# Patient Record
Sex: MALE | Race: WHITE | NOT HISPANIC OR LATINO | Employment: OTHER | URBAN - METROPOLITAN AREA
[De-identification: names, ages, dates, MRNs, and addresses within clinical notes are randomized per-mention and may not be internally consistent; named-entity substitution may affect disease eponyms.]

---

## 2018-08-15 ENCOUNTER — HOSPITAL ENCOUNTER (EMERGENCY)
Facility: HOSPITAL | Age: 53
Discharge: HOME | End: 2018-08-15
Attending: EMERGENCY MEDICINE
Payer: COMMERCIAL

## 2018-08-15 VITALS
BODY MASS INDEX: 21.86 KG/M2 | RESPIRATION RATE: 16 BRPM | HEART RATE: 68 BPM | DIASTOLIC BLOOD PRESSURE: 85 MMHG | WEIGHT: 139.55 LBS | OXYGEN SATURATION: 99 % | SYSTOLIC BLOOD PRESSURE: 139 MMHG | TEMPERATURE: 97.1 F

## 2018-08-15 DIAGNOSIS — L02.91 ABSCESS: Primary | ICD-10-CM

## 2018-08-15 PROCEDURE — 99283 EMERGENCY DEPT VISIT LOW MDM: CPT

## 2018-08-15 RX ORDER — SULFAMETHOXAZOLE AND TRIMETHOPRIM 800; 160 MG/1; MG/1
1 TABLET ORAL 2 TIMES DAILY
Qty: 14 TABLET | Refills: 0 | Status: SHIPPED | OUTPATIENT
Start: 2018-08-15 | End: 2018-08-22

## 2018-08-15 RX ORDER — IBUPROFEN 400 MG/1
200 TABLET ORAL ONCE
Status: DISCONTINUED | OUTPATIENT
Start: 2018-08-15 | End: 2018-08-15

## 2018-08-15 RX ORDER — CEPHALEXIN 250 MG/1
500 CAPSULE ORAL 4 TIMES DAILY
Qty: 56 CAPSULE | Refills: 0 | Status: SHIPPED | OUTPATIENT
Start: 2018-08-15 | End: 2018-08-22

## 2018-08-15 RX ORDER — IBUPROFEN 400 MG/1
400 TABLET ORAL ONCE
Status: COMPLETED | OUTPATIENT
Start: 2018-08-15 | End: 2018-08-15

## 2018-08-15 RX ORDER — IBUPROFEN 400 MG/1
TABLET ORAL
Status: COMPLETED
Start: 2018-08-15 | End: 2018-08-15

## 2018-08-15 RX ADMIN — IBUPROFEN 400 MG: 400 TABLET ORAL at 09:57

## 2018-08-15 NOTE — DISCHARGE INSTRUCTIONS
Abscess   WHAT YOU NEED TO KNOW:   A warm compress may help your abscess drain  Your healthcare provider may make a cut in the abscess so it can drain  You may need surgery to remove an abscess that is on your hands or buttocks  DISCHARGE INSTRUCTIONS:   Return to the emergency department if:   · The area around your abscess becomes very painful, warm, or has red streaks  · You have a fever and chills  · Your heart is beating faster than usual      · You feel faint or confused  Contact your healthcare provider if:   · Your abscess gets bigger or does not get better  · Your abscess returns  · You have questions or concerns about your condition or care  Medicines: You may  need any of the following:  · Antibiotics  help treat a bacterial infection  · Acetaminophen  decreases pain and fever  It is available without a doctor's order  Ask how much to take and how often to take it  Follow directions  Acetaminophen can cause liver damage if not taken correctly  · NSAIDs , such as ibuprofen, help decrease swelling, pain, and fever  This medicine is available with or without a doctor's order  NSAIDs can cause stomach bleeding or kidney problems in certain people  If you take blood thinner medicine, always ask your healthcare provider if NSAIDs are safe for you  Always read the medicine label and follow directions  · Take your medicine as directed  Contact your healthcare provider if you think your medicine is not helping or if you have side effects  Tell him or her if you are allergic to any medicine  Keep a list of the medicines, vitamins, and herbs you take  Include the amounts, and when and why you take them  Bring the list or the pill bottles to follow-up visits  Carry your medicine list with you in case of an emergency  Self-care:   · Apply a warm compress to your abscess  This will help it open and drain  Wet a washcloth in warm, but not hot, water  Apply the compress for 10 minutes  Repeat this 4 times each day  Do not  press on an abscess or try to open it with a needle  You may push the bacteria deeper or into your blood  · Do not share your clothes, towels, or sheets with anyone  This can spread the infection to others  · Wash your hands often  This can help prevent the spread of germs  Use soap and water or an alcohol-based hand rub  Care for your wound after it is drained:   · Care for your wound as directed  If your healthcare provider says it is okay, carefully remove the bandage and gauze packing  You may need to soak the gauze to get it out of your wound  Clean your wound and the area around it as directed  Dry the area and put on new, clean bandages  Change your bandages when they get wet or dirty  · Ask your healthcare provider how to change the gauze in your wound  Keep track of how many pieces of gauze are placed inside the wound  Do not put too much packing in the wound  Do not pack the gauze too tightly in your wound  Follow up with your healthcare provider in 1 to 3 days: You may need to have your packing removed or your bandage changed  Write down your questions so you remember to ask them during your visits  © 2017 [employer]  Information is for End User's use only and may not be sold, redistributed or otherwise used for commercial purposes  All illustrations and images included in CareNotes® are the copyrighted property of [employer] , [employer]  or [employer]  The above information is an  only  It is not intended as medical advice for individual conditions or treatments  Talk to your doctor, nurse or pharmacist before following any medical regimen to see if it is safe and effective for you

## 2018-08-15 NOTE — ED PROVIDER NOTES
History  Chief Complaint   Patient presents with    Abscess     rt back of neck     49-year-old gentleman presents with complaint of an abscess to the back of his neck  He reports he has an ongoing history of this occurring  He denies any fevers, chills, GI symptoms, or any other constitutional symptoms  Denies any history of diabetes or immunosuppression  Pain is localized to the area of the infection  None       History reviewed  No pertinent past medical history  Past Surgical History:   Procedure Laterality Date    HERNIA REPAIR      SKIN GRAFT         History reviewed  No pertinent family history  I have reviewed and agree with the history as documented  Social History   Substance Use Topics    Smoking status: Current Every Day Smoker     Packs/day: 2 00    Smokeless tobacco: Never Used    Alcohol use Yes      Comment: socially        Review of Systems   Constitutional: Negative for chills, fatigue and fever  HENT: Negative for tinnitus and trouble swallowing  Respiratory: Negative for shortness of breath  Cardiovascular: Negative for chest pain  Gastrointestinal: Negative for abdominal pain, nausea and vomiting  Musculoskeletal: Positive for neck pain  Negative for arthralgias, myalgias and neck stiffness  Hematological: Negative for adenopathy  Does not bruise/bleed easily  Physical Exam  Physical Exam   Constitutional: He is oriented to person, place, and time  He appears well-developed and well-nourished  No distress  HENT:   Head: Normocephalic and atraumatic  Eyes: Pupils are equal, round, and reactive to light  Neck: Neck supple  Cardiovascular: Normal rate, regular rhythm and normal heart sounds  Pulmonary/Chest: No respiratory distress  Neurological: He is alert and oriented to person, place, and time  Skin: Skin is warm and dry  Capillary refill takes less than 2 seconds  He is not diaphoretic          Patient noted to have multiple small areas of erythema, folliculitis, tiny abscesses  The most prominent abscess being the right occipital neck area which is already begun draining  There is mild surrounding erythema and no other signs of infection  Nursing note and vitals reviewed  Vital Signs  ED Triage Vitals [08/15/18 0853]   Temperature Pulse Respirations Blood Pressure SpO2   (!) 97 1 °F (36 2 °C) 68 16 139/85 99 %      Temp Source Heart Rate Source Patient Position - Orthostatic VS BP Location FiO2 (%)   Temporal Monitor Sitting Left arm --      Pain Score       --           Vitals:    08/15/18 0853   BP: 139/85   Pulse: 68   Patient Position - Orthostatic VS: Sitting       Visual Acuity      ED Medications  Medications - No data to display    Diagnostic Studies  Results Reviewed     None                 No orders to display              Procedures  Procedures       Phone Contacts  ED Phone Contact    ED Course                               [facility]  CritCbernice Time    Disposition  Final diagnoses:   None     ED Disposition     None      Follow-up Information    None         Patient's Medications    No medications on file     No discharge procedures on file      ED Provider  Electronically Signed by           Tavo Londono DO  08/15/18 0578

## 2018-08-20 ENCOUNTER — TELEPHONE (OUTPATIENT)
Dept: FAMILY MEDICINE CLINIC | Facility: CLINIC | Age: 53
End: 2018-08-20

## 2018-08-20 DIAGNOSIS — L02.91 ABSCESS: ICD-10-CM

## 2018-08-20 DIAGNOSIS — N40.0 BENIGN PROSTATIC HYPERPLASIA WITHOUT LOWER URINARY TRACT SYMPTOMS: Primary | ICD-10-CM

## 2018-08-20 RX ORDER — PANTOPRAZOLE SODIUM 40 MG/1
40 TABLET, DELAYED RELEASE ORAL DAILY
Qty: 30 TABLET | Refills: 2 | Status: SHIPPED | OUTPATIENT
Start: 2018-08-20 | End: 2018-09-20 | Stop reason: SDUPTHER

## 2018-08-20 RX ORDER — PANTOPRAZOLE SODIUM 40 MG/1
40 TABLET, DELAYED RELEASE ORAL DAILY
Refills: 2 | COMMUNITY
Start: 2018-06-06 | End: 2018-08-20 | Stop reason: SDUPTHER

## 2018-08-20 RX ORDER — FLUTICASONE PROPIONATE 50 MCG
SPRAY, SUSPENSION (ML) NASAL EVERY 24 HOURS
COMMUNITY
Start: 2018-04-19 | End: 2018-10-12 | Stop reason: SDUPTHER

## 2018-08-20 RX ORDER — HYDROXYZINE HYDROCHLORIDE 25 MG/1
TABLET, FILM COATED ORAL
COMMUNITY
Start: 2018-04-19 | End: 2018-10-25 | Stop reason: SDUPTHER

## 2018-08-20 RX ORDER — ALPRAZOLAM 0.5 MG/1
TABLET ORAL
COMMUNITY
Start: 2018-04-19 | End: 2018-09-27

## 2018-08-20 RX ORDER — QUETIAPINE FUMARATE 200 MG/1
TABLET, FILM COATED ORAL
COMMUNITY
Start: 2014-04-01 | End: 2018-09-27

## 2018-08-20 RX ORDER — TAMSULOSIN HYDROCHLORIDE 0.4 MG/1
CAPSULE ORAL
COMMUNITY
Start: 2018-04-19 | End: 2018-08-20 | Stop reason: SDUPTHER

## 2018-08-20 RX ORDER — BUPROPION HYDROCHLORIDE 75 MG/1
75 TABLET ORAL 2 TIMES DAILY
Refills: 1 | COMMUNITY
Start: 2018-05-14 | End: 2018-10-25

## 2018-08-20 RX ORDER — ALBUTEROL SULFATE 90 UG/1
AEROSOL, METERED RESPIRATORY (INHALATION)
COMMUNITY
Start: 2018-04-19 | End: 2018-09-27 | Stop reason: SDUPTHER

## 2018-08-20 RX ORDER — CEPHALEXIN 500 MG/1
500 CAPSULE ORAL 4 TIMES DAILY
Refills: 0 | COMMUNITY
Start: 2018-08-16 | End: 2018-09-27

## 2018-08-20 RX ORDER — LORATADINE 10 MG/1
TABLET ORAL EVERY 24 HOURS
COMMUNITY
Start: 2018-04-19 | End: 2018-09-27

## 2018-08-20 RX ORDER — ATORVASTATIN CALCIUM 10 MG/1
10 TABLET, FILM COATED ORAL DAILY
Refills: 2 | COMMUNITY
Start: 2018-06-11 | End: 2018-08-20 | Stop reason: SDUPTHER

## 2018-08-20 RX ORDER — ATORVASTATIN CALCIUM 10 MG/1
10 TABLET, FILM COATED ORAL DAILY
Qty: 30 TABLET | Refills: 2 | Status: SHIPPED | OUTPATIENT
Start: 2018-08-20 | End: 2018-09-20 | Stop reason: SDUPTHER

## 2018-08-20 RX ORDER — BUPROPION HYDROCHLORIDE 100 MG/1
TABLET ORAL
COMMUNITY
Start: 2014-03-06 | End: 2018-10-25

## 2018-08-20 RX ORDER — TAMSULOSIN HYDROCHLORIDE 0.4 MG/1
0.4 CAPSULE ORAL
Qty: 30 CAPSULE | Refills: 2 | Status: SHIPPED | OUTPATIENT
Start: 2018-08-20 | End: 2018-09-20 | Stop reason: SDUPTHER

## 2018-08-20 RX ORDER — DOCUSATE SODIUM 100 MG/1
1 CAPSULE, LIQUID FILLED ORAL 2 TIMES DAILY PRN
COMMUNITY
End: 2018-11-27 | Stop reason: HOSPADM

## 2018-08-20 RX ORDER — PROPRANOLOL HYDROCHLORIDE 10 MG/1
TABLET ORAL
COMMUNITY
Start: 2018-05-14 | End: 2020-10-16 | Stop reason: HOSPADM

## 2018-08-20 NOTE — TELEPHONE ENCOUNTER
Patient requesting refills on   Flomax  Lipitor  Hydralazine  Prilosec  Pharmacy on file correct pt has an appt on 9/27/18

## 2018-08-27 ENCOUNTER — TELEPHONE (OUTPATIENT)
Dept: FAMILY MEDICINE CLINIC | Facility: CLINIC | Age: 53
End: 2018-08-27

## 2018-08-27 NOTE — TELEPHONE ENCOUNTER
I attempted to call Silvia Landa as he happens to be Abbie Gauze  and my patient  I wanted to deliver my condolences regarding his recent loss and Paul Glass passing away on Friday night  No answer  I left a voice message  I Will give another call later

## 2018-09-20 DIAGNOSIS — N40.0 BENIGN PROSTATIC HYPERPLASIA WITHOUT LOWER URINARY TRACT SYMPTOMS: ICD-10-CM

## 2018-09-20 RX ORDER — TAMSULOSIN HYDROCHLORIDE 0.4 MG/1
0.4 CAPSULE ORAL
Qty: 90 CAPSULE | Refills: 0 | Status: SHIPPED | OUTPATIENT
Start: 2018-09-20 | End: 2018-09-27

## 2018-09-20 RX ORDER — PANTOPRAZOLE SODIUM 40 MG/1
40 TABLET, DELAYED RELEASE ORAL DAILY
Qty: 90 TABLET | Refills: 0 | Status: SHIPPED | OUTPATIENT
Start: 2018-09-20 | End: 2018-10-25

## 2018-09-20 RX ORDER — ATORVASTATIN CALCIUM 10 MG/1
10 TABLET, FILM COATED ORAL DAILY
Qty: 90 TABLET | Refills: 0 | Status: SHIPPED | OUTPATIENT
Start: 2018-09-20 | End: 2018-10-25 | Stop reason: SDUPTHER

## 2018-09-27 ENCOUNTER — OFFICE VISIT (OUTPATIENT)
Dept: FAMILY MEDICINE CLINIC | Facility: CLINIC | Age: 53
End: 2018-09-27
Payer: COMMERCIAL

## 2018-09-27 VITALS
WEIGHT: 146 LBS | BODY MASS INDEX: 23.46 KG/M2 | DIASTOLIC BLOOD PRESSURE: 80 MMHG | SYSTOLIC BLOOD PRESSURE: 120 MMHG | TEMPERATURE: 97.2 F | RESPIRATION RATE: 16 BRPM | OXYGEN SATURATION: 98 % | HEIGHT: 66 IN | HEART RATE: 92 BPM

## 2018-09-27 DIAGNOSIS — E78.5 HYPERLIPIDEMIA, UNSPECIFIED HYPERLIPIDEMIA TYPE: ICD-10-CM

## 2018-09-27 DIAGNOSIS — R06.02 SHORTNESS OF BREATH: ICD-10-CM

## 2018-09-27 DIAGNOSIS — Z09 FOLLOW UP: Primary | ICD-10-CM

## 2018-09-27 PROBLEM — F41.9 ANXIETY: Status: ACTIVE | Noted: 2018-09-27

## 2018-09-27 PROBLEM — J44.9 COPD (CHRONIC OBSTRUCTIVE PULMONARY DISEASE) (HCC): Status: ACTIVE | Noted: 2018-09-27

## 2018-09-27 PROBLEM — N40.0 BPH (BENIGN PROSTATIC HYPERPLASIA): Status: ACTIVE | Noted: 2018-09-27

## 2018-09-27 PROBLEM — F17.200 TOBACCO DEPENDENCE SYNDROME: Status: ACTIVE | Noted: 2018-09-27

## 2018-09-27 PROBLEM — K21.9 GASTROESOPHAGEAL REFLUX DISEASE: Status: ACTIVE | Noted: 2018-09-27

## 2018-09-27 PROCEDURE — 99214 OFFICE O/P EST MOD 30 MIN: CPT | Performed by: FAMILY MEDICINE

## 2018-09-27 RX ORDER — TAMSULOSIN HYDROCHLORIDE 0.4 MG/1
0.4 CAPSULE ORAL
Refills: 0
Start: 2018-09-27 | End: 2018-10-25 | Stop reason: SDUPTHER

## 2018-09-27 RX ORDER — ALBUTEROL SULFATE 90 UG/1
1 AEROSOL, METERED RESPIRATORY (INHALATION) EVERY 6 HOURS PRN
Qty: 1 INHALER | Refills: 2 | Status: SHIPPED | OUTPATIENT
Start: 2018-09-27 | End: 2018-12-24 | Stop reason: SDUPTHER

## 2018-09-27 RX ORDER — LORATADINE 10 MG/1
10 TABLET ORAL DAILY
Qty: 30 TABLET | Refills: 2 | Status: SHIPPED | OUTPATIENT
Start: 2018-09-27 | End: 2018-10-25

## 2018-09-27 RX ORDER — OXYCODONE HYDROCHLORIDE AND ACETAMINOPHEN 5; 325 MG/1; MG/1
TABLET ORAL
Refills: 0 | COMMUNITY
Start: 2018-09-21 | End: 2018-11-26

## 2018-09-27 NOTE — PROGRESS NOTES
Assessment/Plan:    COPD (chronic obstructive pulmonary disease) (HCC)  Symptoms are currently uncontrolled at this time  As he reports increased use of his albuterol  Counseled again about smoking cessation  Monitor albuterol use to report back at follow up  Patient aware that increased albuterol use indicates poor control and may need further medication adjustment  BPH (benign prostatic hyperplasia)  Continue Flomax    Anxiety  He is scheduled for follow up with Psychiatry on October 2nd  No HI/SI  Well aware that he needs to call 911 or go to ER if he has any thoughts to hurt himself or anyone  Gastroesophageal reflux disease  Continue Protonix    Hyperlipemia  Continue statin and recheck lipid panel    Shortness of breath  Apparently he has been using his inhaler more frequently  However he has good breath sounds and he is not wheezing  Last use of albuterol inhaler was few days ago  Due to his history of tobacco abuse and hyperlipidemia, will need to rule out coronary artery disease as source of his shortness of breath  Will send for stress test    Tobacco dependence syndrome  Discussed tobacco cessation however patient is not ready to quit at this time  Discussed the effects of smoking on CVS, skin and lungs  Advised the patient try to cut back prior to follow up visit  Diagnoses and all orders for this visit:    Follow up  -     tamsulosin (FLOMAX) 0 4 mg; Take 1 capsule (0 4 mg total) by mouth daily with dinner  -     albuterol (PROAIR HFA) 90 mcg/act inhaler; Inhale 1 puff every 6 (six) hours as needed for wheezing  -     loratadine (CLARITIN) 10 mg tablet; Take 1 tablet (10 mg total) by mouth daily    Shortness of breath  -     Echo stress test w contrast if indicated; Future    Hyperlipidemia, unspecified hyperlipidemia type  -     CBC and differential; Future  -     Comprehensive metabolic panel; Future  -     Lipid panel;  Future    Other orders  -     oxyCODONE-acetaminophen (PERCOCET) 5-325 mg per tablet; TK 1-2 TS PO Q 4-6 H PRN  NOT MORE THAN 4 PILLS PER DAY          Subjective:      Patient ID: Lelo Jones is a 52 y o  male  ARMAND Zhang is a pleasant but unfortunate 78-year-old male whose girlfriend, Harika Escalante, passed away about a month ago from cardiac arrest   I have already spoken to Cathy Richter in person but I gave him my condolences today again  He does not offer any complaints  The he is seeing a therapist and going to group which he thinks is helping a lot with his grief  Denies anyone side of suicidal ideations  He still lives with Karen's brother  The following portions of the patient's history were reviewed and updated as appropriate: allergies, current medications, past family history, past medical history, past social history, past surgical history and problem list     Review of Systems   Constitutional: Negative for chills, fatigue and fever  HENT: Negative for ear discharge, sneezing and sore throat  Eyes: Negative for pain and visual disturbance  Respiratory: Positive for shortness of breath  Negative for cough and chest tightness  Cardiovascular: Negative for chest pain and palpitations  Gastrointestinal: Negative for abdominal distention, abdominal pain, blood in stool, diarrhea, nausea and vomiting  Genitourinary: Negative for difficulty urinating, dysuria and flank pain  Musculoskeletal: Negative for arthralgias and joint swelling  Skin: Negative for pallor and rash  Neurological: Negative for dizziness, syncope and headaches  Hematological: Negative for adenopathy  Psychiatric/Behavioral: Negative for agitation and confusion           Objective:      /80 (BP Location: Left arm, Patient Position: Sitting, Cuff Size: Adult)   Pulse 92   Temp (!) 97 2 °F (36 2 °C) (Tympanic)   Resp 16   Ht 5' 6" (1 676 m)   Wt 66 2 kg (146 lb)   SpO2 98%   BMI 23 57 kg/m²          Physical Exam   Constitutional: He is oriented to person, place, and time  He appears well-developed and well-nourished  HENT:   Head: Normocephalic and atraumatic  Mouth/Throat: Oropharynx is clear and moist  No oropharyngeal exudate  Eyes: Pupils are equal, round, and reactive to light  Right eye exhibits no discharge  Right pupil is reactive  Left pupil is reactive  Neck: Normal range of motion  Neck supple  No tracheal deviation present  No thyromegaly present  Cardiovascular: Normal rate, regular rhythm and normal heart sounds  Exam reveals no friction rub  No murmur heard  Pulmonary/Chest: Effort normal  No respiratory distress  He has decreased breath sounds  He has no wheezes  He has no rales  He exhibits no tenderness  Abdominal: He exhibits no distension  There is no tenderness  Musculoskeletal: Normal range of motion  He exhibits no deformity  Neurological: He is alert and oriented to person, place, and time  Skin: Skin is warm and dry  No rash noted  No erythema  Vitals reviewed

## 2018-09-27 NOTE — ASSESSMENT & PLAN NOTE
Apparently he has been using his inhaler more frequently  However he has good breath sounds and he is not wheezing  Last use of albuterol inhaler was few days ago  Due to his history of tobacco abuse and hyperlipidemia, will need to rule out coronary artery disease as source of his shortness of breath    Will send for stress test

## 2018-09-27 NOTE — ASSESSMENT & PLAN NOTE
Symptoms are currently uncontrolled at this time  As he reports increased use of his albuterol  Counseled again about smoking cessation  Monitor albuterol use to report back at follow up  Patient aware that increased albuterol use indicates poor control and may need further medication adjustment

## 2018-09-27 NOTE — ASSESSMENT & PLAN NOTE
Discussed tobacco cessation however patient is not ready to quit at this time  Discussed the effects of smoking on CVS, skin and lungs  Advised the patient try to cut back prior to follow up visit

## 2018-09-27 NOTE — ASSESSMENT & PLAN NOTE
He is scheduled for follow up with Psychiatry on October 2nd  No HI/SI  Well aware that he needs to call 911 or go to ER if he has any thoughts to hurt himself or anyone

## 2018-10-12 DIAGNOSIS — J30.2 SEASONAL ALLERGIES: Primary | ICD-10-CM

## 2018-10-12 RX ORDER — FLUTICASONE PROPIONATE 50 MCG
SPRAY, SUSPENSION (ML) NASAL
Qty: 1 BOTTLE | Refills: 0 | Status: SHIPPED | OUTPATIENT
Start: 2018-10-12 | End: 2018-10-25 | Stop reason: SDUPTHER

## 2018-10-25 ENCOUNTER — OFFICE VISIT (OUTPATIENT)
Dept: FAMILY MEDICINE CLINIC | Facility: CLINIC | Age: 53
End: 2018-10-25
Payer: COMMERCIAL

## 2018-10-25 VITALS
WEIGHT: 148 LBS | BODY MASS INDEX: 23.78 KG/M2 | HEIGHT: 66 IN | DIASTOLIC BLOOD PRESSURE: 86 MMHG | RESPIRATION RATE: 20 BRPM | SYSTOLIC BLOOD PRESSURE: 132 MMHG | HEART RATE: 81 BPM | OXYGEN SATURATION: 99 % | TEMPERATURE: 96.9 F

## 2018-10-25 DIAGNOSIS — F41.9 ANXIETY: ICD-10-CM

## 2018-10-25 DIAGNOSIS — R05.9 COUGH: ICD-10-CM

## 2018-10-25 DIAGNOSIS — K21.9 GASTROESOPHAGEAL REFLUX DISEASE WITHOUT ESOPHAGITIS: ICD-10-CM

## 2018-10-25 DIAGNOSIS — J30.2 SEASONAL ALLERGIES: ICD-10-CM

## 2018-10-25 DIAGNOSIS — N40.0 BENIGN PROSTATIC HYPERPLASIA WITHOUT LOWER URINARY TRACT SYMPTOMS: ICD-10-CM

## 2018-10-25 DIAGNOSIS — Z09 FOLLOW UP: ICD-10-CM

## 2018-10-25 DIAGNOSIS — Z23 ENCOUNTER FOR IMMUNIZATION: Primary | ICD-10-CM

## 2018-10-25 PROBLEM — R09.82 POSTNASAL DRIP: Status: ACTIVE | Noted: 2018-10-25

## 2018-10-25 PROBLEM — J06.9 UPPER RESPIRATORY INFECTION: Status: ACTIVE | Noted: 2018-10-25

## 2018-10-25 PROCEDURE — 99213 OFFICE O/P EST LOW 20 MIN: CPT | Performed by: FAMILY MEDICINE

## 2018-10-25 PROCEDURE — 90471 IMMUNIZATION ADMIN: CPT | Performed by: FAMILY MEDICINE

## 2018-10-25 PROCEDURE — 90682 RIV4 VACC RECOMBINANT DNA IM: CPT | Performed by: FAMILY MEDICINE

## 2018-10-25 RX ORDER — TAMSULOSIN HYDROCHLORIDE 0.4 MG/1
0.4 CAPSULE ORAL
Qty: 90 CAPSULE | Refills: 0 | Status: SHIPPED | OUTPATIENT
Start: 2018-10-25 | End: 2019-01-18 | Stop reason: SDUPTHER

## 2018-10-25 RX ORDER — FLUTICASONE PROPIONATE 50 MCG
1 SPRAY, SUSPENSION (ML) NASAL DAILY
Qty: 1 BOTTLE | Refills: 2 | Status: SHIPPED | OUTPATIENT
Start: 2018-10-25 | End: 2019-02-14 | Stop reason: SDUPTHER

## 2018-10-25 RX ORDER — TRAZODONE HYDROCHLORIDE 50 MG/1
25 TABLET ORAL
Refills: 0 | Status: ON HOLD
Start: 2018-10-25 | End: 2020-10-16 | Stop reason: SDUPTHER

## 2018-10-25 RX ORDER — GUAIFENESIN 100 MG/5ML
200 SYRUP ORAL 3 TIMES DAILY PRN
Qty: 120 ML | Refills: 0 | Status: SHIPPED | OUTPATIENT
Start: 2018-10-25 | End: 2018-11-04

## 2018-10-25 RX ORDER — CETIRIZINE HYDROCHLORIDE 10 MG/1
10 TABLET, CHEWABLE ORAL DAILY
Qty: 90 TABLET | Refills: 0 | Status: SHIPPED | OUTPATIENT
Start: 2018-10-25 | End: 2018-11-28 | Stop reason: SDUPTHER

## 2018-10-25 RX ORDER — ALPRAZOLAM 0.5 MG/1
0.5 TABLET ORAL DAILY
Refills: 0
Start: 2018-10-25 | End: 2019-09-24 | Stop reason: DRUGHIGH

## 2018-10-25 RX ORDER — HYDROXYZINE HYDROCHLORIDE 25 MG/1
25 TABLET, FILM COATED ORAL
Qty: 90 TABLET | Refills: 0 | Status: SHIPPED | OUTPATIENT
Start: 2018-10-25 | End: 2019-01-18 | Stop reason: SDUPTHER

## 2018-10-25 RX ORDER — ATORVASTATIN CALCIUM 10 MG/1
10 TABLET, FILM COATED ORAL DAILY
Qty: 90 TABLET | Refills: 0 | Status: SHIPPED | OUTPATIENT
Start: 2018-10-25 | End: 2019-01-18 | Stop reason: SDUPTHER

## 2018-10-25 RX ORDER — RANITIDINE 150 MG/1
150 CAPSULE ORAL 2 TIMES DAILY
Qty: 180 CAPSULE | Refills: 0 | Status: SHIPPED | OUTPATIENT
Start: 2018-10-25 | End: 2019-01-18 | Stop reason: SDUPTHER

## 2018-10-25 NOTE — ASSESSMENT & PLAN NOTE
Symptoms are currently controlled at this time  Avoid exposure to tobacco smoke, polluted air and other known COPD triggers  Monitor albuterol use to report back at follow up  Patient aware that increased albuterol use indicates poor control and may need further medication adjustment

## 2018-10-25 NOTE — PROGRESS NOTES
Assessment/Plan:    Upper respiratory infection  Discussed that symptoms are likely cause by virus  Discussed importance of increasing fluid intake and getting plenty of rest   Discussed supportive care with use of OTC cough drops and OTC cough syrup  Symptoms may improve with home humidifier use or exposure to steam from hot shower  Sinus irrigation and warm saltwater gargles may also provide relief  Educated on hand hygiene to prevent spread  Minimize prolonged close contact others while ill  Continue to monitor symptoms closely and call the office if symptoms worsen or do not improve  Gastroesophageal reflux disease  Reviewed the causes of heartburn  Discussed importance of diet and lifestyle modifications to control GERD symptoms  Avoid things which worsen heartburn (ex:  caffeine, tomato based products, spicy foods, tobacco, alcohol, obesity, tight fitting clothing )  Discussed importance of eating small, frequent meals instead of large meals  Elevate head of the bed and do not lay down 2-3 hours following a meal   He has more than 5 year history of GERD  Still symptomatic despite of optimal treatment  He does qualify for EGD to rule out Naranjo's esophagitis  I will refer him to Gastroenterology  He might as well qualify for colonoscopy as he does not recall exactly when his last one was and I do not see any results in the system      COPD (chronic obstructive pulmonary disease) (HCC)  Symptoms are currently controlled at this time  Avoid exposure to tobacco smoke, polluted air and other known COPD triggers  Monitor albuterol use to report back at follow up  Patient aware that increased albuterol use indicates poor control and may need further medication adjustment  Anxiety  He is following up with Psychiatry     Denies any homicidal or suicidal ideation    Encounter for immunization  Will give flu shot today    Shortness of breath  Awaiting for stress test to be scheduled    Postnasal drip  I will switch his loratadine to Zyrtec as he states is not helping much  Continue Flonase  Unfortunately other nasal inhalers will not be covered by his insurance       Diagnoses and all orders for this visit:    Encounter for immunization  -     influenza vaccine, 8853-8162, quadrivalent, recombinant, PF, 0 5 mL, for patients 18 yr+ (FLUBLOK)    Benign prostatic hyperplasia without lower urinary tract symptoms  -     atorvastatin (LIPITOR) 10 mg tablet; Take 1 tablet (10 mg total) by mouth daily    Follow up  -     tamsulosin (FLOMAX) 0 4 mg; Take 1 capsule (0 4 mg total) by mouth daily with dinner    Seasonal allergies  -     hydrOXYzine HCL (ATARAX) 25 mg tablet; Take 1 tablet (25 mg total) by mouth daily at bedtime  -     cetirizine (ZyrTEC) 10 MG chewable tablet; Chew 1 tablet (10 mg total) daily  -     fluticasone (FLONASE) 50 mcg/act nasal spray; 1 spray into each nostril daily    Gastroesophageal reflux disease without esophagitis  -     ranitidine (ZANTAC) 150 MG capsule; Take 1 capsule (150 mg total) by mouth 2 (two) times a day  -     Ambulatory referral to Gastroenterology; Future    Anxiety  -     ALPRAZolam (XANAX) 0 5 mg tablet; Take 1 tablet (0 5 mg total) by mouth daily  -     traZODone (DESYREL) 50 mg tablet; Take 0 5 tablets (25 mg total) by mouth daily at bedtime    Cough  -     guaiFENesin (ROBITUSSIN) 100 mg/5 mL syrup; Take 10 mL (200 mg total) by mouth 3 (three) times a day as needed for cough for up to 10 days          Subjective:      Patient ID: Rosita Loya is a 52 y o  male  HPI  Chung Millan is a pleasant but unfortunate 78-year-old male whose girlfriend, Gemma Nur, passed away about 2 months  ago from cardiac arrest    Today he is here for the follow-up  He states that for the last 2 weeks he has been sick with cough productive of yellow sputum, congestion, and postnasal drip  However he denies any fever or muscle aches  Denies any sick contacts    He has been using Mucinex  However does not help much  He is requesting medication to help loosen the sputum  He did not require Tylenol any ibuprofen  He asked me to refill his Wellbutrin  However patient has been on for a long time and he is still smoking which means it is not helping him  I explained to him that having Wellbutrin in addition to his other psych medications complete him at risk of having multiple interactions  He agrees to stopping the medication     The following portions of the patient's history were reviewed and updated as appropriate: allergies, current medications, past family history, past medical history, past social history, past surgical history and problem list     Review of Systems   Constitutional: Negative for chills, fatigue and fever  HENT: Positive for congestion, postnasal drip and sore throat  Negative for ear discharge and sneezing  Eyes: Negative for pain and visual disturbance  Respiratory: Positive for cough  Negative for chest tightness and shortness of breath  Cardiovascular: Negative for chest pain and palpitations  Gastrointestinal: Negative for abdominal distention, abdominal pain, blood in stool, diarrhea, nausea and vomiting  Genitourinary: Negative for difficulty urinating, dysuria and flank pain  Musculoskeletal: Negative for arthralgias and joint swelling  Skin: Negative for pallor and rash  Neurological: Negative for dizziness, syncope and headaches  Hematological: Negative for adenopathy  Psychiatric/Behavioral: Negative for agitation and confusion  Objective:      /86 (BP Location: Right arm, Patient Position: Sitting, Cuff Size: Standard)   Pulse 81   Temp (!) 96 9 °F (36 1 °C) (Temporal)   Resp 20   Ht 5' 6" (1 676 m)   Wt 67 1 kg (148 lb)   SpO2 99%   BMI 23 89 kg/m²          Physical Exam   Constitutional: He is oriented to person, place, and time  He appears well-developed and well-nourished     HENT:   Head: Normocephalic and atraumatic  Nose: Rhinorrhea present  Mouth/Throat: Oropharynx is clear and moist  No oropharyngeal exudate  Eyes: Pupils are equal, round, and reactive to light  Right eye exhibits no discharge  Right pupil is reactive  Left pupil is reactive  Neck: Normal range of motion  Neck supple  No tracheal deviation present  No thyromegaly present  Cardiovascular: Normal rate, regular rhythm and normal heart sounds  Exam reveals no friction rub  No murmur heard  Pulmonary/Chest: Effort normal  No respiratory distress  He has decreased breath sounds  He has no wheezes  He has no rales  He exhibits no tenderness  Abdominal: He exhibits no distension  There is no tenderness  Musculoskeletal: Normal range of motion  He exhibits no deformity  Neurological: He is alert and oriented to person, place, and time  Skin: Skin is warm and dry  No rash noted  No erythema  seborrhoic  dermatitis on the face   Vitals reviewed

## 2018-10-25 NOTE — ASSESSMENT & PLAN NOTE
Discussed that symptoms are likely cause by virus  Discussed importance of increasing fluid intake and getting plenty of rest   Discussed supportive care with use of OTC cough drops and OTC cough syrup  Symptoms may improve with home humidifier use or exposure to steam from hot shower  Sinus irrigation and warm saltwater gargles may also provide relief  Educated on hand hygiene to prevent spread  Minimize prolonged close contact others while ill  Continue to monitor symptoms closely and call the office if symptoms worsen or do not improve

## 2018-10-25 NOTE — ASSESSMENT & PLAN NOTE
I will switch his loratadine to Zyrtec as he states is not helping much  Continue Flonase    Unfortunately other nasal inhalers will not be covered by his insurance

## 2018-11-26 ENCOUNTER — APPOINTMENT (EMERGENCY)
Dept: RADIOLOGY | Facility: HOSPITAL | Age: 53
End: 2018-11-26
Payer: COMMERCIAL

## 2018-11-26 ENCOUNTER — HOSPITAL ENCOUNTER (OUTPATIENT)
Facility: HOSPITAL | Age: 53
Setting detail: OBSERVATION
Discharge: HOME | End: 2018-11-27
Attending: EMERGENCY MEDICINE | Admitting: FAMILY MEDICINE
Payer: COMMERCIAL

## 2018-11-26 DIAGNOSIS — R11.10 VOMITING: Primary | ICD-10-CM

## 2018-11-26 DIAGNOSIS — R07.9 CHEST PAIN: ICD-10-CM

## 2018-11-26 DIAGNOSIS — K29.00 ACUTE GASTRITIS WITHOUT HEMORRHAGE, UNSPECIFIED GASTRITIS TYPE: ICD-10-CM

## 2018-11-26 DIAGNOSIS — K21.9 GASTROESOPHAGEAL REFLUX DISEASE, ESOPHAGITIS PRESENCE NOT SPECIFIED: ICD-10-CM

## 2018-11-26 PROBLEM — K29.70 GASTRITIS WITHOUT BLEEDING: Status: ACTIVE | Noted: 2018-11-26

## 2018-11-26 LAB
ANION GAP SERPL CALCULATED.3IONS-SCNC: 10 MMOL/L (ref 5–14)
APTT PPP: 28 SECONDS (ref 23–34)
ATRIAL RATE: 57 BPM
ATRIAL RATE: 92 BPM
BUN SERPL-MCNC: 13 MG/DL (ref 5–25)
CALCIUM SERPL-MCNC: 9.2 MG/DL (ref 8.4–10.2)
CHLORIDE SERPL-SCNC: 103 MMOL/L (ref 97–108)
CO2 SERPL-SCNC: 25 MMOL/L (ref 22–30)
CREAT SERPL-MCNC: 0.74 MG/DL (ref 0.7–1.5)
EOSINOPHIL # BLD AUTO: 0.08 THOUSAND/UL (ref 0–0.4)
EOSINOPHIL NFR BLD MANUAL: 1 % (ref 0–6)
ERYTHROCYTE [DISTWIDTH] IN BLOOD BY AUTOMATED COUNT: 12.9 %
GFR SERPL CREATININE-BSD FRML MDRD: 104 ML/MIN/1.73SQ M
GLUCOSE SERPL-MCNC: 101 MG/DL (ref 70–99)
HCT VFR BLD AUTO: 44.4 % (ref 41–53)
HGB BLD-MCNC: 14.6 G/DL (ref 13.5–17.5)
INR PPP: 0.93 (ref 0.89–1.1)
LIPASE SERPL-CCNC: 359 U/L (ref 23–300)
LYMPHOCYTES # BLD AUTO: 1.6 THOUSAND/UL (ref 0.5–4)
LYMPHOCYTES # BLD AUTO: 20 % (ref 20–50)
MCH RBC QN AUTO: 30.2 PG (ref 26–34)
MCHC RBC AUTO-ENTMCNC: 32.9 G/DL (ref 31–36)
MCV RBC AUTO: 92 FL (ref 80–100)
MONOCYTES # BLD AUTO: 0.56 THOUSAND/UL (ref 0.2–0.9)
MONOCYTES NFR BLD AUTO: 7 % (ref 1–10)
NEUTS BAND NFR BLD MANUAL: 2 % (ref 0–8)
NEUTS SEG # BLD: 5.76 THOUSAND/UL (ref 1.8–7.8)
NEUTS SEG NFR BLD AUTO: 70 %
NT-PROBNP SERPL-MCNC: 74.1 PG/ML (ref 0–299)
P AXIS: 62 DEGREES
P AXIS: 69 DEGREES
PLATELET # BLD AUTO: 263 THOUSANDS/UL (ref 150–450)
PLATELET BLD QL SMEAR: ADEQUATE
PMV BLD AUTO: 7.9 FL (ref 8.9–12.7)
POTASSIUM SERPL-SCNC: 4 MMOL/L (ref 3.6–5)
PR INTERVAL: 174 MS
PR INTERVAL: 190 MS
PROTHROMBIN TIME: 9.9 SECONDS (ref 9.5–11.6)
QRS AXIS: 10 DEGREES
QRS AXIS: 4 DEGREES
QRSD INTERVAL: 82 MS
QRSD INTERVAL: 84 MS
QT INTERVAL: 338 MS
QT INTERVAL: 388 MS
QTC INTERVAL: 377 MS
QTC INTERVAL: 417 MS
RBC # BLD AUTO: 4.84 MILLION/UL (ref 4.5–5.9)
RBC MORPH BLD: NORMAL
SODIUM SERPL-SCNC: 138 MMOL/L (ref 137–147)
T WAVE AXIS: 52 DEGREES
T WAVE AXIS: 67 DEGREES
TOTAL CELLS COUNTED SPEC: 100
TROPONIN I SERPL-MCNC: <0.01 NG/ML (ref 0–0.03)
VENTRICULAR RATE: 57 BPM
VENTRICULAR RATE: 92 BPM
WBC # BLD AUTO: 8 THOUSAND/UL (ref 4.5–11)

## 2018-11-26 PROCEDURE — 84484 ASSAY OF TROPONIN QUANT: CPT | Performed by: FAMILY MEDICINE

## 2018-11-26 PROCEDURE — 80048 BASIC METABOLIC PNL TOTAL CA: CPT | Performed by: EMERGENCY MEDICINE

## 2018-11-26 PROCEDURE — 85007 BL SMEAR W/DIFF WBC COUNT: CPT | Performed by: EMERGENCY MEDICINE

## 2018-11-26 PROCEDURE — 85610 PROTHROMBIN TIME: CPT | Performed by: EMERGENCY MEDICINE

## 2018-11-26 PROCEDURE — 83880 ASSAY OF NATRIURETIC PEPTIDE: CPT | Performed by: EMERGENCY MEDICINE

## 2018-11-26 PROCEDURE — 96374 THER/PROPH/DIAG INJ IV PUSH: CPT

## 2018-11-26 PROCEDURE — 85730 THROMBOPLASTIN TIME PARTIAL: CPT | Performed by: EMERGENCY MEDICINE

## 2018-11-26 PROCEDURE — 71045 X-RAY EXAM CHEST 1 VIEW: CPT

## 2018-11-26 PROCEDURE — 84484 ASSAY OF TROPONIN QUANT: CPT | Performed by: EMERGENCY MEDICINE

## 2018-11-26 PROCEDURE — 83690 ASSAY OF LIPASE: CPT | Performed by: EMERGENCY MEDICINE

## 2018-11-26 PROCEDURE — 85027 COMPLETE CBC AUTOMATED: CPT | Performed by: EMERGENCY MEDICINE

## 2018-11-26 PROCEDURE — 93010 ELECTROCARDIOGRAM REPORT: CPT | Performed by: INTERNAL MEDICINE

## 2018-11-26 PROCEDURE — 93005 ELECTROCARDIOGRAM TRACING: CPT

## 2018-11-26 PROCEDURE — 99285 EMERGENCY DEPT VISIT HI MDM: CPT

## 2018-11-26 PROCEDURE — 36415 COLL VENOUS BLD VENIPUNCTURE: CPT | Performed by: EMERGENCY MEDICINE

## 2018-11-26 RX ORDER — LORATADINE 10 MG/1
10 TABLET ORAL DAILY
Status: DISCONTINUED | OUTPATIENT
Start: 2018-11-26 | End: 2018-11-27 | Stop reason: HOSPADM

## 2018-11-26 RX ORDER — HYDROXYZINE HYDROCHLORIDE 25 MG/1
25 TABLET, FILM COATED ORAL
Status: DISCONTINUED | OUTPATIENT
Start: 2018-11-26 | End: 2018-11-27 | Stop reason: HOSPADM

## 2018-11-26 RX ORDER — FAMOTIDINE 20 MG/1
20 TABLET, FILM COATED ORAL 2 TIMES DAILY
Status: DISCONTINUED | OUTPATIENT
Start: 2018-11-26 | End: 2018-11-27 | Stop reason: HOSPADM

## 2018-11-26 RX ORDER — FLUTICASONE PROPIONATE 50 MCG
1 SPRAY, SUSPENSION (ML) NASAL DAILY
Status: DISCONTINUED | OUTPATIENT
Start: 2018-11-26 | End: 2018-11-27 | Stop reason: HOSPADM

## 2018-11-26 RX ORDER — SUCRALFATE ORAL 1 G/10ML
1000 SUSPENSION ORAL ONCE
Status: COMPLETED | OUTPATIENT
Start: 2018-11-26 | End: 2018-11-26

## 2018-11-26 RX ORDER — ONDANSETRON 2 MG/ML
INJECTION INTRAMUSCULAR; INTRAVENOUS
Status: COMPLETED
Start: 2018-11-26 | End: 2018-11-26

## 2018-11-26 RX ORDER — ALPRAZOLAM 0.5 MG/1
0.5 TABLET ORAL DAILY
Status: DISCONTINUED | OUTPATIENT
Start: 2018-11-26 | End: 2018-11-27 | Stop reason: HOSPADM

## 2018-11-26 RX ORDER — SODIUM CHLORIDE AND POTASSIUM CHLORIDE .9; .15 G/100ML; G/100ML
100 SOLUTION INTRAVENOUS CONTINUOUS
Status: DISCONTINUED | OUTPATIENT
Start: 2018-11-26 | End: 2018-11-27 | Stop reason: HOSPADM

## 2018-11-26 RX ORDER — ALBUTEROL SULFATE 90 UG/1
1 AEROSOL, METERED RESPIRATORY (INHALATION) EVERY 6 HOURS PRN
Status: DISCONTINUED | OUTPATIENT
Start: 2018-11-26 | End: 2018-11-27 | Stop reason: HOSPADM

## 2018-11-26 RX ORDER — METOCLOPRAMIDE HYDROCHLORIDE 5 MG/ML
10 INJECTION INTRAMUSCULAR; INTRAVENOUS ONCE
Status: COMPLETED | OUTPATIENT
Start: 2018-11-26 | End: 2018-11-26

## 2018-11-26 RX ORDER — NICOTINE 21 MG/24HR
1 PATCH, TRANSDERMAL 24 HOURS TRANSDERMAL DAILY
Status: DISCONTINUED | OUTPATIENT
Start: 2018-11-26 | End: 2018-11-27 | Stop reason: HOSPADM

## 2018-11-26 RX ORDER — PROPRANOLOL HYDROCHLORIDE 20 MG/1
10 TABLET ORAL DAILY
Status: DISCONTINUED | OUTPATIENT
Start: 2018-11-26 | End: 2018-11-27 | Stop reason: HOSPADM

## 2018-11-26 RX ORDER — DIPHENHYDRAMINE HYDROCHLORIDE 50 MG/ML
50 INJECTION INTRAMUSCULAR; INTRAVENOUS ONCE
Status: COMPLETED | OUTPATIENT
Start: 2018-11-26 | End: 2018-11-26

## 2018-11-26 RX ORDER — ONDANSETRON 2 MG/ML
4 INJECTION INTRAMUSCULAR; INTRAVENOUS ONCE
Status: COMPLETED | OUTPATIENT
Start: 2018-11-26 | End: 2018-11-26

## 2018-11-26 RX ORDER — ATORVASTATIN CALCIUM 10 MG/1
10 TABLET, FILM COATED ORAL DAILY
Status: DISCONTINUED | OUTPATIENT
Start: 2018-11-26 | End: 2018-11-27 | Stop reason: HOSPADM

## 2018-11-26 RX ORDER — TAMSULOSIN HYDROCHLORIDE 0.4 MG/1
0.4 CAPSULE ORAL
Status: DISCONTINUED | OUTPATIENT
Start: 2018-11-26 | End: 2018-11-27 | Stop reason: HOSPADM

## 2018-11-26 RX ORDER — MAGNESIUM HYDROXIDE/ALUMINUM HYDROXICE/SIMETHICONE 120; 1200; 1200 MG/30ML; MG/30ML; MG/30ML
30 SUSPENSION ORAL EVERY 4 HOURS PRN
Status: DISCONTINUED | OUTPATIENT
Start: 2018-11-26 | End: 2018-11-27 | Stop reason: HOSPADM

## 2018-11-26 RX ORDER — TRAZODONE HYDROCHLORIDE 50 MG/1
25 TABLET ORAL
Status: DISCONTINUED | OUTPATIENT
Start: 2018-11-26 | End: 2018-11-27 | Stop reason: HOSPADM

## 2018-11-26 RX ADMIN — SUCRALFATE 1000 MG: 1 SUSPENSION ORAL at 13:18

## 2018-11-26 RX ADMIN — ALUMINUM HYDROXIDE, MAGNESIUM HYDROXIDE, AND SIMETHICONE 30 ML: 200; 200; 20 SUSPENSION ORAL at 16:29

## 2018-11-26 RX ADMIN — LORATADINE 10 MG: 10 TABLET ORAL at 16:35

## 2018-11-26 RX ADMIN — TRAZODONE HYDROCHLORIDE 25 MG: 50 TABLET ORAL at 21:30

## 2018-11-26 RX ADMIN — PROPRANOLOL HYDROCHLORIDE 10 MG: 20 TABLET ORAL at 16:35

## 2018-11-26 RX ADMIN — DIPHENHYDRAMINE HYDROCHLORIDE 50 MG: 50 INJECTION, SOLUTION INTRAMUSCULAR; INTRAVENOUS at 14:04

## 2018-11-26 RX ADMIN — METOCLOPRAMIDE 10 MG: 5 INJECTION, SOLUTION INTRAMUSCULAR; INTRAVENOUS at 14:06

## 2018-11-26 RX ADMIN — ALPRAZOLAM 0.5 MG: 0.5 TABLET ORAL at 16:35

## 2018-11-26 RX ADMIN — POTASSIUM CHLORIDE AND SODIUM CHLORIDE 100 ML/HR: 900; 150 INJECTION, SOLUTION INTRAVENOUS at 16:30

## 2018-11-26 RX ADMIN — ATORVASTATIN CALCIUM 10 MG: 10 TABLET, FILM COATED ORAL at 16:35

## 2018-11-26 RX ADMIN — ONDANSETRON 4 MG: 2 INJECTION INTRAMUSCULAR; INTRAVENOUS at 12:45

## 2018-11-26 RX ADMIN — FAMOTIDINE 20 MG: 20 TABLET ORAL at 13:18

## 2018-11-26 RX ADMIN — ONDANSETRON HYDROCHLORIDE 4 MG: 2 INJECTION, SOLUTION INTRAMUSCULAR; INTRAVENOUS at 12:45

## 2018-11-26 RX ADMIN — TAMSULOSIN HYDROCHLORIDE 0.4 MG: 0.4 CAPSULE ORAL at 16:35

## 2018-11-26 RX ADMIN — NICOTINE 1 PATCH: 21 PATCH, EXTENDED RELEASE TRANSDERMAL at 16:34

## 2018-11-26 RX ADMIN — TIOTROPIUM BROMIDE 18 MCG: 18 CAPSULE ORAL; RESPIRATORY (INHALATION) at 16:53

## 2018-11-26 RX ADMIN — HYDROXYZINE HYDROCHLORIDE 25 MG: 25 TABLET, FILM COATED ORAL at 21:30

## 2018-11-26 NOTE — ASSESSMENT & PLAN NOTE
Currently managed with ventolin PRN, cetirizine 10 mg daily, flonase, and spiriva  Does not appear to be in exacerbation  Lungs CTAB/L    - continue home meds

## 2018-11-26 NOTE — ASSESSMENT & PLAN NOTE
Etiology: gastritis vs panic attack vs cardiac  One day history of burning chest pain  Associated with nausea and non-bilious, non-bloody vomiting  Unable to tolerate p o  intake  Denies diarrhea, fever  History of GERD  Smokes 1 5-2 packs of cigarettes per day  EKG: NSR  Troponin (-)ve x3  Lipase mildly elevated at 359  No leukocytosis   CMP wnl       - IVF at 100 cc/hr   - reglan prn for nausea  - cardiac diet

## 2018-11-26 NOTE — PROGRESS NOTES
History and Physical - Koko Bhatti    Patient Information: Deepti Wilson 52 y o  male MRN: [id number]  Unit/Bed#: 5T -01 Encounter: [id number]  Admitting Physician: Oleg Manning MD  PCP: Radha Berger MD  Date of Admission:  11/26/18    Assessment and Plan    Chest pain   Assessment & Plan    Etiology: gastritis vs panic attack vs cardiac  One day history of burning chest pain  Associated with nausea and non-bilious, non-bloody vomiting  Unable to tolerate po intake  Denies diarrhea, fever  History of GERD  Smokes 1 5-2 packs of cigarettes per day  EKG: NSR  Troponin (-)ve x1  Lipase mildly elevated at 359  No leukocytosis  CMP wnl       - admit for observation  - IVF at 100 cc/hr  - trend troponin  - EKG am   - reglan prn for nausea  - cardiac diet     Gastritis without bleeding   Assessment & Plan    PMH includes GERD, tobacco dependence  One day history of nausea and non-bilious, non-bloody vomiting, associated with burning chest pain  Unable to tolerate po intake since yesterday  Given reglan and zofran in the ED  Continues to complain of nausea  Appears slightly dehydrated on exam  Lipase mildly elevated at 359     - management as above  BPH (benign prostatic hyperplasia)   Assessment & Plan    Managed with flomax  - continue     COPD (chronic obstructive pulmonary disease) ([address] )   Assessment & Plan    Currently managed with ventolin PRN, cetirizine 10 mg daily, flonase, and spiriva  Does not appear to be in exacerbation  Lungs CTAB/L    - continue home meds  Tobacco dependence syndrome   Assessment & Plan    Currently smokes 1 5-2 packs of cigarettes per day  -  cessation  - nicotine replacement therapy  Hyperlipemia   Assessment & Plan    Currently managed with lipitor 10 mg daily  - continue     Gastroesophageal reflux disease   Assessment & Plan    Currently managed with zantac 150 mg BID       - continue     Anxiety   Assessment & Plan Currently managed with xanax 0 5 mg daily, trazodone 50 mg qhs      - continue         VTE Prophylaxis: Enoxaparin (Lovenox)  Code Status: No Order  Anticipated Length of Stay:  Patient will be admitted on an Observation basis with an anticipated length of stay of  < than 2 midnights  Justification for Hospital Stay: intractable vomiting  Total Time for Visit, including Counseling / Coordination of Care: 45 mins  Greater than 50% of this total time spent on direct patient counseling and coordination of care  Chief Complaint:     Chief Complaint   Patient presents with    Vomiting     pt arrives via EMS with vomiting and chest pressure (when vomiting)  pt states it started this moring aournd 1030  pt denies diarrhea  pt denies CP at this time  History of Present Illness:    Lisandro Roth is a 52 y o  male history of GERD, hyperlipidemia, COPD, BPH, anxiety, tobacco dependence who presents with burning chest pain  The pain began this morning  He was on a bus on his way home from a trip out of town when he began to throw up  He also noticed tightness in his chest, and continued to have nausea and a burning sensation in his chest and throat  He continued to throw up in the ambulance  Emesis was clear/yellow liquid with mucus  No blood or bile  He is not aware of any sick contacts  Denies fever, abdominal pain, diarrhea or blood in his stool  Review of Systems:  Review of Systems   Constitutional: Negative for fatigue and fever  HENT: Positive for sore throat  Respiratory: Positive for chest tightness  Negative for cough and shortness of breath  Cardiovascular: Positive for chest pain (burning)  Negative for palpitations  Gastrointestinal: Positive for nausea and vomiting  Negative for abdominal pain, blood in stool and diarrhea  Musculoskeletal: Negative for arthralgias, myalgias and neck stiffness  Skin: Negative for rash  Neurological: Negative for dizziness and weakness  Psychiatric/Behavioral: Negative for suicidal ideas  Past Medical and Surgical History:   Past Medical History:   Diagnosis Date    Anxiety     COPD (chronic obstructive pulmonary disease) ([address] )     Depression     GERD (gastroesophageal reflux disease)     Hyperlipidemia      Past Surgical History:   Procedure Laterality Date    FRACTURE SURGERY      ORIF    HERNIA REPAIR      SKIN GRAFT       Meds/Allergies: Allergies: No Known Allergies  Prior to Admission Medications   Prescriptions Last Dose Informant Patient Reported? Taking?    ALPRAZolam (XANAX) 0 5 mg tablet Unknown at Unknown time  No No   Sig: Take 1 tablet (0 5 mg total) by mouth daily   albuterol (PROAIR HFA) 90 mcg/act inhaler Unknown at Unknown time  No No   Sig: Inhale 1 puff every 6 (six) hours as needed for wheezing   atorvastatin (LIPITOR) 10 mg tablet 2018 at Unknown time  No Yes   Sig: Take 1 tablet (10 mg total) by mouth daily   cetirizine (ZyrTEC) 10 MG chewable tablet Unknown at Unknown time  No No   Sig: Chew 1 tablet (10 mg total) daily   docusate sodium (COLACE) 100 mg capsule Unknown at Unknown time  Yes No   Sig: Take 1 capsule by mouth 2 (two) times a day as needed   fluticasone (FLONASE) 50 mcg/act nasal spray Unknown at Unknown time  No No   Si spray into each nostril daily   hydrOXYzine HCL (ATARAX) 25 mg tablet 2018 at Unknown time  No Yes   Sig: Take 1 tablet (25 mg total) by mouth daily at bedtime   mupirocin (BACTROBAN) 2 % ointment Unknown at Unknown time  Yes No   Sig: APPLY TWICE DAILY FOR 5 DAYS US   propranolol (INDERAL) 10 mg tablet Unknown at Unknown time  Yes No   Sig: Take 1 tab BID   ranitidine (ZANTAC) 150 MG capsule 2018 at Unknown time  No Yes   Sig: Take 1 capsule (150 mg total) by mouth 2 (two) times a day   tamsulosin (FLOMAX) 0 4 mg 2018 at Unknown time  No Yes   Sig: Take 1 capsule (0 4 mg total) by mouth daily with dinner   tiotropium (SPIRIVA HANDIHALER) 18 mcg inhalation capsule Unknown at Unknown time  Yes No   Sig: Place into inhaler and inhale   traZODone (DESYREL) 50 mg tablet Unknown at Unknown time  No No   Sig: Take 0 5 tablets (25 mg total) by mouth daily at bedtime      Facility-Administered Medications: None     Social History:     Social History     Social History    Marital status: Single     Spouse name: N/A    Number of children: N/A    Years of education: N/A     Occupational History    Not on file  Social History Main Topics    Smoking status: Heavy Tobacco Smoker     Packs/day: 2 00     Types: Cigarettes    Smokeless tobacco: Never Used    Alcohol use Yes      Comment: socially    Drug use: No    Sexual activity: Not on file     Other Topics Concern    Not on file     Social History Narrative    No narrative on file     Patient Pre-hospital Living Situation: home  Patient Pre-hospital Level of Mobility: ambulates without assistance  Patient Pre-hospital Diet Restrictions: none    Family History:  History reviewed  No pertinent family history  Physical Exam:   Vitals:   Blood Pressure: 125/73 (11/26/18 1501)  Pulse: 60 (11/26/18 1501)  Temperature: (!) 96 7 °F (35 9 °C) (11/26/18 1236)  Temp Source: Tympanic (11/26/18 1236)  Respirations: 16 (11/26/18 1501)  Height: 5' 6" (167 6 cm) (11/26/18 1236)  Weight - Scale: 70 8 kg (156 lb) (11/26/18 1236)  SpO2: 99 % (11/26/18 1501)    Physical Exam   Constitutional: He is oriented to person, place, and time  He appears well-developed and well-nourished  No distress  HENT:   Head: Normocephalic and atraumatic  Mouth/Throat: Mucous membranes are dry  Abnormal dentition  Eyes: Pupils are equal, round, and reactive to light  Conjunctivae and EOM are normal  No scleral icterus  Neck: Normal range of motion  Neck supple  Cardiovascular: Normal rate, regular rhythm and normal heart sounds  Exam reveals no friction rub  No murmur heard    Pulmonary/Chest: Effort normal and breath sounds normal  No respiratory distress  He has no wheezes  Abdominal: Soft  Bowel sounds are normal  There is no tenderness  Musculoskeletal: He exhibits no deformity  Lymphadenopathy:     He has no cervical adenopathy  Neurological: He is alert and oriented to person, place, and time  No cranial nerve deficit  Skin: Skin is warm and dry  No rash noted  Psychiatric: He has a normal mood and affect  Lab Results: I have personally reviewed pertinent reports  Results from last 7 days  Lab Units 11/26/18  1244   WBC Thousand/uL 8 00   HEMOGLOBIN g/dL 14 6   HEMATOCRIT % 44 4   PLATELETS Thousands/uL 263   LYMPHO PCT % 20   MONO PCT % 7   EOS PCT % 1   BANDS PCT % 2       Results from last 7 days  Lab Units 11/26/18  1244   POTASSIUM mmol/L 4 0   CHLORIDE mmol/L 103   CO2 mmol/L 25   BUN mg/dL 13   CREATININE mg/dL 0 74   CALCIUM mg/dL 9 2   EGFR ml/min/1 73sq m 104       Results from last 7 days  Lab Units 11/26/18  1244   INR  0 93       Results from last 7 days  Lab Units 11/26/18  1244   TROPONIN I ng/mL <0 01               Results from last 7 days  Lab Units 11/26/18  1244   NT-PRO BNP pg/mL 74 1            Invalid input(s): URIBILINOGEN          Imaging: I have personally reviewed pertinent reports  Xr Chest Portable    Result Date: 11/26/2018  Narrative: CHEST INDICATION:   Chest pain  COMPARISON:  8/16/2014 EXAM PERFORMED/VIEWS:  XR CHEST PORTABLE FINDINGS: Cardiomediastinal silhouette demonstrates normal heart size  Medial left lower lobe density likely a small hiatal hernia    The lungs are clear  No pneumothorax or pleural effusion  Osseous structures appear within normal limits for patient age  Impression: No acute cardiopulmonary disease  Medial left lower lobe density likely a small hiatal hernia  This was not definitively present previously and could be confirmed with CT scan if deemed clinically indicated   Workstation performed: [id number]       EKG, Pathology, and Other Studies Reviewed on Admission:   EKG  Result Date: 11/26/18  Impression:  NSR    Allscripts Records Reviewed: Yes    Entire H&P was discussed with Dr Deirdre Rodriguez who agreed to what is noted above    Faustino Toussaint MD  11/26/18  3:29 PM

## 2018-11-26 NOTE — ASSESSMENT & PLAN NOTE
PMH includes GERD, tobacco dependence  One day history of nausea and non-bilious, non-bloody vomiting, associated with burning chest pain  Unable to tolerate po intake since yesterday  Given reglan and zofran in the ED  Continues to complain of nausea  Appears slightly dehydrated on exam  Lipase mildly elevated at 359     - management as above

## 2018-11-26 NOTE — NURSING NOTE
Pt received this pm at 15 30 from the ED into 511, pt a+o, denies nausea/vomitting, tolerating diet, taking meds with no difficulties  C/o mid sternum burning which subsided after mylanta, but still c/o tightness  Lungs clear, no wheezing noted  And soft with bs   Pt now sleeping, no distress, will monitor

## 2018-11-26 NOTE — H&P
History and Physical - Koko Bhatti    Patient Information: Erlinda Cuello 52 y o  male MRN: [id number]  Unit/Bed#: 5T -01 Encounter: [id number]  Admitting Physician: Stephany Lui  PCP: Gudelia Crawford MD  Date of Admission:  11/26/18    Assessment and Plan    Chest pain   Assessment & Plan    Etiology: gastritis vs panic attack vs cardiac  One day history of burning chest pain  Associated with nausea and non-bilious, non-bloody vomiting  Unable to tolerate po intake  Denies diarrhea, fever  History of GERD  Smokes 1 5-2 packs of cigarettes per day  EKG: NSR  Troponin (-)ve x1  Lipase mildly elevated at 359  No leukocytosis  CMP wnl       - admit for observation  - IVF at 100 cc/hr  - trend troponin  - EKG am   - reglan prn for nausea  - cardiac diet     Gastritis without bleeding   Assessment & Plan    PMH includes GERD, tobacco dependence  One day history of nausea and non-bilious, non-bloody vomiting, associated with burning chest pain  Unable to tolerate po intake since yesterday  Given reglan and zofran in the ED  Continues to complain of nausea  Appears slightly dehydrated on exam  Lipase mildly elevated at 359     - management as above  BPH (benign prostatic hyperplasia)   Assessment & Plan    Managed with flomax  - continue     COPD (chronic obstructive pulmonary disease) ([facility] [address] )   Assessment & Plan    Currently managed with ventolin PRN, cetirizine 10 mg daily, flonase, and spiriva  Does not appear to be in exacerbation  Lungs CTAB/L    - continue home meds  Tobacco dependence syndrome   Assessment & Plan    Currently smokes 1 5-2 packs of cigarettes per day  -  cessation  - nicotine replacement therapy  Hyperlipemia   Assessment & Plan    Currently managed with lipitor 10 mg daily  - continue     Gastroesophageal reflux disease   Assessment & Plan    Currently managed with zantac 150 mg BID       - continue     Anxiety   Assessment & Plan Currently managed with xanax 0 5 mg daily, trazodone 50 mg qhs      - continue         VTE Prophylaxis: Enoxaparin (Lovenox)  Code Status: No Order  Anticipated Length of Stay:  Patient will be admitted on an Observation basis with an anticipated length of stay of  < than 2 midnights  Justification for Hospital Stay: intractable vomiting  Total Time for Visit, including Counseling / Coordination of Care: 45 mins  Greater than 50% of this total time spent on direct patient counseling and coordination of care  Chief Complaint:     Chief Complaint   Patient presents with    Vomiting     pt arrives via EMS with vomiting and chest pressure (when vomiting)  pt states it started this moring aournd 1030  pt denies diarrhea  pt denies CP at this time  History of Present Illness:    Michelle De La Cruz is a 52 y o  male history of GERD, hyperlipidemia, COPD, BPH, anxiety, tobacco dependence who presents with burning chest pain  The pain began this morning  He was on a bus on his way home from a trip out of town when he began to throw up  He also noticed tightness in his chest, and continued to have nausea and a burning sensation in his chest and throat  He continued to throw up in the ambulance  Emesis was clear/yellow liquid with mucus  No blood or bile  He is not aware of any sick contacts  Denies fever, abdominal pain, diarrhea or blood in his stool      Review of Systems:  Review of Systems   Constitutional: Negative for fatigue and fever  HENT: Positive for sore throat  Respiratory: Positive for chest tightness  Negative for cough and shortness of breath  Cardiovascular: Positive for chest pain  Negative for palpitations  Gastrointestinal: Positive for nausea and vomiting  Negative for abdominal pain, blood in stool and diarrhea  Musculoskeletal: Negative for arthralgias and myalgias  Skin: Negative for rash  Neurological: Negative for dizziness and weakness     Psychiatric/Behavioral: Negative for suicidal ideas  Past Medical and Surgical History:   Past Medical History:   Diagnosis Date    Anxiety     COPD (chronic obstructive pulmonary disease) ([address] )     Depression     GERD (gastroesophageal reflux disease)     Hyperlipidemia      Past Surgical History:   Procedure Laterality Date    FRACTURE SURGERY      ORIF    HERNIA REPAIR      SKIN GRAFT       Meds/Allergies: Allergies: No Known Allergies  Prior to Admission Medications   Prescriptions Last Dose Informant Patient Reported? Taking?    ALPRAZolam (XANAX) 0 5 mg tablet Unknown at Unknown time  No No   Sig: Take 1 tablet (0 5 mg total) by mouth daily   albuterol (PROAIR HFA) 90 mcg/act inhaler Unknown at Unknown time  No No   Sig: Inhale 1 puff every 6 (six) hours as needed for wheezing   atorvastatin (LIPITOR) 10 mg tablet 2018 at Unknown time  No Yes   Sig: Take 1 tablet (10 mg total) by mouth daily   cetirizine (ZyrTEC) 10 MG chewable tablet Unknown at Unknown time  No No   Sig: Chew 1 tablet (10 mg total) daily   docusate sodium (COLACE) 100 mg capsule Unknown at Unknown time  Yes No   Sig: Take 1 capsule by mouth 2 (two) times a day as needed   fluticasone (FLONASE) 50 mcg/act nasal spray Unknown at Unknown time  No No   Si spray into each nostril daily   hydrOXYzine HCL (ATARAX) 25 mg tablet 2018 at Unknown time  No Yes   Sig: Take 1 tablet (25 mg total) by mouth daily at bedtime   mupirocin (BACTROBAN) 2 % ointment Unknown at Unknown time  Yes No   Sig: APPLY TWICE DAILY FOR 5 DAYS US   propranolol (INDERAL) 10 mg tablet Unknown at Unknown time  Yes No   Sig: Take 1 tab BID   ranitidine (ZANTAC) 150 MG capsule 2018 at Unknown time  No Yes   Sig: Take 1 capsule (150 mg total) by mouth 2 (two) times a day   tamsulosin (FLOMAX) 0 4 mg 2018 at Unknown time  No Yes   Sig: Take 1 capsule (0 4 mg total) by mouth daily with dinner   tiotropium (SPIRIVA HANDIHALER) 18 mcg inhalation capsule Unknown at Unknown time  Yes No   Sig: Place into inhaler and inhale   traZODone (DESYREL) 50 mg tablet Unknown at Unknown time  No No   Sig: Take 0 5 tablets (25 mg total) by mouth daily at bedtime      Facility-Administered Medications: None     Social History:     Social History     Social History    Marital status: Single     Spouse name: N/A    Number of children: N/A    Years of education: N/A     Occupational History    Not on file  Social History Main Topics    Smoking status: Heavy Tobacco Smoker     Packs/day: 2 00     Types: Cigarettes    Smokeless tobacco: Never Used    Alcohol use Yes      Comment: socially    Drug use: No    Sexual activity: Not on file     Other Topics Concern    Not on file     Social History Narrative    No narrative on file     Patient Pre-hospital Living Situation: home  Patient Pre-hospital Level of Mobility: ambulates without assistance  Patient Pre-hospital Diet Restrictions: none    Family History:  History reviewed  No pertinent family history  Physical Exam:   Vitals:   Blood Pressure: 125/73 (11/26/18 1501)  Pulse: 60 (11/26/18 1501)  Temperature: (!) 96 7 °F (35 9 °C) (11/26/18 1236)  Temp Source: Tympanic (11/26/18 1236)  Respirations: 16 (11/26/18 1501)  Height: 5' 6" (167 6 cm) (11/26/18 1236)  Weight - Scale: 70 8 kg (156 lb) (11/26/18 1236)  SpO2: 99 % (11/26/18 1501)    Physical Exam   Constitutional: He is oriented to person, place, and time  He appears well-developed and well-nourished  No distress  HENT:   Head: Normocephalic and atraumatic  Mouth/Throat: Mucous membranes are dry  Abnormal dentition  Eyes: Pupils are equal, round, and reactive to light  Conjunctivae and EOM are normal  No scleral icterus  Neck: Normal range of motion  Neck supple  Cardiovascular: Normal rate, regular rhythm and normal heart sounds  Exam reveals no friction rub  No murmur heard  Pulmonary/Chest: Effort normal and breath sounds normal  No respiratory distress   He has no wheezes  Abdominal: Soft  Bowel sounds are normal  There is no tenderness  Musculoskeletal: He exhibits no deformity  Lymphadenopathy:     He has no cervical adenopathy  Neurological: He is alert and oriented to person, place, and time  No cranial nerve deficit  Skin: Skin is warm and dry  No rash noted  Psychiatric: He has a normal mood and affect  Lab Results: I have personally reviewed pertinent reports  Results from last 7 days  Lab Units 11/26/18  1244   WBC Thousand/uL 8 00   HEMOGLOBIN g/dL 14 6   HEMATOCRIT % 44 4   PLATELETS Thousands/uL 263   LYMPHO PCT % 20   MONO PCT % 7   EOS PCT % 1   BANDS PCT % 2       Results from last 7 days  Lab Units 11/26/18  1244   POTASSIUM mmol/L 4 0   CHLORIDE mmol/L 103   CO2 mmol/L 25   BUN mg/dL 13   CREATININE mg/dL 0 74   CALCIUM mg/dL 9 2   EGFR ml/min/1 73sq m 104       Results from last 7 days  Lab Units 11/26/18  1244   INR  0 93       Results from last 7 days  Lab Units 11/26/18  1244   TROPONIN I ng/mL <0 01               Results from last 7 days  Lab Units 11/26/18  1244   NT-PRO BNP pg/mL 74 1            Invalid input(s): URIBILINOGEN          Imaging: I have personally reviewed pertinent reports  Xr Chest Portable    Result Date: 11/26/2018  Narrative: CHEST INDICATION:   Chest pain  COMPARISON:  8/16/2014 EXAM PERFORMED/VIEWS:  XR CHEST PORTABLE FINDINGS: Cardiomediastinal silhouette demonstrates normal heart size  Medial left lower lobe density likely a small hiatal hernia    The lungs are clear  No pneumothorax or pleural effusion  Osseous structures appear within normal limits for patient age  Impression: No acute cardiopulmonary disease  Medial left lower lobe density likely a small hiatal hernia  This was not definitively present previously and could be confirmed with CT scan if deemed clinically indicated   Workstation performed: [id number]       EKG, Pathology, and Other Studies Reviewed on Admission:   EKG  Result Date: 11/26/18  Impression:  NSR    Allscripts Records Reviewed: Yes    Entire H&P was discussed with Dr Deirdre Rodriguez who agreed to what is noted above    Faustino Toussaint MD  11/26/18  3:35 PM

## 2018-11-26 NOTE — ED PROCEDURE NOTE
PROCEDURE  ECG 12 Lead Documentation  Date/Time: 11/26/2018 12:34 PM  Performed by: Miroslava Malloy by: Narciso Yates     ECG reviewed by me, the ED Provider: yes    Patient location:  ED  Previous ECG:     Previous ECG:  Compared to current    Similarity:  No change  Interpretation:     Interpretation: normal    Rate:     ECG rate assessment: normal    Rhythm:     Rhythm: sinus rhythm    Ectopy:     Ectopy: none    QRS:     QRS axis:  Normal    QRS intervals:  Normal  Conduction:     Conduction: normal    ST segments:     ST segments:  Normal  T waves:     T waves: normal           Perico Marsh DO  11/26/18 1234

## 2018-11-26 NOTE — SEPSIS NOTE
Sepsis Note   Joe Braxton 52 y o  male MRN: [id number]  Unit/Bed#: ED 09 Encounter: [id number]            Initial Sepsis Screening     [address] Name 11/26/18 1354                Is the patient's history suggestive of a new or worsening infection? (!)  Yes (Proceed)  -EE        Suspected source of infection acute abdominal infection  -EE        Are two or more of the following signs & symptoms of infection both present and new to the patient? (!)  Yes (Proceed)  -EE        Indicate SIRS criteria Hypothermia < 36C (96 8F); Tachypnea > 20 resp per min  -EE        If the answer is yes to both questions, suspicion of sepsis is present          If severe sepsis is present AND tissue hypoperfusion perists in the hour after fluid resuscitation or lactate > 4, the patient meets criteria for SEPTIC SHOCK          Are any of the following organ dysfunction criteria present within 6 hours of suspected infection and SIRS criteria that are NOT considered to be chronic conditions?         Organ dysfunction          Date of presentation of severe sepsis          Time of presentation of severe sepsis          Tissue hypoperfusion persists in the hour after crystalloid fluid administration, evidenced, by either:          Was hypotension present within one hour of the conclusion of crystalloid fluid administration?           Date of presentation of septic shock          Time of presentation of septic shock            User Key  (r) = Recorded By, (t) = Taken By, (c) = Cosigned By    [address] Name Provider Type    EE Dayna Sosa DO Physician

## 2018-11-26 NOTE — DISCHARGE SUMMARY
Discharge Summary - Koko Bhatti    Patient Information: Nico Albarado 52 y o  male MRN: [id number]  Unit/Bed#: 5T -01 Encounter: [id number]    Discharging Physician / Practitioner: Suzi Stockton MD  PCP: Duke Bautista MD  Admission Date:   Admission Orders     Ordered        11/26/18 1402  Place in Observation (expected length of stay for this patient is less than two midnights)  Once             Discharge Date: 11/27/18    Reason for Admission: intractable vomiting    Discharge Diagnoses:     Principal Problem:    Chest pain  Active Problems:    Gastritis without bleeding    Anxiety    Gastroesophageal reflux disease    Hyperlipemia    Tobacco dependence syndrome    COPD (chronic obstructive pulmonary disease) (HCC)    BPH (benign prostatic hyperplasia)  Resolved Problems:    * No resolved hospital problems  *      Consultations During Hospital Stay:  · none    Procedures Performed:   · none    Significant Findings / Test Results:   · CXR: no acute cardiopulmonary disease  Incidental Findings:   · none     Test Results Pending at Discharge (will require follow up):   · none     Outpatient Tests Requested:  · none    Complications:  none    Hospital Course:     Nico Albarado is a 52 y o  male patient history of GERD, hyperlipidemia, BPH, COPD, anxiety, tobacco dependence who originally presented to the hospital on 11/26/2018 due to chest pain, intractable non-bilious, non-bloody vomiting  Despite being given reglan and zofran in the ED, he continued to be nauseous  He appeared dehydrated on exam  CXR showed no acute cardiopulmonary disease  EKG showed NSR  He was afebrile, no leukocytosis  His lipase was elevated at 359  He was admitted for observation  Troponin was negative x3  Repeat EKG in the morning was unchanged  When the patient was reassessed in the morning, his nausea had resolved  He was able to tolerate p o intake  He remained afebrile throughout his observation  He was discharged with instructions to continue his zantac and protonix, as well as his other maintenance medications  He was instructed on methods of reducing GERD symptoms, foods to avoid  He was encouraged to keep his follow up appointment with his PCP  Condition at Discharge: good     Discharge Day Visit / Exam:     Vitals: Blood Pressure: 120/57 (11/27/18 0735)  Pulse: 64 (11/27/18 0735)  Temperature: 97 8 °F (36 6 °C) (11/27/18 0735)  Temp Source: Temporal (11/27/18 0735)  Respirations: 18 (11/27/18 0735)  Height: 5' 6" (167 6 cm) (11/26/18 1535)  Weight - Scale: 68 kg (149 lb 14 6 oz) (11/27/18 0500)  SpO2: 97 % (11/27/18 0735)  Exam:   Physical Exam   Constitutional: He is oriented to person, place, and time  He appears well-developed and well-nourished  No distress  HENT:   Head: Normocephalic and atraumatic  Eyes: Conjunctivae and EOM are normal  No scleral icterus  Neck: Normal range of motion  Neck supple  Cardiovascular: Normal rate, regular rhythm and normal heart sounds  Exam reveals no friction rub  No murmur heard  Pulmonary/Chest: Effort normal and breath sounds normal  No respiratory distress  He has no wheezes  Abdominal: Soft  Bowel sounds are normal  There is no tenderness  Musculoskeletal: He exhibits no deformity  Neurological: He is alert and oriented to person, place, and time  No cranial nerve deficit  Skin: Skin is warm and dry  No rash noted  Psychiatric: He has a normal mood and affect  Discussion with Family: none    Discharge instructions/Information to patient and family:   See after visit summary for information provided to patient and family        Discharge Medications:    Morning Afternoon Evening Bedtime As Needed    albuterol 90 mcg/act inhaler   Commonly known as: PROAIR HFA   Inhale 1 puff every 6 (six) hours as needed for wheezing   Refills: 2                    ALPRAZolam 0 5 mg tablet   Commonly known as: XANAX   Take 1 tablet (0 5 mg total) by mouth daily   Refills: 0                    atorvastatin 10 mg tablet   Commonly known as: LIPITOR   Take 1 tablet (10 mg total) by mouth daily   Refills: 0                    cetirizine 10 MG chewable tablet   Commonly known as: ZyrTEC   Chew 1 tablet (10 mg total) daily   Refills: 0                    COLACE 100 mg capsule   Generic drug: docusate sodium   Take 1 capsule by mouth 2 (two) times a day as needed   Refills: 0                    fluticasone 50 mcg/act nasal spray   Commonly known as: FLONASE   1 spray into each nostril daily   Refills: 2                    hydrOXYzine HCL 25 mg tablet   Commonly known as: ATARAX   Take 1 tablet (25 mg total) by mouth daily at bedtime   Refills: 0                    propranolol 10 mg tablet   Commonly known as: INDERAL   Take 1 tab BID   Refills: 0                    ranitidine 150 MG capsule   Commonly known as: ZANTAC   Take 1 capsule (150 mg total) by mouth 2 (two) times a day   Refills: 0                    SPIRIVA HANDIHALER 18 mcg inhalation capsule   Generic drug: tiotropium   Place 18 mcg into inhaler and inhale daily   Refills: 0                    tamsulosin 0 4 mg   Commonly known as: FLOMAX   Take 1 capsule (0 4 mg total) by mouth daily with dinner   Refills: 0                    traZODone 50 mg tablet   Commonly known as: DESYREL   Take 0 5 tablets (25 mg total) by mouth daily at bedtime   Refills: 0                 Provisions for Follow-Up Care:  See after visit summary for information related to follow-up care and any pertinent home health orders  Disposition:     Home    For Discharges to [facility]:   · Not Applicable to this Patient - Not Applicable to this Patient    Planned Readmission: not expected     Discharge Statement:  I spent 30 minutes discharging the patient  This time was spent on the day of discharge  I had direct contact with the patient on the day of discharge   Greater than 50% of the total time was spent examining patient, answering all patient questions, arranging and discussing plan of care with patient as well as directly providing post-discharge instructions  Additional time then spent on discharge activities  Discharge Medications:  See after visit summary for reconciled discharge medications provided to patient and family        ** Please Note: This note has been constructed using a voice recognition system **    Becki Garcia MD  11/27/18  12:16 PM

## 2018-11-26 NOTE — ASSESSMENT & PLAN NOTE
Etiology: gastritis vs panic attack vs cardiac  One day history of burning chest pain  Associated with nausea and non-bilious, non-bloody vomiting  Unable to tolerate po intake  Denies diarrhea, fever  History of GERD  Smokes 1 5-2 packs of cigarettes per day  EKG: NSR  Troponin (-)ve x1  Lipase mildly elevated at 359  No leukocytosis   CMP wnl       - admit for observation  - IVF at 100 cc/hr  - trend troponin  - EKG am   - reglan prn for nausea  - cardiac diet

## 2018-11-26 NOTE — ED PROVIDER NOTES
History  Chief Complaint   Patient presents with    Vomiting     pt arrives via EMS with vomiting and chest pressure (when vomiting)  pt states it started this moring aournd 1030  pt denies diarrhea  pt denies CP at this time  History provided by:  Patient  Vomiting   Severity:  Moderate  Timing:  Constant  Quality:  Stomach contents and undigested food  Progression:  Worsening  Chronicity:  New  Recent urination:  Normal  Relieved by:  Nothing  Worsened by:  Nothing  Ineffective treatments:  None tried  Associated symptoms: chills and cough    Associated symptoms: no abdominal pain, no arthralgias, no diarrhea, no fever, no headaches, no myalgias and no sore throat    Associated symptoms comment:  Cp noted as well hx of gerd         Prior to Admission Medications   Prescriptions Last Dose Informant Patient Reported? Taking?    ALPRAZolam (XANAX) 0 5 mg tablet Unknown at Unknown time  No No   Sig: Take 1 tablet (0 5 mg total) by mouth daily   albuterol (PROAIR HFA) 90 mcg/act inhaler Unknown at Unknown time  No No   Sig: Inhale 1 puff every 6 (six) hours as needed for wheezing   atorvastatin (LIPITOR) 10 mg tablet 2018 at Unknown time  No Yes   Sig: Take 1 tablet (10 mg total) by mouth daily   cetirizine (ZyrTEC) 10 MG chewable tablet Unknown at Unknown time  No No   Sig: Chew 1 tablet (10 mg total) daily   docusate sodium (COLACE) 100 mg capsule Unknown at Unknown time  Yes No   Sig: Take 1 capsule by mouth 2 (two) times a day as needed   fluticasone (FLONASE) 50 mcg/act nasal spray Unknown at Unknown time  No No   Si spray into each nostril daily   hydrOXYzine HCL (ATARAX) 25 mg tablet 2018 at Unknown time  No Yes   Sig: Take 1 tablet (25 mg total) by mouth daily at bedtime   mupirocin (BACTROBAN) 2 % ointment Unknown at Unknown time  Yes No   Sig: APPLY TWICE DAILY FOR 5 DAYS US   oxyCODONE-acetaminophen (PERCOCET) 5-325 mg per tablet Unknown at Unknown time  Yes No   Sig: TK 1-2 TS PO Q 4-6 H PRN  NOT MORE THAN 4 PILLS PER DAY   propranolol (INDERAL) 10 mg tablet Unknown at Unknown time  Yes No   Sig: Take 1 tab BID   ranitidine (ZANTAC) 150 MG capsule 11/25/2018 at Unknown time  No Yes   Sig: Take 1 capsule (150 mg total) by mouth 2 (two) times a day   tamsulosin (FLOMAX) 0 4 mg 11/25/2018 at Unknown time  No Yes   Sig: Take 1 capsule (0 4 mg total) by mouth daily with dinner   tiotropium (SPIRIVA HANDIHALER) 18 mcg inhalation capsule Unknown at Unknown time  Yes No   Sig: Place into inhaler and inhale   traZODone (DESYREL) 50 mg tablet Unknown at Unknown time  No No   Sig: Take 0 5 tablets (25 mg total) by mouth daily at bedtime      Facility-Administered Medications: None       Past Medical History:   Diagnosis Date    Anxiety     COPD [facility]chronic obstructive pulmonary disease) ([facility] [address] )     Depression     GERD (gastroesophageal reflux disease)     Hyperlipidemia        Past Surgical History:   Procedure Laterality Date    FRACTURE SURGERY      ORIF    HERNIA REPAIR      SKIN GRAFT         History reviewed  No pertinent family history  I have reviewed and agree with the history as documented  Social History   Substance Use Topics    Smoking status: Heavy Tobacco Smoker     Packs/day: 2 00     Types: Cigarettes    Smokeless tobacco: Never Used    Alcohol use Yes      Comment: socially        Review of Systems   Constitutional: Positive for chills  Negative for fever  HENT: Negative for rhinorrhea, sore throat and trouble swallowing  Eyes: Negative for pain  Respiratory: Positive for cough  Negative for shortness of breath, wheezing and stridor  Cardiovascular: Positive for chest pain  Negative for leg swelling  Gastrointestinal: Positive for nausea and vomiting  Negative for abdominal pain and diarrhea  Endocrine: Negative for polyuria  Genitourinary: Negative for dysuria, flank pain and urgency     Musculoskeletal: Negative for arthralgias, joint swelling, myalgias and neck stiffness  Skin: Negative for rash  Allergic/Immunologic: Negative for immunocompromised state  Neurological: Negative for dizziness, syncope, weakness, numbness and headaches  Psychiatric/Behavioral: Negative for confusion and suicidal ideas  All other systems reviewed and are negative  Physical Exam  Physical Exam   Constitutional: He is oriented to person, place, and time  He appears distressed  Actively retching and vomiting in the department  HENT:   Head: Normocephalic and atraumatic  Eyes: Pupils are equal, round, and reactive to light  EOM are normal    Neck: Normal range of motion  Neck supple  Cardiovascular: Normal rate and regular rhythm  Exam reveals no friction rub  No murmur heard  Pulmonary/Chest: Breath sounds normal  No respiratory distress  He has no wheezes  He has no rales  Abdominal: Soft  Bowel sounds are normal  He exhibits no distension  There is no tenderness  Musculoskeletal: Normal range of motion  He exhibits no edema or tenderness  Neurological: He is alert and oriented to person, place, and time  Skin: Skin is warm  No rash noted  Psychiatric: He has a normal mood and affect  Nursing note and vitals reviewed        Vital Signs  ED Triage Vitals [11/26/18 1236]   Temperature Pulse Respirations Blood Pressure SpO2   (!) 96 7 °F (35 9 °C) 92 (!) 32 (!) 186/95 100 %      Temp Source Heart Rate Source Patient Position - Orthostatic VS BP Location FiO2 (%)   Tympanic Monitor Sitting Left arm --      Pain Score       --           Vitals:    11/26/18 1236 11/26/18 1312   BP: (!) 186/95 141/85   Pulse: 92 70   Patient Position - Orthostatic VS: Sitting Lying       Visual Acuity      ED Medications  Medications   famotidine (PEPCID) tablet 20 mg (20 mg Oral Given 11/26/18 1318)   ondansetron (ZOFRAN) injection 4 mg (4 mg Intravenous Given 11/26/18 1245)   sucralfate (CARAFATE) oral suspension 1,000 mg (1,000 mg Oral Given 11/26/18 1318) metoclopramide (REGLAN) injection 10 mg (10 mg Intravenous Given 11/26/18 1406)   diphenhydrAMINE (BENADRYL) injection 50 mg (50 mg Intravenous Given 11/26/18 1404)       Diagnostic Studies  Results Reviewed     Procedure Component Value Units Date/Time    Troponin I [[id number]]  (Normal) Collected:  11/26/18 1244    Lab Status:  Final result Specimen:  Blood from Arm, Left Updated:  11/26/18 1313     Troponin I <0 01 ng/mL     NT-BNP PRO [[id number]]  (Normal) Collected:  11/26/18 1244    Lab Status:  Final result Specimen:  Blood from Arm, Left Updated:  11/26/18 1310     NT-proBNP 74 1 pg/mL     Protime-INR [[id number]]  (Normal) Collected:  11/26/18 1244    Lab Status:  Final result Specimen:  Blood from Arm, Left Updated:  11/26/18 1303     Protime 9 9 seconds      INR 0 93    Narrative:       INR:  ,PROTIME:      APTT [[id number]]  (Normal) Collected:  11/26/18 1244    Lab Status:  Final result Specimen:  Blood from Arm, Left Updated:  11/26/18 1303     PTT 28 seconds     Narrative:       PTT:      Basic metabolic panel [[id number]]  (Abnormal) Collected:  11/26/18 1244    Lab Status:  Final result Specimen:  Blood from Arm, Left Updated:  11/26/18 1302     Sodium 138 mmol/L      Potassium 4 0 mmol/L      Chloride 103 mmol/L      CO2 25 mmol/L      ANION GAP 10 mmol/L      BUN 13 mg/dL      Creatinine 0 74 mg/dL      Glucose 101 (H) mg/dL      Calcium 9 2 mg/dL      eGFR 104 ml/min/1 73sq m     Narrative:         National Kidney Disease Education Program recommendations are as follows:  GFR calculation is accurate only with a steady state creatinine  Chronic Kidney disease less than 60 ml/min/1 73 sq  meters  Kidney failure less than 15 ml/min/1 73 sq  meters      Lipase [[id number]]  (Abnormal) Collected:  11/26/18 1244    Lab Status:  Final result Specimen:  Blood from Arm, Left Updated:  11/26/18 1302     Lipase 359 (H) u/L     CBC and differential [[id number]]  (Abnormal) Collected:  11/26/18 1244    Lab Status:  Final result Specimen:  Blood from Arm, Left Updated:  11/26/18 1255     WBC 8 00 Thousand/uL      RBC 4 84 Million/uL      Hemoglobin 14 6 g/dL      Hematocrit 44 4 %      MCV 92 fL      MCH 30 2 pg      MCHC 32 9 g/dL      RDW 12 9 %      MPV 7 9 (L) fL      Platelets 001 Thousands/uL                  XR chest portable   Final Result by Iris Smith MD (11/26 1354)      No acute cardiopulmonary disease  Medial left lower lobe density likely a small hiatal hernia  This was not definitively present previously and could be confirmed with CT scan if deemed clinically indicated  Workstation performed: [id number]                    Procedures  Procedures       Phone Contacts  ED Phone Contact    ED Course         HEART Risk Score      Most Recent Value   History  0 Filed at: 11/26/2018 1356   ECG  0 Filed at: 11/26/2018 1356   Age  1 Filed at: 11/26/2018 1356   Risk Factors  1 Filed at: 11/26/2018 1356   Troponin  0 Filed at: 11/26/2018 1356   Heart Score Risk Calculator   History  0 Filed at: 11/26/2018 1356   ECG  0 Filed at: 11/26/2018 1356   Age  1 Filed at: 11/26/2018 1356   Risk Factors  1 Filed at: 11/26/2018 1356   Troponin  0 Filed at: 11/26/2018 1356   HEART Score  2 Filed at: 11/26/2018 1356   HEART Score  2 Filed at: 11/26/2018 1356                      Initial Sepsis Screening     Row Name 11/26/18 1354                Is the patient's history suggestive of a new or worsening infection? (!)  Yes (Proceed)  -EE        Suspected source of infection acute abdominal infection  -EE        Are two or more of the following signs & symptoms of infection both present and new to the patient? (!)  Yes (Proceed)  -EE        Indicate SIRS criteria Hypothermia < 36C (96 8F); Tachypnea > 20 resp per min  -EE        If the answer is yes to both questions, suspicion of sepsis is present          If severe sepsis is present AND tissue hypoperfusion perists in the hour after fluid resuscitation or lactate > 4, the patient meets criteria for SEPTIC SHOCK          Are any of the following organ dysfunction criteria present within 6 hours of suspected infection and SIRS criteria that are NOT considered to be chronic conditions?         Organ dysfunction          Date of presentation of severe sepsis          Time of presentation of severe sepsis          Tissue hypoperfusion persists in the hour after crystalloid fluid administration, evidenced, by either:          Was hypotension present within one hour of the conclusion of crystalloid fluid administration?         Date of presentation of septic shock          Time of presentation of septic shock            User Key  (r) = Recorded By, (t) = Taken By, (c) = Cosigned By    Initials Name Provider Type    CORY Medellin DO Physician                  [facility]  Number of Diagnoses or Management Options  Acute gastritis without hemorrhage, unspecified gastritis type: new and requires workup  Chest pain: new and requires workup  Vomiting: new and requires workup  Diagnosis management comments: 2:15 PM   Spoke with the admitting team  Accepted the pt for admission  Spoke with the pt and they are in agreement to stay for further eval and admission            Amount and/or Complexity of Data Reviewed  Clinical lab tests: ordered and reviewed  Tests in the radiology section of CPT®: ordered and reviewed  Review and summarize past medical records: yes  Independent visualization of images, tracings, or specimens: yes (All labs reviewed and utilized in the medical decision making process    All radiology studies independently viewed by me and interpreted by the radiologist )      CritCare Time    Disposition  Final diagnoses:   Vomiting   Chest pain   Acute gastritis without hemorrhage, unspecified gastritis type     Time reflects when diagnosis was documented in both MDM as applicable and the Disposition within this note     Time User Action Codes Description Comment    11/26/2018  2:01 PM Emani TOLBERT Add [R11 10] Vomiting     11/26/2018  2:01 PM Emani TOLBERT Add [R07 9] Chest pain     11/26/2018  2:01 PM Emani TOLBERT Add [K29 00] Acute gastritis without hemorrhage, unspecified gastritis type       ED Disposition     ED Disposition Condition Comment    Admit        Follow-up Information    None         Patient's Medications   Discharge Prescriptions    No medications on file     No discharge procedures on file      ED Provider  Electronically Signed by           Sagrario Ruiz DO  11/26/18 5507

## 2018-11-27 ENCOUNTER — TRANSITIONAL CARE MANAGEMENT (OUTPATIENT)
Dept: FAMILY MEDICINE CLINIC | Facility: CLINIC | Age: 53
End: 2018-11-27

## 2018-11-27 VITALS
OXYGEN SATURATION: 97 % | HEIGHT: 66 IN | WEIGHT: 149.91 LBS | HEART RATE: 64 BPM | TEMPERATURE: 97.8 F | BODY MASS INDEX: 24.09 KG/M2 | RESPIRATION RATE: 18 BRPM | SYSTOLIC BLOOD PRESSURE: 120 MMHG | DIASTOLIC BLOOD PRESSURE: 57 MMHG

## 2018-11-27 LAB
ANION GAP SERPL CALCULATED.3IONS-SCNC: 5 MMOL/L (ref 5–14)
BASOPHILS # BLD AUTO: 0 THOUSANDS/ΜL (ref 0–0.1)
BASOPHILS NFR BLD AUTO: 1 % (ref 0–1)
BUN SERPL-MCNC: 16 MG/DL (ref 5–25)
CALCIUM SERPL-MCNC: 8.3 MG/DL (ref 8.4–10.2)
CHLORIDE SERPL-SCNC: 106 MMOL/L (ref 97–108)
CHOLEST SERPL-MCNC: 124 MG/DL
CO2 SERPL-SCNC: 25 MMOL/L (ref 22–30)
CREAT SERPL-MCNC: 0.89 MG/DL (ref 0.7–1.5)
EOSINOPHIL # BLD AUTO: 0.3 THOUSAND/ΜL (ref 0–0.4)
EOSINOPHIL NFR BLD AUTO: 5 % (ref 0–6)
ERYTHROCYTE [DISTWIDTH] IN BLOOD BY AUTOMATED COUNT: 12.6 %
GFR SERPL CREATININE-BSD FRML MDRD: 96 ML/MIN/1.73SQ M
GLUCOSE SERPL-MCNC: 85 MG/DL (ref 70–99)
HCT VFR BLD AUTO: 36.9 % (ref 41–53)
HDLC SERPL-MCNC: 35 MG/DL (ref 40–59)
HGB BLD-MCNC: 12.1 G/DL (ref 13.5–17.5)
LDLC SERPL CALC-MCNC: 62 MG/DL
LYMPHOCYTES # BLD AUTO: 2.2 THOUSANDS/ΜL (ref 0.5–4)
LYMPHOCYTES NFR BLD AUTO: 37 % (ref 20–50)
MAGNESIUM SERPL-MCNC: 1.9 MG/DL (ref 1.6–2.3)
MCH RBC QN AUTO: 30.3 PG (ref 26–34)
MCHC RBC AUTO-ENTMCNC: 32.9 G/DL (ref 31–36)
MCV RBC AUTO: 92 FL (ref 80–100)
MONOCYTES # BLD AUTO: 0.6 THOUSAND/ΜL (ref 0.2–0.9)
MONOCYTES NFR BLD AUTO: 11 % (ref 1–10)
NEUTROPHILS # BLD AUTO: 2.9 THOUSANDS/ΜL (ref 1.8–7.8)
NEUTS SEG NFR BLD AUTO: 47 % (ref 45–65)
NONHDLC SERPL-MCNC: 89 MG/DL
PLATELET # BLD AUTO: 214 THOUSANDS/UL (ref 150–450)
PMV BLD AUTO: 8 FL (ref 8.9–12.7)
POTASSIUM SERPL-SCNC: 4 MMOL/L (ref 3.6–5)
RBC # BLD AUTO: 4.01 MILLION/UL (ref 4.5–5.9)
SODIUM SERPL-SCNC: 136 MMOL/L (ref 137–147)
TRIGL SERPL-MCNC: 136 MG/DL
WBC # BLD AUTO: 6.1 THOUSAND/UL (ref 4.5–11)

## 2018-11-27 PROCEDURE — 99217 PR OBSERVATION CARE DISCHARGE MANAGEMENT: CPT | Performed by: FAMILY MEDICINE

## 2018-11-27 PROCEDURE — 85025 COMPLETE CBC W/AUTO DIFF WBC: CPT | Performed by: FAMILY MEDICINE

## 2018-11-27 PROCEDURE — 80048 BASIC METABOLIC PNL TOTAL CA: CPT | Performed by: FAMILY MEDICINE

## 2018-11-27 PROCEDURE — 99220 PR INITIAL OBSERVATION CARE/DAY 70 MINUTES: CPT | Performed by: FAMILY MEDICINE

## 2018-11-27 PROCEDURE — 94760 N-INVAS EAR/PLS OXIMETRY 1: CPT

## 2018-11-27 PROCEDURE — 80061 LIPID PANEL: CPT | Performed by: FAMILY MEDICINE

## 2018-11-27 PROCEDURE — 94640 AIRWAY INHALATION TREATMENT: CPT

## 2018-11-27 PROCEDURE — 83735 ASSAY OF MAGNESIUM: CPT | Performed by: FAMILY MEDICINE

## 2018-11-27 RX ORDER — PANTOPRAZOLE SODIUM 40 MG/1
40 TABLET, DELAYED RELEASE ORAL DAILY
Qty: 30 TABLET | Refills: 0 | Status: SHIPPED | OUTPATIENT
Start: 2018-11-27 | End: 2019-02-14 | Stop reason: SDUPTHER

## 2018-11-27 RX ADMIN — FLUTICASONE PROPIONATE 1 SPRAY: 50 SPRAY, METERED NASAL at 08:28

## 2018-11-27 RX ADMIN — ALPRAZOLAM 0.5 MG: 0.5 TABLET ORAL at 08:28

## 2018-11-27 RX ADMIN — ALBUTEROL SULFATE 1 PUFF: 90 AEROSOL, METERED RESPIRATORY (INHALATION) at 08:04

## 2018-11-27 RX ADMIN — POTASSIUM CHLORIDE AND SODIUM CHLORIDE 100 ML/HR: 900; 150 INJECTION, SOLUTION INTRAVENOUS at 00:04

## 2018-11-27 RX ADMIN — TIOTROPIUM BROMIDE 18 MCG: 18 CAPSULE ORAL; RESPIRATORY (INHALATION) at 08:01

## 2018-11-27 RX ADMIN — NICOTINE 1 PATCH: 21 PATCH, EXTENDED RELEASE TRANSDERMAL at 08:29

## 2018-11-27 RX ADMIN — FAMOTIDINE 20 MG: 20 TABLET ORAL at 08:28

## 2018-11-27 RX ADMIN — ATORVASTATIN CALCIUM 10 MG: 10 TABLET, FILM COATED ORAL at 08:28

## 2018-11-27 RX ADMIN — PROPRANOLOL HYDROCHLORIDE 10 MG: 20 TABLET ORAL at 08:27

## 2018-11-27 RX ADMIN — LORATADINE 10 MG: 10 TABLET ORAL at 08:28

## 2018-11-27 NOTE — NURSING NOTE
AOX3 HR regular Lungs decreased clear + Bowel Sounds x4 + PP ABD Soft  Denies any pain  Will continue to monitor call bell within reach

## 2018-11-27 NOTE — SOCIAL WORK
Pt under observation status for chest pain and gastritis  Pt reports that he was riding the bus when he had tightness and burning in his chest and nausea/vomiting  SW met with pt to complete observation notification and assessment  Notice signed with original placed in scan bin  Pt reports that he lives with his friend Kulwinder Jimenez and Anand's wife in a single story home  Pt sleeps in a basement apartment  Pt notes that he is independent with ambulation w/o DME and self-care tasks  He relies on public bus for transportation  Pt reports to smoking 1 5-2 ppd of cigarettes and having a MH hx of anxiety and depression managed by his out-patient providers at [facility]  PCP is [employer] and preferred pharmacy is [facility]  When medically cleared for discharge pt plans on taking the bus home  No questions or concerns from pt at this time regarding discharge plan  SW will continue to follow

## 2018-11-27 NOTE — DISCHARGE INSTRUCTIONS
Gastroesophageal Reflux Disease   WHAT YOU NEED TO KNOW:   Gastroesophageal reflux occurs when acid and food in the stomach back up into the esophagus  Gastroesophageal reflux disease (GERD) is reflux that occurs more than twice a week for a few weeks  It usually causes heartburn and other symptoms  GERD can cause other health problems over time if it is not treated  DISCHARGE INSTRUCTIONS:   Return to the emergency department if:   · You feel full and cannot burp or vomit  · You have severe chest pain and sudden trouble breathing  · Your bowel movements are black, bloody, or tarry-looking  · Your vomit looks like coffee grounds or has blood in it  Contact your healthcare provider if:   · You vomit large amounts, or you vomit often  · You have trouble breathing after you vomit  · You have trouble swallowing, or pain with swallowing  · You are losing weight without trying  · Your symptoms get worse or do not improve with treatment  · You have questions or concerns about your condition or care  Medicines:   · Medicines  are used to decrease stomach acid  Medicine may also be used to help your lower esophageal sphincter and stomach contract (tighten) more  · Take your medicine as directed  Contact your healthcare provider if you think your medicine is not helping or if you have side effects  Tell him of her if you are allergic to any medicine  Keep a list of the medicines, vitamins, and herbs you take  Include the amounts, and when and why you take them  Bring the list or the pill bottles to follow-up visits  Carry your medicine list with you in case of an emergency  Manage GERD:   · Do not have foods or drinks that may increase heartburn  These include chocolate, peppermint, fried or fatty foods, drinks that contain caffeine, or carbonated drinks (soda)  Other foods include spicy foods, onions, tomatoes, and tomato-based foods   Do not have foods or drinks that can irritate your esophagus, such as citrus fruits, juices, and alcohol  · Do not eat large meals  When you eat a lot of food at one time, your stomach needs more acid to digest it  Eat 6 small meals each day instead of 3 large ones, and eat slowly  Do not eat meals 2 to 3 hours before bedtime  · Elevate the head of your bed  Place 6-inch blocks under the head of your bed frame  You may also use more than one pillow under your head and shoulders while you sleep  · Maintain a healthy weight  If you are overweight, weight loss may help relieve symptoms of GERD  · Do not smoke  Smoking weakens the lower esophageal sphincter and increases the risk of GERD  Ask your healthcare provider for information if you currently smoke and need help to quit  E-cigarettes or smokeless tobacco still contain nicotine  Talk to your healthcare provider before you use these products  · Do not wear clothing that is tight around your waist   Tight clothing can put pressure on your stomach and cause or worsen GERD symptoms  Follow up with your healthcare provider as directed:  Write down your questions so you remember to ask them during your visits  © 2017 [address] [facility] Information is for End User's use only and may not be sold, redistributed or otherwise used for commercial purposes  All illustrations and images included in CareNotes® are the copyrighted property of [employer]  or [employer]  The above information is an  only  It is not intended as medical advice for individual conditions or treatments  Talk to your doctor, nurse or pharmacist before following any medical regimen to see if it is safe and effective for you  How to Stop Smoking   WHAT YOU NEED TO KNOW:   You will improve your health and the health of others around you if you stop smoking  Your risk for heart and lung disease, cancer, stroke, heart attack, and vision problems will also decrease   You can benefit from quitting no matter how long you have smoked  DISCHARGE INSTRUCTIONS:   Prepare to stop smoking:  Nicotine is a highly addictive drug found in cigarettes  Withdrawal symptoms can happen when you stop smoking and make it hard to quit  These include anxiety, depression, irritability, trouble sleeping, and increased appetite  You increase your chances of success if you prepare to quit  · Set a quit date  Sandeep Berger a date that is within the next 2 weeks  Do not pick a day that you think may be stressful or busy  Write down the day or [place] it on your calender  · Tell friends and family that you plan to quit  Explain that you may have withdrawal symptoms when you try to quit  Ask them to support you  They may be able to encourage you and help reduce your stress to make it easier for you to quit  · Make a list of your reasons for quitting  Put the list somewhere you will see it every day, such as your refrigerator  You can look at the list when you have a craving  · Remove all tobacco and nicotine products from your home, car, and workplace  Also, remove anything else that will tempt you to smoke, such as lighters, matches, or ashtrays  Clean your car, home, and places at work that smell like smoke  The smell of smoke can trigger a craving  · Identify triggers that make you want to smoke  This may include activities, feelings, or people  Also write down 1 way you can deal with each of your triggers  For example, if you want to smoke as soon as you wake up, plan another activity during this time, such as exercise  · Make a plan for how you will quit  Learn about the tools that can help you quit, such as medicine, counseling, or nicotine replacement therapy  Choose at least 2 options to help you quit  Tools to help you stop smoking:   · Counseling  from a trained healthcare provider can provide you with support and skills to quit smoking   The provider will also teach you to manage your withdrawal symptoms and cravings  You may receive counseling from one counselor, in group therapy, or through phone therapy called a quit line  · Nicotine replacement therapy (NRT)  such as nicotine patches, gum, or lozenges may help reduce your nicotine cravings  You may get these without a doctor's order  Do not use e-cigarettes or smokeless tobacco in place of cigarettes or to help you quit  They still contain nicotine  · Prescription medicines  such as nasal sprays or nicotine inhalers may help reduce your withdrawal symptoms  Other medicines may also be used to reduce your urge to smoke  Ask your healthcare provider about these medicines  You may need to start certain medicines 2 weeks before your quit date for them to work well  · Hypnosis  is a practice that helps guide you through thoughts and feelings  Hypnosis may help decrease your cravings and make you more willing to quit  · Acupuncture therapy  uses very thin needles to balance energy channels in the body  This is thought to help decrease cravings and symptoms of nicotine withdrawal      · Support groups  let you talk to others who are trying to quit or have already quit  It may be helpful to speak with others about how they quit  Manage your cravings:   · Avoid situations, people, and places that tempt you to smoke  Go to nonsmoking places, such as libraries or restaurants  Understand what tempts you and try to avoid these things  · Keep your hands busy  Hold things such as a stress ball or pen  · Put candy or toothpicks in your mouth  Keep lollipops, sugarless gum, or toothpicks with you at all times  · Do not have alcohol or caffeine  These drinks may tempt you to smoke  Drink healthy liquids such as water or juice instead  · Reward yourself when you resist your cravings  Rewards will motivate you and help you stay positive  · Do an activity that distracts you from your craving    Examples include going for a walk, exercising, or cleaning  Prevent weight gain after you quit:  You may gain a few pounds after you quit smoking  It is healthier for you to gain a few pounds than to continue to smoke  The following can help you prevent weight gain:  · Eat healthy foods  These include fruits, vegetables, whole-grain breads, low-fat dairy products, beans, lean meats, and fish  Eat healthy snacks, such as low-fat yogurt, if you get hungry between meals  · Drink water before, during, and between meals  This will make your stomach feel full and help prevent you from overeating  Ask your healthcare provider how much liquid to drink each day and which liquids are best for you  · Exercise  Take a walk or do some kind of exercise every day  Ask your healthcare provider what exercise is right for you  This may help reduce your cravings and reduce stress  For support and more information:   · Smokefree  gov  Phone: 7- 306 - 074-8492  Web Address: www [employer]  © 2017 [address] [employer] Information is for End User's use only and may not be sold, redistributed or otherwise used for commercial purposes  All illustrations and images included in CareNotes® are the copyrighted property of [employer]  or [employer]  The above information is an  only  It is not intended as medical advice for individual conditions or treatments  Talk to your doctor, nurse or pharmacist before following any medical regimen to see if it is safe and effective for you

## 2018-11-27 NOTE — UTILIZATION REVIEW
Initial Clinical Review    Admission: Date/Time/Statement: OBS  11/26  1402    Orders Placed This Encounter   Procedures    Place in Observation (expected length of stay for this patient is less than two midnights)     Standing Status:   Standing     Number of Occurrences:   1     Order Specific Question:   Admitting Physician     Answer:   Yvette Sharma     Order Specific Question:   Level of Care     Answer:   Med Surg [16]         ED: Date/Time/Mode of Arrival:   ED Arrival Information     Expected Arrival [address] of Arrival Escorted By Service Admission Type    - [facility] 12[facility]    Arrival Complaint    chest pain          Chief Complaint:   Chief Complaint   Patient presents with    Vomiting     pt arrives via EMS with vomiting and chest pressure (when vomiting)  pt states it started this moring aournd 1030  pt denies diarrhea  pt denies CP at this time  History of Illness:    Tiffany Arias is a 52 y o  male who presents with burning chest pain  The pain began this morning  He was on a bus on his way home from a trip out of town when he began to throw up  He also noticed tightness in his chest, and continued to have nausea and a burning sensation in his chest and throat  He continued to throw up in the ambulance  Emesis was clear/yellow liquid with mucus      ED Vital Signs:   ED Triage Vitals   Temperature Pulse Respirations Blood Pressure SpO2   11/26/18 1236 11/26/18 1236 11/26/18 1236 11/26/18 1236 11/26/18 1236   (!) 96 7 °F (35 9 °C) 92 (!) 32 (!) 186/95 100 %      Temp Source Heart Rate Source Patient Position - Orthostatic VS BP Location FiO2 (%)   11/26/18 1236 11/26/18 1236 11/26/18 1236 11/26/18 1236 --   Tympanic Monitor Sitting Left arm       Pain Score       11/26/18 1501       5        Wt Readings from Last 1 Encounters:   11/27/18 68 kg (149 lb 14 6 oz)       Vital Signs (abnormal): wnl     Abnormal Labs/Diagnostic Test Results:  Gluc  101, lipase  359  PCXR - No acute cardiopulmonary disease  Medial left lower lobe density likely a small hiatal hernia   This was not definitively present previously and could be confirmed with CT scan if deemed clinically indicated  EKG- NSR    ED Treatment:   Medication Administration from 11/26/2018 1229 to 11/26/2018 1529       Date/Time Order Dose Route Action Action by Comments     11/26/2018 1245 ondansetron (ZOFRAN) injection 4 mg 4 mg Intravenous Given Erich Rinaldi RN      11/26/2018 1318 sucralfate (CARAFATE) oral suspension 1,000 mg 1,000 mg Oral Given Misha Tavera RN      11/26/2018 1318 famotidine (PEPCID) tablet 20 mg 20 mg Oral Given Misha Tavera RN      11/26/2018 1406 metoclopramide (REGLAN) injection 10 mg 10 mg Intravenous Given Erich Rinaldi RN      11/26/2018 1404 diphenhydrAMINE (BENADRYL) injection 50 mg 50 mg Intravenous Given Erich Rinaldi RN           Past Medical/Surgical History: Active Ambulatory Problems     Diagnosis Date Noted    Anxiety 09/27/2018    Depression 05/21/2012    Gastroesophageal reflux disease 09/27/2018    Hyperlipemia 09/27/2018    Tobacco dependence syndrome 09/27/2018    COPD [facility]chronic obstructive pulmonary disease) ([facility] [address] ) 09/27/2018    BPH (benign prostatic hyperplasia) 09/27/2018    Shortness of breath 09/27/2018    Upper respiratory infection 10/25/2018    Encounter for immunization 10/25/2018    Postnasal drip 10/25/2018     Past Medical History:   Diagnosis Date    Anxiety     [facility]     Depression     GERD (gastroesophageal reflux disease)     Hyperlipidemia        Admitting Diagnosis: Vomiting [R11 10]  Chest pain [R07 9]  Acute gastritis without hemorrhage, unspecified gastritis type [K29 00]    Age/Sex: 52 y o  male    Assessment/Plan:   Chest pain   Assessment & Plan     Etiology: gastritis vs panic attack vs cardiac  One day history of burning chest pain  Associated with nausea and non-bilious, non-bloody vomiting  Unable to tolerate po intake  Denies diarrhea, fever  History of GERD  Smokes 1 5-2 packs of cigarettes per day  EKG: NSR  Troponin (-)ve x1  Lipase mildly elevated at 359  No leukocytosis  CMP wnl      - admit for observation  - IVF at 100 cc/hr  - trend troponin  - EKG am   - reglan prn for nausea  - cardiac diet    Gastritis without bleeding   Assessment & Plan     PMH includes GERD, tobacco dependence  One day history of nausea and non-bilious, non-bloody vomiting, associated with burning chest pain  Unable to tolerate po intake since yesterday  Given reglan and zofran in the ED  Continues to complain of nausea  Appears slightly dehydrated on exam  Lipase mildly elevated at 359      - management as above      BPH (benign prostatic hyperplasia)   Assessment & Plan     Managed with flomax      - continue    COPD (chronic obstructive pulmonary disease) (HCC)   Assessment & Plan     Currently managed with ventolin PRN, cetirizine 10 mg daily, flonase, and spiriva  Does not appear to be in exacerbation  Lungs CTAB/L     - continue home meds     Tobacco dependence syndrome   Assessment & Plan     Currently smokes 1 5-2 packs of cigarettes per day       -  cessation  - nicotine replacement therapy       Hyperlipemia   Assessment & Plan     Currently managed with lipitor 10 mg daily       - continue    Gastroesophageal reflux disease   Assessment & Plan     Currently managed with zantac 150 mg BID     - continue    Anxiety   Assessment & Plan     Currently managed with xanax 0 5 mg daily, trazodone 50 mg qhs       - continue    VTE Prophylaxis: Enoxaparin (Lovenox)  Code Status: No Order  Anticipated Length of Stay:  Patient will be admitted on an Observation basis with an anticipated length of stay of  < than 2 midnights       Justification for Hospital Stay: intractable vomiting  Total Time for Visit, including Counseling / Coordination of Care: 45 mins  Greater than 50% of this total time spent on direct patient counseling and coordination of care        Admission Orders:  Scheduled Meds:   Current Facility-Administered Medications:  albuterol 1 puff Inhalation Q6H PRN     ALPRAZolam 0 5 mg Oral Daily     aluminum-magnesium hydroxide-simethicone 30 mL Oral Q4H PRN     atorvastatin 10 mg Oral Daily     enoxaparin 40 mg Subcutaneous Daily     famotidine 20 mg Oral BID     fluticasone 1 spray Nasal Daily     hydrOXYzine HCL 25 mg Oral HS     loratadine 10 mg Oral Daily     nicotine 1 patch Transdermal Daily     propranolol 10 mg Oral Daily     sodium chloride 0 9 % with KCl 20 mEq/L 100 mL/hr Intravenous Continuous  Last Rate: 100 mL/hr (11/27/18 0101)   tamsulosin 0 4 mg Oral Daily With Dinner     tiotropium 18 mcg Inhalation Daily     traZODone 25 mg Oral HS       Tele   NPO   EKG prn cp   EKG 2nd and 3rd trop   Up w/ assist   PT eval   Serial trop

## 2018-11-27 NOTE — ASSESSMENT & PLAN NOTE
Managed with ventolin PRN, cetirizine 10 mg daily, flonase, and spiriva  Does not appear to be in exacerbation  Lungs CTAB/L    - continue home meds

## 2018-11-27 NOTE — PLAN OF CARE
DISCHARGE PLANNING     Discharge to home or other facility with appropriate resources Completed        INFECTION - ADULT     Absence or prevention of progression during hospitalization Completed        Knowledge Deficit     Patient/family/caregiver demonstrates understanding of disease process, treatment plan, medications, and discharge instructions Completed        PAIN - ADULT     Verbalizes/displays adequate comfort level or baseline comfort level Completed        Potential for Falls     Patient will remain free of falls Completed        Prexisting or High Potential for Compromised Skin Integrity     Skin integrity is maintained or improved Completed        SAFETY ADULT     Maintain or return to baseline ADL function Completed     Maintain or return mobility status to optimal level Completed     Patient will remain free of falls Completed        Patient ordered for discharge

## 2018-11-27 NOTE — PLAN OF CARE
INFECTION - ADULT     Absence of fever/infection during neutropenic period Completed          DISCHARGE PLANNING     Discharge to home or other facility with appropriate resources Progressing        INFECTION - ADULT     Absence or prevention of progression during hospitalization Progressing        Knowledge Deficit     Patient/family/caregiver demonstrates understanding of disease process, treatment plan, medications, and discharge instructions Progressing        PAIN - ADULT     Verbalizes/displays adequate comfort level or baseline comfort level Progressing        Potential for Falls     Patient will remain free of falls Progressing        Prexisting or High Potential for Compromised Skin Integrity     Skin integrity is maintained or improved Progressing        SAFETY ADULT     Maintain or return to baseline ADL function Progressing     Maintain or return mobility status to optimal level Progressing     Patient will remain free of falls Progressing        Continue current plan of care

## 2018-11-28 ENCOUNTER — TRANSITIONAL CARE MANAGEMENT (OUTPATIENT)
Dept: FAMILY MEDICINE CLINIC | Facility: CLINIC | Age: 53
End: 2018-11-28

## 2018-11-28 DIAGNOSIS — J30.2 SEASONAL ALLERGIES: ICD-10-CM

## 2018-11-28 DIAGNOSIS — Z09 FOLLOW UP: ICD-10-CM

## 2018-11-28 LAB
ATRIAL RATE: 59 BPM
P AXIS: 67 DEGREES
PR INTERVAL: 190 MS
QRS AXIS: 12 DEGREES
QRSD INTERVAL: 84 MS
QT INTERVAL: 408 MS
QTC INTERVAL: 403 MS
T WAVE AXIS: 55 DEGREES
VENTRICULAR RATE: 59 BPM

## 2018-11-28 PROCEDURE — 93010 ELECTROCARDIOGRAM REPORT: CPT | Performed by: INTERNAL MEDICINE

## 2018-11-28 RX ORDER — ALPRAZOLAM 0.25 MG/1
0.25 TABLET ORAL 2 TIMES DAILY
Refills: 0 | COMMUNITY
Start: 2018-11-03 | End: 2018-11-28

## 2018-11-28 RX ORDER — ATORVASTATIN CALCIUM 10 MG/1
10 TABLET, FILM COATED ORAL EVERY 24 HOURS
COMMUNITY
Start: 2015-01-13 | End: 2018-11-28

## 2018-11-29 ENCOUNTER — OFFICE VISIT (OUTPATIENT)
Dept: FAMILY MEDICINE CLINIC | Facility: CLINIC | Age: 53
End: 2018-11-29
Payer: COMMERCIAL

## 2018-11-29 VITALS
BODY MASS INDEX: 24.11 KG/M2 | OXYGEN SATURATION: 98 % | RESPIRATION RATE: 16 BRPM | WEIGHT: 150 LBS | TEMPERATURE: 96.5 F | DIASTOLIC BLOOD PRESSURE: 80 MMHG | HEIGHT: 66 IN | SYSTOLIC BLOOD PRESSURE: 120 MMHG | HEART RATE: 80 BPM

## 2018-11-29 DIAGNOSIS — Z09 HOSPITAL DISCHARGE FOLLOW-UP: Primary | ICD-10-CM

## 2018-11-29 PROBLEM — N53.19 DISORDER OF EJACULATION: Status: ACTIVE | Noted: 2018-11-29

## 2018-11-29 PROCEDURE — 99213 OFFICE O/P EST LOW 20 MIN: CPT | Performed by: FAMILY MEDICINE

## 2018-11-29 NOTE — ASSESSMENT & PLAN NOTE
For which he was hospitalized over night for observation  He has a follow-up appointment with Gastroenterology on January 30, 2019  Requested an earlier appointment  I will check with GI for availability    I will also check with [place] to help with transportation    He should continue his Protonix and Zantac

## 2018-11-29 NOTE — ASSESSMENT & PLAN NOTE
Patient was counseled extensively about cessation with Dr Craven  He had multiple attempts to quit in the past but didn't succeed  He tried medications as well but didn't help   Dr Craven suggested behavorial therapy and encouraged the patient to speak with his therapist

## 2018-11-29 NOTE — PROGRESS NOTES
Assessment/Plan:    Gastritis without bleeding  For which he was hospitalized over night for observation  He has a follow-up appointment with Gastroenterology on January 30, 2019  Requested an earlier appointment  I will check with GI for availability  I will also check with [place] to help with transportation    He should continue his Protonix and Zantac    Tobacco dependence syndrome  Patient was counseled extensively about cessation with Dr Craven  He had multiple attempts to quit in the past but didn't succeed  He tried medications as well but didn't help  Dr Craven suggested behavorial therapy and encouraged the patient to speak with his therapist      Disorder of ejaculation  Requesting medications to help  I explained to him that ejaculation dysfucntion could be 2/2 to his psych medications  I will refer him to urology for further evaluation tho       Diagnoses and all orders for this visit:    Hospital discharge follow-up          Subjective:      Patient ID: Joe Braxton is a 52 y o  male  HPI  Tim Crews is here for the follow-up after his hospitalization  He still reports mild chest/epigastric pain but no nausea or vomiting  He is still smoking 2 packs a day  Denies any headache or shortness of breath  Dr Abhijeet Magallanes was present throughout the encounter  Tim Crews was concerned as he has a new sexual partner and is having problems with ejaculation  He was requesting medications to help with his sexual difficulty  The following portions of the patient's history were reviewed and updated as appropriate: allergies, current medications, past family history, past medical history, past social history, past surgical history and problem list     Review of Systems   Constitutional: Negative for chills, fatigue and fever  HENT: Negative for congestion, ear discharge, postnasal drip, sneezing and sore throat  Eyes: Negative for pain and visual disturbance     Respiratory: Negative for cough, chest tightness and shortness of breath  Cardiovascular: Negative for chest pain and palpitations  Gastrointestinal: Positive for abdominal pain  Negative for abdominal distention, blood in stool, diarrhea, nausea and vomiting  Genitourinary: Negative for difficulty urinating, dysuria and flank pain  Musculoskeletal: Negative for arthralgias and joint swelling  Skin: Negative for pallor and rash  Neurological: Negative for dizziness, syncope and headaches  Hematological: Negative for adenopathy  Psychiatric/Behavioral: Negative for agitation and confusion  Objective:      /80 (BP Location: Left arm, Patient Position: Sitting, Cuff Size: Adult)   Pulse 80   Temp (!) 96 5 °F (35 8 °C) (Tympanic)   Resp 16   Ht 5' 6" (1 676 m)   Wt 68 kg (150 lb)   SpO2 98%   BMI 24 21 kg/m²          Physical Exam   Constitutional: He is oriented to person, place, and time  He appears well-developed and well-nourished  No distress  HENT:   Head: Normocephalic and atraumatic  Nose: No rhinorrhea  Eyes: Pupils are equal, round, and reactive to light  Right eye exhibits no discharge  Left eye exhibits no discharge  Right pupil is reactive  Left pupil is reactive  Neck: Normal range of motion  No tracheal deviation present  No thyromegaly present  Cardiovascular: Normal rate, regular rhythm and normal heart sounds  Exam reveals no friction rub  No murmur heard  Pulmonary/Chest: Effort normal  No respiratory distress  He has decreased breath sounds  He has no wheezes  He has no rales  He exhibits no tenderness  Abdominal: He exhibits no distension  There is no tenderness  Musculoskeletal: Normal range of motion  He exhibits no deformity  Neurological: He is alert and oriented to person, place, and time  Skin: Skin is warm  seborrhoic  dermatitis on the face   Vitals reviewed

## 2018-11-29 NOTE — ASSESSMENT & PLAN NOTE
Requesting medications to help  I explained to him that ejaculation dysfucntion could be 2/2 to his psych medications   I will refer him to urology for further evaluation chay

## 2018-12-14 ENCOUNTER — OFFICE VISIT (OUTPATIENT)
Dept: FAMILY MEDICINE CLINIC | Facility: CLINIC | Age: 53
End: 2018-12-14
Payer: COMMERCIAL

## 2018-12-14 VITALS
SYSTOLIC BLOOD PRESSURE: 122 MMHG | DIASTOLIC BLOOD PRESSURE: 78 MMHG | HEART RATE: 88 BPM | RESPIRATION RATE: 16 BRPM | WEIGHT: 151 LBS | HEIGHT: 66 IN | TEMPERATURE: 98.1 F | BODY MASS INDEX: 24.27 KG/M2 | OXYGEN SATURATION: 97 %

## 2018-12-14 DIAGNOSIS — N53.19 ABNORMAL EJACULATION: Primary | ICD-10-CM

## 2018-12-14 DIAGNOSIS — R06.02 SOB (SHORTNESS OF BREATH): ICD-10-CM

## 2018-12-14 DIAGNOSIS — L30.9 DERMATITIS: ICD-10-CM

## 2018-12-14 DIAGNOSIS — F43.29 GRIEF REACTION WITH PROLONGED BEREAVEMENT: ICD-10-CM

## 2018-12-14 PROBLEM — L21.9 SEBORRHEIC DERMATITIS: Status: ACTIVE | Noted: 2018-12-14

## 2018-12-14 PROCEDURE — 99213 OFFICE O/P EST LOW 20 MIN: CPT | Performed by: FAMILY MEDICINE

## 2018-12-14 RX ORDER — KETOCONAZOLE 20 MG/ML
1 SHAMPOO TOPICAL DAILY
Qty: 120 ML | Refills: 0 | Status: SHIPPED | OUTPATIENT
Start: 2018-12-14 | End: 2019-01-08 | Stop reason: SDUPTHER

## 2018-12-14 NOTE — ASSESSMENT & PLAN NOTE
Early ejaculation  This problem is stressing him out and affecting his new relationship    His requesting a urologist referral

## 2018-12-14 NOTE — ASSESSMENT & PLAN NOTE
Stress test was never scheduled    I ordered it again and instructed the patient to call [facility] to have the test done

## 2018-12-14 NOTE — PROGRESS NOTES
Assessment/Plan:    Disorder of ejaculation  Early ejaculation  This problem is stressing him out and affecting his new relationship  His requesting a urologist referral    Shortness of breath  Stress test was never scheduled  I ordered it again and instructed the patient to call [facility] scheduling to have the test done    Seborrheic dermatitis  Will give ketoconazole shampoo       Diagnoses and all orders for this visit:    Abnormal ejaculation  -     Ambulatory referral to Urology; Future    Dermatitis  -     ketoconazole (NIZORAL) 2 % shampoo; Apply 1 application topically daily    SOB (shortness of breath)  -     Echo stress test w contrast if indicated; Future          Subjective:      Patient ID: Billy Andrew is a 52 y o  male  HPI  Paddy Leonardo is here for the follow-up   Aurea is hard for him this year as his partner passed away recently  However he is talking to friends and to me about it and he feels that this is helping him  I also encouraged him to talk to His psychiatrist   He is still concerned about early ejaculation  Also shortness of breath which seems to progress  Sometimes even when he is tying  his shoes      The following portions of the patient's history were reviewed and updated as appropriate: allergies, current medications, past family history, past medical history, past social history, past surgical history and problem list     Review of Systems   Constitutional: Negative for chills, fatigue and fever  HENT: Negative for congestion, ear discharge, postnasal drip, sneezing and sore throat  Eyes: Negative for pain and visual disturbance  Respiratory: Positive for shortness of breath  Negative for cough and chest tightness  Cardiovascular: Negative for chest pain and palpitations  Gastrointestinal: Negative for abdominal distention, abdominal pain, blood in stool, diarrhea, nausea and vomiting     Genitourinary: Negative for difficulty urinating, dysuria and flank pain    Musculoskeletal: Negative for arthralgias and joint swelling  Skin: Positive for rash  Negative for pallor  Neurological: Negative for dizziness, syncope and headaches  Hematological: Negative for adenopathy  Psychiatric/Behavioral: Negative for agitation and confusion  Objective:      /78 (BP Location: Right arm, Patient Position: Sitting, Cuff Size: Standard)   Pulse 88   Temp 98 1 °F (36 7 °C) (Temporal)   Resp 16   Ht 5' 6" (1 676 m)   Wt 68 5 kg (151 lb)   SpO2 97%   BMI 24 37 kg/m²          Physical Exam   Constitutional: He is oriented to person, place, and time  He appears well-developed and well-nourished  No distress  HENT:   Head: Normocephalic and atraumatic  Nose: No rhinorrhea  Mouth/Throat: Oropharynx is clear and moist    Eyes: Pupils are equal, round, and reactive to light  Right eye exhibits no discharge  Left eye exhibits no discharge  Right pupil is reactive  Left pupil is reactive  Neck: Normal range of motion  Neck supple  No tracheal deviation present  No thyromegaly present  Cardiovascular: Normal rate, regular rhythm and normal heart sounds  Exam reveals no friction rub  No murmur heard  Pulmonary/Chest: Effort normal  No respiratory distress  He has decreased breath sounds  He has no wheezes  He has no rales  He exhibits no tenderness  Abdominal: Soft  He exhibits no distension  There is no tenderness  Musculoskeletal: Normal range of motion  He exhibits no deformity  Neurological: He is alert and oriented to person, place, and time  Skin: Skin is warm  seborrhoic  dermatitis on the face   Vitals reviewed

## 2018-12-18 ENCOUNTER — PATIENT OUTREACH (OUTPATIENT)
Dept: FAMILY MEDICINE CLINIC | Facility: CLINIC | Age: 53
End: 2018-12-18

## 2018-12-18 NOTE — PROGRESS NOTES
Pt referred to SW by RN  AZALIA has met with pt previously  Pt's partner passed a few months ago, and pt is having difficulty coming to terms with this  AZALIA had previously arranged a [address] appt with pt  AZALIA called pt and he states that he is not interested in psychosocial support from SW at this time  Pt was provided with SW's contact information, and encouraged to call SW if he changes his mind  SW is available for additional support as needed via order for ambulatory social work care management order

## 2018-12-24 DIAGNOSIS — Z09 FOLLOW UP: ICD-10-CM

## 2018-12-28 DIAGNOSIS — R05.9 COUGH: ICD-10-CM

## 2018-12-30 RX ORDER — GUAIFENESIN 100 MG/5ML
200 SYRUP ORAL 3 TIMES DAILY PRN
Qty: 120 ML | OUTPATIENT
Start: 2018-12-30 | End: 2019-01-09

## 2018-12-30 RX ORDER — LORATADINE 10 MG/1
10 TABLET ORAL DAILY
Qty: 30 TABLET | Refills: 2 | Status: SHIPPED | OUTPATIENT
Start: 2018-12-30 | End: 2019-02-14 | Stop reason: SDUPTHER

## 2018-12-30 RX ORDER — CETIRIZINE HYDROCHLORIDE 10 MG/1
10 TABLET, CHEWABLE ORAL DAILY
Qty: 90 TABLET | Refills: 0 | Status: SHIPPED | OUTPATIENT
Start: 2018-12-30 | End: 2019-02-14 | Stop reason: SDUPTHER

## 2019-01-08 DIAGNOSIS — L30.9 DERMATITIS: ICD-10-CM

## 2019-01-08 RX ORDER — KETOCONAZOLE 20 MG/ML
1 SHAMPOO TOPICAL DAILY
Qty: 120 ML | Refills: 0 | Status: SHIPPED | OUTPATIENT
Start: 2019-01-08 | End: 2019-02-04 | Stop reason: SDUPTHER

## 2019-01-09 ENCOUNTER — HOSPITAL ENCOUNTER (OUTPATIENT)
Dept: NON INVASIVE DIAGNOSTICS | Facility: HOSPITAL | Age: 54
Discharge: HOME | End: 2019-01-09
Payer: COMMERCIAL

## 2019-01-09 DIAGNOSIS — R06.02 SOB (SHORTNESS OF BREATH): ICD-10-CM

## 2019-01-09 PROCEDURE — 93350 STRESS TTE ONLY: CPT

## 2019-01-10 PROCEDURE — 93351 STRESS TTE COMPLETE: CPT | Performed by: INTERNAL MEDICINE

## 2019-01-11 ENCOUNTER — OFFICE VISIT (OUTPATIENT)
Dept: UROLOGY | Facility: CLINIC | Age: 54
End: 2019-01-11
Payer: COMMERCIAL

## 2019-01-11 VITALS
WEIGHT: 152.6 LBS | HEART RATE: 82 BPM | BODY MASS INDEX: 24.53 KG/M2 | DIASTOLIC BLOOD PRESSURE: 78 MMHG | SYSTOLIC BLOOD PRESSURE: 124 MMHG | HEIGHT: 66 IN

## 2019-01-11 DIAGNOSIS — N53.19 ABNORMAL EJACULATION: ICD-10-CM

## 2019-01-11 DIAGNOSIS — N53.19 DISORDER OF EJACULATION: ICD-10-CM

## 2019-01-11 DIAGNOSIS — Z12.5 PROSTATE CANCER SCREENING: ICD-10-CM

## 2019-01-11 DIAGNOSIS — N40.1 BENIGN PROSTATIC HYPERPLASIA WITH LOWER URINARY TRACT SYMPTOMS, SYMPTOM DETAILS UNSPECIFIED: Primary | ICD-10-CM

## 2019-01-11 LAB
CHEST PAIN STATEMENT: NORMAL
MAX DIASTOLIC BP: 60 MMHG
MAX HEART RATE: 137 BPM
MAX PREDICTED HEART RATE: 165 BPM
MAX. SYSTOLIC BP: 140 MMHG
PROTOCOL NAME: NORMAL
REASON FOR TERMINATION: NORMAL
TARGET HR FORMULA: NORMAL
TEST INDICATION: NORMAL
TIME IN EXERCISE PHASE: NORMAL

## 2019-01-11 PROCEDURE — 99204 OFFICE O/P NEW MOD 45 MIN: CPT | Performed by: UROLOGY

## 2019-01-11 RX ORDER — PAROXETINE HYDROCHLORIDE 20 MG/1
20 TABLET, FILM COATED ORAL AS NEEDED
Qty: 30 TABLET | Refills: 1 | Status: SHIPPED | OUTPATIENT
Start: 2019-01-11 | End: 2019-02-04 | Stop reason: SDUPTHER

## 2019-01-11 NOTE — PATIENT INSTRUCTIONS
Benign Prostatic Hypertrophy   WHAT YOU NEED TO KNOW:   Benign prostatic hypertrophy (BPH) is a condition that causes your prostate gland to grow larger than normal  The prostate gland is the male sex gland that produces a fluid that is part of semen  It is about the size of a walnut and it is located under the bladder  As the prostate grows, it can squeeze the urethra  This can block urine flow and cause urinary problems  DISCHARGE INSTRUCTIONS:   Medicines:   · Alpha blockers: This medicine relaxes the muscles in your prostate and bladder  It may help you urinate more easily  · 5 alpha reductase inhibitors: These medicines block the production of a hormone that causes the prostate to get larger  It may help slow the growth of the prostate or shrink the prostate  · Take your medicine as directed  Contact your healthcare provider if you think your medicine is not helping or if you have side effects  Tell him or her if you are allergic to any medicine  Keep a list of the medicines, vitamins, and herbs you take  Include the amounts, and when and why you take them  Bring the list or the pill bottles to follow-up visits  Carry your medicine list with you in case of an emergency  Follow up with your healthcare provider as directed:  Write down your questions so you remember to ask them during your visits  Manage BPH:   · Do not let your bladder get too full before you empty it  Urinate when you feel the urge  · Limit alcohol  Do not drink large amounts of any liquid at one time  · Decrease the amount of salt you eat  Examples of salty foods are chips, cured meats, and canned soups  Do not use table salt  · Healthcare providers may tell you not to eat spicy foods such as chilli peppers  This may help you find out if spicy food makes your BPH symptoms worse  · You may have sex if you feel well  Contact your healthcare provider if:   · There is a large amount of blood in your urine  · Your signs and symptoms get worse  · You have a fever  · You have questions or concerns about your condition or care  Seek care immediately if:   · You are unable to urinate  · Your bladder feels very full and painful  © 2017 [employer] Information is for End User's use only and may not be sold, redistributed or otherwise used for commercial purposes  All illustrations and images included in CareNotes® are the copyrighted property of [employer]  or [employer]  The above information is an  only  It is not intended as medical advice for individual conditions or treatments  Talk to your doctor, nurse or pharmacist before following any medical regimen to see if it is safe and effective for you

## 2019-01-11 NOTE — PROGRESS NOTES
UROLOGY NEW CONSULT NOTE     CHIEF COMPLAINT   Bhanu Waite is a 52 y o  male with a complaint of   Chief Complaint   Patient presents with    abnormal ejaculation       History of Present Illness:     52 y o  mal e with urinary frequency and urgency  Patient was started on Flomax by his primary care team   This has slightly improved things  Patient denies constipation  He also has an issue with premature ejaculation which is very bothersome  He reports being able to obtain normal erections but typically ejaculating prior to penetration  The patient has a family history of prostate cancer in his uncle    No recent PSA or digital rectal test    Past Medical History:     Past Medical History:   Diagnosis Date    Anxiety     COPD (chronic obstructive pulmonary disease) ([facility] [address] )     Depression     GERD (gastroesophageal reflux disease)     Hyperlipidemia        PAST SURGICAL HISTORY:     Past Surgical History:   Procedure Laterality Date    FRACTURE SURGERY      ORIF    HERNIA REPAIR      SKIN GRAFT         CURRENT MEDICATIONS:     Current Outpatient Prescriptions   Medication Sig Dispense Refill    ALPRAZolam (XANAX) 0 5 mg tablet Take 1 tablet (0 5 mg total) by mouth daily  0    atorvastatin (LIPITOR) 10 mg tablet Take 1 tablet (10 mg total) by mouth daily 90 tablet 0    cetirizine (ZyrTEC) 10 MG chewable tablet Chew 1 tablet (10 mg total) daily 90 tablet 0    fluticasone (FLONASE) 50 mcg/act nasal spray 1 spray into each nostril daily 1 Bottle 2    hydrOXYzine HCL (ATARAX) 25 mg tablet Take 1 tablet (25 mg total) by mouth daily at bedtime 90 tablet 0    ketoconazole (NIZORAL) 2 % shampoo Apply 1 application topically daily 120 mL 0    loratadine (CLARITIN) 10 mg tablet Take 1 tablet (10 mg total) by mouth daily 30 tablet 2    pantoprazole (PROTONIX) 40 mg tablet Take 1 tablet (40 mg total) by mouth daily 30 tablet 0    PROAIR  (90 Base) MCG/ACT inhaler Inhale 1 puff every 6 (six) hours as needed for wheezing 1 Inhaler 1    propranolol (INDERAL) 10 mg tablet Take 1 tab BID      ranitidine (ZANTAC) 150 MG capsule Take 1 capsule (150 mg total) by mouth 2 (two) times a day 180 capsule 0    tamsulosin (FLOMAX) 0 4 mg Take 1 capsule (0 4 mg total) by mouth daily with dinner 90 capsule 0    tiotropium (SPIRIVA HANDIHALER) 18 mcg inhalation capsule Place 18 mcg into inhaler and inhale daily        tiotropium (SPIRIVA HANDIHALER) 18 mcg inhalation capsule Place 18 mcg into inhaler and inhale as needed      traZODone (DESYREL) 50 mg tablet Take 0 5 tablets (25 mg total) by mouth daily at bedtime  0    PARoxetine (PAXIL) 20 mg tablet Take 1 tablet (20 mg total) by mouth as needed (sexual dysfunction) 30 tablet 1     No current facility-administered medications for this visit  ALLERGIES:   No Known Allergies    SOCIAL HISTORY:     Social History     Social History    Marital status: Single     Spouse name: N/A    Number of children: N/A    Years of education: N/A     Social History Main Topics    Smoking status: Heavy Tobacco Smoker     Packs/day: 2 00     Types: Cigarettes    Smokeless tobacco: Never Used    Alcohol use Yes      Comment: socialy    Drug use: No    Sexual activity: Not Asked     Other Topics Concern    None     Social History Narrative    None       SOCIAL HISTORY:   History reviewed  No pertinent family history  REVIEW OF SYSTEMS:     Review of Systems   Constitutional: Negative  Respiratory: Negative  Cardiovascular: Negative  Gastrointestinal: Negative  Negative for constipation  Genitourinary: Positive for difficulty urinating, frequency and urgency  Musculoskeletal: Negative  Skin: Negative  Psychiatric/Behavioral: Negative  PHYSICAL EXAM:     There were no vitals taken for this visit  General:  Healthy appearing male in no acute distress  They have a normal affect    There is not appear to be any gross neurologic defects or abnormalities  HEENT:  Normocephalic, atraumatic  Neck is supple without any palpable lymphadenopathy  Cardiovascular:  Patient has normal palpable distal radial pulses  There is no significant peripheral edema  No JVD is noted  Respiratory:  Patient has unlabored respirations  There is no audible wheeze or rhonchi  Abdomen:  Abdomen with healed surgical scars  Abdomen is soft and nontender  There is no tympany  Inguinal and umbilical hernia are not appreciated, prior inguinal hernia scars    Genitourinary:  Circumcised phallus, orthotopic meatus, testicles descended and smooth without nodules  Digital rectal exam is small and smooth with no induration or abnormality    Musculoskeletal:  Patient does not have significant CVA tenderness in the  flank with palpation or percussion  They full range of motion in all 4 extremities  Strength in all 4 extremities appears congruent  Patient is able to ambulate without assistance or difficulty  Dermatologic:  Patient has no skin abnormalities or rashes  LABS:     CBC:   Lab Results   Component Value Date    WBC 6 10 11/27/2018    HGB 12 1 (L) 11/27/2018    HCT 36 9 (L) 11/27/2018    MCV 92 11/27/2018     11/27/2018       BMP:   Lab Results   Component Value Date    GLUCOSE 93 08/17/2014    CALCIUM 8 3 (L) 11/27/2018     (L) 08/17/2014    K 4 0 11/27/2018    CO2 25 11/27/2018     11/27/2018    BUN 16 11/27/2018    CREATININE 0 89 11/27/2018       ASSESSMENT:     52 y o  male with urinary urgency and premature ejaculation with need for updated prostate cancer screening    PLAN:     Digital rectal exam is normal   Patient will obtain PSA in 2 weeks  Patient has urinary symptoms that are not well enough controlled on Flomax  I may consider adding an anticholinergic or Myrbetriq but I would 1st like to make sure the patient empties his bladder    He will go for a set of urine studies and a renal bladder ultrasound with postvoid residual     For his premature ejaculation, I recommended Paxil 20 mg 1 hour prior to intercourse     Return to clinic in 3 months to review

## 2019-01-18 DIAGNOSIS — K21.9 GASTROESOPHAGEAL REFLUX DISEASE WITHOUT ESOPHAGITIS: ICD-10-CM

## 2019-01-18 DIAGNOSIS — Z09 FOLLOW UP: ICD-10-CM

## 2019-01-18 DIAGNOSIS — N40.0 BENIGN PROSTATIC HYPERPLASIA WITHOUT LOWER URINARY TRACT SYMPTOMS: ICD-10-CM

## 2019-01-18 DIAGNOSIS — J30.2 SEASONAL ALLERGIES: ICD-10-CM

## 2019-01-24 RX ORDER — RANITIDINE 150 MG/1
150 CAPSULE ORAL 2 TIMES DAILY
Qty: 180 CAPSULE | Refills: 0 | Status: SHIPPED | OUTPATIENT
Start: 2019-01-24 | End: 2019-02-14 | Stop reason: SDUPTHER

## 2019-01-24 RX ORDER — ATORVASTATIN CALCIUM 10 MG/1
10 TABLET, FILM COATED ORAL DAILY
Qty: 90 TABLET | Refills: 0 | Status: SHIPPED | OUTPATIENT
Start: 2019-01-24 | End: 2019-02-14 | Stop reason: SDUPTHER

## 2019-01-24 RX ORDER — HYDROXYZINE HYDROCHLORIDE 25 MG/1
25 TABLET, FILM COATED ORAL
Qty: 90 TABLET | Refills: 0 | Status: SHIPPED | OUTPATIENT
Start: 2019-01-24 | End: 2019-04-19 | Stop reason: SDUPTHER

## 2019-01-24 RX ORDER — TAMSULOSIN HYDROCHLORIDE 0.4 MG/1
0.4 CAPSULE ORAL
Qty: 90 CAPSULE | Refills: 0 | Status: SHIPPED | OUTPATIENT
Start: 2019-01-24 | End: 2019-02-14 | Stop reason: SDUPTHER

## 2019-01-30 ENCOUNTER — OFFICE VISIT (OUTPATIENT)
Dept: GASTROENTEROLOGY | Facility: MEDICAL CENTER | Age: 54
End: 2019-01-30
Payer: COMMERCIAL

## 2019-01-30 VITALS
BODY MASS INDEX: 24.91 KG/M2 | HEIGHT: 66 IN | SYSTOLIC BLOOD PRESSURE: 132 MMHG | DIASTOLIC BLOOD PRESSURE: 78 MMHG | TEMPERATURE: 97.3 F | WEIGHT: 155 LBS | HEART RATE: 87 BPM

## 2019-01-30 DIAGNOSIS — K21.9 GASTROESOPHAGEAL REFLUX DISEASE WITHOUT ESOPHAGITIS: ICD-10-CM

## 2019-01-30 PROCEDURE — 99204 OFFICE O/P NEW MOD 45 MIN: CPT | Performed by: INTERNAL MEDICINE

## 2019-01-30 NOTE — LETTER
January 30, 2019     Ronaldo Melo MD  [address] [place], [place] [address]    Patient: Arnoldo Cerna   YOB: 1965   Date of Visit: 1/30/2019       Dear Dr Jared Viera: Thank you for referring Jazmyn Omalley to me for evaluation  Below are my notes for this consultation  If you have questions, please do not hesitate to call me  I look forward to following your patient along with you  Sincerely,        Lakesha Muhammad MD        CC: No Recipients  Lakesha Muhammad MD  1/30/2019 11:00 PM  Sign at close encounter  [facility] - Outpatient Consultation  Arnoldo Cerna 52 y o  male MRN: [id number]  Encounter: [id number]          ASSESSMENT AND PLAN:      1  Gastroesophageal reflux disease without esophagitis  Patient has long-time history of acid reflux  Patient has been compliant with PPI but still with worsening of symptoms  Plan for upper endoscopy to rule out esophagitis, Naranjo's esophagus  2  Colon cancer screening  I discussed with the patient regarding colon cancer screening as he is due for his repeat colonoscopy  He prefers to focus on acid reflux for now and doing a colonoscopy after he feels improvement for his acid reflux   ______________________________________________________________________    HPI:      49-year-old man referred for evaluation of acid reflux  Patient reported he has been having acid reflux for many years and he underwent an upper endoscopy 10 years ago with negative findings  He reported worsening of his symptoms in the past 2 months  He reported intermittent nausea vomiting after he feels heartburn after eating pizza sauce  He reported regular appetite  Denies overt weight loss  He actually gained some weight in the past few years  He denies family history of esophageal cancer     Patient had recent cardiac workup was negative findings for CAD on stress test     He underwent an colonoscopy 10 years ago with findings of polyp  He had another colonoscopy done 1 year after with negative findings according to him  REVIEW OF SYSTEMS:    CONSTITUTIONAL: Denies any fever, chills, rigors, and weight loss  HEENT: No earache or tinnitus  Denies hearing loss or visual disturbances  CARDIOVASCULAR: No chest pain or palpitations  RESPIRATORY: Denies any cough, hemoptysis, shortness of breath or dyspnea on exertion  GASTROINTESTINAL: As noted in the History of Present Illness  GENITOURINARY: No problems with urination  Denies any hematuria or dysuria  NEUROLOGIC: No dizziness or vertigo, denies headaches  MUSCULOSKELETAL: Denies any muscle or joint pain  SKIN: Denies skin rashes or itching  ENDOCRINE: Denies excessive thirst  Denies intolerance to heat or cold  PSYCHOSOCIAL: Denies depression or anxiety  Denies any recent memory loss  Historical Information   Past Medical History:   Diagnosis Date    Anxiety     COPD (chronic obstructive pulmonary disease) ([facility] [address] )     Depression     GERD (gastroesophageal reflux disease)     Hyperlipidemia      Past Surgical History:   Procedure Laterality Date    FRACTURE SURGERY      ORIF    HERNIA REPAIR      SKIN GRAFT       Social History   History   Alcohol Use    Yes     Comment: socialy     History   Drug Use No     History   Smoking Status    Heavy Tobacco Smoker    Packs/day: 2 00    Types: Cigarettes   Smokeless Tobacco    Never Used     No family history on file      Meds/Allergies       Current Outpatient Prescriptions:     ALPRAZolam (XANAX) 0 5 mg tablet    atorvastatin (LIPITOR) 10 mg tablet    cetirizine (ZyrTEC) 10 MG chewable tablet    fluticasone (FLONASE) 50 mcg/act nasal spray    hydrOXYzine HCL (ATARAX) 25 mg tablet    ketoconazole (NIZORAL) 2 % shampoo    loratadine (CLARITIN) 10 mg tablet    pantoprazole (PROTONIX) 40 mg tablet    PARoxetine (PAXIL) 20 mg tablet    PROAIR  (90 Base) MCG/ACT inhaler    propranolol (INDERAL) 10 mg tablet    ranitidine (ZANTAC) 150 MG capsule    tamsulosin (FLOMAX) 0 4 mg    tiotropium (SPIRIVA HANDIHALER) 18 mcg inhalation capsule    tiotropium (SPIRIVA HANDIHALER) 18 mcg inhalation capsule    traZODone (DESYREL) 50 mg tablet    No Known Allergies        Objective     Blood pressure 132/78, pulse 87, temperature (!) 97 3 °F (36 3 °C), temperature source Tympanic, height 5' 6" (1 676 m), weight 70 3 kg (155 lb)  Body mass index is 25 02 kg/m²  PHYSICAL EXAM:      General Appearance:   Alert, cooperative, no distress   HEENT:   Normocephalic, atraumatic, anicteric      Neck:  Supple, symmetrical, trachea midline   Lungs:   Clear to auscultation bilaterally; no rales, rhonchi or wheezing; respirations unlabored    Heart[de-identified]   Regular rate and rhythm; no murmur, rub, or gallop  Abdomen:   Soft, non-tender, non-distended; normal bowel sounds; no masses, no organomegaly    Genitalia:   Deferred    Rectal:   Deferred    Extremities:  No cyanosis, clubbing or edema    Pulses:  2+ and symmetric    Skin:  No jaundice, rashes, or lesions    Lymph nodes:  No palpable cervical lymphadenopathy        Lab Results:   No visits with results within 1 Day(s) from this visit  Latest known visit with results is:   Hospital Outpatient Visit on 01/09/2019   Component Date Value    Protocol Name 01/09/2019 ECHO MELISA     Time In Exercise Phase 01/09/2019 00:10:10     MAX  SYSTOLIC BP 63/99/2500 746     Max Diastolic Bp 30/44/8749 60     Max Heart Rate 01/09/2019 137     Max Predicted Heart Rate 01/09/2019 165     Reason for Termination 01/09/2019 Fatigue     Test Indication 01/09/2019 Dyspnea     Target Hr Formular 01/09/2019 (220 - Age)*85%     Chest Pain Statement 01/09/2019 none          Radiology Results:   No results found

## 2019-01-30 NOTE — PROGRESS NOTES
[facility] - Outpatient Consultation  Jed Rocha 52 y o  male MRN: [id number]  Encounter: [id number]          ASSESSMENT AND PLAN:      1  Gastroesophageal reflux disease without esophagitis  Patient has long-time history of acid reflux  Patient has been compliant with PPI but still with worsening of symptoms  Plan for upper endoscopy to rule out esophagitis, Naranjo's esophagus  2  Colon cancer screening  I discussed with the patient regarding colon cancer screening as he is due for his repeat colonoscopy  He prefers to focus on acid reflux for now and doing a colonoscopy after he feels improvement for his acid reflux   ______________________________________________________________________    HPI:      56-year-old man referred for evaluation of acid reflux  Patient reported he has been having acid reflux for many years and he underwent an upper endoscopy 10 years ago with negative findings  He reported worsening of his symptoms in the past 2 months  He reported intermittent nausea vomiting after he feels heartburn after eating pizza sauce  He reported regular appetite  Denies overt weight loss  He actually gained some weight in the past few years  He denies family history of esophageal cancer  Patient had recent cardiac workup was negative findings for CAD on stress test     He underwent an colonoscopy 10 years ago with findings of polyp  He had another colonoscopy done 1 year after with negative findings according to him  REVIEW OF SYSTEMS:    CONSTITUTIONAL: Denies any fever, chills, rigors, and weight loss  HEENT: No earache or tinnitus  Denies hearing loss or visual disturbances  CARDIOVASCULAR: No chest pain or palpitations  RESPIRATORY: Denies any cough, hemoptysis, shortness of breath or dyspnea on exertion  GASTROINTESTINAL: As noted in the History of Present Illness  GENITOURINARY: No problems with urination   Denies any hematuria or dysuria  NEUROLOGIC: No dizziness or vertigo, denies headaches  MUSCULOSKELETAL: Denies any muscle or joint pain  SKIN: Denies skin rashes or itching  ENDOCRINE: Denies excessive thirst  Denies intolerance to heat or cold  PSYCHOSOCIAL: Denies depression or anxiety  Denies any recent memory loss  Historical Information   Past Medical History:   Diagnosis Date    Anxiety     COPD [facility]ch[facility] disease) ([facility] [address] )     Depression     GERD (gastroesophageal reflux disease)     Hyperlipidemia      Past Surgical History:   Procedure Laterality Date    FRACTURE SURGERY      ORIF    HERNIA REPAIR      SKIN GRAFT       Social History   History   Alcohol Use    Yes     Comment: socialy     History   Drug Use No     History   Smoking Status    Heavy Tobacco Smoker    Packs/day: 2 00    Types: Cigarettes   Smokeless Tobacco    Never Used     No family history on file  Meds/Allergies       Current Outpatient Prescriptions:     ALPRAZolam (XANAX) 0 5 mg tablet    atorvastatin (LIPITOR) 10 mg tablet    cetirizine (ZyrTEC) 10 MG chewable tablet    fluticasone (FLONASE) 50 mcg/act nasal spray    hydrOXYzine HCL (ATARAX) 25 mg tablet    ketoconazole (NIZORAL) 2 % shampoo    loratadine (CLARITIN) 10 mg tablet    pantoprazole (PROTONIX) 40 mg tablet    PARoxetine (PAXIL) 20 mg tablet    PROAIR  (90 Base) MCG/ACT inhaler    propranolol (INDERAL) 10 mg tablet    ranitidine (ZANTAC) 150 MG capsule    tamsulosin (FLOMAX) 0 4 mg    tiotropium (SPIRIVA HANDIHALER) 18 mcg inhalation capsule    tiotropium (SPIRIVA HANDIHALER) 18 mcg inhalation capsule    traZODone (DESYREL) 50 mg tablet    No Known Allergies        Objective     Blood pressure 132/78, pulse 87, temperature (!) 97 3 °F (36 3 °C), temperature source Tympanic, height 5' 6" (1 676 m), weight 70 3 kg (155 lb)  Body mass index is 25 02 kg/m²          PHYSICAL EXAM:      General Appearance:   Alert, cooperative, no distress   HEENT:   Normocephalic, atraumatic, anicteric      Neck:  Supple, symmetrical, trachea midline   Lungs:   Clear to auscultation bilaterally; no rales, rhonchi or wheezing; respirations unlabored    Heart[de-identified]   Regular rate and rhythm; no murmur, rub, or gallop  Abdomen:   Soft, non-tender, non-distended; normal bowel sounds; no masses, no organomegaly    Genitalia:   Deferred    Rectal:   Deferred    Extremities:  No cyanosis, clubbing or edema    Pulses:  2+ and symmetric    Skin:  No jaundice, rashes, or lesions    Lymph nodes:  No palpable cervical lymphadenopathy        Lab Results:   No visits with results within 1 Day(s) from this visit  Latest known visit with results is:   Hospital Outpatient Visit on 01/09/2019   Component Date Value    Protocol Name 01/09/2019 ECHO MELISA     Time In Exercise Phase 01/09/2019 00:10:10     MAX  SYSTOLIC BP 88/37/7955 621     Max Diastolic Bp 21/36/8106 60     Max Heart Rate 01/09/2019 137     Max Predicted Heart Rate 01/09/2019 165     Reason for Termination 01/09/2019 Fatigue     Test Indication 01/09/2019 Dyspnea     Target Hr Formular 01/09/2019 (220 - Age)*85%     Chest Pain Statement 01/09/2019 none          Radiology Results:   No results found

## 2019-02-04 ENCOUNTER — HOSPITAL ENCOUNTER (OUTPATIENT)
Dept: ULTRASOUND IMAGING | Facility: HOSPITAL | Age: 54
Discharge: HOME | End: 2019-02-04
Attending: INTERNAL MEDICINE
Payer: COMMERCIAL

## 2019-02-04 DIAGNOSIS — N53.19 DISORDER OF EJACULATION: ICD-10-CM

## 2019-02-04 DIAGNOSIS — K21.9 GASTROESOPHAGEAL REFLUX DISEASE WITHOUT ESOPHAGITIS: ICD-10-CM

## 2019-02-04 DIAGNOSIS — L30.9 DERMATITIS: ICD-10-CM

## 2019-02-04 PROCEDURE — 76705 ECHO EXAM OF ABDOMEN: CPT

## 2019-02-04 RX ORDER — PAROXETINE HYDROCHLORIDE 20 MG/1
20 TABLET, FILM COATED ORAL AS NEEDED
Qty: 30 TABLET | Refills: 6 | Status: SHIPPED | OUTPATIENT
Start: 2019-02-04 | End: 2019-07-15 | Stop reason: SDUPTHER

## 2019-02-04 RX ORDER — KETOCONAZOLE 20 MG/ML
1 SHAMPOO TOPICAL DAILY
Qty: 120 ML | Refills: 0 | Status: SHIPPED | OUTPATIENT
Start: 2019-02-04 | End: 2019-03-01 | Stop reason: SDUPTHER

## 2019-02-07 ENCOUNTER — HOSPITAL ENCOUNTER (OUTPATIENT)
Dept: ULTRASOUND IMAGING | Facility: HOSPITAL | Age: 54
Discharge: HOME | End: 2019-02-07
Payer: COMMERCIAL

## 2019-02-07 ENCOUNTER — APPOINTMENT (OUTPATIENT)
Dept: LAB | Facility: HOSPITAL | Age: 54
End: 2019-02-07
Payer: COMMERCIAL

## 2019-02-07 ENCOUNTER — TRANSCRIBE ORDERS (OUTPATIENT)
Dept: ADMINISTRATIVE | Facility: HOSPITAL | Age: 54
End: 2019-02-07

## 2019-02-07 DIAGNOSIS — E78.5 HYPERLIPIDEMIA, UNSPECIFIED HYPERLIPIDEMIA TYPE: ICD-10-CM

## 2019-02-07 DIAGNOSIS — N40.1 BENIGN PROSTATIC HYPERPLASIA WITH LOWER URINARY TRACT SYMPTOMS, SYMPTOM DETAILS UNSPECIFIED: ICD-10-CM

## 2019-02-07 LAB
ALBUMIN SERPL BCP-MCNC: 4.5 G/DL (ref 3–5.2)
ALP SERPL-CCNC: 71 U/L (ref 43–122)
ALT SERPL W P-5'-P-CCNC: 24 U/L (ref 9–52)
ANION GAP SERPL CALCULATED.3IONS-SCNC: 8 MMOL/L (ref 5–14)
AST SERPL W P-5'-P-CCNC: 17 U/L (ref 17–59)
BACTERIA UR QL AUTO: ABNORMAL /HPF
BASOPHILS # BLD AUTO: 0 THOUSANDS/ΜL (ref 0–0.1)
BASOPHILS NFR BLD AUTO: 0 % (ref 0–1)
BILIRUB SERPL-MCNC: 0.6 MG/DL
BILIRUB UR QL STRIP: NEGATIVE
BUN SERPL-MCNC: 17 MG/DL (ref 5–25)
CALCIUM SERPL-MCNC: 9.3 MG/DL (ref 8.4–10.2)
CHLORIDE SERPL-SCNC: 102 MMOL/L (ref 97–108)
CHOLEST SERPL-MCNC: 156 MG/DL
CLARITY UR: CLEAR
CO2 SERPL-SCNC: 27 MMOL/L (ref 22–30)
COLOR UR: ABNORMAL
CREAT SERPL-MCNC: 0.9 MG/DL (ref 0.7–1.5)
EOSINOPHIL # BLD AUTO: 0.2 THOUSAND/ΜL (ref 0–0.4)
EOSINOPHIL NFR BLD AUTO: 3 % (ref 0–6)
ERYTHROCYTE [DISTWIDTH] IN BLOOD BY AUTOMATED COUNT: 13.3 %
GFR SERPL CREATININE-BSD FRML MDRD: 96 ML/MIN/1.73SQ M
GLUCOSE P FAST SERPL-MCNC: 97 MG/DL (ref 70–99)
GLUCOSE UR STRIP-MCNC: NEGATIVE MG/DL
HCT VFR BLD AUTO: 43.3 % (ref 41–53)
HDLC SERPL-MCNC: 50 MG/DL (ref 40–59)
HGB BLD-MCNC: 14.4 G/DL (ref 13.5–17.5)
HGB UR QL STRIP.AUTO: NEGATIVE
KETONES UR STRIP-MCNC: NEGATIVE MG/DL
LDLC SERPL CALC-MCNC: 89 MG/DL
LEUKOCYTE ESTERASE UR QL STRIP: NEGATIVE
LYMPHOCYTES # BLD AUTO: 1.4 THOUSANDS/ΜL (ref 0.5–4)
LYMPHOCYTES NFR BLD AUTO: 23 % (ref 25–45)
MCH RBC QN AUTO: 30.2 PG (ref 26–34)
MCHC RBC AUTO-ENTMCNC: 33.3 G/DL (ref 31–36)
MCV RBC AUTO: 91 FL (ref 80–100)
MONOCYTES # BLD AUTO: 0.5 THOUSAND/ΜL (ref 0.2–0.9)
MONOCYTES NFR BLD AUTO: 9 % (ref 1–10)
NEUTROPHILS # BLD AUTO: 3.9 THOUSANDS/ΜL (ref 1.8–7.8)
NEUTS SEG NFR BLD AUTO: 64 % (ref 45–65)
NITRITE UR QL STRIP: NEGATIVE
NON-SQ EPI CELLS URNS QL MICRO: ABNORMAL /HPF
NONHDLC SERPL-MCNC: 106 MG/DL
PH UR STRIP.AUTO: 7 [PH] (ref 4.5–8)
PLATELET # BLD AUTO: 242 THOUSANDS/UL (ref 150–450)
PMV BLD AUTO: 8.4 FL (ref 8.9–12.7)
POTASSIUM SERPL-SCNC: 4.6 MMOL/L (ref 3.6–5)
PROT SERPL-MCNC: 7.5 G/DL (ref 5.9–8.4)
PROT UR STRIP-MCNC: NEGATIVE MG/DL
RBC # BLD AUTO: 4.78 MILLION/UL (ref 4.5–5.9)
RBC #/AREA URNS AUTO: ABNORMAL /HPF
SODIUM SERPL-SCNC: 137 MMOL/L (ref 137–147)
SP GR UR STRIP.AUTO: 1.01 (ref 1–1.04)
TRIGL SERPL-MCNC: 86 MG/DL
UROBILINOGEN UA: NEGATIVE MG/DL
WBC # BLD AUTO: 6 THOUSAND/UL (ref 4.5–11)
WBC #/AREA URNS AUTO: ABNORMAL /HPF

## 2019-02-07 PROCEDURE — 81001 URINALYSIS AUTO W/SCOPE: CPT | Performed by: UROLOGY

## 2019-02-07 PROCEDURE — 80061 LIPID PANEL: CPT

## 2019-02-07 PROCEDURE — 51798 US URINE CAPACITY MEASURE: CPT

## 2019-02-07 PROCEDURE — 85025 COMPLETE CBC W/AUTO DIFF WBC: CPT

## 2019-02-07 PROCEDURE — 36415 COLL VENOUS BLD VENIPUNCTURE: CPT

## 2019-02-07 PROCEDURE — 80053 COMPREHEN METABOLIC PANEL: CPT

## 2019-02-14 ENCOUNTER — HOSPITAL ENCOUNTER (OUTPATIENT)
Dept: RADIOLOGY | Facility: HOSPITAL | Age: 54
Discharge: HOME | End: 2019-02-14
Payer: COMMERCIAL

## 2019-02-14 ENCOUNTER — OFFICE VISIT (OUTPATIENT)
Dept: FAMILY MEDICINE CLINIC | Facility: CLINIC | Age: 54
End: 2019-02-14

## 2019-02-14 VITALS
BODY MASS INDEX: 25.02 KG/M2 | TEMPERATURE: 97.9 F | WEIGHT: 155 LBS | DIASTOLIC BLOOD PRESSURE: 76 MMHG | SYSTOLIC BLOOD PRESSURE: 132 MMHG | RESPIRATION RATE: 16 BRPM | HEART RATE: 97 BPM | OXYGEN SATURATION: 97 %

## 2019-02-14 DIAGNOSIS — Z09 FOLLOW UP: ICD-10-CM

## 2019-02-14 DIAGNOSIS — J42 CHRONIC BRONCHITIS, UNSPECIFIED CHRONIC BRONCHITIS TYPE (HCC): ICD-10-CM

## 2019-02-14 DIAGNOSIS — M54.2 NECK PAIN: Primary | ICD-10-CM

## 2019-02-14 DIAGNOSIS — J30.2 SEASONAL ALLERGIES: ICD-10-CM

## 2019-02-14 DIAGNOSIS — N40.0 BENIGN PROSTATIC HYPERPLASIA WITHOUT LOWER URINARY TRACT SYMPTOMS: ICD-10-CM

## 2019-02-14 DIAGNOSIS — M54.2 NECK PAIN: ICD-10-CM

## 2019-02-14 DIAGNOSIS — K21.9 GASTROESOPHAGEAL REFLUX DISEASE WITHOUT ESOPHAGITIS: ICD-10-CM

## 2019-02-14 DIAGNOSIS — K21.9 GASTROESOPHAGEAL REFLUX DISEASE, ESOPHAGITIS PRESENCE NOT SPECIFIED: ICD-10-CM

## 2019-02-14 PROCEDURE — 72050 X-RAY EXAM NECK SPINE 4/5VWS: CPT

## 2019-02-14 PROCEDURE — 99213 OFFICE O/P EST LOW 20 MIN: CPT | Performed by: FAMILY MEDICINE

## 2019-02-14 RX ORDER — LORATADINE 10 MG/1
10 TABLET ORAL DAILY
Qty: 30 TABLET | Refills: 2 | Status: SHIPPED | OUTPATIENT
Start: 2019-02-14 | End: 2019-05-20 | Stop reason: SDUPTHER

## 2019-02-14 RX ORDER — RANITIDINE 150 MG/1
150 CAPSULE ORAL 2 TIMES DAILY
Qty: 180 CAPSULE | Refills: 0 | Status: SHIPPED | OUTPATIENT
Start: 2019-02-14 | End: 2019-04-09 | Stop reason: SDUPTHER

## 2019-02-14 RX ORDER — TIZANIDINE HYDROCHLORIDE 4 MG/1
4 CAPSULE, GELATIN COATED ORAL 3 TIMES DAILY
Qty: 30 CAPSULE | Refills: 0 | Status: ON HOLD | OUTPATIENT
Start: 2019-02-14 | End: 2019-03-01

## 2019-02-14 RX ORDER — TAMSULOSIN HYDROCHLORIDE 0.4 MG/1
0.4 CAPSULE ORAL
Qty: 90 CAPSULE | Refills: 0 | Status: SHIPPED | OUTPATIENT
Start: 2019-02-14 | End: 2019-04-11 | Stop reason: SDUPTHER

## 2019-02-14 RX ORDER — CETIRIZINE HYDROCHLORIDE 10 MG/1
10 TABLET, CHEWABLE ORAL DAILY
Qty: 90 TABLET | Refills: 0 | Status: SHIPPED | OUTPATIENT
Start: 2019-02-14 | End: 2019-05-20

## 2019-02-14 RX ORDER — ATORVASTATIN CALCIUM 10 MG/1
10 TABLET, FILM COATED ORAL DAILY
Qty: 90 TABLET | Refills: 0 | Status: SHIPPED | OUTPATIENT
Start: 2019-02-14 | End: 2019-05-20 | Stop reason: SDUPTHER

## 2019-02-14 RX ORDER — ALBUTEROL SULFATE 90 UG/1
1 AEROSOL, METERED RESPIRATORY (INHALATION) EVERY 6 HOURS PRN
Qty: 1 INHALER | Refills: 1 | Status: SHIPPED | OUTPATIENT
Start: 2019-02-14 | End: 2019-04-19 | Stop reason: SDUPTHER

## 2019-02-14 RX ORDER — FLUTICASONE PROPIONATE 50 MCG
1 SPRAY, SUSPENSION (ML) NASAL DAILY
Qty: 1 BOTTLE | Refills: 2 | Status: SHIPPED | OUTPATIENT
Start: 2019-02-14 | End: 2019-05-20 | Stop reason: SDUPTHER

## 2019-02-14 RX ORDER — PANTOPRAZOLE SODIUM 40 MG/1
40 TABLET, DELAYED RELEASE ORAL DAILY
Qty: 30 TABLET | Refills: 0 | Status: SHIPPED | OUTPATIENT
Start: 2019-02-14 | End: 2019-03-11 | Stop reason: SDUPTHER

## 2019-02-14 RX ORDER — QUETIAPINE FUMARATE 100 MG/1
1 TABLET, FILM COATED ORAL
COMMUNITY
Start: 2015-01-13 | End: 2020-10-16 | Stop reason: HOSPADM

## 2019-02-14 NOTE — ASSESSMENT & PLAN NOTE
Was evaluated by Urology  Was started on 20 mg of Paxil 1 hr before sexual intercourse however he is not taking it as he is not currently sexually active  Ultrasound of the abdomen was ordered as well  Which was not significant

## 2019-02-14 NOTE — PROGRESS NOTES
Assessment/Plan:    Disorder of ejaculation  Was evaluated by Urology  Was started on 20 mg of Paxil 1 hr before sexual intercourse  Ultrasound of the abdomen was ordered as well  Which was not significant    Neck pain  Will obtain x-ray to evaluate for any fractures although unlikely  but just because of her history of trauma  He  does have muscle spasms as well so will give Tizanidine  COPD (chronic obstructive pulmonary disease) (HCC)  Symptoms are currently controlled at this time  Avoid exposure to tobacco smoke, polluted air and other known COPD triggers  Monitor albuterol use to report back at follow up  Patient aware that increased albuterol use indicates poor control and may need further medication adjustment  Diagnoses and all orders for this visit:    Neck pain  -     XR spine cervical 2 or 3 vw injury; Future  -     TiZANidine (ZANAFLEX) 4 MG capsule; Take 1 capsule (4 mg total) by mouth 3 (three) times a day    Benign prostatic hyperplasia without lower urinary tract symptoms  -     atorvastatin (LIPITOR) 10 mg tablet; Take 1 tablet (10 mg total) by mouth daily    Seasonal allergies  -     cetirizine (ZyrTEC) 10 MG chewable tablet; Chew 1 tablet (10 mg total) daily  -     fluticasone (FLONASE) 50 mcg/act nasal spray; 1 spray into each nostril daily    Follow up  -     loratadine (CLARITIN) 10 mg tablet; Take 1 tablet (10 mg total) by mouth daily  -     albuterol (PROAIR HFA) 90 mcg/act inhaler; Inhale 1 puff every 6 (six) hours as needed for wheezing  -     tamsulosin (FLOMAX) 0 4 mg; Take 1 capsule (0 4 mg total) by mouth daily with dinner    Gastroesophageal reflux disease, esophagitis presence not specified  -     pantoprazole (PROTONIX) 40 mg tablet; Take 1 tablet (40 mg total) by mouth daily    Gastroesophageal reflux disease without esophagitis  -     ranitidine (ZANTAC) 150 MG capsule;  Take 1 capsule (150 mg total) by mouth 2 (two) times a day    Chronic bronchitis, unspecified chronic bronchitis type ([facility] [address] )  -     tiotropium (SPIRIVA HANDIHALER) 18 mcg inhalation capsule; Place 1 capsule (18 mcg total) into inhaler and inhale daily    Other orders  -     QUEtiapine (SEROQUEL) 100 mg tablet; 1 tablet          Subjective:      Patient ID: Gevena Rubinstein is a 52 y o  male  Neck Pain    Pertinent negatives include no chest pain, fever or headaches  Roseann Salas is here for the follow-up   Denies any complaints of chest pain or shortness breath he is complaining of neck pain started few months ago after he fells on stairs  No Numbness or tingling  No weakness in his legs  No saddle anesthesia   No urinary or fecal incontinence  He was concerned about side effects of Paxil that he read on the bottle that he decrease libido  I explained to him that that would happen only when he is taking the medication on a regular basis however he would only be taking it as needed before sexual intercourse  The following portions of the patient's history were reviewed and updated as appropriate: allergies, current medications, past family history, past medical history, past social history, past surgical history and problem list     Review of Systems   Constitutional: Negative for chills, fatigue and fever  HENT: Negative for congestion, ear discharge, postnasal drip, sneezing and sore throat  Eyes: Negative for pain and visual disturbance  Respiratory: Positive for shortness of breath  Negative for cough and chest tightness  Cardiovascular: Negative for chest pain and palpitations  Gastrointestinal: Negative for abdominal distention, abdominal pain, blood in stool, diarrhea, nausea and vomiting  Genitourinary: Negative for difficulty urinating, dysuria and flank pain  Musculoskeletal: Positive for neck pain  Negative for arthralgias and joint swelling  Skin: Positive for rash  Negative for pallor  Neurological: Negative for dizziness, syncope and headaches  Hematological: Negative for adenopathy  Psychiatric/Behavioral: Negative for agitation and confusion  Objective:      /76 (BP Location: Left arm, Patient Position: Sitting, Cuff Size: Standard)   Pulse 97   Temp 97 9 °F (36 6 °C)   Resp 16   Wt 70 3 kg (155 lb)   SpO2 97%   BMI 25 02 kg/m²          Physical Exam   Constitutional: He is oriented to person, place, and time  He appears well-developed and well-nourished  No distress  HENT:   Head: Normocephalic and atraumatic  Nose: No rhinorrhea  Mouth/Throat: Oropharynx is clear and moist    Eyes: Pupils are equal, round, and reactive to light  Right eye exhibits no discharge  Left eye exhibits no discharge  Right pupil is reactive  Left pupil is reactive  Neck: Normal range of motion  Neck supple  No tracheal deviation present  No thyromegaly present  Cardiovascular: Normal rate, regular rhythm and normal heart sounds  Exam reveals no friction rub  No murmur heard  Pulmonary/Chest: Effort normal  No respiratory distress  He has decreased breath sounds  He has no wheezes  He has no rales  He exhibits no tenderness  Abdominal: Soft  He exhibits no distension  There is no tenderness  Musculoskeletal: Normal range of motion  He exhibits no deformity  Neurological: He is alert and oriented to person, place, and time  Skin: Skin is warm  seborrhoic  dermatitis on the face   Vitals reviewed          Cervical  ROM: intact  Tenderness: no spinous process tenderness;   Sensation UE Bilateral:  C5: normal  C6: normal  C7: normal  C8: normal  T1: normal  Strength UE: 5/5 elbow, wrist, fingers bilatearl  Reflexes: symmetric bilateral triceps, biceps, corachobrachiais  Spurlings:

## 2019-02-14 NOTE — ASSESSMENT & PLAN NOTE
Will obtain x-ray to evaluate for any fractures although unlikely  but just because of her history of trauma  He  does have muscle spasms as well so will give Tizanidine

## 2019-02-19 DIAGNOSIS — M54.2 NECK PAIN: ICD-10-CM

## 2019-02-23 RX ORDER — TIZANIDINE HYDROCHLORIDE 4 MG/1
4 CAPSULE, GELATIN COATED ORAL 3 TIMES DAILY
Qty: 30 CAPSULE | Refills: 0 | OUTPATIENT
Start: 2019-02-23

## 2019-02-28 ENCOUNTER — ANESTHESIA EVENT (OUTPATIENT)
Dept: GASTROENTEROLOGY | Facility: MEDICAL CENTER | Age: 54
End: 2019-02-28
Payer: COMMERCIAL

## 2019-03-01 ENCOUNTER — HOSPITAL ENCOUNTER (OUTPATIENT)
Facility: MEDICAL CENTER | Age: 54
Setting detail: OUTPATIENT SURGERY
Discharge: HOME | End: 2019-03-01
Attending: INTERNAL MEDICINE | Admitting: INTERNAL MEDICINE
Payer: COMMERCIAL

## 2019-03-01 ENCOUNTER — ANESTHESIA (OUTPATIENT)
Dept: GASTROENTEROLOGY | Facility: MEDICAL CENTER | Age: 54
End: 2019-03-01
Payer: COMMERCIAL

## 2019-03-01 VITALS
TEMPERATURE: 97.6 F | DIASTOLIC BLOOD PRESSURE: 67 MMHG | HEIGHT: 66 IN | RESPIRATION RATE: 20 BRPM | HEART RATE: 56 BPM | BODY MASS INDEX: 24.91 KG/M2 | SYSTOLIC BLOOD PRESSURE: 96 MMHG | WEIGHT: 155 LBS | OXYGEN SATURATION: 98 %

## 2019-03-01 DIAGNOSIS — K21.9 GASTROESOPHAGEAL REFLUX DISEASE WITHOUT ESOPHAGITIS: ICD-10-CM

## 2019-03-01 DIAGNOSIS — M54.2 NECK PAIN: ICD-10-CM

## 2019-03-01 DIAGNOSIS — L30.9 DERMATITIS: ICD-10-CM

## 2019-03-01 PROCEDURE — 88305 TISSUE EXAM BY PATHOLOGIST: CPT | Performed by: PATHOLOGY

## 2019-03-01 PROCEDURE — 88342 IMHCHEM/IMCYTCHM 1ST ANTB: CPT | Performed by: PATHOLOGY

## 2019-03-01 PROCEDURE — 88341 IMHCHEM/IMCYTCHM EA ADD ANTB: CPT | Performed by: PATHOLOGY

## 2019-03-01 PROCEDURE — 43239 EGD BIOPSY SINGLE/MULTIPLE: CPT | Performed by: INTERNAL MEDICINE

## 2019-03-01 PROCEDURE — 88313 SPECIAL STAINS GROUP 2: CPT | Performed by: PATHOLOGY

## 2019-03-01 RX ORDER — LIDOCAINE HYDROCHLORIDE 20 MG/ML
INJECTION, SOLUTION EPIDURAL; INFILTRATION; INTRACAUDAL; PERINEURAL AS NEEDED
Status: DISCONTINUED | OUTPATIENT
Start: 2019-03-01 | End: 2019-03-01 | Stop reason: SURG

## 2019-03-01 RX ORDER — PROPOFOL 10 MG/ML
INJECTION, EMULSION INTRAVENOUS CONTINUOUS PRN
Status: DISCONTINUED | OUTPATIENT
Start: 2019-03-01 | End: 2019-03-01 | Stop reason: SURG

## 2019-03-01 RX ORDER — SODIUM CHLORIDE 9 MG/ML
125 INJECTION, SOLUTION INTRAVENOUS CONTINUOUS
Status: DISCONTINUED | OUTPATIENT
Start: 2019-03-01 | End: 2019-03-01 | Stop reason: HOSPADM

## 2019-03-01 RX ORDER — PROPOFOL 10 MG/ML
INJECTION, EMULSION INTRAVENOUS AS NEEDED
Status: DISCONTINUED | OUTPATIENT
Start: 2019-03-01 | End: 2019-03-01 | Stop reason: SURG

## 2019-03-01 RX ORDER — ALBUTEROL SULFATE 2.5 MG/3ML
2.5 SOLUTION RESPIRATORY (INHALATION) ONCE AS NEEDED
Status: COMPLETED | OUTPATIENT
Start: 2019-03-01 | End: 2019-03-01

## 2019-03-01 RX ADMIN — SODIUM CHLORIDE 125 ML/HR: 0.9 INJECTION, SOLUTION INTRAVENOUS at 08:41

## 2019-03-01 RX ADMIN — PROPOFOL 50 MG: 10 INJECTION, EMULSION INTRAVENOUS at 09:26

## 2019-03-01 RX ADMIN — PROPOFOL 50 MG: 10 INJECTION, EMULSION INTRAVENOUS at 09:28

## 2019-03-01 RX ADMIN — PROPOFOL 80 MCG/KG/MIN: 10 INJECTION, EMULSION INTRAVENOUS at 09:23

## 2019-03-01 RX ADMIN — PROPOFOL 200 MG: 10 INJECTION, EMULSION INTRAVENOUS at 09:23

## 2019-03-01 RX ADMIN — LIDOCAINE HYDROCHLORIDE 5 ML: 20 INJECTION, SOLUTION EPIDURAL; INFILTRATION; INTRACAUDAL; PERINEURAL at 09:23

## 2019-03-01 RX ADMIN — ALBUTEROL SULFATE 2.5 MG: 2.5 SOLUTION RESPIRATORY (INHALATION) at 08:51

## 2019-03-01 NOTE — OP NOTE
ESOPHAGOGASTRODUODENOSCOPY    PROCEDURE: EGD    SEDATION: Monitored anesthesia care, check anesthesia records    ASA Class: 3    INDICATIONS: acid reflux    CONSENT:  Informed consent was obtained for the procedure, including sedation after explaining the risks and benefits of the procedure  Risks including but not limited to bleeding, perforation, infection, and missed lesion  PREPARATION:   Telemetry, pulse oximetry, blood pressure were monitored throughout the procedure  Patient was identified by myself both verbally and by visual inspection of ID band  DESCRIPTION:   Patient was placed in the left lateral decubitus position and was sedated with the above medication  The gastroscope was introduced in to the oropharynx and the esophagus was intubated under direct visualization  Scope was passed down the esophagus up to 2nd part of the duodenum  A careful inspection was made as the gastroscope was withdrawn, including a retroflexed view of the stomach; findings and interventions are described below  FINDINGS:    #1  Esophagus- Z-line at 35 cm  Slightly irregular  No signs of tejada's esophagus  Cold forceps biopsies taken  #2  Stomach- Hiatus hernia measuring 4 cm in length  Mild erosions in the gastric antrum  Cold forceps biopsies taken  #3  Duodenum- normal          IMPRESSIONS:      Median size hiatus hernia likely to be because of persistent acid reflux  RECOMMENDATIONS:     Follow up biopsy results  Continue PPI  Following in office to discuss acid reflux and possible had is hernia repair  COMPLICATIONS:  None; patient tolerated the procedure well      SPECIMENS:    ID Type Source Tests Collected by Time Destination   1 : gastric antum, erosion, r/o h pylori Tissue Stomach TISSUE EXAM Jeffery Alcazar MD 3/1/2019  9:28 AM    2 : r/o barretts Tissue Esophagogastric junction TISSUE EXAM Jeffery Alcazar MD 3/1/2019  9:30 AM        ESTIMATED BLOOD LOSS:  Minimal

## 2019-03-01 NOTE — DISCHARGE INSTRUCTIONS
Upper Endoscopy   WHAT YOU NEED TO KNOW:   What do I need to know about an upper endoscopy? An upper endoscopy is also called an upper gastrointestinal (GI) endoscopy, or an esophagogastroduodenoscopy (EGD)  A scope (thin, flexible tube with a light and camera) is used to examine the walls of your upper intestines  The upper intestines include the esophagus, stomach, and duodenum (first part of the small intestine)  An upper endoscopy is used to look for problems, such as bleeding, polyps, ulcers, or infection  How do I prepare for an upper endoscopy? Your healthcare provider will talk to you about how to prepare for your procedure  You may need to not eat or drink anything except water for 6 to 12 hours before the procedure  He will tell you what medicines to take or not take on the day of your procedure  Arrange to have someone drive you home  What will happen during an upper endoscopy? · You will be given medicine through your IV to help you relax and make you drowsy  You will also be given medicine to numb your throat  You may need to wear a plastic mouthpiece to help hold your mouth open and protect your teeth and tongue  Your healthcare provider will gently insert the endoscope through your mouth and down into your throat  You may be asked to swallow once to help move the scope into your upper intestines  You may feel pressure in your throat but you should not feel pain  The endoscope does not restrict your breathing  · Your healthcare provider will watch the scope on a monitor  He will take pictures with the scope  He may gently inject air so he can see your digestive tract clearly  Your healthcare provider may take tissue samples and send them to the lab for tests  He may remove foreign objects, tumors, or polyps that may be blocking your upper intestines  Your healthcare provider may also insert tools with the scope to treat bleeding or place a stent (tube)   When the procedure is finished, the endoscope will be slowly removed  What will happen after an upper endoscopy? You may feel bloated, gassy, or have some abdominal discomfort  Your throat may be sore for 24 to 36 hours after the procedure  You may burp or pass gas from air that is still inside your body after your procedure  You may need to take short walks to help move the gas out  Eat small meals, if you feel bloated  Do not drive or make important decisions until the day after your procedure  What are the risks of an upper endoscopy? Your esophagus, stomach, or duodenum may be punctured or torn during the procedure  This is because of increased pressure as the scope and air are passing through  You may bleed more than expected or get an infection  You may have a slow or irregular heartbeat, or low blood pressure  This can cause sweating and fainting  Fluid may enter your lungs and you may have trouble breathing  These problems can be life-threatening  CARE AGREEMENT:   You have the right to help plan your care  Learn about your health condition and how it may be treated  Discuss treatment options with your caregivers to decide what care you want to receive  You always have the right to refuse treatment  The above information is an  only  It is not intended as medical advice for individual conditions or treatments  Talk to your doctor, nurse or pharmacist before following any medical regimen to see if it is safe and effective for you  © 2017 [employer] Information is for End User's use only and may not be sold, redistributed or otherwise used for commercial purposes  All illustrations and images included in CareNotes® are the copyrighted property of [employer]  or [employer]  Gastritis   WHAT YOU NEED TO KNOW:   What is gastritis? Gastritis is inflammation or irritation of the lining of your stomach  What increases my risk for gastritis?    · Infection with bacteria, a virus, or a parasite    · NSAIDs, aspirin, or steroid medicine    · Use of tobacco products or alcohol    · Trauma such as an injury to your stomach or intestine    · Autoimmune disorders such as diabetes, thyroid disease, or Crohn disease    · Stress    · Age older than 60 years    · Illegal drugs, such as cocaine  What are the signs and symptoms of gastritis? · Stomach pain, burning, or tenderness when you press on it    · Stomach fullness or tightness    · Nausea or vomiting    · Loss of appetite, or feeling full quickly when you eat    · Bad breath    · Fatigue or feeling more tired than usual    · Heartburn  How is gastritis diagnosed? Your healthcare provider will ask about your signs and symptoms and examine you  You may need any of the following:  · Blood tests  may be used to show an infection, dehydration, or anemia (low red blood cell levels)  · A bowel movement sample  may be tested for blood or the germ that may be causing your gastritis  · A breath test  may show if H pylori is causing your gastritis  You will be given a liquid to drink  Then you will breathe into a bag  Your healthcare provider will measure the amount of carbon dioxide in your breath  Extra amounts of carbon dioxide may mean you have an H pylori infection  · An endoscopy  may be used to look for irritation or bleeding in your stomach  Your healthcare provider will use an endoscope (tube with a light and camera on the end) during the procedure  He or she may take a sample from your stomach to be tested  How is gastritis treated? Your symptoms may go away without treatment  Treatment will depend on what is causing your gastritis  Your healthcare provider may recommend changes to the medicines you take  Medicines may be given to help treat a bacterial infection or decrease stomach acid  How can I manage or prevent gastritis? · Do not smoke    Nicotine and other chemicals in cigarettes and cigars can make your symptoms worse and cause lung damage  Ask your healthcare provider for information if you currently smoke and need help to quit  E-cigarettes or smokeless tobacco still contain nicotine  Talk to your healthcare provider before you use these products  · Do not drink alcohol  Alcohol can prevent healing and make your gastritis worse  Talk to your healthcare provider if you need help to stop drinking  · Do not take NSAIDs or aspirin unless directed  These and similar medicines can cause irritation of your stomach lining  If your healthcare provider says it is okay to take NSAIDs, take them with food  · Do not eat foods that cause irritation  Foods such as oranges and salsa can cause burning or pain  Eat a variety of healthy foods  Examples include fruits (not citrus), vegetables, low-fat dairy products, beans, whole-grain breads, and lean meats and fish  Try to eat small meals, and drink water with your meals  Do not eat for at least 3 hours before you go to bed  · Find ways to relax and decrease stress  Stress can increase stomach acid and make gastritis worse  Activities such as yoga, meditation, or listening to music can help you relax  Spend time with friends, or do things you enjoy  Call 911 for any of the following:   · You develop chest pain or shortness of breath  When should I seek immediate care? · You vomit blood  · You have black or bloody bowel movements  · You have severe stomach or back pain  When should I contact my healthcare provider? · You have a fever  · You have new or worsening symptoms, even after treatment  · You have questions or concerns about your condition or care  CARE AGREEMENT:   You have the right to help plan your care  Learn about your health condition and how it may be treated  Discuss treatment options with your caregivers to decide what care you want to receive  You always have the right to refuse treatment   The above information is an  only  It is not intended as medical advice for individual conditions or treatments  Talk to your doctor, nurse or pharmacist before following any medical regimen to see if it is safe and effective for you  © 2017 [employer] Information is for End User's use only and may not be sold, redistributed or otherwise used for commercial purposes  All illustrations and images included in CareNotes® are the copyrighted property of [employer]  or [employer]  Diet for Stomach Ulcers and Gastritis   WHAT YOU NEED TO KNOW:   What is a diet for stomach ulcers and gastritis? A diet for ulcers and gastritis is a meal plan that limits foods that irritate your stomach  Certain foods may worsen symptoms such as stomach pain, bloating, heartburn, or indigestion  Which foods should I limit or avoid? You may need to avoid acidic, spicy, or high-fat foods  Not all foods affect everyone the same way  You will need to learn which foods worsen your symptoms and limit those foods  The following are some foods that may worsen ulcer or gastritis symptoms:  · Beverages:      ¨ Whole milk and chocolate milk    ¨ Hot cocoa and cola    ¨ Any beverage with caffeine    ¨ Regular and decaffeinated coffee    ¨ Peppermint and spearmint tea    ¨ Green and black tea, with or without caffeine    ¨ Orange and grapefruit juices    ¨ Drinks that contain alcohol    · Spices and seasonings:      ¨ Black and red pepper    ¨ Chili powder    ¨ Mustard seed and nutmeg    · Other foods:      ¨ Dairy foods made from whole milk or cream    ¨ Chocolate    ¨ Spicy or strongly flavored cheeses, such as jalapeno or black pepper    ¨ Highly seasoned, high-fat meats, such as sausage, salami, mcdaniels, ham, and cold cuts    ¨ Hot chiles and peppers    ¨ Tomato products, such as tomato paste, tomato sauce, or tomato juice  Which foods can I eat and drink? Eat a variety of healthy foods from all the food groups   Eat fruits, vegetables, whole grains, and fat-free or low-fat dairy foods  Whole grains include whole-wheat breads, cereals, pasta, and brown rice  Choose lean meats, poultry (chicken and turkey), fish, beans, eggs, and nuts  A healthy meal plan is low in unhealthy fats, salt, and added sugar  Healthy fats include olive oil and canola oil  Ask your dietitian for more information about a healthy meal plan  What other guidelines may be helpful? · Do not eat right before bedtime  Stop eating at least 2 hours before bedtime  · Eat small, frequent meals  Your stomach may tolerate small, frequent meals better than large meals  CARE AGREEMENT:   You have the right to help plan your care  Discuss treatment options with your caregivers to decide what care you want to receive  You always have the right to refuse treatment  The above information is an  only  It is not intended as medical advice for individual conditions or treatments  Talk to your doctor, nurse or pharmacist before following any medical regimen to see if it is safe and effective for you  © 2017 26[address] [facility] Information is for End User's use only and may not be sold, redistributed or otherwise used for commercial purposes  All illustrations and images included in CareNotes® are the copyrighted property of [employer]  or [employer]  Hiatal Hernia   WHAT YOU NEED TO KNOW:   What is a hiatal hernia? A hiatal hernia is a condition that causes part of your stomach to bulge through the hiatus (small opening) in your diaphragm  The part of the stomach may move up and down, or it may get trapped above the diaphragm  What increases my risk for a hiatal hernia? The exact cause of a hiatal hernia is not known  You may have been born with a large hiatus   The following may increase your risk of a hiatal hernia:  · Obesity    · Older age    · Medical conditions such as diverticulosis or esophagitis    · Previous surgery of the esophagus or stomach or trauma such as from a motor vehicle accident  What are the types of hiatal hernia? · Type I (sliding hiatal hernia): A portion of the stomach slides in and out of the hiatus  This type is the most common and usually causes gastroesophageal reflux disease (GERD)  GERD occurs when the esophageal sphincter does not close properly and causes acid reflux  The esophageal sphincter is the lower muscle of the esophagus  · Type II (paraesophageal hiatal hernia):  Type II hiatal hernia forms when a part of the stomach squeezes through the hiatus and lies next to the esophagus  · Type III (combined):  Type III hiatal hernia is a combination of a sliding and a paraesophageal hiatal hernia  · Type IV (complex paraesophageal hiatal hernia): The whole stomach, the small and large bowels, spleen, pancreas, or liver is pushed up into the chest   What are the signs and symptoms of a hiatal hernia? The most common symptom is heartburn  This usually occurs after meals and spreads to your neck, jaw, or shoulder  You may have no signs or symptoms, or you may have any of the following:  · Abdominal pain, especially in the area just above your navel    · Bitter or acid taste in your mouth    · Trouble swallowing    · Coughing or hoarseness    · Chest pain or shortness of breath that occurs after eating    · Frequent burping or hiccups    · Uncomfortable feeling of fullness after eating  How is a hiatal hernia diagnosed? · An upper GI series test  includes x-rays of your esophagus, stomach, and your small intestines  It is also called a barium swallow test  You will be given barium (a chalky liquid) to drink before the pictures are taken  This liquid helps your stomach and intestines show up better on the x-rays  An upper GI series can show if you have an ulcer, a blocked intestine, or other problems  · An endoscopy  uses a scope to see the inside of your digestive tract   A scope is a long, bendable tube with a light on the end of it  A camera may be hooked to the scope to take pictures  How is a hiatal hernia treated? Treatment depends on the type of hiatal hernia you have and on your symptoms  You may not need any treatment  You may need any of the following:  · Medicines  may be given to relieve heartburn symptoms  These medicines help to decrease or block stomach acid  You may also be given medicines that help to tighten the esophageal sphincter  · Surgery  may be done when medicines cannot control your symptoms, or other problems are present  Your healthcare provider may also suggest surgery depending on the type of hernia you have  Your healthcare provider can put your stomach back into its normal location  He may make the hiatus (hole) smaller and anchor your stomach in your abdomen  Fundoplication is a surgery that wraps the upper part of the stomach around the esophageal sphincter to strengthen it  How can I manage symptoms? The following nutrition and lifestyle changes may be recommended to relieve symptoms of heartburn  · Avoid foods that make your symptoms worse  These may include spicy foods, fruit juices, alcohol, caffeine, chocolate, and mint  · Eat several small meals during the day  Small meals give your stomach less food to digest     · Avoid lying down and bending forward after you eat  Do not eat meals 2 to 3 hours before bedtime  This decreases your risk for reflux  · Maintain a healthy weight  If you are overweight, weight loss may help relieve your symptoms  · Sleep with your head elevated  at least 6 inches  · Do not smoke  Smoking can increase your symptoms of heartburn  When should I seek immediate care? · You have severe abdominal pain  · You try to vomit but nothing comes out (retching)  · You have severe chest pain and sudden trouble breathing  · Your bowel movements are black or bloody      · Your vomit looks like coffee grounds or has blood in it  When should I contact my healthcare provider? · Your symptoms are getting worse  · You have nausea, and you are vomiting  · You are losing weight without trying  · You have questions or concerns about your condition or care  CARE AGREEMENT:   You have the right to help plan your care  Learn about your health condition and how it may be treated  Discuss treatment options with your caregivers to decide what care you want to receive  You always have the right to refuse treatment  The above information is an  only  It is not intended as medical advice for individual conditions or treatments  Talk to your doctor, nurse or pharmacist before following any medical regimen to see if it is safe and effective for you  © 2017 26[address] [facility] Information is for End User's use only and may not be sold, redistributed or otherwise used for commercial purposes  All illustrations and images included in CareNotes® are the copyrighted property of [employer]  or [employer]

## 2019-03-01 NOTE — H&P
History and Physical - [facility]  Mickie Kern III 52 y o  male MRN: [id number]                  HPI: Александр Ansari is a 52y o  year old male who presents for acid reflux       REVIEW OF SYSTEMS: Per the HPI, and otherwise unremarkable  Historical Information   Past Medical History:   Diagnosis Date    Anxiety     Asthma     COPD (chronic obstructive pulmonary disease) ([address] )     Depression     GERD (gastroesophageal reflux disease)     Hyperlipidemia     Hypertension      Past Surgical History:   Procedure Laterality Date    FRACTURE SURGERY      ORIF    HERNIA REPAIR      SKIN GRAFT       Social History   Social History     Substance and Sexual Activity   Alcohol Use Yes    Comment: socialy     Social History     Substance and Sexual Activity   Drug Use No     Social History     Tobacco Use   Smoking Status Heavy Tobacco Smoker    Packs/day: 1 50    Types: Cigarettes   Smokeless Tobacco Never Used     History reviewed  No pertinent family history  Meds/Allergies     Medications Prior to Admission   Medication    albuterol (PROAIR HFA) 90 mcg/act inhaler    ALPRAZolam (XANAX) 0 5 mg tablet    atorvastatin (LIPITOR) 10 mg tablet    cetirizine (ZyrTEC) 10 MG chewable tablet    hydrOXYzine HCL (ATARAX) 25 mg tablet    loratadine (CLARITIN) 10 mg tablet    pantoprazole (PROTONIX) 40 mg tablet    PARoxetine (PAXIL) 20 mg tablet    propranolol (INDERAL) 10 mg tablet    QUEtiapine (SEROQUEL) 100 mg tablet    ranitidine (ZANTAC) 150 MG capsule    tamsulosin (FLOMAX) 0 4 mg    tiotropium (SPIRIVA HANDIHALER) 18 mcg inhalation capsule    traZODone (DESYREL) 50 mg tablet    fluticasone (FLONASE) 50 mcg/act nasal spray    ketoconazole (NIZORAL) 2 % shampoo       No Known Allergies    Objective     Blood pressure 146/87, pulse 63, temperature 97 6 °F (36 4 °C), temperature source Temporal, resp   rate 16, height 5' 6" (1 676 m), weight 70 3 kg (155 lb), SpO2 98 %  PHYSICAL EXAM    Gen: NAD  CV: RRR  CHEST: Clear  ABD: soft, NT/ND  EXT: no edema      ASSESSMENT/PLAN:  This is a 52y o  year old male here for acid reflux, and he is stable and optimized for his procedure

## 2019-03-01 NOTE — DISCHARGE INSTR - AVS FIRST PAGE
ESOPHAGOGASTRODUODENOSCOPY    PROCEDURE: EGD    SEDATION: Monitored anesthesia care, check anesthesia records    ASA Class: 3    INDICATIONS: acid reflux    CONSENT:  Informed consent was obtained for the procedure, including sedation after explaining the risks and benefits of the procedure  Risks including but not limited to bleeding, perforation, infection, and missed lesion  PREPARATION:   Telemetry, pulse oximetry, blood pressure were monitored throughout the procedure  Patient was identified by myself both verbally and by visual inspection of ID band  DESCRIPTION:   Patient was placed in the left lateral decubitus position and was sedated with the above medication  The gastroscope was introduced in to the oropharynx and the esophagus was intubated under direct visualization  Scope was passed down the esophagus up to 2nd part of the duodenum  A careful inspection was made as the gastroscope was withdrawn, including a retroflexed view of the stomach; findings and interventions are described below  FINDINGS:    #1  Esophagus- Z-line at 35 cm  Slightly irregular  No signs of tejada's esophagus  Cold forceps biopsies taken  #2  Stomach- Hiatus hernia measuring 4 cm in length  Mild erosions in the gastric antrum  Cold forceps biopsies taken  #3  Duodenum- normal          IMPRESSIONS:      Median size hiatus hernia likely to be because of persistent acid reflux  RECOMMENDATIONS:     Follow up biopsy results  Continue PPI  Following in office to discuss acid reflux and possible had is hernia repair  COMPLICATIONS:  None; patient tolerated the procedure well      SPECIMENS:    ID Type Source Tests Collected by Time Destination   1 : gastric antum, erosion, r/o h pylori Tissue Stomach TISSUE EXAM Quinn Horne MD 3/1/2019  9:28 AM    2 : r/o barretts Tissue Esophagogastric junction TISSUE EXAM Quinn Horne MD 3/1/2019  9:30 AM        ESTIMATED BLOOD LOSS:  Minimal

## 2019-03-01 NOTE — ANESTHESIA POSTPROCEDURE EVALUATION
Post-Op Assessment Note    CV Status:  Stable  Pain Score: 1    Pain management: adequate     Mental Status:  Alert and awake   Hydration Status:  Euvolemic   PONV Controlled:  Controlled   Airway Patency:  Patent   Post Op Vitals Reviewed: Yes      Staff: Anesthesiologist           BP 96/67 (03/01/19 0957)    Temp      Pulse 56 (03/01/19 0957)   Resp 20 (03/01/19 0957)    SpO2 98 % (03/01/19 0957)

## 2019-03-01 NOTE — ANESTHESIA PREPROCEDURE EVALUATION
Review of Systems/Medical History  Patient summary reviewed    No history of anesthetic complications     Cardiovascular  EKG reviewed, Exercise tolerance (METS): >4,  Hyperlipidemia,   Comment: 1/2019 STRESS RESULTS:  Duration of exercise was 10 min and 10 sec  Maximal work rate was 12 METs  Maximal heart rate during stress was 137 bpm ( 83 % of maximal predicted heart rate)  Target heart rate was not achieved  There was no chest pain during stress  11/2018 SB @ 58bpm ,  Pulmonary  Smoker (2pk/day) cigarette smoker  , COPD moderate- medication dependent , Shortness of breath,        GI/Hepatic    GERD well controlled,        Negative  ROS        Endo/Other  Negative endo/other ROS      GYN       Hematology  Negative hematology ROS      Musculoskeletal  Negative musculoskeletal ROS        Neurology  Negative neurology ROS      Psychology   Anxiety, Depression , being treated for depression,              Physical Exam    Airway    Mallampati score: II  TM Distance: >3 FB  Neck ROM: full     Dental       Cardiovascular  Rhythm: regular, Rate: normal,     Pulmonary  Wheezes,     Other Findings  Very poor dentition, multiple missing teeth  Anesthesia Plan  ASA Score- 2     Anesthesia Type- IV sedation with anesthesia with ASA Monitors  Additional Monitors:   Airway Plan:     Comment: Nebulized albuterol was given in preop-wheezing improved        Plan Factors- Patient instructed to abstain from smoking on day of procedure  Patient smoked on day of surgery  Induction- intravenous  Postoperative Plan-     Informed Consent- Anesthetic plan and risks discussed with patient

## 2019-03-03 RX ORDER — KETOCONAZOLE 20 MG/ML
1 SHAMPOO TOPICAL DAILY
Qty: 120 ML | Refills: 0 | Status: SHIPPED | OUTPATIENT
Start: 2019-03-03 | End: 2019-03-29 | Stop reason: SDUPTHER

## 2019-03-06 RX ORDER — TIZANIDINE HYDROCHLORIDE 4 MG/1
4 CAPSULE, GELATIN COATED ORAL 3 TIMES DAILY
Qty: 30 CAPSULE | Refills: 0 | Status: SHIPPED | OUTPATIENT
Start: 2019-03-06 | End: 2019-03-11 | Stop reason: SDUPTHER

## 2019-03-11 DIAGNOSIS — M54.2 NECK PAIN: ICD-10-CM

## 2019-03-11 DIAGNOSIS — K21.9 GASTROESOPHAGEAL REFLUX DISEASE, ESOPHAGITIS PRESENCE NOT SPECIFIED: ICD-10-CM

## 2019-03-11 RX ORDER — PANTOPRAZOLE SODIUM 40 MG/1
40 TABLET, DELAYED RELEASE ORAL DAILY
Qty: 30 TABLET | Refills: 0 | Status: SHIPPED | OUTPATIENT
Start: 2019-03-11 | End: 2019-04-05 | Stop reason: SDUPTHER

## 2019-03-11 RX ORDER — TIZANIDINE HYDROCHLORIDE 4 MG/1
4 CAPSULE, GELATIN COATED ORAL 3 TIMES DAILY
Qty: 30 CAPSULE | Refills: 0 | Status: SHIPPED | OUTPATIENT
Start: 2019-03-11 | End: 2019-03-19 | Stop reason: SDUPTHER

## 2019-03-14 ENCOUNTER — APPOINTMENT (OUTPATIENT)
Dept: LAB | Facility: HOSPITAL | Age: 54
End: 2019-03-14
Payer: COMMERCIAL

## 2019-03-14 DIAGNOSIS — Z12.5 PROSTATE CANCER SCREENING: ICD-10-CM

## 2019-03-14 LAB — PSA SERPL-MCNC: 0.9 NG/ML (ref 0–4)

## 2019-03-14 PROCEDURE — G0103 PSA SCREENING: HCPCS

## 2019-03-14 PROCEDURE — 36415 COLL VENOUS BLD VENIPUNCTURE: CPT

## 2019-03-19 DIAGNOSIS — M54.2 NECK PAIN: ICD-10-CM

## 2019-03-21 RX ORDER — TIZANIDINE HYDROCHLORIDE 4 MG/1
4 CAPSULE, GELATIN COATED ORAL 3 TIMES DAILY
Qty: 30 CAPSULE | Refills: 0 | Status: SHIPPED | OUTPATIENT
Start: 2019-03-21 | End: 2019-05-20 | Stop reason: SDUPTHER

## 2019-03-26 DIAGNOSIS — J30.2 SEASONAL ALLERGIES: ICD-10-CM

## 2019-03-26 RX ORDER — CETIRIZINE HYDROCHLORIDE 10 MG/1
10 TABLET, CHEWABLE ORAL DAILY
Qty: 90 TABLET | Refills: 0 | Status: SHIPPED | OUTPATIENT
Start: 2019-03-26 | End: 2019-05-20 | Stop reason: SDUPTHER

## 2019-03-29 DIAGNOSIS — L30.9 DERMATITIS: ICD-10-CM

## 2019-03-29 RX ORDER — KETOCONAZOLE 20 MG/ML
1 SHAMPOO TOPICAL DAILY
Qty: 120 ML | Refills: 0 | Status: SHIPPED | OUTPATIENT
Start: 2019-03-29 | End: 2019-04-23 | Stop reason: SDUPTHER

## 2019-04-01 DIAGNOSIS — J42 CHRONIC BRONCHITIS, UNSPECIFIED CHRONIC BRONCHITIS TYPE (HCC): ICD-10-CM

## 2019-04-01 RX ORDER — TIOTROPIUM BROMIDE 18 UG/1
CAPSULE ORAL; RESPIRATORY (INHALATION)
Qty: 30 EACH | Refills: 2 | Status: SHIPPED | OUTPATIENT
Start: 2019-04-01 | End: 2019-05-20 | Stop reason: SDUPTHER

## 2019-04-05 DIAGNOSIS — K21.9 GASTROESOPHAGEAL REFLUX DISEASE, ESOPHAGITIS PRESENCE NOT SPECIFIED: ICD-10-CM

## 2019-04-05 RX ORDER — PANTOPRAZOLE SODIUM 40 MG/1
40 TABLET, DELAYED RELEASE ORAL DAILY
Qty: 30 TABLET | Refills: 0 | Status: SHIPPED | OUTPATIENT
Start: 2019-04-05 | End: 2019-04-09 | Stop reason: SDUPTHER

## 2019-04-09 ENCOUNTER — OFFICE VISIT (OUTPATIENT)
Dept: GASTROENTEROLOGY | Facility: MEDICAL CENTER | Age: 54
End: 2019-04-09
Payer: COMMERCIAL

## 2019-04-09 VITALS
WEIGHT: 150 LBS | TEMPERATURE: 97.4 F | HEART RATE: 79 BPM | HEIGHT: 66 IN | SYSTOLIC BLOOD PRESSURE: 124 MMHG | DIASTOLIC BLOOD PRESSURE: 78 MMHG | BODY MASS INDEX: 24.11 KG/M2

## 2019-04-09 DIAGNOSIS — R19.4 CHANGE IN BOWEL HABITS: ICD-10-CM

## 2019-04-09 DIAGNOSIS — Z12.11 SCREENING FOR COLON CANCER: ICD-10-CM

## 2019-04-09 DIAGNOSIS — K44.9 HIATAL HERNIA: ICD-10-CM

## 2019-04-09 DIAGNOSIS — K21.9 GASTROESOPHAGEAL REFLUX DISEASE, ESOPHAGITIS PRESENCE NOT SPECIFIED: Primary | ICD-10-CM

## 2019-04-09 DIAGNOSIS — K21.9 GASTROESOPHAGEAL REFLUX DISEASE WITHOUT ESOPHAGITIS: ICD-10-CM

## 2019-04-09 DIAGNOSIS — K59.09 OTHER CONSTIPATION: ICD-10-CM

## 2019-04-09 DIAGNOSIS — K22.89 MUCOSAL ABNORMALITY OF ESOPHAGUS: ICD-10-CM

## 2019-04-09 DIAGNOSIS — K22.9 IRREGULAR Z LINE OF ESOPHAGUS: ICD-10-CM

## 2019-04-09 PROCEDURE — 99214 OFFICE O/P EST MOD 30 MIN: CPT | Performed by: PHYSICIAN ASSISTANT

## 2019-04-09 RX ORDER — POLYETHYLENE GLYCOL 3350 17 G/17G
17 POWDER, FOR SOLUTION ORAL DAILY
Qty: 289 G | Refills: 1 | Status: SHIPPED | OUTPATIENT
Start: 2019-04-09 | End: 2019-04-30 | Stop reason: SDUPTHER

## 2019-04-09 RX ORDER — RANITIDINE 150 MG/1
150 CAPSULE ORAL 2 TIMES DAILY
Qty: 180 CAPSULE | Refills: 0 | Status: SHIPPED | OUTPATIENT
Start: 2019-04-09 | End: 2019-05-20 | Stop reason: SDUPTHER

## 2019-04-09 RX ORDER — PANTOPRAZOLE SODIUM 40 MG/1
40 TABLET, DELAYED RELEASE ORAL 2 TIMES DAILY
Qty: 60 TABLET | Refills: 3 | Status: SHIPPED | OUTPATIENT
Start: 2019-04-09 | End: 2019-05-20 | Stop reason: SDUPTHER

## 2019-04-10 PROBLEM — F52.4 PREMATURE EJACULATION: Status: ACTIVE | Noted: 2019-04-10

## 2019-04-10 PROBLEM — R39.15 URINARY URGENCY: Status: ACTIVE | Noted: 2019-04-10

## 2019-04-10 PROBLEM — Z12.5 PROSTATE CANCER SCREENING: Status: ACTIVE | Noted: 2019-04-10

## 2019-04-11 ENCOUNTER — OFFICE VISIT (OUTPATIENT)
Dept: UROLOGY | Facility: CLINIC | Age: 54
End: 2019-04-11
Payer: COMMERCIAL

## 2019-04-11 VITALS
HEART RATE: 58 BPM | HEIGHT: 66 IN | SYSTOLIC BLOOD PRESSURE: 162 MMHG | DIASTOLIC BLOOD PRESSURE: 90 MMHG | WEIGHT: 152 LBS | BODY MASS INDEX: 24.43 KG/M2

## 2019-04-11 DIAGNOSIS — R39.15 URINARY URGENCY: Primary | ICD-10-CM

## 2019-04-11 DIAGNOSIS — Z12.5 PROSTATE CANCER SCREENING: ICD-10-CM

## 2019-04-11 DIAGNOSIS — F52.4 PREMATURE EJACULATION: ICD-10-CM

## 2019-04-11 DIAGNOSIS — Z09 FOLLOW UP: ICD-10-CM

## 2019-04-11 PROCEDURE — 51798 US URINE CAPACITY MEASURE: CPT | Performed by: PHYSICIAN ASSISTANT

## 2019-04-11 PROCEDURE — 99213 OFFICE O/P EST LOW 20 MIN: CPT | Performed by: PHYSICIAN ASSISTANT

## 2019-04-11 RX ORDER — OXYBUTYNIN CHLORIDE 10 MG/1
10 TABLET, EXTENDED RELEASE ORAL
Qty: 30 TABLET | Refills: 11 | Status: SHIPPED | OUTPATIENT
Start: 2019-04-11 | End: 2019-12-27 | Stop reason: SDUPTHER

## 2019-04-11 RX ORDER — TAMSULOSIN HYDROCHLORIDE 0.4 MG/1
0.4 CAPSULE ORAL
Qty: 90 CAPSULE | Refills: 3 | Status: SHIPPED | OUTPATIENT
Start: 2019-04-11 | End: 2020-02-13

## 2019-04-19 DIAGNOSIS — N40.0 BENIGN PROSTATIC HYPERPLASIA WITHOUT LOWER URINARY TRACT SYMPTOMS: ICD-10-CM

## 2019-04-19 DIAGNOSIS — Z09 FOLLOW UP: ICD-10-CM

## 2019-04-19 DIAGNOSIS — J30.2 SEASONAL ALLERGIES: ICD-10-CM

## 2019-04-21 RX ORDER — ATORVASTATIN CALCIUM 10 MG/1
10 TABLET, FILM COATED ORAL DAILY
Qty: 90 TABLET | Refills: 0 | Status: SHIPPED | OUTPATIENT
Start: 2019-04-21 | End: 2019-05-20

## 2019-04-21 RX ORDER — HYDROXYZINE HYDROCHLORIDE 25 MG/1
25 TABLET, FILM COATED ORAL
Qty: 90 TABLET | Refills: 0 | Status: SHIPPED | OUTPATIENT
Start: 2019-04-21 | End: 2019-10-11 | Stop reason: SDUPTHER

## 2019-04-23 DIAGNOSIS — L30.9 DERMATITIS: ICD-10-CM

## 2019-04-23 RX ORDER — KETOCONAZOLE 20 MG/ML
1 SHAMPOO TOPICAL DAILY
Qty: 120 ML | Refills: 0 | Status: SHIPPED | OUTPATIENT
Start: 2019-04-23 | End: 2019-07-20 | Stop reason: SDUPTHER

## 2019-04-30 DIAGNOSIS — K59.09 OTHER CONSTIPATION: ICD-10-CM

## 2019-04-30 RX ORDER — POLYETHYLENE GLYCOL 3350 17 G/17G
17 POWDER, FOR SOLUTION ORAL DAILY
Qty: 289 G | Refills: 1 | Status: SHIPPED | OUTPATIENT
Start: 2019-04-30 | End: 2019-05-09 | Stop reason: SDUPTHER

## 2019-05-07 ENCOUNTER — OFFICE VISIT (OUTPATIENT)
Dept: GASTROENTEROLOGY | Facility: MEDICAL CENTER | Age: 54
End: 2019-05-07
Payer: COMMERCIAL

## 2019-05-07 VITALS
SYSTOLIC BLOOD PRESSURE: 122 MMHG | WEIGHT: 147 LBS | HEIGHT: 66 IN | BODY MASS INDEX: 23.63 KG/M2 | HEART RATE: 74 BPM | TEMPERATURE: 96.5 F | DIASTOLIC BLOOD PRESSURE: 78 MMHG

## 2019-05-07 DIAGNOSIS — K21.9 GASTROESOPHAGEAL REFLUX DISEASE WITHOUT ESOPHAGITIS: Primary | ICD-10-CM

## 2019-05-07 DIAGNOSIS — K44.9 HH (HIATUS HERNIA): ICD-10-CM

## 2019-05-07 PROCEDURE — 99214 OFFICE O/P EST MOD 30 MIN: CPT | Performed by: INTERNAL MEDICINE

## 2019-05-09 DIAGNOSIS — K59.09 OTHER CONSTIPATION: ICD-10-CM

## 2019-05-09 RX ORDER — POLYETHYLENE GLYCOL 3350 17 G/17G
17 POWDER, FOR SOLUTION ORAL DAILY
Qty: 289 G | Refills: 1 | Status: SHIPPED | OUTPATIENT
Start: 2019-05-09 | End: 2019-05-22 | Stop reason: SDUPTHER

## 2019-05-17 DIAGNOSIS — M54.2 NECK PAIN: ICD-10-CM

## 2019-05-17 RX ORDER — TIZANIDINE HYDROCHLORIDE 4 MG/1
4 CAPSULE, GELATIN COATED ORAL 3 TIMES DAILY
Qty: 30 CAPSULE | Refills: 0 | OUTPATIENT
Start: 2019-05-17

## 2019-05-17 RX ORDER — TIZANIDINE HYDROCHLORIDE 4 MG/1
4 CAPSULE, GELATIN COATED ORAL 3 TIMES DAILY
Qty: 30 CAPSULE | Refills: 0 | Status: SHIPPED | OUTPATIENT
Start: 2019-05-17 | End: 2019-05-20

## 2019-05-20 ENCOUNTER — OFFICE VISIT (OUTPATIENT)
Dept: FAMILY MEDICINE CLINIC | Facility: CLINIC | Age: 54
End: 2019-05-20

## 2019-05-20 VITALS
DIASTOLIC BLOOD PRESSURE: 62 MMHG | BODY MASS INDEX: 22.66 KG/M2 | SYSTOLIC BLOOD PRESSURE: 110 MMHG | RESPIRATION RATE: 18 BRPM | WEIGHT: 141 LBS | OXYGEN SATURATION: 98 % | TEMPERATURE: 97.5 F | HEART RATE: 69 BPM | HEIGHT: 66 IN

## 2019-05-20 DIAGNOSIS — K21.9 GASTROESOPHAGEAL REFLUX DISEASE, ESOPHAGITIS PRESENCE NOT SPECIFIED: ICD-10-CM

## 2019-05-20 DIAGNOSIS — K21.9 GASTROESOPHAGEAL REFLUX DISEASE WITHOUT ESOPHAGITIS: ICD-10-CM

## 2019-05-20 DIAGNOSIS — M54.2 NECK PAIN: ICD-10-CM

## 2019-05-20 DIAGNOSIS — J42 CHRONIC BRONCHITIS, UNSPECIFIED CHRONIC BRONCHITIS TYPE (HCC): ICD-10-CM

## 2019-05-20 DIAGNOSIS — Z09 FOLLOW UP: ICD-10-CM

## 2019-05-20 DIAGNOSIS — J30.2 SEASONAL ALLERGIES: ICD-10-CM

## 2019-05-20 DIAGNOSIS — N40.0 BENIGN PROSTATIC HYPERPLASIA WITHOUT LOWER URINARY TRACT SYMPTOMS: ICD-10-CM

## 2019-05-20 PROBLEM — M47.812 OA (OSTEOARTHRITIS) OF NECK: Status: ACTIVE | Noted: 2019-05-20

## 2019-05-20 PROCEDURE — 99213 OFFICE O/P EST LOW 20 MIN: CPT | Performed by: FAMILY MEDICINE

## 2019-05-20 RX ORDER — CETIRIZINE HYDROCHLORIDE 10 MG/1
10 TABLET, CHEWABLE ORAL DAILY
Qty: 90 TABLET | Refills: 0 | Status: SHIPPED | OUTPATIENT
Start: 2019-05-20 | End: 2020-09-24 | Stop reason: SDUPTHER

## 2019-05-20 RX ORDER — HYDROXYZINE HYDROCHLORIDE 25 MG/1
25 TABLET, FILM COATED ORAL
Qty: 90 TABLET | Refills: 0 | Status: SHIPPED | OUTPATIENT
Start: 2019-05-20 | End: 2020-10-16 | Stop reason: HOSPADM

## 2019-05-20 RX ORDER — PANTOPRAZOLE SODIUM 40 MG/1
40 TABLET, DELAYED RELEASE ORAL 2 TIMES DAILY
Qty: 60 TABLET | Refills: 3 | Status: SHIPPED | OUTPATIENT
Start: 2019-05-20 | End: 2020-06-02

## 2019-05-20 RX ORDER — FLUTICASONE PROPIONATE 50 MCG
1 SPRAY, SUSPENSION (ML) NASAL DAILY
Qty: 1 BOTTLE | Refills: 2 | Status: SHIPPED | OUTPATIENT
Start: 2019-05-20 | End: 2020-10-16 | Stop reason: HOSPADM

## 2019-05-20 RX ORDER — LORATADINE 10 MG/1
10 TABLET ORAL DAILY
Qty: 30 TABLET | Refills: 2 | Status: SHIPPED | OUTPATIENT
Start: 2019-05-20 | End: 2019-07-08 | Stop reason: SDUPTHER

## 2019-05-20 RX ORDER — ALBUTEROL SULFATE 90 UG/1
1 AEROSOL, METERED RESPIRATORY (INHALATION) EVERY 6 HOURS PRN
Qty: 1 INHALER | Refills: 1 | Status: SHIPPED | OUTPATIENT
Start: 2019-05-20 | End: 2019-06-03 | Stop reason: SDUPTHER

## 2019-05-20 RX ORDER — RANITIDINE 150 MG/1
150 CAPSULE ORAL 2 TIMES DAILY
Qty: 180 CAPSULE | Refills: 0 | Status: SHIPPED | OUTPATIENT
Start: 2019-05-20 | End: 2020-10-16 | Stop reason: HOSPADM

## 2019-05-20 RX ORDER — TIZANIDINE HYDROCHLORIDE 4 MG/1
4 CAPSULE, GELATIN COATED ORAL 3 TIMES DAILY
Qty: 30 CAPSULE | Refills: 0 | Status: SHIPPED | OUTPATIENT
Start: 2019-05-20 | End: 2020-10-16 | Stop reason: HOSPADM

## 2019-05-20 RX ORDER — ATORVASTATIN CALCIUM 10 MG/1
10 TABLET, FILM COATED ORAL DAILY
Qty: 90 TABLET | Refills: 0 | Status: ON HOLD | OUTPATIENT
Start: 2019-05-20 | End: 2020-10-16 | Stop reason: SDUPTHER

## 2019-05-22 DIAGNOSIS — K59.09 OTHER CONSTIPATION: ICD-10-CM

## 2019-05-22 PROBLEM — R39.15 URINARY URGENCY: Status: RESOLVED | Noted: 2019-04-10 | Resolved: 2019-05-22

## 2019-05-22 PROBLEM — N53.19 DISORDER OF EJACULATION: Status: RESOLVED | Noted: 2018-11-29 | Resolved: 2019-05-22

## 2019-05-22 RX ORDER — POLYETHYLENE GLYCOL 3350 17 G/17G
17 POWDER, FOR SOLUTION ORAL DAILY
Qty: 289 G | Refills: 6 | Status: SHIPPED | OUTPATIENT
Start: 2019-05-22 | End: 2019-10-29 | Stop reason: SDUPTHER

## 2019-05-23 ENCOUNTER — OFFICE VISIT (OUTPATIENT)
Dept: UROLOGY | Facility: CLINIC | Age: 54
End: 2019-05-23
Payer: COMMERCIAL

## 2019-05-23 VITALS
HEART RATE: 64 BPM | SYSTOLIC BLOOD PRESSURE: 126 MMHG | HEIGHT: 66 IN | BODY MASS INDEX: 23.53 KG/M2 | DIASTOLIC BLOOD PRESSURE: 72 MMHG | WEIGHT: 146.4 LBS

## 2019-05-23 DIAGNOSIS — N53.19 DISORDER OF EJACULATION: ICD-10-CM

## 2019-05-23 DIAGNOSIS — Z12.5 PROSTATE CANCER SCREENING: ICD-10-CM

## 2019-05-23 DIAGNOSIS — F52.4 PREMATURE EJACULATION: ICD-10-CM

## 2019-05-23 DIAGNOSIS — N40.1 BENIGN PROSTATIC HYPERPLASIA WITH LOWER URINARY TRACT SYMPTOMS, SYMPTOM DETAILS UNSPECIFIED: Primary | ICD-10-CM

## 2019-05-23 LAB — POST-VOID RESIDUAL VOLUME, ML POC: 0 ML

## 2019-05-23 PROCEDURE — 51798 US URINE CAPACITY MEASURE: CPT | Performed by: PHYSICIAN ASSISTANT

## 2019-05-23 PROCEDURE — 99213 OFFICE O/P EST LOW 20 MIN: CPT | Performed by: PHYSICIAN ASSISTANT

## 2019-05-23 RX ORDER — ESCITALOPRAM OXALATE 10 MG/1
10 TABLET ORAL DAILY
Refills: 0 | COMMUNITY
Start: 2019-05-16 | End: 2020-10-16 | Stop reason: HOSPADM

## 2019-05-23 RX ORDER — ALPRAZOLAM 0.25 MG/1
0.25 TABLET ORAL 2 TIMES DAILY
Refills: 0 | COMMUNITY
Start: 2019-05-21 | End: 2020-10-16 | Stop reason: HOSPADM

## 2019-05-23 RX ORDER — OXYCODONE HYDROCHLORIDE AND ACETAMINOPHEN 5; 325 MG/1; MG/1
1 TABLET ORAL 2 TIMES DAILY PRN
Refills: 0 | COMMUNITY
Start: 2019-05-16 | End: 2020-10-05

## 2019-05-29 ENCOUNTER — HOSPITAL ENCOUNTER (OUTPATIENT)
Dept: GASTROENTEROLOGY | Facility: HOSPITAL | Age: 54
Discharge: HOME | End: 2019-05-29
Attending: INTERNAL MEDICINE
Payer: COMMERCIAL

## 2019-05-29 VITALS
TEMPERATURE: 98.7 F | OXYGEN SATURATION: 97 % | HEART RATE: 55 BPM | DIASTOLIC BLOOD PRESSURE: 92 MMHG | SYSTOLIC BLOOD PRESSURE: 157 MMHG | RESPIRATION RATE: 18 BRPM

## 2019-05-29 DIAGNOSIS — K21.9 GASTROESOPHAGEAL REFLUX DISEASE WITHOUT ESOPHAGITIS: ICD-10-CM

## 2019-05-29 PROCEDURE — 91038 ESOPH IMPED FUNCT TEST > 1HR: CPT

## 2019-05-29 PROCEDURE — 91020 GASTRIC MOTILITY STUDIES: CPT

## 2019-06-03 DIAGNOSIS — Z09 FOLLOW UP: ICD-10-CM

## 2019-06-03 RX ORDER — ALBUTEROL SULFATE 90 UG/1
1 AEROSOL, METERED RESPIRATORY (INHALATION) EVERY 6 HOURS PRN
Qty: 1 INHALER | Refills: 1 | Status: ON HOLD | OUTPATIENT
Start: 2019-06-03 | End: 2020-10-16 | Stop reason: SDUPTHER

## 2019-07-08 DIAGNOSIS — Z09 FOLLOW UP: ICD-10-CM

## 2019-07-10 RX ORDER — LORATADINE 10 MG/1
10 TABLET ORAL DAILY
Qty: 30 TABLET | Refills: 2 | Status: SHIPPED | OUTPATIENT
Start: 2019-07-10 | End: 2019-09-07 | Stop reason: SDUPTHER

## 2019-07-15 DIAGNOSIS — N53.19 DISORDER OF EJACULATION: ICD-10-CM

## 2019-07-16 RX ORDER — PAROXETINE HYDROCHLORIDE 20 MG/1
20 TABLET, FILM COATED ORAL AS NEEDED
Qty: 30 TABLET | Refills: 6 | Status: SHIPPED | OUTPATIENT
Start: 2019-07-16 | End: 2019-12-26 | Stop reason: SDUPTHER

## 2019-07-17 ENCOUNTER — OFFICE VISIT (OUTPATIENT)
Dept: GASTROENTEROLOGY | Facility: MEDICAL CENTER | Age: 54
End: 2019-07-17
Payer: COMMERCIAL

## 2019-07-17 VITALS
TEMPERATURE: 96.9 F | SYSTOLIC BLOOD PRESSURE: 124 MMHG | DIASTOLIC BLOOD PRESSURE: 76 MMHG | HEART RATE: 68 BPM | BODY MASS INDEX: 22.98 KG/M2 | WEIGHT: 143 LBS | HEIGHT: 66 IN

## 2019-07-17 DIAGNOSIS — K59.09 OTHER CONSTIPATION: ICD-10-CM

## 2019-07-17 DIAGNOSIS — K44.9 HIATAL HERNIA: ICD-10-CM

## 2019-07-17 DIAGNOSIS — K21.9 GASTROESOPHAGEAL REFLUX DISEASE WITHOUT ESOPHAGITIS: Primary | ICD-10-CM

## 2019-07-17 PROCEDURE — 99214 OFFICE O/P EST MOD 30 MIN: CPT | Performed by: PHYSICIAN ASSISTANT

## 2019-07-17 RX ORDER — RANITIDINE 150 MG/1
150 TABLET ORAL 2 TIMES DAILY
Refills: 0 | COMMUNITY
Start: 2019-06-09 | End: 2019-09-24 | Stop reason: SDUPTHER

## 2019-07-17 NOTE — PROGRESS NOTES
Assessment/Plan:     Diagnoses and all orders for this visit:    Gastroesophageal reflux disease without esophagitis  He has a history of GERD likely secondary to his hiatal hernia  He underwent manometry testing which was within normal limits  A pH study did not show significant exposure/clinical significance  It was previously recommended that he have an evaluation for hiatal hernia repair however it is possible that he may not get significant improvement in his symptoms  Would recommend following up with bariatrics to discuss this further   -     Ambulatory referral to Bariatric Surgery; Future    Other constipation  He also has a history of constipation for which she takes MiraLax  Would recommend continuing as well as increasing fiber in water in his diet  Will see him back in the next 2-4 months or sooner if necessary  Subjective:      Patient ID: Geoffrey Stephens is a 62 y o  male  HPI     Patient is here for follow-up of GERD, constipation and to discuss his manometry and pH studies  He states his reflux symptoms are fairly well controlled with pantoprazole 40 mg daily and Zantac 150 mg in the evening  He states he does still get occasional breakthrough symptoms especially if he eats spaghetti sauce  He was previously found to have a 4 cm hiatal hernia  He underwent manometry testing that showed normal motility  His pH study showed acid reflux episodes and acid exposure time within normal limits, cough not associated with acid reflux with statistical significance  He also has constipation for which he takes MiraLax  His last endoscopy was in March 2019 with a hiatal hernia and mild erosions in the gastric antrum  Last colonoscopy was reportedly 9 years ago in within normal limits      Patient Active Problem List   Diagnosis    Anxiety    Depression    Gastroesophageal reflux disease    Hyperlipemia    Tobacco dependence syndrome    [facility]    BPH (benign prostatic hyperplasia)    Shortness of breath    Upper respiratory infection    Encounter for immunization    Postnasal drip    Chest pain    Gastritis without bleeding    Seborrheic dermatitis    Gastroesophageal reflux disease without esophagitis    Neck pain    Premature ejaculation    Prostate cancer screening    OA (osteoarthritis) of neck    Other constipation     No Known Allergies  Current Outpatient Medications on File Prior to Visit   Medication Sig    albuterol (PROVENTIL HFA,VENTOLIN HFA) 90 mcg/act inhaler Inhale 1 puff every 6 (six) hours as needed for wheezing    ALPRAZolam (XANAX) 0 25 mg tablet Take 0 25 mg by mouth 2 (two) times a day    atorvastatin (LIPITOR) 10 mg tablet Take 1 tablet (10 mg total) by mouth daily    cetirizine (ZyrTEC) 10 MG chewable tablet Chew 1 tablet (10 mg total) daily    diclofenac sodium (VOLTAREN) 1 % APPLY 1 INCH TO EACH KNEE TWICE DAILY    escitalopram (LEXAPRO) 10 mg tablet Take 10 mg by mouth daily    fluticasone (FLONASE) 50 mcg/act nasal spray 1 spray into each nostril daily    hydrOXYzine HCL (ATARAX) 25 mg tablet Take 1 tablet (25 mg total) by mouth daily at bedtime    ketoconazole (NIZORAL) 2 % shampoo Apply 1 application topically daily    loratadine (CLARITIN) 10 mg tablet Take 1 tablet (10 mg total) by mouth daily    oxybutynin (DITROPAN-XL) 10 MG 24 hr tablet Take 1 tablet (10 mg total) by mouth daily at bedtime    oxyCODONE-acetaminophen (PERCOCET) 5-325 mg per tablet Take 1 tablet by mouth 2 (two) times a day as needed    pantoprazole (PROTONIX) 40 mg tablet Take 1 tablet (40 mg total) by mouth 2 (two) times a day    PARoxetine (PAXIL) 20 mg tablet Take 1 tablet (20 mg total) by mouth as needed (sexual dysfunction)    polyethylene glycol (GLYCOLAX) powder Take 17 g by mouth daily Start with a half dose and increase as needed    propranolol (INDERAL) 10 mg tablet Take 1 tab BID    QUEtiapine (SEROQUEL) 100 mg tablet 1 tablet    ranitidine (ZANTAC) 150 MG capsule Take 1 capsule (150 mg total) by mouth 2 (two) times a day    ranitidine (ZANTAC) 150 mg tablet Take 150 mg by mouth 2 (two) times a day    tamsulosin (FLOMAX) 0 4 mg Take 1 capsule (0 4 mg total) by mouth daily with dinner    tiotropium (SPIRIVA HANDIHALER) 18 mcg inhalation capsule Place 1 capsule (18 mcg total) into inhaler and inhale daily    TiZANidine (ZANAFLEX) 4 MG capsule Take 1 capsule (4 mg total) by mouth 3 (three) times a day    traZODone (DESYREL) 50 mg tablet Take 0 5 tablets (25 mg total) by mouth daily at bedtime    ALPRAZolam (XANAX) 0 5 mg tablet Take 1 tablet (0 5 mg total) by mouth daily (Patient not taking: Reported on 5/23/2019)     No current facility-administered medications on file prior to visit  No family history on file    Past Medical History:   Diagnosis Date    Anxiety     Asthma     [facility]ch[facility]) [facility]     Depression     GERD (gastroesophageal reflux disease)     Hyperlipidemia     Hypertension      Social History     Socioeconomic History    Marital status: Single     Spouse name: None    Number of children: None    Years of education: None    Highest education level: None   Occupational History    None   Social Needs    Financial resource strain: None    Food insecurity:     Worry: None     Inability: None    Transportation needs:     Medical: None     Non-medical: None   Tobacco Use    Smoking status: Heavy Tobacco Smoker     Packs/day: 1 50     Types: Cigarettes    Smokeless tobacco: Never Used   Substance and Sexual Activity    Alcohol use: Yes     Comment: socialy    Drug use: No    Sexual activity: None   Lifestyle    Physical activity:     Days per week: None     Minutes per session: None    Stress: None   Relationships    Social connections:     Talks on phone: None     Gets together: None     Attends Sikh service: None     Active member of club or organization: None     Attends meetings of clubs or organizations: None     Relationship status: None    Intimate partner violence:     Fear of current or ex partner: None     Emotionally abused: None     Physically abused: None     Forced sexual activity: None   Other Topics Concern    None   Social History Narrative    None     Past Surgical History:   Procedure Laterality Date    ESOPHAGOGASTRODUODENOSCOPY N/A 3/1/2019    Procedure: ESOPHAGOGASTRODUODENOSCOPY (EGD); Surgeon: Jackie Wells MD;  Location: [facility]; Service: Gastroenterology    FRACTURE SURGERY      ORIF    HERNIA REPAIR      SKIN GRAFT           Review of Systems   All other systems reviewed and are negative  Objective:      /76 (BP Location: Left arm, Patient Position: Sitting, Cuff Size: Adult)   Pulse 68   Temp (!) 96 9 °F (36 1 °C) (Tympanic)   Ht 5' 6" (1 676 m)   Wt 64 9 kg (143 lb)   BMI 23 08 kg/m²          Physical Exam   Constitutional: He is oriented to person, place, and time  He appears well-developed and well-nourished  HENT:   Head: Normocephalic and atraumatic  Poor dentition   Eyes: Conjunctivae and EOM are normal    Neck: Normal range of motion  Cardiovascular: Normal rate and regular rhythm  Pulmonary/Chest: Effort normal and breath sounds normal    Abdominal: Soft  Bowel sounds are normal    Musculoskeletal: Normal range of motion  Neurological: He is alert and oriented to person, place, and time  Skin: Skin is warm and dry  Psychiatric: He has a normal mood and affect   His behavior is normal

## 2019-07-18 ENCOUNTER — TELEPHONE (OUTPATIENT)
Dept: FAMILY MEDICINE CLINIC | Facility: CLINIC | Age: 54
End: 2019-07-18

## 2019-07-20 DIAGNOSIS — Z09 FOLLOW UP: ICD-10-CM

## 2019-07-20 DIAGNOSIS — L30.9 DERMATITIS: ICD-10-CM

## 2019-07-21 RX ORDER — KETOCONAZOLE 20 MG/ML
1 SHAMPOO TOPICAL DAILY
Qty: 120 ML | Refills: 0 | Status: SHIPPED | OUTPATIENT
Start: 2019-07-21 | End: 2019-10-11 | Stop reason: SDUPTHER

## 2019-07-25 DIAGNOSIS — J42 CHRONIC BRONCHITIS, UNSPECIFIED CHRONIC BRONCHITIS TYPE (HCC): ICD-10-CM

## 2019-07-25 RX ORDER — TIOTROPIUM BROMIDE 18 UG/1
CAPSULE ORAL; RESPIRATORY (INHALATION)
Qty: 30 CAPSULE | Refills: 1 | Status: SHIPPED | OUTPATIENT
Start: 2019-07-25 | End: 2019-09-09 | Stop reason: SDUPTHER

## 2019-08-05 ENCOUNTER — TELEPHONE (OUTPATIENT)
Dept: OBGYN CLINIC | Facility: MEDICAL CENTER | Age: 54
End: 2019-08-05

## 2019-09-07 DIAGNOSIS — Z09 FOLLOW UP: ICD-10-CM

## 2019-09-09 DIAGNOSIS — J42 CHRONIC BRONCHITIS, UNSPECIFIED CHRONIC BRONCHITIS TYPE (HCC): ICD-10-CM

## 2019-09-09 RX ORDER — TIOTROPIUM BROMIDE 18 UG/1
CAPSULE ORAL; RESPIRATORY (INHALATION)
Qty: 30 CAPSULE | Refills: 1 | Status: SHIPPED | OUTPATIENT
Start: 2019-09-09 | End: 2019-10-04 | Stop reason: SDUPTHER

## 2019-09-24 ENCOUNTER — CONSULT (OUTPATIENT)
Dept: PAIN MEDICINE | Facility: MEDICAL CENTER | Age: 54
End: 2019-09-24
Payer: COMMERCIAL

## 2019-09-24 VITALS
RESPIRATION RATE: 14 BRPM | HEIGHT: 66 IN | SYSTOLIC BLOOD PRESSURE: 161 MMHG | HEART RATE: 53 BPM | BODY MASS INDEX: 22.5 KG/M2 | DIASTOLIC BLOOD PRESSURE: 96 MMHG | WEIGHT: 140 LBS

## 2019-09-24 DIAGNOSIS — M12.569 TRAUMATIC ARTHRITIS OF KNEE, UNSPECIFIED LATERALITY: ICD-10-CM

## 2019-09-24 DIAGNOSIS — M25.562 CHRONIC PAIN OF BOTH KNEES: Primary | ICD-10-CM

## 2019-09-24 DIAGNOSIS — G89.29 CHRONIC PAIN OF BOTH KNEES: Primary | ICD-10-CM

## 2019-09-24 DIAGNOSIS — M25.561 CHRONIC PAIN OF BOTH KNEES: Primary | ICD-10-CM

## 2019-09-24 PROCEDURE — 99204 OFFICE O/P NEW MOD 45 MIN: CPT | Performed by: PHYSICAL MEDICINE & REHABILITATION
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
## 2019-09-25 ENCOUNTER — HOSPITAL ENCOUNTER (OUTPATIENT)
Dept: RADIOLOGY | Facility: HOSPITAL | Age: 54
Discharge: HOME | End: 2019-09-25
Payer: COMMERCIAL

## 2019-09-25 DIAGNOSIS — M25.562 CHRONIC PAIN OF BOTH KNEES: ICD-10-CM

## 2019-09-25 DIAGNOSIS — G89.29 CHRONIC PAIN OF BOTH KNEES: ICD-10-CM

## 2019-09-25 DIAGNOSIS — M12.569 TRAUMATIC ARTHRITIS OF KNEE, UNSPECIFIED LATERALITY: ICD-10-CM

## 2019-09-25 DIAGNOSIS — M25.561 CHRONIC PAIN OF BOTH KNEES: ICD-10-CM

## 2019-09-25 PROCEDURE — 73562 X-RAY EXAM OF KNEE 3: CPT

## 2019-09-30 ENCOUNTER — TELEPHONE (OUTPATIENT)
Dept: PAIN MEDICINE | Facility: MEDICAL CENTER | Age: 54
End: 2019-09-30

## 2019-09-30 NOTE — TELEPHONE ENCOUNTER
----- Message from Slim Stewart DO sent at 9/30/2019  8:54 AM EDT -----  Please review xray impression with pt, plan per my note

## 2019-10-01 RX ORDER — LORATADINE 10 MG/1
10 TABLET ORAL DAILY
Qty: 30 TABLET | Refills: 2 | Status: SHIPPED | OUTPATIENT
Start: 2019-10-01 | End: 2019-11-25 | Stop reason: SDUPTHER

## 2019-10-03 NOTE — TELEPHONE ENCOUNTER
Unable to reach pt and got message stating the person you are calling can not accept calls at this time

## 2019-10-04 DIAGNOSIS — J42 CHRONIC BRONCHITIS, UNSPECIFIED CHRONIC BRONCHITIS TYPE (HCC): ICD-10-CM

## 2019-10-04 RX ORDER — TIOTROPIUM BROMIDE 18 UG/1
CAPSULE ORAL; RESPIRATORY (INHALATION)
Qty: 30 CAPSULE | Refills: 1 | Status: SHIPPED | OUTPATIENT
Start: 2019-10-04 | End: 2019-11-18 | Stop reason: SDUPTHER

## 2019-10-10 NOTE — TELEPHONE ENCOUNTER
SAGRARIO    S/w pt and advised of knee xray results  Pt will call to schedule PT consult  States he was waiting to hear results prior to initiating therapy  Pt asking about oxycodone prescription  Per ov note, Elsa Vega will not take over opioid prescription  Pt asking if [facility] can advise PCP that Clifm Fuse not prescribing  Advised Dr Eliecer Helms can view consult note in Epic and pt can discuss at his ov with PCP later this month  Pt verbalized understanding   Advised pt to continue with voltaren gel and schedule PT

## 2019-10-10 NOTE — TELEPHONE ENCOUNTER
Patient transferred to scheduling line; nursing not able to speak with pt currently to review results    Okay to leave him a message if not available and please leave correct # for him to call back to per his request      [phone]

## 2019-10-11 DIAGNOSIS — L30.9 DERMATITIS: ICD-10-CM

## 2019-10-11 DIAGNOSIS — K59.09 OTHER CONSTIPATION: ICD-10-CM

## 2019-10-11 DIAGNOSIS — J30.2 SEASONAL ALLERGIES: ICD-10-CM

## 2019-10-11 RX ORDER — HYDROXYZINE HYDROCHLORIDE 25 MG/1
25 TABLET, FILM COATED ORAL
Qty: 90 TABLET | Refills: 0 | Status: SHIPPED | OUTPATIENT
Start: 2019-10-11 | End: 2020-10-16 | Stop reason: HOSPADM

## 2019-10-12 RX ORDER — KETOCONAZOLE 20 MG/ML
1 SHAMPOO TOPICAL DAILY
Qty: 120 ML | Refills: 0 | Status: SHIPPED | OUTPATIENT
Start: 2019-10-12 | End: 2019-11-06 | Stop reason: SDUPTHER

## 2019-10-13 RX ORDER — POLYETHYLENE GLYCOL 3350 17 G/17G
POWDER, FOR SOLUTION ORAL
Qty: 578 G | Refills: 6 | Status: SHIPPED | OUTPATIENT
Start: 2019-10-13 | End: 2020-06-09

## 2019-10-23 ENCOUNTER — OFFICE VISIT (OUTPATIENT)
Dept: FAMILY MEDICINE CLINIC | Facility: CLINIC | Age: 54
End: 2019-10-23

## 2019-10-23 VITALS
BODY MASS INDEX: 23.57 KG/M2 | HEART RATE: 78 BPM | DIASTOLIC BLOOD PRESSURE: 82 MMHG | RESPIRATION RATE: 16 BRPM | SYSTOLIC BLOOD PRESSURE: 164 MMHG | OXYGEN SATURATION: 98 % | WEIGHT: 146 LBS | TEMPERATURE: 98 F

## 2019-10-23 DIAGNOSIS — M25.562 CHRONIC PAIN OF BOTH KNEES: Primary | ICD-10-CM

## 2019-10-23 DIAGNOSIS — M25.561 CHRONIC PAIN OF BOTH KNEES: Primary | ICD-10-CM

## 2019-10-23 DIAGNOSIS — G89.29 CHRONIC PAIN OF BOTH KNEES: Primary | ICD-10-CM

## 2019-10-23 PROCEDURE — 4004F PT TOBACCO SCREEN RCVD TLK: CPT | Performed by: FAMILY MEDICINE

## 2019-10-23 PROCEDURE — 96372 THER/PROPH/DIAG INJ SC/IM: CPT | Performed by: FAMILY MEDICINE

## 2019-10-23 PROCEDURE — 99214 OFFICE O/P EST MOD 30 MIN: CPT | Performed by: FAMILY MEDICINE

## 2019-10-23 RX ORDER — NAPROXEN 500 MG/1
500 TABLET ORAL 2 TIMES DAILY WITH MEALS
Qty: 30 TABLET | Refills: 0 | Status: SHIPPED | OUTPATIENT
Start: 2019-10-23 | End: 2020-10-16 | Stop reason: HOSPADM

## 2019-10-23 RX ORDER — KETOROLAC TROMETHAMINE 30 MG/ML
30 INJECTION, SOLUTION INTRAMUSCULAR; INTRAVENOUS ONCE
Status: COMPLETED | OUTPATIENT
Start: 2019-10-23 | End: 2019-10-23

## 2019-10-23 RX ADMIN — KETOROLAC TROMETHAMINE 30 MG: 30 INJECTION, SOLUTION INTRAMUSCULAR; INTRAVENOUS at 16:53

## 2019-10-23 NOTE — ASSESSMENT & PLAN NOTE
Has recently follow-up with Dr Erika Adams, patient is requesting 2nd opinion  Comprehensive spinal program referral given in addition to orthopedic surgery, appreciate their input  30 mg Toradol now  Trial of naproxen given, although I feel this will offer little benefit as he may need surgical intervention  Encouraged to use lidocaine patches which recently prescribed
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
## 2019-10-25 ENCOUNTER — TELEPHONE (OUTPATIENT)
Dept: PHYSICAL THERAPY | Facility: OTHER | Age: 54
End: 2019-10-25

## 2019-10-25 NOTE — TELEPHONE ENCOUNTER
Voice message left for patient to call back  Phone number and hours of business provided     called X1

## 2019-10-28 ENCOUNTER — TELEPHONE (OUTPATIENT)
Dept: PHYSICAL THERAPY | Facility: OTHER | Age: 54
End: 2019-10-28

## 2019-10-28 NOTE — TELEPHONE ENCOUNTER
Voice message left for patient to call back  Phone number and hours of business provided  2nd attempt to reach patient    Referral closed

## 2019-10-29 DIAGNOSIS — K59.09 OTHER CONSTIPATION: ICD-10-CM

## 2019-10-29 RX ORDER — POLYETHYLENE GLYCOL 3350 17 G/17G
POWDER, FOR SOLUTION ORAL
Qty: 510 G | Refills: 1 | Status: SHIPPED | OUTPATIENT
Start: 2019-10-29 | End: 2020-10-16 | Stop reason: HOSPADM

## 2019-10-30 DIAGNOSIS — Z09 FOLLOW UP: ICD-10-CM

## 2019-10-30 RX ORDER — ALBUTEROL SULFATE 90 UG/1
AEROSOL, METERED RESPIRATORY (INHALATION)
Qty: 18 G | Refills: 2 | Status: SHIPPED | OUTPATIENT
Start: 2019-10-30 | End: 2020-02-26

## 2019-11-06 DIAGNOSIS — L30.9 DERMATITIS: ICD-10-CM

## 2019-11-06 RX ORDER — KETOCONAZOLE 20 MG/ML
1 SHAMPOO TOPICAL DAILY
Qty: 120 ML | Refills: 0 | Status: SHIPPED | OUTPATIENT
Start: 2019-11-06 | End: 2019-12-02 | Stop reason: SDUPTHER

## 2019-11-09 DIAGNOSIS — M12.569 TRAUMATIC ARTHRITIS OF KNEE, UNSPECIFIED LATERALITY: ICD-10-CM

## 2019-11-09 DIAGNOSIS — M25.561 CHRONIC PAIN OF BOTH KNEES: ICD-10-CM

## 2019-11-09 DIAGNOSIS — N40.0 BENIGN PROSTATIC HYPERPLASIA WITHOUT LOWER URINARY TRACT SYMPTOMS: ICD-10-CM

## 2019-11-09 DIAGNOSIS — K21.9 GASTROESOPHAGEAL REFLUX DISEASE WITHOUT ESOPHAGITIS: ICD-10-CM

## 2019-11-09 DIAGNOSIS — M25.562 CHRONIC PAIN OF BOTH KNEES: ICD-10-CM

## 2019-11-09 DIAGNOSIS — G89.29 CHRONIC PAIN OF BOTH KNEES: ICD-10-CM

## 2019-11-10 RX ORDER — ATORVASTATIN CALCIUM 10 MG/1
10 TABLET, FILM COATED ORAL DAILY
Qty: 90 TABLET | Refills: 0 | Status: SHIPPED | OUTPATIENT
Start: 2019-11-10 | End: 2020-02-04

## 2019-11-11 RX ORDER — RANITIDINE 150 MG/1
TABLET ORAL
Qty: 180 TABLET | Refills: 0 | Status: SHIPPED | OUTPATIENT
Start: 2019-11-11 | End: 2020-02-04

## 2019-11-18 DIAGNOSIS — J42 CHRONIC BRONCHITIS, UNSPECIFIED CHRONIC BRONCHITIS TYPE (HCC): ICD-10-CM

## 2019-11-18 RX ORDER — TIOTROPIUM BROMIDE 18 UG/1
CAPSULE ORAL; RESPIRATORY (INHALATION)
Qty: 30 CAPSULE | Refills: 0 | Status: SHIPPED | OUTPATIENT
Start: 2019-11-18 | End: 2020-01-06

## 2019-11-25 DIAGNOSIS — Z09 FOLLOW UP: ICD-10-CM

## 2019-11-25 RX ORDER — LORATADINE 10 MG/1
10 TABLET ORAL DAILY
Qty: 30 TABLET | Refills: 0 | Status: SHIPPED | OUTPATIENT
Start: 2019-11-25 | End: 2019-12-20 | Stop reason: SDUPTHER

## 2019-12-02 DIAGNOSIS — L30.9 DERMATITIS: ICD-10-CM

## 2019-12-02 RX ORDER — KETOCONAZOLE 20 MG/ML
1 SHAMPOO TOPICAL DAILY
Qty: 120 ML | Refills: 0 | Status: SHIPPED | OUTPATIENT
Start: 2019-12-02 | End: 2019-12-27 | Stop reason: SDUPTHER

## 2019-12-12 DIAGNOSIS — J42 CHRONIC BRONCHITIS, UNSPECIFIED CHRONIC BRONCHITIS TYPE (HCC): ICD-10-CM

## 2019-12-12 RX ORDER — TIOTROPIUM BROMIDE 18 UG/1
CAPSULE ORAL; RESPIRATORY (INHALATION)
Qty: 30 CAPSULE | Refills: 0 | Status: SHIPPED | OUTPATIENT
Start: 2019-12-12 | End: 2020-01-27

## 2019-12-20 DIAGNOSIS — Z09 FOLLOW UP: ICD-10-CM

## 2019-12-20 RX ORDER — LORATADINE 10 MG/1
10 TABLET ORAL DAILY
Qty: 30 TABLET | Refills: 0 | Status: SHIPPED | OUTPATIENT
Start: 2019-12-20 | End: 2020-02-29

## 2019-12-26 DIAGNOSIS — N53.19 DISORDER OF EJACULATION: ICD-10-CM

## 2019-12-26 RX ORDER — PAROXETINE HYDROCHLORIDE 20 MG/1
20 TABLET, FILM COATED ORAL AS NEEDED
Qty: 30 TABLET | Refills: 0 | Status: SHIPPED | OUTPATIENT
Start: 2019-12-26 | End: 2020-01-20

## 2019-12-27 DIAGNOSIS — L30.9 DERMATITIS: ICD-10-CM

## 2019-12-27 DIAGNOSIS — R39.15 URINARY URGENCY: ICD-10-CM

## 2019-12-27 RX ORDER — KETOCONAZOLE 20 MG/ML
1 SHAMPOO TOPICAL DAILY
Qty: 120 ML | Refills: 0 | Status: SHIPPED | OUTPATIENT
Start: 2019-12-27 | End: 2020-01-21

## 2019-12-27 RX ORDER — OXYBUTYNIN CHLORIDE 10 MG/1
10 TABLET, EXTENDED RELEASE ORAL
Qty: 30 TABLET | Refills: 0 | Status: SHIPPED | OUTPATIENT
Start: 2019-12-27 | End: 2020-02-13

## 2020-01-06 DIAGNOSIS — J42 CHRONIC BRONCHITIS, UNSPECIFIED CHRONIC BRONCHITIS TYPE (HCC): ICD-10-CM

## 2020-01-06 RX ORDER — TIOTROPIUM BROMIDE 18 UG/1
CAPSULE ORAL; RESPIRATORY (INHALATION)
Qty: 30 CAPSULE | Refills: 0 | Status: SHIPPED | OUTPATIENT
Start: 2020-01-06 | End: 2020-02-19

## 2020-01-14 ENCOUNTER — TELEPHONE (OUTPATIENT)
Dept: BARIATRICS | Facility: CLINIC | Age: 55
End: 2020-01-14

## 2020-01-20 DIAGNOSIS — N53.19 DISORDER OF EJACULATION: ICD-10-CM

## 2020-01-20 RX ORDER — PAROXETINE HYDROCHLORIDE 20 MG/1
20 TABLET, FILM COATED ORAL AS NEEDED
Qty: 30 TABLET | Refills: 0 | Status: SHIPPED | OUTPATIENT
Start: 2020-01-20 | End: 2020-03-06

## 2020-01-21 DIAGNOSIS — L30.9 DERMATITIS: ICD-10-CM

## 2020-01-21 RX ORDER — KETOCONAZOLE 20 MG/ML
1 SHAMPOO TOPICAL DAILY
Qty: 120 ML | Refills: 0 | Status: SHIPPED | OUTPATIENT
Start: 2020-01-21 | End: 2020-10-16 | Stop reason: HOSPADM

## 2020-01-27 DIAGNOSIS — J42 CHRONIC BRONCHITIS, UNSPECIFIED CHRONIC BRONCHITIS TYPE (HCC): ICD-10-CM

## 2020-01-27 RX ORDER — TIOTROPIUM BROMIDE 18 UG/1
CAPSULE ORAL; RESPIRATORY (INHALATION)
Qty: 30 CAPSULE | Refills: 0 | Status: SHIPPED | OUTPATIENT
Start: 2020-01-27 | End: 2020-10-16 | Stop reason: HOSPADM

## 2020-02-04 DIAGNOSIS — K21.9 GASTROESOPHAGEAL REFLUX DISEASE WITHOUT ESOPHAGITIS: ICD-10-CM

## 2020-02-04 DIAGNOSIS — N40.0 BENIGN PROSTATIC HYPERPLASIA WITHOUT LOWER URINARY TRACT SYMPTOMS: ICD-10-CM

## 2020-02-04 RX ORDER — ATORVASTATIN CALCIUM 10 MG/1
10 TABLET, FILM COATED ORAL DAILY
Qty: 90 TABLET | Refills: 0 | Status: SHIPPED | OUTPATIENT
Start: 2020-02-04 | End: 2020-09-24 | Stop reason: SDUPTHER

## 2020-02-04 RX ORDER — RANITIDINE 150 MG/1
TABLET ORAL
Qty: 180 TABLET | Refills: 0 | Status: SHIPPED | OUTPATIENT
Start: 2020-02-04 | End: 2020-10-16 | Stop reason: HOSPADM

## 2020-02-11 ENCOUNTER — APPOINTMENT (OUTPATIENT)
Dept: LAB | Facility: HOSPITAL | Age: 55
End: 2020-02-11
Payer: COMMERCIAL

## 2020-02-11 ENCOUNTER — TRANSCRIBE ORDERS (OUTPATIENT)
Dept: ADMINISTRATIVE | Facility: HOSPITAL | Age: 55
End: 2020-02-11

## 2020-02-11 DIAGNOSIS — F31.30 BIPOLAR I DISORDER, MOST RECENT EPISODE DEPRESSED (HCC): Primary | ICD-10-CM

## 2020-02-11 DIAGNOSIS — F31.30 BIPOLAR I DISORDER, MOST RECENT EPISODE DEPRESSED (HCC): ICD-10-CM

## 2020-02-11 DIAGNOSIS — Z12.5 PROSTATE CANCER SCREENING: ICD-10-CM

## 2020-02-11 LAB
ALBUMIN SERPL BCP-MCNC: 3.7 G/DL (ref 3–5.2)
ALP SERPL-CCNC: 59 U/L (ref 43–122)
ALT SERPL W P-5'-P-CCNC: 25 U/L (ref 9–52)
ANION GAP SERPL CALCULATED.3IONS-SCNC: 8 MMOL/L (ref 5–14)
AST SERPL W P-5'-P-CCNC: 20 U/L (ref 17–59)
BILIRUB SERPL-MCNC: 0.3 MG/DL
BUN SERPL-MCNC: 24 MG/DL (ref 5–25)
CALCIUM SERPL-MCNC: 9.1 MG/DL (ref 8.4–10.2)
CHLORIDE SERPL-SCNC: 100 MMOL/L (ref 97–108)
CHOLEST SERPL-MCNC: 145 MG/DL
CO2 SERPL-SCNC: 29 MMOL/L (ref 22–30)
CREAT SERPL-MCNC: 0.96 MG/DL (ref 0.7–1.5)
ERYTHROCYTE [DISTWIDTH] IN BLOOD BY AUTOMATED COUNT: 13.7 %
EST. AVERAGE GLUCOSE BLD GHB EST-MCNC: 94 MG/DL
FOLATE SERPL-MCNC: 17.1 NG/ML (ref 3.1–17.5)
GFR SERPL CREATININE-BSD FRML MDRD: 88 ML/MIN/1.73SQ M
GLUCOSE SERPL-MCNC: 89 MG/DL (ref 70–99)
HBA1C MFR BLD: 4.9 %
HCT VFR BLD AUTO: 41.3 % (ref 41–53)
HDLC SERPL-MCNC: 62 MG/DL
HGB BLD-MCNC: 14.3 G/DL (ref 13.5–17.5)
LDLC SERPL CALC-MCNC: 60 MG/DL
MCH RBC QN AUTO: 32.5 PG (ref 26–34)
MCHC RBC AUTO-ENTMCNC: 34.6 G/DL (ref 31–36)
MCV RBC AUTO: 94 FL (ref 80–100)
NONHDLC SERPL-MCNC: 83 MG/DL
PLATELET # BLD AUTO: 237 THOUSANDS/UL (ref 150–450)
PMV BLD AUTO: 7.9 FL (ref 8.9–12.7)
POTASSIUM SERPL-SCNC: 4.4 MMOL/L (ref 3.6–5)
PROT SERPL-MCNC: 6.5 G/DL (ref 5.9–8.4)
PSA SERPL-MCNC: 1.2 NG/ML (ref 0–4)
RBC # BLD AUTO: 4.4 MILLION/UL (ref 4.5–5.9)
SODIUM SERPL-SCNC: 137 MMOL/L (ref 137–147)
TRIGL SERPL-MCNC: 114 MG/DL
TSH SERPL DL<=0.05 MIU/L-ACNC: 0.98 UIU/ML (ref 0.47–4.68)
VIT B12 SERPL-MCNC: 179 PG/ML (ref 100–900)
WBC # BLD AUTO: 6.8 THOUSAND/UL (ref 4.5–11)

## 2020-02-11 PROCEDURE — G0103 PSA SCREENING: HCPCS

## 2020-02-11 PROCEDURE — 84443 ASSAY THYROID STIM HORMONE: CPT

## 2020-02-11 PROCEDURE — 82746 ASSAY OF FOLIC ACID SERUM: CPT

## 2020-02-11 PROCEDURE — 83036 HEMOGLOBIN GLYCOSYLATED A1C: CPT

## 2020-02-11 PROCEDURE — 85027 COMPLETE CBC AUTOMATED: CPT

## 2020-02-11 PROCEDURE — 36415 COLL VENOUS BLD VENIPUNCTURE: CPT

## 2020-02-11 PROCEDURE — 82607 VITAMIN B-12: CPT

## 2020-02-11 PROCEDURE — 80053 COMPREHEN METABOLIC PANEL: CPT

## 2020-02-11 PROCEDURE — 80061 LIPID PANEL: CPT

## 2020-02-11 PROCEDURE — 80307 DRUG TEST PRSMV CHEM ANLYZR: CPT | Performed by: PSYCHIATRY & NEUROLOGY

## 2020-02-12 LAB — OXYCODONE+OXYMORPHONE UR QL SCN: NEGATIVE NG/ML

## 2020-02-13 DIAGNOSIS — R39.15 URINARY URGENCY: ICD-10-CM

## 2020-02-13 DIAGNOSIS — Z09 FOLLOW UP: ICD-10-CM

## 2020-02-13 RX ORDER — OXYBUTYNIN CHLORIDE 10 MG/1
10 TABLET, EXTENDED RELEASE ORAL
Qty: 30 TABLET | Refills: 0 | Status: SHIPPED | OUTPATIENT
Start: 2020-02-13 | End: 2020-03-09

## 2020-02-13 RX ORDER — TAMSULOSIN HYDROCHLORIDE 0.4 MG/1
0.4 CAPSULE ORAL
Qty: 90 CAPSULE | Refills: 3 | Status: ON HOLD | OUTPATIENT
Start: 2020-02-13 | End: 2020-10-16 | Stop reason: SDUPTHER

## 2020-02-18 LAB
AMPHETAMINES UR QL SCN: NEGATIVE NG/ML
BARBITURATES UR QL SCN: NEGATIVE NG/ML
BENZODIAZ UR QL: NEGATIVE
BZE UR QL: NEGATIVE NG/ML
CANNABINOIDS UR QL SCN: NEGATIVE NG/ML
METHADONE UR QL SCN: NEGATIVE NG/ML
OPIATES UR QL: NEGATIVE NG/ML
PCP UR QL: NEGATIVE NG/ML
PROPOXYPH UR QL SCN: NEGATIVE NG/ML

## 2020-02-19 ENCOUNTER — OFFICE VISIT (OUTPATIENT)
Dept: FAMILY MEDICINE CLINIC | Facility: CLINIC | Age: 55
End: 2020-02-19

## 2020-02-19 ENCOUNTER — APPOINTMENT (OUTPATIENT)
Dept: LAB | Facility: HOSPITAL | Age: 55
End: 2020-02-19
Payer: COMMERCIAL

## 2020-02-19 VITALS
BODY MASS INDEX: 25.07 KG/M2 | HEART RATE: 95 BPM | SYSTOLIC BLOOD PRESSURE: 160 MMHG | RESPIRATION RATE: 16 BRPM | OXYGEN SATURATION: 97 % | DIASTOLIC BLOOD PRESSURE: 94 MMHG | WEIGHT: 156 LBS | HEIGHT: 66 IN | TEMPERATURE: 98 F

## 2020-02-19 DIAGNOSIS — J42 CHRONIC BRONCHITIS, UNSPECIFIED CHRONIC BRONCHITIS TYPE (HCC): ICD-10-CM

## 2020-02-19 DIAGNOSIS — J44.1 COPD EXACERBATION (HCC): Primary | ICD-10-CM

## 2020-02-19 DIAGNOSIS — Z11.3 SCREEN FOR STD (SEXUALLY TRANSMITTED DISEASE): ICD-10-CM

## 2020-02-19 PROCEDURE — 3008F BODY MASS INDEX DOCD: CPT | Performed by: FAMILY MEDICINE

## 2020-02-19 PROCEDURE — 87389 HIV-1 AG W/HIV-1&-2 AB AG IA: CPT

## 2020-02-19 PROCEDURE — 86592 SYPHILIS TEST NON-TREP QUAL: CPT

## 2020-02-19 PROCEDURE — 87491 CHLMYD TRACH DNA AMP PROBE: CPT

## 2020-02-19 PROCEDURE — 4004F PT TOBACCO SCREEN RCVD TLK: CPT | Performed by: FAMILY MEDICINE

## 2020-02-19 PROCEDURE — 99214 OFFICE O/P EST MOD 30 MIN: CPT | Performed by: FAMILY MEDICINE

## 2020-02-19 PROCEDURE — 87591 N.GONORRHOEAE DNA AMP PROB: CPT

## 2020-02-19 PROCEDURE — 80074 ACUTE HEPATITIS PANEL: CPT

## 2020-02-19 PROCEDURE — 36415 COLL VENOUS BLD VENIPUNCTURE: CPT

## 2020-02-19 RX ORDER — TIOTROPIUM BROMIDE 18 UG/1
CAPSULE ORAL; RESPIRATORY (INHALATION)
Qty: 30 CAPSULE | Refills: 0 | Status: SHIPPED | OUTPATIENT
Start: 2020-02-19 | End: 2020-04-06

## 2020-02-19 RX ORDER — ALPRAZOLAM 0.5 MG/1
0.5 TABLET ORAL 2 TIMES DAILY
Status: ON HOLD | COMMUNITY
Start: 2020-02-04 | End: 2020-10-16 | Stop reason: SDUPTHER

## 2020-02-19 RX ORDER — OXYMETAZOLINE HYDROCHLORIDE 0.05 G/100ML
2 SPRAY NASAL 2 TIMES DAILY
Qty: 30 ML | Refills: 0 | Status: SHIPPED | OUTPATIENT
Start: 2020-02-19 | End: 2020-10-05

## 2020-02-19 RX ORDER — PREDNISONE 20 MG/1
40 TABLET ORAL DAILY
Qty: 10 TABLET | Refills: 0 | Status: SHIPPED | OUTPATIENT
Start: 2020-02-19 | End: 2020-02-24

## 2020-02-20 PROBLEM — Z11.3 SCREEN FOR STD (SEXUALLY TRANSMITTED DISEASE): Status: ACTIVE | Noted: 2020-02-20

## 2020-02-20 PROBLEM — J44.1 COPD EXACERBATION (HCC): Status: ACTIVE | Noted: 2020-02-20

## 2020-02-20 LAB
C TRACH DNA SPEC QL NAA+PROBE: NEGATIVE
HAV IGM SER QL: NORMAL
HBV CORE IGM SER QL: NORMAL
HBV SURFACE AG SER QL: NORMAL
HCV AB SER QL: NORMAL
HIV 1+2 AB+HIV1 P24 AG SERPL QL IA: NORMAL
N GONORRHOEA DNA SPEC QL NAA+PROBE: NEGATIVE
RPR SER QL: NORMAL

## 2020-02-20 NOTE — ASSESSMENT & PLAN NOTE
Responded well with DuoNeb in office  Will treat with short burst steroid, Afrin and Advair  Instructed update office in 2-3 days regarding symptoms if no improvement will likely need hospital admission

## 2020-02-20 NOTE — PROGRESS NOTES
Assessment/Plan:    Screen for STD (sexually transmitted disease)  Counseled on the importance of safe sex practices  --STD panel pending    [facility]  Responded well with DuoNeb in office  Will treat with short burst steroid, Afrin and Advair  Instructed update office in 2-3 days regarding symptoms if no improvement will likely need hospital admission  Diagnoses and all orders for this visit:    COPD exacerbation ([facility] )  -     predniSONE 20 mg tablet; Take 2 tablets (40 mg total) by mouth daily for 5 days  -     fluticasone-salmeterol (ADVAIR, WIXELA) 500-50 mcg/dose inhaler; Inhale 1 puff 2 (two) times a day Rinse mouth after use  -     oxymetazoline (AFRIN) 0 05 % nasal spray; 2 sprays by Each Nare route 2 (two) times a day    Screen for STD (sexually transmitted disease)  -     Chlamydia/GC amplified DNA by PCR; Future  -     HIV 1/2 AG-AB combo; Future  -     RPR; Future  -     Hepatitis panel, acute; Future    Other orders  -     ALPRAZolam (XANAX) 0 5 mg tablet; Take 0 5 mg by mouth 2 (two) times a day          Subjective:      Patient ID: Vera Espitia is a 62 y o  male  HPI    47-year-old male presents to clinic for sick visit currently has 2 complaints  Complaint 1  Increased shortness of breath over the past 2 weeks, of note he is a current smoker no PFTs noted on file, up-to-date with flu shot  States he has noticed increased sputum production, rhinorrhea and productive cough without fevers  Has been using his albuterol inhaler in addition to Spiriva with minimal resolution of symptoms  Complaint 2  Was recently sexually active with new partner was in unprotected event requesting STD screening  Denies any rash dysuria or pain from site       The following portions of the patient's history were reviewed and updated as appropriate: allergies, current medications, past family history, past medical history, past social history, past surgical history and problem list     Review of Systems   Constitutional: Negative for activity change, appetite change, chills, diaphoresis, fatigue and fever  HENT: Positive for postnasal drip and rhinorrhea  Negative for congestion, ear pain, hearing loss, sneezing, sore throat and tinnitus  Eyes: Negative for pain  Respiratory: Positive for cough, shortness of breath and wheezing  Negative for apnea, choking and chest tightness  Cardiovascular: Negative for chest pain, palpitations and leg swelling  Gastrointestinal: Negative for abdominal distention, abdominal pain, constipation, diarrhea, nausea and vomiting  Genitourinary: Negative for decreased urine volume and dysuria  Musculoskeletal: Negative for back pain  Skin: Negative for rash  Neurological: Negative for dizziness, tremors and syncope  Psychiatric/Behavioral: Negative for agitation and suicidal ideas  Objective:      /94 (BP Location: Left arm, Patient Position: Sitting, Cuff Size: Standard)   Pulse 95   Temp 98 °F (36 7 °C) (Temporal)   Resp 16   Ht 5' 6" (1 676 m)   Wt 70 8 kg (156 lb)   SpO2 97%   BMI 25 18 kg/m²          Physical Exam   Constitutional: He is oriented to person, place, and time  No distress  HENT:   Right Ear: External ear normal    Left Ear: External ear normal    Eyes: Conjunctivae are normal  Right eye exhibits no discharge  Left eye exhibits no discharge  Neck: Normal range of motion  No JVD present  No tracheal deviation present  No thyromegaly present  Cardiovascular: Normal rate and regular rhythm  Pulmonary/Chest: He is in respiratory distress  He has wheezes  Decreased breath sounds  Poor air movement   Abdominal: Soft  Musculoskeletal: He exhibits no edema  Neurological: He is alert and oriented to person, place, and time  Skin: Skin is dry  Capillary refill takes less than 2 seconds  He is not diaphoretic

## 2020-02-26 ENCOUNTER — OFFICE VISIT (OUTPATIENT)
Dept: FAMILY MEDICINE CLINIC | Facility: CLINIC | Age: 55
End: 2020-02-26

## 2020-02-26 VITALS
HEIGHT: 66 IN | TEMPERATURE: 98 F | HEART RATE: 60 BPM | DIASTOLIC BLOOD PRESSURE: 82 MMHG | OXYGEN SATURATION: 98 % | BODY MASS INDEX: 24.75 KG/M2 | RESPIRATION RATE: 16 BRPM | SYSTOLIC BLOOD PRESSURE: 124 MMHG | WEIGHT: 154 LBS

## 2020-02-26 DIAGNOSIS — Z09 FOLLOW UP: ICD-10-CM

## 2020-02-26 DIAGNOSIS — Z71.2 ENCOUNTER TO DISCUSS TEST RESULTS: Primary | ICD-10-CM

## 2020-02-26 DIAGNOSIS — J44.9 CHRONIC OBSTRUCTIVE PULMONARY DISEASE, UNSPECIFIED COPD TYPE (HCC): ICD-10-CM

## 2020-02-26 PROCEDURE — 99213 OFFICE O/P EST LOW 20 MIN: CPT | Performed by: FAMILY MEDICINE

## 2020-02-26 PROCEDURE — 3008F BODY MASS INDEX DOCD: CPT | Performed by: FAMILY MEDICINE

## 2020-02-26 PROCEDURE — 4004F PT TOBACCO SCREEN RCVD TLK: CPT | Performed by: FAMILY MEDICINE

## 2020-02-26 RX ORDER — ALBUTEROL SULFATE 90 UG/1
AEROSOL, METERED RESPIRATORY (INHALATION)
Qty: 18 G | Refills: 2 | Status: SHIPPED | OUTPATIENT
Start: 2020-02-26 | End: 2020-05-12

## 2020-02-26 NOTE — PROGRESS NOTES
Assessment/Plan:    Encounter to discuss test results   STD panel reviewed with patient  Counseled on safe sex practices    COPD (chronic obstructive pulmonary disease) ([facility])   PFTs pending   continue with Spiriva, Advair       Diagnoses and all orders for this visit:    Encounter to discuss test results    Chronic obstructive pulmonary disease, unspecified COPD type ([facility] )          Subjective:      Patient ID: Krista Walters is a 62 y o  male  HPI    57-year-old male presents to clinic for follow-up STD panel results  Has no other questions or concerns at this time  States his breathing is improved from prior examination has completed a short burst of steroids and has been taking his Advair 1 puff 2 times per day he is still a current smoker uninterested in cessation at this time  The following portions of the patient's history were reviewed and updated as appropriate: allergies, current medications, past family history, past medical history, past social history, past surgical history and problem list     Review of Systems   Constitutional: Negative for activity change, appetite change, chills, diaphoresis, fatigue and fever  HENT: Negative for congestion, ear pain, hearing loss, postnasal drip, rhinorrhea, sneezing, sore throat and tinnitus  Eyes: Negative for pain  Respiratory: Negative for apnea, cough, choking, chest tightness, shortness of breath and wheezing  Cardiovascular: Negative for chest pain, palpitations and leg swelling  Gastrointestinal: Negative for abdominal distention, abdominal pain, constipation, diarrhea, nausea and vomiting  Genitourinary: Negative for decreased urine volume and dysuria  Musculoskeletal: Negative for back pain  Skin: Negative for rash  Neurological: Negative for dizziness, tremors and syncope  Psychiatric/Behavioral: Negative for agitation and suicidal ideas           Objective:      /82 (BP Location: Left arm, Patient Position: Sitting, Cuff Size: Standard)   Pulse 60   Temp 98 °F (36 7 °C) (Temporal)   Resp 16   Ht 5' 6" (1 676 m)   Wt 69 9 kg (154 lb)   SpO2 98%   BMI 24 86 kg/m²          Physical Exam   Constitutional: He is oriented to person, place, and time  He appears well-developed and well-nourished  No distress  HENT:   Head: Normocephalic and atraumatic  Right Ear: External ear normal    Left Ear: External ear normal    Mouth/Throat: Oropharynx is clear and moist  No oropharyngeal exudate  Eyes: Pupils are equal, round, and reactive to light  Conjunctivae are normal  No scleral icterus  Neck: Normal range of motion  Neck supple  No tracheal deviation present  No thyromegaly present  Cardiovascular: Normal rate, regular rhythm, normal heart sounds and intact distal pulses  No murmur heard  Pulmonary/Chest: Effort normal  No respiratory distress  He has wheezes  Abdominal: Soft  Bowel sounds are normal  He exhibits no distension  There is no rebound  Musculoskeletal: Normal range of motion  He exhibits no edema  Neurological: He is alert and oriented to person, place, and time  Skin: Skin is warm  He is not diaphoretic  Psychiatric: He has a normal mood and affect

## 2020-02-28 DIAGNOSIS — Z09 FOLLOW UP: ICD-10-CM

## 2020-02-29 RX ORDER — LORATADINE 10 MG/1
10 TABLET ORAL DAILY
Qty: 30 TABLET | Refills: 0 | Status: SHIPPED | OUTPATIENT
Start: 2020-02-29 | End: 2020-03-25

## 2020-03-06 DIAGNOSIS — N53.19 DISORDER OF EJACULATION: ICD-10-CM

## 2020-03-06 RX ORDER — PAROXETINE HYDROCHLORIDE 20 MG/1
20 TABLET, FILM COATED ORAL AS NEEDED
Qty: 30 TABLET | Refills: 0 | Status: SHIPPED | OUTPATIENT
Start: 2020-03-06 | End: 2020-04-22

## 2020-03-09 DIAGNOSIS — R39.15 URINARY URGENCY: ICD-10-CM

## 2020-03-09 RX ORDER — OXYBUTYNIN CHLORIDE 10 MG/1
10 TABLET, EXTENDED RELEASE ORAL
Qty: 30 TABLET | Refills: 0 | Status: SHIPPED | OUTPATIENT
Start: 2020-03-09 | End: 2020-04-03

## 2020-03-25 DIAGNOSIS — Z09 FOLLOW UP: ICD-10-CM

## 2020-03-25 RX ORDER — LORATADINE 10 MG/1
10 TABLET ORAL DAILY
Qty: 90 TABLET | Refills: 0 | Status: SHIPPED | OUTPATIENT
Start: 2020-03-25 | End: 2020-04-19 | Stop reason: SDUPTHER

## 2020-04-03 DIAGNOSIS — R39.15 URINARY URGENCY: ICD-10-CM

## 2020-04-03 RX ORDER — OXYBUTYNIN CHLORIDE 10 MG/1
10 TABLET, EXTENDED RELEASE ORAL
Qty: 30 TABLET | Refills: 0 | Status: SHIPPED | OUTPATIENT
Start: 2020-04-03 | End: 2020-10-05

## 2020-04-06 DIAGNOSIS — J42 CHRONIC BRONCHITIS, UNSPECIFIED CHRONIC BRONCHITIS TYPE (HCC): ICD-10-CM

## 2020-04-06 RX ORDER — TIOTROPIUM BROMIDE 18 UG/1
CAPSULE ORAL; RESPIRATORY (INHALATION)
Qty: 90 CAPSULE | Refills: 3 | Status: ON HOLD | OUTPATIENT
Start: 2020-04-06 | End: 2020-10-16 | Stop reason: SDUPTHER

## 2020-04-17 DIAGNOSIS — Z09 FOLLOW UP: ICD-10-CM

## 2020-04-19 RX ORDER — LORATADINE 10 MG/1
10 TABLET ORAL DAILY
Qty: 90 TABLET | Refills: 0 | Status: SHIPPED | OUTPATIENT
Start: 2020-04-19 | End: 2020-09-24 | Stop reason: SDUPTHER

## 2020-04-22 DIAGNOSIS — J44.1 COPD EXACERBATION (HCC): ICD-10-CM

## 2020-04-22 DIAGNOSIS — N53.19 DISORDER OF EJACULATION: ICD-10-CM

## 2020-04-22 RX ORDER — PAROXETINE HYDROCHLORIDE 20 MG/1
20 TABLET, FILM COATED ORAL AS NEEDED
Qty: 30 TABLET | Refills: 0 | Status: SHIPPED | OUTPATIENT
Start: 2020-04-22 | End: 2020-10-16 | Stop reason: HOSPADM

## 2020-05-05 ENCOUNTER — TELEPHONE (OUTPATIENT)
Dept: UROLOGY | Facility: CLINIC | Age: 55
End: 2020-05-05

## 2020-05-12 DIAGNOSIS — Z09 FOLLOW UP: ICD-10-CM

## 2020-05-12 RX ORDER — ALBUTEROL SULFATE 90 UG/1
AEROSOL, METERED RESPIRATORY (INHALATION)
Qty: 18 G | Refills: 2 | Status: SHIPPED | OUTPATIENT
Start: 2020-05-12 | End: 2020-06-09 | Stop reason: SDUPTHER

## 2020-05-28 ENCOUNTER — TELEPHONE (OUTPATIENT)
Dept: UROLOGY | Facility: CLINIC | Age: 55
End: 2020-05-28

## 2020-05-28 PROBLEM — Z12.5 PROSTATE CANCER SCREENING: Status: ACTIVE | Noted: 2020-05-28

## 2020-05-28 PROBLEM — F52.4 PREMATURE EJACULATION: Status: ACTIVE | Noted: 2020-05-28

## 2020-06-01 DIAGNOSIS — K21.9 GASTROESOPHAGEAL REFLUX DISEASE, ESOPHAGITIS PRESENCE NOT SPECIFIED: ICD-10-CM

## 2020-06-02 RX ORDER — PANTOPRAZOLE SODIUM 40 MG/1
40 TABLET, DELAYED RELEASE ORAL 2 TIMES DAILY
Qty: 60 TABLET | Refills: 3 | Status: SHIPPED | OUTPATIENT
Start: 2020-06-02 | End: 2020-09-24 | Stop reason: SDUPTHER

## 2020-06-09 DIAGNOSIS — K59.09 OTHER CONSTIPATION: ICD-10-CM

## 2020-06-09 DIAGNOSIS — Z09 FOLLOW UP: ICD-10-CM

## 2020-06-09 RX ORDER — POLYETHYLENE GLYCOL 3350 17 G/17G
POWDER, FOR SOLUTION ORAL
Qty: 510 G | Refills: 3 | Status: SHIPPED | OUTPATIENT
Start: 2020-06-09 | End: 2020-10-16 | Stop reason: HOSPADM

## 2020-06-09 RX ORDER — ALBUTEROL SULFATE 90 UG/1
AEROSOL, METERED RESPIRATORY (INHALATION)
Qty: 18 G | Refills: 2 | Status: SHIPPED | OUTPATIENT
Start: 2020-06-09 | End: 2020-09-09

## 2020-07-06 DIAGNOSIS — J44.1 COPD EXACERBATION (HCC): ICD-10-CM

## 2020-09-08 DIAGNOSIS — Z09 FOLLOW UP: ICD-10-CM

## 2020-09-09 RX ORDER — ALBUTEROL SULFATE 90 UG/1
AEROSOL, METERED RESPIRATORY (INHALATION)
Qty: 18 G | Refills: 2 | Status: SHIPPED | OUTPATIENT
Start: 2020-09-09 | End: 2020-10-16 | Stop reason: HOSPADM

## 2020-09-24 DIAGNOSIS — K21.9 GASTROESOPHAGEAL REFLUX DISEASE, ESOPHAGITIS PRESENCE NOT SPECIFIED: ICD-10-CM

## 2020-09-24 DIAGNOSIS — Z09 FOLLOW UP: ICD-10-CM

## 2020-09-24 DIAGNOSIS — K59.09 OTHER CONSTIPATION: ICD-10-CM

## 2020-09-24 DIAGNOSIS — N40.0 BENIGN PROSTATIC HYPERPLASIA WITHOUT LOWER URINARY TRACT SYMPTOMS: ICD-10-CM

## 2020-09-24 DIAGNOSIS — J30.2 SEASONAL ALLERGIES: ICD-10-CM

## 2020-09-24 RX ORDER — ATORVASTATIN CALCIUM 10 MG/1
10 TABLET, FILM COATED ORAL DAILY
Qty: 90 TABLET | Refills: 0 | Status: SHIPPED | OUTPATIENT
Start: 2020-09-24 | End: 2020-10-16 | Stop reason: HOSPADM

## 2020-09-24 RX ORDER — CETIRIZINE HYDROCHLORIDE 10 MG/1
10 TABLET, CHEWABLE ORAL DAILY
Qty: 90 TABLET | Refills: 0 | Status: SHIPPED | OUTPATIENT
Start: 2020-09-24 | End: 2020-10-16 | Stop reason: HOSPADM

## 2020-09-24 RX ORDER — PANTOPRAZOLE SODIUM 40 MG/1
40 TABLET, DELAYED RELEASE ORAL DAILY
Qty: 60 TABLET | Refills: 3 | Status: SHIPPED | OUTPATIENT
Start: 2020-09-24 | End: 2020-10-16 | Stop reason: HOSPADM

## 2020-09-24 RX ORDER — LORATADINE 10 MG/1
10 TABLET ORAL DAILY
Qty: 90 TABLET | Refills: 0 | Status: SHIPPED | OUTPATIENT
Start: 2020-09-24 | End: 2020-10-16 | Stop reason: HOSPADM

## 2020-10-05 ENCOUNTER — HOSPITAL ENCOUNTER (EMERGENCY)
Facility: HOSPITAL | Age: 55
Discharge: HOME | End: 2020-10-05
Attending: EMERGENCY MEDICINE | Admitting: EMERGENCY MEDICINE
Payer: COMMERCIAL

## 2020-10-05 VITALS
WEIGHT: 146.2 LBS | SYSTOLIC BLOOD PRESSURE: 151 MMHG | RESPIRATION RATE: 18 BRPM | OXYGEN SATURATION: 98 % | HEART RATE: 77 BPM | BODY MASS INDEX: 23.6 KG/M2 | DIASTOLIC BLOOD PRESSURE: 88 MMHG | TEMPERATURE: 97.2 F

## 2020-10-05 DIAGNOSIS — Z20.822 SUSPECTED COVID-19 VIRUS INFECTION: Primary | ICD-10-CM

## 2020-10-05 LAB
ALBUMIN SERPL BCP-MCNC: 4.3 G/DL (ref 3–5.2)
ALP SERPL-CCNC: 71 U/L (ref 43–122)
ALT SERPL W P-5'-P-CCNC: 12 U/L (ref 9–52)
ANION GAP SERPL CALCULATED.3IONS-SCNC: 5 MMOL/L (ref 5–14)
AST SERPL W P-5'-P-CCNC: 19 U/L (ref 17–59)
BASOPHILS # BLD AUTO: 0.1 THOUSANDS/ΜL (ref 0–0.1)
BASOPHILS NFR BLD AUTO: 1 % (ref 0–1)
BILIRUB SERPL-MCNC: 0.6 MG/DL
BUN SERPL-MCNC: 11 MG/DL (ref 5–25)
CALCIUM SERPL-MCNC: 9.6 MG/DL (ref 8.4–10.2)
CHLORIDE SERPL-SCNC: 103 MMOL/L (ref 97–108)
CO2 SERPL-SCNC: 27 MMOL/L (ref 22–30)
CREAT SERPL-MCNC: 0.75 MG/DL (ref 0.7–1.5)
EOSINOPHIL # BLD AUTO: 0 THOUSAND/ΜL (ref 0–0.4)
EOSINOPHIL NFR BLD AUTO: 1 % (ref 0–6)
ERYTHROCYTE [DISTWIDTH] IN BLOOD BY AUTOMATED COUNT: 12.9 %
GFR SERPL CREATININE-BSD FRML MDRD: 102 ML/MIN/1.73SQ M
GLUCOSE SERPL-MCNC: 97 MG/DL (ref 70–99)
HCT VFR BLD AUTO: 46.2 % (ref 41–53)
HGB BLD-MCNC: 15.7 G/DL (ref 13.5–17.5)
LIPASE SERPL-CCNC: 50 U/L (ref 23–300)
LYMPHOCYTES # BLD AUTO: 1.1 THOUSANDS/ΜL (ref 0.5–4)
LYMPHOCYTES NFR BLD AUTO: 14 % (ref 25–45)
MCH RBC QN AUTO: 30.8 PG (ref 26–34)
MCHC RBC AUTO-ENTMCNC: 34.1 G/DL (ref 31–36)
MCV RBC AUTO: 90 FL (ref 80–100)
MONOCYTES # BLD AUTO: 0.6 THOUSAND/ΜL (ref 0.2–0.9)
MONOCYTES NFR BLD AUTO: 8 % (ref 1–10)
NEUTROPHILS # BLD AUTO: 5.9 THOUSANDS/ΜL (ref 1.8–7.8)
NEUTS SEG NFR BLD AUTO: 77 % (ref 45–65)
PLATELET # BLD AUTO: 295 THOUSANDS/UL (ref 150–450)
PMV BLD AUTO: 7.7 FL (ref 8.9–12.7)
POTASSIUM SERPL-SCNC: 4.3 MMOL/L (ref 3.6–5)
PROT SERPL-MCNC: 7.6 G/DL (ref 5.9–8.4)
RBC # BLD AUTO: 5.11 MILLION/UL (ref 4.5–5.9)
SODIUM SERPL-SCNC: 135 MMOL/L (ref 137–147)
WBC # BLD AUTO: 7.7 THOUSAND/UL (ref 4.5–11)

## 2020-10-05 PROCEDURE — 96374 THER/PROPH/DIAG INJ IV PUSH: CPT

## 2020-10-05 PROCEDURE — 83690 ASSAY OF LIPASE: CPT | Performed by: PHYSICIAN ASSISTANT

## 2020-10-05 PROCEDURE — 96361 HYDRATE IV INFUSION ADD-ON: CPT

## 2020-10-05 PROCEDURE — 85025 COMPLETE CBC W/AUTO DIFF WBC: CPT | Performed by: PHYSICIAN ASSISTANT

## 2020-10-05 PROCEDURE — 99284 EMERGENCY DEPT VISIT MOD MDM: CPT | Performed by: PHYSICIAN ASSISTANT

## 2020-10-05 PROCEDURE — U0003 INFECTIOUS AGENT DETECTION BY NUCLEIC ACID (DNA OR RNA); SEVERE ACUTE RESPIRATORY SYNDROME CORONAVIRUS 2 (SARS-COV-2) (CORONAVIRUS DISEASE [COVID-19]), AMPLIFIED PROBE TECHNIQUE, MAKING USE OF HIGH THROUGHPUT TECHNOLOGIES AS DESCRIBED BY CMS-2020-01-R: HCPCS | Performed by: PHYSICIAN ASSISTANT

## 2020-10-05 PROCEDURE — 99283 EMERGENCY DEPT VISIT LOW MDM: CPT

## 2020-10-05 PROCEDURE — 36415 COLL VENOUS BLD VENIPUNCTURE: CPT | Performed by: PHYSICIAN ASSISTANT

## 2020-10-05 PROCEDURE — 80053 COMPREHEN METABOLIC PANEL: CPT | Performed by: PHYSICIAN ASSISTANT

## 2020-10-05 RX ORDER — ACETAMINOPHEN 500 MG
500 TABLET ORAL EVERY 6 HOURS PRN
Qty: 30 TABLET | Refills: 0 | Status: SHIPPED | OUTPATIENT
Start: 2020-10-05 | End: 2020-10-16 | Stop reason: HOSPADM

## 2020-10-05 RX ORDER — ONDANSETRON 4 MG/1
4 TABLET, FILM COATED ORAL EVERY 8 HOURS PRN
Qty: 10 TABLET | Refills: 0 | Status: SHIPPED | OUTPATIENT
Start: 2020-10-05 | End: 2020-10-16 | Stop reason: HOSPADM

## 2020-10-05 RX ORDER — ACETAMINOPHEN 325 MG/1
650 TABLET ORAL ONCE
Status: COMPLETED | OUTPATIENT
Start: 2020-10-05 | End: 2020-10-05

## 2020-10-05 RX ORDER — ONDANSETRON 2 MG/ML
4 INJECTION INTRAMUSCULAR; INTRAVENOUS ONCE
Status: COMPLETED | OUTPATIENT
Start: 2020-10-05 | End: 2020-10-05

## 2020-10-05 RX ADMIN — SODIUM CHLORIDE 1000 ML: 0.9 INJECTION, SOLUTION INTRAVENOUS at 15:49

## 2020-10-05 RX ADMIN — ACETAMINOPHEN 650 MG: 325 TABLET ORAL at 16:19

## 2020-10-05 RX ADMIN — ONDANSETRON 4 MG: 2 INJECTION INTRAMUSCULAR; INTRAVENOUS at 16:19

## 2020-10-06 LAB — SARS-COV-2 RNA SPEC QL NAA+PROBE: NOT DETECTED

## 2020-10-07 ENCOUNTER — APPOINTMENT (EMERGENCY)
Dept: RADIOLOGY | Facility: HOSPITAL | Age: 55
DRG: 881 | End: 2020-10-07
Payer: COMMERCIAL

## 2020-10-07 ENCOUNTER — HOSPITAL ENCOUNTER (INPATIENT)
Facility: HOSPITAL | Age: 55
LOS: 9 days | Discharge: HOME | DRG: 881 | End: 2020-10-16
Attending: EMERGENCY MEDICINE | Admitting: PSYCHIATRY & NEUROLOGY
Payer: COMMERCIAL

## 2020-10-07 DIAGNOSIS — J44.9 CHRONIC OBSTRUCTIVE PULMONARY DISEASE, UNSPECIFIED COPD TYPE (HCC): ICD-10-CM

## 2020-10-07 DIAGNOSIS — I10 HTN (HYPERTENSION): ICD-10-CM

## 2020-10-07 DIAGNOSIS — M25.561 CHRONIC PAIN OF BOTH KNEES: ICD-10-CM

## 2020-10-07 DIAGNOSIS — F32.A DEPRESSION: ICD-10-CM

## 2020-10-07 DIAGNOSIS — G89.29 CHRONIC PAIN OF BOTH KNEES: ICD-10-CM

## 2020-10-07 DIAGNOSIS — N40.0 BENIGN PROSTATIC HYPERPLASIA WITHOUT LOWER URINARY TRACT SYMPTOMS: ICD-10-CM

## 2020-10-07 DIAGNOSIS — R07.89 ATYPICAL CHEST PAIN: ICD-10-CM

## 2020-10-07 DIAGNOSIS — M25.562 CHRONIC PAIN OF BOTH KNEES: ICD-10-CM

## 2020-10-07 DIAGNOSIS — F32.9 MAJOR DEPRESSION: Primary | ICD-10-CM

## 2020-10-07 DIAGNOSIS — R45.851 SUICIDAL IDEATION: ICD-10-CM

## 2020-10-07 DIAGNOSIS — J42 CHRONIC BRONCHITIS, UNSPECIFIED CHRONIC BRONCHITIS TYPE (HCC): ICD-10-CM

## 2020-10-07 DIAGNOSIS — Z00.8 MEDICAL CLEARANCE FOR PSYCHIATRIC ADMISSION: ICD-10-CM

## 2020-10-07 DIAGNOSIS — K21.9 GASTROESOPHAGEAL REFLUX DISEASE WITHOUT ESOPHAGITIS: ICD-10-CM

## 2020-10-07 DIAGNOSIS — F41.9 ANXIETY: ICD-10-CM

## 2020-10-07 DIAGNOSIS — Z09 FOLLOW UP: ICD-10-CM

## 2020-10-07 LAB
ALBUMIN SERPL BCP-MCNC: 4.1 G/DL (ref 3–5.2)
ALP SERPL-CCNC: 72 U/L (ref 43–122)
ALT SERPL W P-5'-P-CCNC: 12 U/L (ref 9–52)
AMPHETAMINES SERPL QL SCN: NEGATIVE
ANION GAP SERPL CALCULATED.3IONS-SCNC: 9 MMOL/L (ref 5–14)
APAP SERPL-MCNC: <10 UG/ML (ref 10–20)
AST SERPL W P-5'-P-CCNC: 17 U/L (ref 17–59)
ATRIAL RATE: 98 BPM
BACTERIA UR QL AUTO: ABNORMAL /HPF
BARBITURATES UR QL: NEGATIVE
BASOPHILS # BLD AUTO: 0 THOUSANDS/ΜL (ref 0–0.1)
BASOPHILS NFR BLD AUTO: 0 % (ref 0–1)
BENZODIAZ UR QL: NEGATIVE
BILIRUB SERPL-MCNC: 0.7 MG/DL
BILIRUB UR QL STRIP: NEGATIVE
BUN SERPL-MCNC: 13 MG/DL (ref 5–25)
CALCIUM SERPL-MCNC: 9.8 MG/DL (ref 8.4–10.2)
CHLORIDE SERPL-SCNC: 101 MMOL/L (ref 97–108)
CHOLEST SERPL-MCNC: 216 MG/DL
CLARITY UR: CLEAR
CO2 SERPL-SCNC: 24 MMOL/L (ref 22–30)
COCAINE UR QL: NEGATIVE
COLOR UR: ABNORMAL
CREAT SERPL-MCNC: 0.77 MG/DL (ref 0.7–1.5)
EOSINOPHIL # BLD AUTO: 0 THOUSAND/ΜL (ref 0–0.4)
EOSINOPHIL NFR BLD AUTO: 0 % (ref 0–6)
ERYTHROCYTE [DISTWIDTH] IN BLOOD BY AUTOMATED COUNT: 12.8 %
ETHANOL SERPL-MCNC: <10 MG/DL (ref 0–10)
GFR SERPL CREATININE-BSD FRML MDRD: 101 ML/MIN/1.73SQ M
GLUCOSE SERPL-MCNC: 83 MG/DL (ref 70–99)
GLUCOSE UR STRIP-MCNC: NEGATIVE MG/DL
HCT VFR BLD AUTO: 44.4 % (ref 41–53)
HDLC SERPL-MCNC: 43 MG/DL
HGB BLD-MCNC: 15.4 G/DL (ref 13.5–17.5)
HGB UR QL STRIP.AUTO: 150
KETONES UR STRIP-MCNC: ABNORMAL MG/DL
LDLC SERPL CALC-MCNC: 146 MG/DL
LEUKOCYTE ESTERASE UR QL STRIP: NEGATIVE
LIPASE SERPL-CCNC: 61 U/L (ref 23–300)
LYMPHOCYTES # BLD AUTO: 1 THOUSANDS/ΜL (ref 0.5–4)
LYMPHOCYTES NFR BLD AUTO: 11 % (ref 25–45)
MAGNESIUM SERPL-MCNC: 1.7 MG/DL (ref 1.6–2.3)
MCH RBC QN AUTO: 31.7 PG (ref 26–34)
MCHC RBC AUTO-ENTMCNC: 34.6 G/DL (ref 31–36)
MCV RBC AUTO: 91 FL (ref 80–100)
METHADONE UR QL: NEGATIVE
MONOCYTES # BLD AUTO: 0.5 THOUSAND/ΜL (ref 0.2–0.9)
MONOCYTES NFR BLD AUTO: 5 % (ref 1–10)
MUCOUS THREADS UR QL AUTO: ABNORMAL
NEUTROPHILS # BLD AUTO: 7.4 THOUSANDS/ΜL (ref 1.8–7.8)
NEUTS SEG NFR BLD AUTO: 84 % (ref 45–65)
NITRITE UR QL STRIP: NEGATIVE
NON-SQ EPI CELLS URNS QL MICRO: ABNORMAL /HPF
NONHDLC SERPL-MCNC: 173 MG/DL
OPIATES UR QL SCN: NEGATIVE
OXYCODONE+OXYMORPHONE UR QL SCN: NEGATIVE
P AXIS: 60 DEGREES
PCP UR QL: NEGATIVE
PH UR STRIP.AUTO: 5 [PH]
PLATELET # BLD AUTO: 268 THOUSANDS/UL (ref 150–450)
PMV BLD AUTO: 8.2 FL (ref 8.9–12.7)
POTASSIUM SERPL-SCNC: 4.4 MMOL/L (ref 3.6–5)
PR INTERVAL: 174 MS
PROT SERPL-MCNC: 7 G/DL (ref 5.9–8.4)
PROT UR STRIP-MCNC: ABNORMAL MG/DL
QRS AXIS: 10 DEGREES
QRSD INTERVAL: 80 MS
QT INTERVAL: 332 MS
QTC INTERVAL: 423 MS
RBC # BLD AUTO: 4.86 MILLION/UL (ref 4.5–5.9)
RBC #/AREA URNS AUTO: ABNORMAL /HPF
SALICYLATES SERPL-MCNC: <1 MG/DL (ref 10–30)
SARS-COV-2 RNA RESP QL NAA+PROBE: NEGATIVE
SODIUM SERPL-SCNC: 134 MMOL/L (ref 137–147)
SP GR UR STRIP.AUTO: 1.03 (ref 1–1.04)
T WAVE AXIS: 68 DEGREES
THC UR QL: NEGATIVE
TRIGL SERPL-MCNC: 136 MG/DL
TROPONIN I SERPL-MCNC: 0.01 NG/ML (ref 0–0.03)
TROPONIN I SERPL-MCNC: <0.01 NG/ML (ref 0–0.03)
TSH SERPL DL<=0.05 MIU/L-ACNC: 0.6 UIU/ML (ref 0.47–4.68)
UROBILINOGEN UA: 1 MG/DL
VENTRICULAR RATE: 98 BPM
WBC # BLD AUTO: 8.8 THOUSAND/UL (ref 4.5–11)
WBC #/AREA URNS AUTO: ABNORMAL /HPF

## 2020-10-07 PROCEDURE — 93005 ELECTROCARDIOGRAM TRACING: CPT

## 2020-10-07 PROCEDURE — 99285 EMERGENCY DEPT VISIT HI MDM: CPT | Performed by: EMERGENCY MEDICINE

## 2020-10-07 PROCEDURE — 71045 X-RAY EXAM CHEST 1 VIEW: CPT

## 2020-10-07 PROCEDURE — 87635 SARS-COV-2 COVID-19 AMP PRB: CPT | Performed by: EMERGENCY MEDICINE

## 2020-10-07 PROCEDURE — 99285 EMERGENCY DEPT VISIT HI MDM: CPT

## 2020-10-07 PROCEDURE — 84443 ASSAY THYROID STIM HORMONE: CPT | Performed by: EMERGENCY MEDICINE

## 2020-10-07 PROCEDURE — 80053 COMPREHEN METABOLIC PANEL: CPT | Performed by: EMERGENCY MEDICINE

## 2020-10-07 PROCEDURE — 81001 URINALYSIS AUTO W/SCOPE: CPT | Performed by: EMERGENCY MEDICINE

## 2020-10-07 PROCEDURE — 80329 ANALGESICS NON-OPIOID 1 OR 2: CPT | Performed by: EMERGENCY MEDICINE

## 2020-10-07 PROCEDURE — 83735 ASSAY OF MAGNESIUM: CPT | Performed by: EMERGENCY MEDICINE

## 2020-10-07 PROCEDURE — 93010 ELECTROCARDIOGRAM REPORT: CPT | Performed by: INTERNAL MEDICINE

## 2020-10-07 PROCEDURE — 80061 LIPID PANEL: CPT | Performed by: PSYCHIATRY & NEUROLOGY

## 2020-10-07 PROCEDURE — 80320 DRUG SCREEN QUANTALCOHOLS: CPT | Performed by: EMERGENCY MEDICINE

## 2020-10-07 PROCEDURE — 36415 COLL VENOUS BLD VENIPUNCTURE: CPT | Performed by: EMERGENCY MEDICINE

## 2020-10-07 PROCEDURE — 85025 COMPLETE CBC W/AUTO DIFF WBC: CPT | Performed by: EMERGENCY MEDICINE

## 2020-10-07 PROCEDURE — 84484 ASSAY OF TROPONIN QUANT: CPT | Performed by: EMERGENCY MEDICINE

## 2020-10-07 PROCEDURE — 90682 RIV4 VACC RECOMBINANT DNA IM: CPT | Performed by: PSYCHIATRY & NEUROLOGY

## 2020-10-07 PROCEDURE — 80307 DRUG TEST PRSMV CHEM ANLYZR: CPT | Performed by: EMERGENCY MEDICINE

## 2020-10-07 PROCEDURE — G0008 ADMIN INFLUENZA VIRUS VAC: HCPCS | Performed by: PSYCHIATRY & NEUROLOGY

## 2020-10-07 PROCEDURE — 83690 ASSAY OF LIPASE: CPT | Performed by: EMERGENCY MEDICINE

## 2020-10-07 PROCEDURE — 99222 1ST HOSP IP/OBS MODERATE 55: CPT | Performed by: PSYCHIATRY & NEUROLOGY

## 2020-10-07 RX ORDER — TRAZODONE HYDROCHLORIDE 50 MG/1
50 TABLET ORAL
Status: DISCONTINUED | OUTPATIENT
Start: 2020-10-07 | End: 2020-10-16 | Stop reason: HOSPADM

## 2020-10-07 RX ORDER — FLUTICASONE FUROATE AND VILANTEROL 200; 25 UG/1; UG/1
1 POWDER RESPIRATORY (INHALATION)
Status: DISCONTINUED | OUTPATIENT
Start: 2020-10-08 | End: 2020-10-16 | Stop reason: HOSPADM

## 2020-10-07 RX ORDER — ASPIRIN 325 MG
325 TABLET ORAL ONCE
Status: COMPLETED | OUTPATIENT
Start: 2020-10-07 | End: 2020-10-07

## 2020-10-07 RX ORDER — HYDROXYZINE HYDROCHLORIDE 25 MG/1
25 TABLET, FILM COATED ORAL EVERY 4 HOURS PRN
Status: DISCONTINUED | OUTPATIENT
Start: 2020-10-07 | End: 2020-10-16 | Stop reason: HOSPADM

## 2020-10-07 RX ORDER — MAGNESIUM HYDROXIDE/ALUMINUM HYDROXICE/SIMETHICONE 120; 1200; 1200 MG/30ML; MG/30ML; MG/30ML
30 SUSPENSION ORAL EVERY 4 HOURS PRN
Status: DISCONTINUED | OUTPATIENT
Start: 2020-10-07 | End: 2020-10-16 | Stop reason: HOSPADM

## 2020-10-07 RX ORDER — ACETAMINOPHEN 325 MG/1
325 TABLET ORAL EVERY 6 HOURS PRN
Status: DISCONTINUED | OUTPATIENT
Start: 2020-10-07 | End: 2020-10-16 | Stop reason: HOSPADM

## 2020-10-07 RX ORDER — ACETAMINOPHEN 325 MG/1
650 TABLET ORAL EVERY 6 HOURS PRN
Status: DISCONTINUED | OUTPATIENT
Start: 2020-10-07 | End: 2020-10-16 | Stop reason: HOSPADM

## 2020-10-07 RX ORDER — PROPRANOLOL HYDROCHLORIDE 10 MG/1
10 TABLET ORAL 2 TIMES DAILY
Status: DISCONTINUED | OUTPATIENT
Start: 2020-10-07 | End: 2020-10-10

## 2020-10-07 RX ORDER — OLANZAPINE 10 MG/1
10 INJECTION, POWDER, LYOPHILIZED, FOR SOLUTION INTRAMUSCULAR
Status: DISCONTINUED | OUTPATIENT
Start: 2020-10-07 | End: 2020-10-16 | Stop reason: HOSPADM

## 2020-10-07 RX ORDER — ALPRAZOLAM 0.25 MG/1
1 TABLET ORAL ONCE
Status: COMPLETED | OUTPATIENT
Start: 2020-10-07 | End: 2020-10-07

## 2020-10-07 RX ORDER — HALOPERIDOL 1 MG/1
2 TABLET ORAL EVERY 8 HOURS PRN
Status: DISCONTINUED | OUTPATIENT
Start: 2020-10-07 | End: 2020-10-16 | Stop reason: HOSPADM

## 2020-10-07 RX ORDER — LORAZEPAM 1 MG/1
1 TABLET ORAL EVERY 8 HOURS PRN
Status: DISCONTINUED | OUTPATIENT
Start: 2020-10-07 | End: 2020-10-16 | Stop reason: HOSPADM

## 2020-10-07 RX ORDER — TAMSULOSIN HYDROCHLORIDE 0.4 MG/1
0.4 CAPSULE ORAL
Status: DISCONTINUED | OUTPATIENT
Start: 2020-10-07 | End: 2020-10-16 | Stop reason: HOSPADM

## 2020-10-07 RX ORDER — ALBUTEROL SULFATE 90 UG/1
1 AEROSOL, METERED RESPIRATORY (INHALATION) EVERY 6 HOURS PRN
Status: DISCONTINUED | OUTPATIENT
Start: 2020-10-07 | End: 2020-10-16 | Stop reason: HOSPADM

## 2020-10-07 RX ORDER — BENZTROPINE MESYLATE 1 MG/ML
1 INJECTION INTRAMUSCULAR; INTRAVENOUS EVERY 6 HOURS PRN
Status: DISCONTINUED | OUTPATIENT
Start: 2020-10-07 | End: 2020-10-16 | Stop reason: HOSPADM

## 2020-10-07 RX ORDER — ACETAMINOPHEN 325 MG/1
650 TABLET ORAL EVERY 8 HOURS PRN
Status: DISCONTINUED | OUTPATIENT
Start: 2020-10-07 | End: 2020-10-16 | Stop reason: HOSPADM

## 2020-10-07 RX ORDER — QUETIAPINE FUMARATE 100 MG/1
100 TABLET, FILM COATED ORAL
Status: DISCONTINUED | OUTPATIENT
Start: 2020-10-07 | End: 2020-10-12

## 2020-10-07 RX ORDER — LORAZEPAM 2 MG/ML
2 INJECTION INTRAMUSCULAR EVERY 6 HOURS PRN
Status: DISCONTINUED | OUTPATIENT
Start: 2020-10-07 | End: 2020-10-16 | Stop reason: HOSPADM

## 2020-10-07 RX ORDER — ESCITALOPRAM OXALATE 10 MG/1
10 TABLET ORAL DAILY
Status: DISCONTINUED | OUTPATIENT
Start: 2020-10-08 | End: 2020-10-14

## 2020-10-07 RX ORDER — RISPERIDONE 1 MG/1
1 TABLET, ORALLY DISINTEGRATING ORAL
Status: DISCONTINUED | OUTPATIENT
Start: 2020-10-07 | End: 2020-10-16 | Stop reason: HOSPADM

## 2020-10-07 RX ORDER — TRAZODONE HYDROCHLORIDE 50 MG/1
25 TABLET ORAL
Status: DISCONTINUED | OUTPATIENT
Start: 2020-10-07 | End: 2020-10-16 | Stop reason: HOSPADM

## 2020-10-07 RX ORDER — OLANZAPINE 10 MG/1
10 TABLET ORAL
Status: DISCONTINUED | OUTPATIENT
Start: 2020-10-07 | End: 2020-10-16 | Stop reason: HOSPADM

## 2020-10-07 RX ORDER — BENZTROPINE MESYLATE 1 MG/1
1 TABLET ORAL EVERY 6 HOURS PRN
Status: DISCONTINUED | OUTPATIENT
Start: 2020-10-07 | End: 2020-10-16 | Stop reason: HOSPADM

## 2020-10-07 RX ADMIN — ASPIRIN 325 MG ORAL TABLET 325 MG: 325 PILL ORAL at 11:06

## 2020-10-07 RX ADMIN — INFLUENZA A VIRUS A/HAWAII/70/2019 (H1N1) RECOMBINANT HEMAGGLUTININ ANTIGEN, INFLUENZA A VIRUS A/MINNESOTA/41/2019 (H3N2) RECOMBINANT HEMAGGLUTININ ANTIGEN, INFLUENZA B VIRUS B/WASHINGTON/02/2019 RECOMBINANT HEMAGGLUTININ ANTIGEN, AND INFLUENZA B VIRUS B/PHUKET/3073/2013 RECOMBINANT HEMAGGLUTININ ANTIGEN 0.5 ML: 45; 45; 45; 45 INJECTION INTRAMUSCULAR at 22:03

## 2020-10-07 RX ADMIN — TAMSULOSIN HYDROCHLORIDE 0.4 MG: 0.4 CAPSULE ORAL at 18:50

## 2020-10-07 RX ADMIN — QUETIAPINE FUMARATE 100 MG: 100 TABLET ORAL at 22:03

## 2020-10-07 RX ADMIN — PROPRANOLOL HYDROCHLORIDE 10 MG: 10 TABLET ORAL at 18:50

## 2020-10-07 RX ADMIN — TRAZODONE HYDROCHLORIDE 25 MG: 50 TABLET ORAL at 22:03

## 2020-10-07 RX ADMIN — ALPRAZOLAM 1 MG: 0.25 TABLET ORAL at 11:04

## 2020-10-08 PROBLEM — F39 MOOD DISORDER (HCC): Status: ACTIVE | Noted: 2020-10-08

## 2020-10-08 LAB
ATRIAL RATE: 78 BPM
BASOPHILS # BLD AUTO: 0.1 THOUSANDS/ΜL (ref 0–0.1)
BASOPHILS NFR BLD AUTO: 1 % (ref 0–1)
EOSINOPHIL # BLD AUTO: 0.2 THOUSAND/ΜL (ref 0–0.4)
EOSINOPHIL NFR BLD AUTO: 3 % (ref 0–6)
ERYTHROCYTE [DISTWIDTH] IN BLOOD BY AUTOMATED COUNT: 13.3 %
HCT VFR BLD AUTO: 41.9 % (ref 41–53)
HGB BLD-MCNC: 14.2 G/DL (ref 13.5–17.5)
LYMPHOCYTES # BLD AUTO: 2.1 THOUSANDS/ΜL (ref 0.5–4)
LYMPHOCYTES NFR BLD AUTO: 31 % (ref 25–45)
MCH RBC QN AUTO: 31 PG (ref 26–34)
MCHC RBC AUTO-ENTMCNC: 34 G/DL (ref 31–36)
MCV RBC AUTO: 91 FL (ref 80–100)
MONOCYTES # BLD AUTO: 0.7 THOUSAND/ΜL (ref 0.2–0.9)
MONOCYTES NFR BLD AUTO: 10 % (ref 1–10)
NEUTROPHILS # BLD AUTO: 3.7 THOUSANDS/ΜL (ref 1.8–7.8)
NEUTS SEG NFR BLD AUTO: 55 % (ref 45–65)
P AXIS: 65 DEGREES
PLATELET # BLD AUTO: 253 THOUSANDS/UL (ref 150–450)
PMV BLD AUTO: 8.3 FL (ref 8.9–12.7)
PR INTERVAL: 178 MS
QRS AXIS: 13 DEGREES
QRSD INTERVAL: 80 MS
QT INTERVAL: 354 MS
QTC INTERVAL: 403 MS
RBC # BLD AUTO: 4.59 MILLION/UL (ref 4.5–5.9)
T WAVE AXIS: 59 DEGREES
VENTRICULAR RATE: 78 BPM
WBC # BLD AUTO: 6.7 THOUSAND/UL (ref 4.5–11)

## 2020-10-08 PROCEDURE — 85025 COMPLETE CBC W/AUTO DIFF WBC: CPT | Performed by: PSYCHIATRY & NEUROLOGY

## 2020-10-08 PROCEDURE — 93010 ELECTROCARDIOGRAM REPORT: CPT

## 2020-10-08 PROCEDURE — 99221 1ST HOSP IP/OBS SF/LOW 40: CPT | Performed by: PSYCHIATRY & NEUROLOGY

## 2020-10-08 RX ORDER — ALPRAZOLAM 0.25 MG/1
0.5 TABLET ORAL 2 TIMES DAILY
Status: DISCONTINUED | OUTPATIENT
Start: 2020-10-08 | End: 2020-10-16 | Stop reason: HOSPADM

## 2020-10-08 RX ADMIN — PROPRANOLOL HYDROCHLORIDE 10 MG: 10 TABLET ORAL at 17:12

## 2020-10-08 RX ADMIN — TRAZODONE HYDROCHLORIDE 25 MG: 50 TABLET ORAL at 21:48

## 2020-10-08 RX ADMIN — ACETAMINOPHEN 650 MG: 325 TABLET ORAL at 21:57

## 2020-10-08 RX ADMIN — FLUTICASONE FUROATE AND VILANTEROL TRIFENATATE 1 PUFF: 200; 25 POWDER RESPIRATORY (INHALATION) at 09:17

## 2020-10-08 RX ADMIN — MAGNESIUM HYDROXIDE 30 ML: 400 SUSPENSION ORAL at 20:20

## 2020-10-08 RX ADMIN — ALPRAZOLAM 0.5 MG: 0.25 TABLET ORAL at 21:48

## 2020-10-08 RX ADMIN — ESCITALOPRAM OXALATE 10 MG: 10 TABLET ORAL at 09:14

## 2020-10-08 RX ADMIN — DICLOFENAC 2 G: 10 GEL TOPICAL at 17:12

## 2020-10-08 RX ADMIN — ALUMINUM HYDROXIDE, MAGNESIUM HYDROXIDE, AND SIMETHICONE 30 ML: 200; 200; 20 SUSPENSION ORAL at 17:12

## 2020-10-08 RX ADMIN — TIOTROPIUM BROMIDE 18 MCG: 18 CAPSULE ORAL; RESPIRATORY (INHALATION) at 09:00

## 2020-10-08 RX ADMIN — QUETIAPINE FUMARATE 100 MG: 100 TABLET ORAL at 21:47

## 2020-10-08 RX ADMIN — TAMSULOSIN HYDROCHLORIDE 0.4 MG: 0.4 CAPSULE ORAL at 17:12

## 2020-10-08 RX ADMIN — ALPRAZOLAM 0.5 MG: 0.25 TABLET ORAL at 14:10

## 2020-10-08 RX ADMIN — DICLOFENAC 2 G: 10 GEL TOPICAL at 09:15

## 2020-10-09 PROBLEM — F39 MOOD DISORDER (HCC): Chronic | Status: ACTIVE | Noted: 2020-10-08

## 2020-10-09 LAB
ATRIAL RATE: 53 BPM
P AXIS: 64 DEGREES
PR INTERVAL: 182 MS
QRS AXIS: 14 DEGREES
QRSD INTERVAL: 90 MS
QT INTERVAL: 406 MS
QTC INTERVAL: 380 MS
T WAVE AXIS: 49 DEGREES
VENTRICULAR RATE: 53 BPM

## 2020-10-09 PROCEDURE — 93005 ELECTROCARDIOGRAM TRACING: CPT

## 2020-10-09 PROCEDURE — 99232 SBSQ HOSP IP/OBS MODERATE 35: CPT | Performed by: PSYCHIATRY & NEUROLOGY

## 2020-10-09 PROCEDURE — 93010 ELECTROCARDIOGRAM REPORT: CPT | Performed by: INTERNAL MEDICINE

## 2020-10-09 RX ORDER — FAMOTIDINE 20 MG/1
20 TABLET, FILM COATED ORAL 2 TIMES DAILY
Status: DISCONTINUED | OUTPATIENT
Start: 2020-10-09 | End: 2020-10-16 | Stop reason: HOSPADM

## 2020-10-09 RX ADMIN — ALPRAZOLAM 0.5 MG: 0.25 TABLET ORAL at 21:30

## 2020-10-09 RX ADMIN — TRAZODONE HYDROCHLORIDE 25 MG: 50 TABLET ORAL at 22:33

## 2020-10-09 RX ADMIN — ACETAMINOPHEN 650 MG: 325 TABLET ORAL at 11:36

## 2020-10-09 RX ADMIN — FLUTICASONE FUROATE AND VILANTEROL TRIFENATATE 1 PUFF: 200; 25 POWDER RESPIRATORY (INHALATION) at 08:18

## 2020-10-09 RX ADMIN — ALPRAZOLAM 0.5 MG: 0.25 TABLET ORAL at 08:16

## 2020-10-09 RX ADMIN — ALUMINUM HYDROXIDE, MAGNESIUM HYDROXIDE, AND SIMETHICONE 30 ML: 200; 200; 20 SUSPENSION ORAL at 11:37

## 2020-10-09 RX ADMIN — ESCITALOPRAM OXALATE 10 MG: 10 TABLET ORAL at 08:16

## 2020-10-09 RX ADMIN — FAMOTIDINE 20 MG: 20 TABLET ORAL at 18:39

## 2020-10-09 RX ADMIN — ALUMINUM HYDROXIDE, MAGNESIUM HYDROXIDE, AND SIMETHICONE 30 ML: 200; 200; 20 SUSPENSION ORAL at 20:16

## 2020-10-09 RX ADMIN — TIOTROPIUM BROMIDE 18 MCG: 18 CAPSULE ORAL; RESPIRATORY (INHALATION) at 08:18

## 2020-10-09 RX ADMIN — QUETIAPINE FUMARATE 100 MG: 100 TABLET ORAL at 23:12

## 2020-10-09 RX ADMIN — TAMSULOSIN HYDROCHLORIDE 0.4 MG: 0.4 CAPSULE ORAL at 17:28

## 2020-10-09 RX ADMIN — FAMOTIDINE 20 MG: 20 TABLET ORAL at 11:37

## 2020-10-09 RX ADMIN — PROPRANOLOL HYDROCHLORIDE 10 MG: 10 TABLET ORAL at 08:15

## 2020-10-09 RX ADMIN — ACETAMINOPHEN 650 MG: 325 TABLET ORAL at 18:38

## 2020-10-10 PROBLEM — R06.02 SHORTNESS OF BREATH: Status: RESOLVED | Noted: 2018-09-27 | Resolved: 2020-10-10

## 2020-10-10 PROBLEM — J44.1 COPD EXACERBATION (HCC): Status: RESOLVED | Noted: 2020-02-20 | Resolved: 2020-10-10

## 2020-10-10 PROBLEM — E78.2 MIXED HYPERLIPIDEMIA: Status: ACTIVE | Noted: 2018-09-27

## 2020-10-10 LAB
ANION GAP SERPL CALCULATED.3IONS-SCNC: 2 MMOL/L (ref 5–14)
BUN SERPL-MCNC: 13 MG/DL (ref 5–25)
CALCIUM SERPL-MCNC: 8.6 MG/DL (ref 8.4–10.2)
CHLORIDE SERPL-SCNC: 102 MMOL/L (ref 97–108)
CO2 SERPL-SCNC: 32 MMOL/L (ref 22–30)
CREAT SERPL-MCNC: 0.85 MG/DL (ref 0.7–1.5)
GFR SERPL CREATININE-BSD FRML MDRD: 97 ML/MIN/1.73SQ M
GLUCOSE P FAST SERPL-MCNC: 84 MG/DL (ref 70–99)
GLUCOSE SERPL-MCNC: 84 MG/DL (ref 70–99)
MAGNESIUM SERPL-MCNC: 2.2 MG/DL (ref 1.6–2.3)
PHOSPHATE SERPL-MCNC: 3.6 MG/DL (ref 2.5–4.8)
POTASSIUM SERPL-SCNC: 4.1 MMOL/L (ref 3.6–5)
SODIUM SERPL-SCNC: 136 MMOL/L (ref 137–147)

## 2020-10-10 PROCEDURE — 99222 1ST HOSP IP/OBS MODERATE 55: CPT | Performed by: FAMILY MEDICINE

## 2020-10-10 PROCEDURE — 83735 ASSAY OF MAGNESIUM: CPT | Performed by: PSYCHIATRY & NEUROLOGY

## 2020-10-10 PROCEDURE — 84100 ASSAY OF PHOSPHORUS: CPT | Performed by: PSYCHIATRY & NEUROLOGY

## 2020-10-10 PROCEDURE — 99232 SBSQ HOSP IP/OBS MODERATE 35: CPT | Performed by: NURSE PRACTITIONER

## 2020-10-10 PROCEDURE — 80048 BASIC METABOLIC PNL TOTAL CA: CPT | Performed by: PSYCHIATRY & NEUROLOGY

## 2020-10-10 RX ORDER — ONDANSETRON 4 MG/1
4 TABLET, ORALLY DISINTEGRATING ORAL EVERY 6 HOURS PRN
Status: DISCONTINUED | OUTPATIENT
Start: 2020-10-10 | End: 2020-10-16 | Stop reason: HOSPADM

## 2020-10-10 RX ORDER — PANTOPRAZOLE SODIUM 40 MG/1
40 TABLET, DELAYED RELEASE ORAL
Status: DISCONTINUED | OUTPATIENT
Start: 2020-10-11 | End: 2020-10-16 | Stop reason: HOSPADM

## 2020-10-10 RX ORDER — ATORVASTATIN CALCIUM 10 MG/1
10 TABLET, FILM COATED ORAL
Status: DISCONTINUED | OUTPATIENT
Start: 2020-10-11 | End: 2020-10-16 | Stop reason: HOSPADM

## 2020-10-10 RX ADMIN — MAGNESIUM HYDROXIDE 30 ML: 400 SUSPENSION ORAL at 13:48

## 2020-10-10 RX ADMIN — ONDANSETRON 4 MG: 4 TABLET, ORALLY DISINTEGRATING ORAL at 17:06

## 2020-10-10 RX ADMIN — ONDANSETRON 4 MG: 4 TABLET, ORALLY DISINTEGRATING ORAL at 17:05

## 2020-10-10 RX ADMIN — ACETAMINOPHEN 650 MG: 325 TABLET ORAL at 19:14

## 2020-10-10 RX ADMIN — FAMOTIDINE 20 MG: 20 TABLET ORAL at 18:13

## 2020-10-10 RX ADMIN — ESCITALOPRAM OXALATE 10 MG: 10 TABLET ORAL at 08:37

## 2020-10-10 RX ADMIN — FAMOTIDINE 20 MG: 20 TABLET ORAL at 08:36

## 2020-10-10 RX ADMIN — TIOTROPIUM BROMIDE 18 MCG: 18 CAPSULE ORAL; RESPIRATORY (INHALATION) at 08:40

## 2020-10-10 RX ADMIN — ALUMINUM HYDROXIDE, MAGNESIUM HYDROXIDE, AND SIMETHICONE 30 ML: 200; 200; 20 SUSPENSION ORAL at 19:11

## 2020-10-10 RX ADMIN — TRAZODONE HYDROCHLORIDE 25 MG: 50 TABLET ORAL at 21:04

## 2020-10-10 RX ADMIN — ALPRAZOLAM 0.5 MG: 0.25 TABLET ORAL at 08:37

## 2020-10-10 RX ADMIN — BENZTROPINE MESYLATE 1 MG: 1 TABLET ORAL at 22:42

## 2020-10-10 RX ADMIN — QUETIAPINE FUMARATE 100 MG: 100 TABLET ORAL at 21:04

## 2020-10-10 RX ADMIN — ACETAMINOPHEN 650 MG: 325 TABLET ORAL at 08:37

## 2020-10-10 RX ADMIN — TAMSULOSIN HYDROCHLORIDE 0.4 MG: 0.4 CAPSULE ORAL at 18:13

## 2020-10-10 RX ADMIN — FLUTICASONE FUROATE AND VILANTEROL TRIFENATATE 1 PUFF: 200; 25 POWDER RESPIRATORY (INHALATION) at 08:41

## 2020-10-10 RX ADMIN — ALPRAZOLAM 0.5 MG: 0.25 TABLET ORAL at 21:04

## 2020-10-11 PROCEDURE — 99232 SBSQ HOSP IP/OBS MODERATE 35: CPT | Performed by: NURSE PRACTITIONER

## 2020-10-11 RX ADMIN — ALPRAZOLAM 0.5 MG: 0.25 TABLET ORAL at 21:04

## 2020-10-11 RX ADMIN — FLUTICASONE FUROATE AND VILANTEROL TRIFENATATE 1 PUFF: 200; 25 POWDER RESPIRATORY (INHALATION) at 08:49

## 2020-10-11 RX ADMIN — PANTOPRAZOLE SODIUM 40 MG: 40 TABLET, DELAYED RELEASE ORAL at 06:14

## 2020-10-11 RX ADMIN — QUETIAPINE FUMARATE 100 MG: 100 TABLET ORAL at 21:05

## 2020-10-11 RX ADMIN — ALPRAZOLAM 0.5 MG: 0.25 TABLET ORAL at 08:49

## 2020-10-11 RX ADMIN — TAMSULOSIN HYDROCHLORIDE 0.4 MG: 0.4 CAPSULE ORAL at 17:05

## 2020-10-11 RX ADMIN — TIOTROPIUM BROMIDE 18 MCG: 18 CAPSULE ORAL; RESPIRATORY (INHALATION) at 08:49

## 2020-10-11 RX ADMIN — FAMOTIDINE 20 MG: 20 TABLET ORAL at 08:48

## 2020-10-11 RX ADMIN — TRAZODONE HYDROCHLORIDE 25 MG: 50 TABLET ORAL at 21:05

## 2020-10-11 RX ADMIN — FAMOTIDINE 20 MG: 20 TABLET ORAL at 17:05

## 2020-10-11 RX ADMIN — ATORVASTATIN CALCIUM 10 MG: 10 TABLET, FILM COATED ORAL at 17:05

## 2020-10-11 RX ADMIN — ESCITALOPRAM OXALATE 10 MG: 10 TABLET ORAL at 08:49

## 2020-10-11 RX ADMIN — DICLOFENAC 2 G: 10 GEL TOPICAL at 08:50

## 2020-10-12 PROCEDURE — 99232 SBSQ HOSP IP/OBS MODERATE 35: CPT | Performed by: PSYCHIATRY & NEUROLOGY

## 2020-10-12 RX ADMIN — ALPRAZOLAM 0.5 MG: 0.25 TABLET ORAL at 08:13

## 2020-10-12 RX ADMIN — TIOTROPIUM BROMIDE 18 MCG: 18 CAPSULE ORAL; RESPIRATORY (INHALATION) at 08:11

## 2020-10-12 RX ADMIN — QUETIAPINE FUMARATE 150 MG: 100 TABLET ORAL at 21:33

## 2020-10-12 RX ADMIN — TAMSULOSIN HYDROCHLORIDE 0.4 MG: 0.4 CAPSULE ORAL at 17:30

## 2020-10-12 RX ADMIN — FLUTICASONE FUROATE AND VILANTEROL TRIFENATATE 1 PUFF: 200; 25 POWDER RESPIRATORY (INHALATION) at 08:11

## 2020-10-12 RX ADMIN — TRAZODONE HYDROCHLORIDE 25 MG: 50 TABLET ORAL at 21:34

## 2020-10-12 RX ADMIN — ATORVASTATIN CALCIUM 10 MG: 10 TABLET, FILM COATED ORAL at 17:00

## 2020-10-12 RX ADMIN — FAMOTIDINE 20 MG: 20 TABLET ORAL at 08:13

## 2020-10-12 RX ADMIN — ESCITALOPRAM OXALATE 10 MG: 10 TABLET ORAL at 08:13

## 2020-10-12 RX ADMIN — FAMOTIDINE 20 MG: 20 TABLET ORAL at 17:33

## 2020-10-12 RX ADMIN — ALPRAZOLAM 0.5 MG: 0.25 TABLET ORAL at 21:33

## 2020-10-12 RX ADMIN — PANTOPRAZOLE SODIUM 40 MG: 40 TABLET, DELAYED RELEASE ORAL at 06:18

## 2020-10-13 PROCEDURE — 99232 SBSQ HOSP IP/OBS MODERATE 35: CPT | Performed by: PSYCHIATRY & NEUROLOGY

## 2020-10-13 RX ADMIN — FAMOTIDINE 20 MG: 20 TABLET ORAL at 17:17

## 2020-10-13 RX ADMIN — QUETIAPINE FUMARATE 150 MG: 100 TABLET ORAL at 21:24

## 2020-10-13 RX ADMIN — ESCITALOPRAM OXALATE 10 MG: 10 TABLET ORAL at 08:08

## 2020-10-13 RX ADMIN — TRAZODONE HYDROCHLORIDE 25 MG: 50 TABLET ORAL at 21:24

## 2020-10-13 RX ADMIN — ATORVASTATIN CALCIUM 10 MG: 10 TABLET, FILM COATED ORAL at 17:17

## 2020-10-13 RX ADMIN — TIOTROPIUM BROMIDE 18 MCG: 18 CAPSULE ORAL; RESPIRATORY (INHALATION) at 08:08

## 2020-10-13 RX ADMIN — TAMSULOSIN HYDROCHLORIDE 0.4 MG: 0.4 CAPSULE ORAL at 17:17

## 2020-10-13 RX ADMIN — ALPRAZOLAM 0.5 MG: 0.25 TABLET ORAL at 08:08

## 2020-10-13 RX ADMIN — PANTOPRAZOLE SODIUM 40 MG: 40 TABLET, DELAYED RELEASE ORAL at 06:47

## 2020-10-13 RX ADMIN — FAMOTIDINE 20 MG: 20 TABLET ORAL at 08:08

## 2020-10-13 RX ADMIN — FLUTICASONE FUROATE AND VILANTEROL TRIFENATATE 1 PUFF: 200; 25 POWDER RESPIRATORY (INHALATION) at 08:08

## 2020-10-13 RX ADMIN — ALPRAZOLAM 0.5 MG: 0.25 TABLET ORAL at 21:24

## 2020-10-14 PROCEDURE — 99232 SBSQ HOSP IP/OBS MODERATE 35: CPT | Performed by: PSYCHIATRY & NEUROLOGY

## 2020-10-14 RX ORDER — ESCITALOPRAM OXALATE 10 MG/1
20 TABLET ORAL DAILY
Status: DISCONTINUED | OUTPATIENT
Start: 2020-10-15 | End: 2020-10-16 | Stop reason: HOSPADM

## 2020-10-14 RX ADMIN — PANTOPRAZOLE SODIUM 40 MG: 40 TABLET, DELAYED RELEASE ORAL at 06:12

## 2020-10-14 RX ADMIN — ATORVASTATIN CALCIUM 10 MG: 10 TABLET, FILM COATED ORAL at 16:50

## 2020-10-14 RX ADMIN — ESCITALOPRAM OXALATE 10 MG: 10 TABLET ORAL at 08:15

## 2020-10-14 RX ADMIN — FAMOTIDINE 20 MG: 20 TABLET ORAL at 08:15

## 2020-10-14 RX ADMIN — TIOTROPIUM BROMIDE 18 MCG: 18 CAPSULE ORAL; RESPIRATORY (INHALATION) at 08:15

## 2020-10-14 RX ADMIN — FLUTICASONE FUROATE AND VILANTEROL TRIFENATATE 1 PUFF: 200; 25 POWDER RESPIRATORY (INHALATION) at 08:15

## 2020-10-14 RX ADMIN — ALPRAZOLAM 0.5 MG: 0.25 TABLET ORAL at 08:15

## 2020-10-14 RX ADMIN — QUETIAPINE FUMARATE 150 MG: 100 TABLET ORAL at 21:37

## 2020-10-14 RX ADMIN — TAMSULOSIN HYDROCHLORIDE 0.4 MG: 0.4 CAPSULE ORAL at 16:50

## 2020-10-14 RX ADMIN — FAMOTIDINE 20 MG: 20 TABLET ORAL at 17:41

## 2020-10-14 RX ADMIN — TRAZODONE HYDROCHLORIDE 25 MG: 50 TABLET ORAL at 21:37

## 2020-10-14 RX ADMIN — ALPRAZOLAM 0.5 MG: 0.25 TABLET ORAL at 21:37

## 2020-10-15 PROCEDURE — 99232 SBSQ HOSP IP/OBS MODERATE 35: CPT | Performed by: PSYCHIATRY & NEUROLOGY

## 2020-10-15 RX ADMIN — TRAZODONE HYDROCHLORIDE 25 MG: 50 TABLET ORAL at 21:01

## 2020-10-15 RX ADMIN — FLUTICASONE FUROATE AND VILANTEROL TRIFENATATE 1 PUFF: 200; 25 POWDER RESPIRATORY (INHALATION) at 08:03

## 2020-10-15 RX ADMIN — ATORVASTATIN CALCIUM 10 MG: 10 TABLET, FILM COATED ORAL at 17:08

## 2020-10-15 RX ADMIN — LORAZEPAM 1 MG: 1 TABLET ORAL at 15:41

## 2020-10-15 RX ADMIN — FAMOTIDINE 20 MG: 20 TABLET ORAL at 08:03

## 2020-10-15 RX ADMIN — QUETIAPINE FUMARATE 150 MG: 100 TABLET ORAL at 21:01

## 2020-10-15 RX ADMIN — ALPRAZOLAM 0.5 MG: 0.25 TABLET ORAL at 08:03

## 2020-10-15 RX ADMIN — FAMOTIDINE 20 MG: 20 TABLET ORAL at 17:08

## 2020-10-15 RX ADMIN — TIOTROPIUM BROMIDE 18 MCG: 18 CAPSULE ORAL; RESPIRATORY (INHALATION) at 08:03

## 2020-10-15 RX ADMIN — PANTOPRAZOLE SODIUM 40 MG: 40 TABLET, DELAYED RELEASE ORAL at 05:49

## 2020-10-15 RX ADMIN — ESCITALOPRAM OXALATE 20 MG: 10 TABLET ORAL at 08:03

## 2020-10-15 RX ADMIN — TAMSULOSIN HYDROCHLORIDE 0.4 MG: 0.4 CAPSULE ORAL at 17:08

## 2020-10-15 RX ADMIN — ALPRAZOLAM 0.5 MG: 0.25 TABLET ORAL at 21:00

## 2020-10-16 VITALS
SYSTOLIC BLOOD PRESSURE: 148 MMHG | HEART RATE: 80 BPM | WEIGHT: 143.2 LBS | DIASTOLIC BLOOD PRESSURE: 75 MMHG | RESPIRATION RATE: 16 BRPM | BODY MASS INDEX: 23.01 KG/M2 | TEMPERATURE: 98.7 F | OXYGEN SATURATION: 100 % | HEIGHT: 66 IN

## 2020-10-16 PROCEDURE — 99238 HOSP IP/OBS DSCHRG MGMT 30/<: CPT | Performed by: PSYCHIATRY & NEUROLOGY

## 2020-10-16 RX ORDER — ALPRAZOLAM 0.5 MG/1
0.5 TABLET ORAL 2 TIMES DAILY
Qty: 60 TABLET | Refills: 0 | Status: SHIPPED | OUTPATIENT
Start: 2020-10-16 | End: 2021-12-15

## 2020-10-16 RX ORDER — ALBUTEROL SULFATE 90 UG/1
1 AEROSOL, METERED RESPIRATORY (INHALATION) EVERY 6 HOURS PRN
Qty: 1 INHALER | Refills: 0 | Status: SHIPPED | OUTPATIENT
Start: 2020-10-16 | End: 2022-02-03 | Stop reason: SDUPTHER

## 2020-10-16 RX ORDER — FAMOTIDINE 20 MG/1
20 TABLET, FILM COATED ORAL 2 TIMES DAILY
Qty: 60 TABLET | Refills: 0 | Status: SHIPPED | OUTPATIENT
Start: 2020-10-16 | End: 2021-12-15 | Stop reason: SDUPTHER

## 2020-10-16 RX ORDER — FLUTICASONE FUROATE AND VILANTEROL 200; 25 UG/1; UG/1
1 POWDER RESPIRATORY (INHALATION)
Qty: 1 INHALER | Refills: 0 | Status: SHIPPED | OUTPATIENT
Start: 2020-10-17 | End: 2021-02-11

## 2020-10-16 RX ORDER — TAMSULOSIN HYDROCHLORIDE 0.4 MG/1
0.4 CAPSULE ORAL
Qty: 30 CAPSULE | Refills: 0 | Status: SHIPPED | OUTPATIENT
Start: 2020-10-16 | End: 2021-06-21

## 2020-10-16 RX ORDER — TIOTROPIUM BROMIDE 18 UG/1
18 CAPSULE ORAL; RESPIRATORY (INHALATION) DAILY
Qty: 30 CAPSULE | Refills: 0 | Status: SHIPPED | OUTPATIENT
Start: 2020-10-16 | End: 2022-02-14 | Stop reason: SDUPTHER

## 2020-10-16 RX ORDER — TRAZODONE HYDROCHLORIDE 50 MG/1
25 TABLET ORAL
Qty: 15 TABLET | Refills: 1 | Status: SHIPPED | OUTPATIENT
Start: 2020-10-16

## 2020-10-16 RX ORDER — ATORVASTATIN CALCIUM 10 MG/1
10 TABLET, FILM COATED ORAL DAILY
Qty: 30 TABLET | Refills: 0 | Status: SHIPPED | OUTPATIENT
Start: 2020-10-16 | End: 2021-03-26

## 2020-10-16 RX ORDER — PANTOPRAZOLE SODIUM 40 MG/1
40 TABLET, DELAYED RELEASE ORAL
Qty: 30 TABLET | Refills: 0 | Status: SHIPPED | OUTPATIENT
Start: 2020-10-17 | End: 2021-06-22

## 2020-10-16 RX ORDER — QUETIAPINE FUMARATE 50 MG/1
150 TABLET, FILM COATED ORAL
Qty: 90 TABLET | Refills: 1 | Status: SHIPPED | OUTPATIENT
Start: 2020-10-16

## 2020-10-16 RX ORDER — ESCITALOPRAM OXALATE 20 MG/1
20 TABLET ORAL DAILY
Qty: 30 TABLET | Refills: 1 | Status: SHIPPED | OUTPATIENT
Start: 2020-10-17

## 2020-10-16 RX ADMIN — TIOTROPIUM BROMIDE 18 MCG: 18 CAPSULE ORAL; RESPIRATORY (INHALATION) at 08:02

## 2020-10-16 RX ADMIN — PANTOPRAZOLE SODIUM 40 MG: 40 TABLET, DELAYED RELEASE ORAL at 06:26

## 2020-10-16 RX ADMIN — ALPRAZOLAM 0.5 MG: 0.25 TABLET ORAL at 08:01

## 2020-10-16 RX ADMIN — FLUTICASONE FUROATE AND VILANTEROL TRIFENATATE 1 PUFF: 200; 25 POWDER RESPIRATORY (INHALATION) at 08:02

## 2020-10-16 RX ADMIN — FAMOTIDINE 20 MG: 20 TABLET ORAL at 08:02

## 2020-10-16 RX ADMIN — ESCITALOPRAM OXALATE 20 MG: 10 TABLET ORAL at 08:01

## 2020-10-27 DIAGNOSIS — J44.1 COPD EXACERBATION (HCC): ICD-10-CM

## 2021-01-01 NOTE — ADDENDUM NOTE
Addended by: Julianne Tate on: 12/14/2018 01:41 PM     Modules accepted: Orders Spectra S1/2:  Power on and press wavy line button to go into let-down mode. Cycle will be 70 (and cannot be changed). Cycle is the number of times the pump pulls per minute. Fast cycling stimulates let-down, slow cycling expresses available milk. Adjust vacuum to comfort. After 2 minutes (or sooner if milk is spraying), press wavy line button again to go into expression mode. Cycle should be between 54, 50 or 46. Again, adjust vacuum to comfort.

## 2021-01-04 DIAGNOSIS — J44.1 COPD EXACERBATION (HCC): ICD-10-CM

## 2021-02-11 ENCOUNTER — HOSPITAL ENCOUNTER (EMERGENCY)
Facility: HOSPITAL | Age: 56
Discharge: HOME | End: 2021-02-11
Attending: EMERGENCY MEDICINE | Admitting: EMERGENCY MEDICINE
Payer: MEDICARE

## 2021-02-11 ENCOUNTER — APPOINTMENT (EMERGENCY)
Dept: CT IMAGING | Facility: HOSPITAL | Age: 56
End: 2021-02-11
Payer: MEDICARE

## 2021-02-11 VITALS
WEIGHT: 145.28 LBS | BODY MASS INDEX: 23.45 KG/M2 | DIASTOLIC BLOOD PRESSURE: 79 MMHG | HEART RATE: 54 BPM | OXYGEN SATURATION: 99 % | TEMPERATURE: 97.6 F | RESPIRATION RATE: 16 BRPM | SYSTOLIC BLOOD PRESSURE: 141 MMHG

## 2021-02-11 DIAGNOSIS — R10.31 GROIN PAIN, RIGHT: Primary | ICD-10-CM

## 2021-02-11 DIAGNOSIS — S76.219A: ICD-10-CM

## 2021-02-11 LAB
ALBUMIN SERPL BCP-MCNC: 4 G/DL (ref 3–5.2)
ALP SERPL-CCNC: 65 U/L (ref 43–122)
ALT SERPL W P-5'-P-CCNC: 7 U/L (ref 9–52)
ANION GAP SERPL CALCULATED.3IONS-SCNC: 7 MMOL/L (ref 5–14)
APTT PPP: 31 SECONDS (ref 23–37)
AST SERPL W P-5'-P-CCNC: 20 U/L (ref 17–59)
BACTERIA UR QL AUTO: NORMAL /HPF
BILIRUB SERPL-MCNC: 0.6 MG/DL
BILIRUB UR QL STRIP: NEGATIVE
BUN SERPL-MCNC: 13 MG/DL (ref 5–25)
CALCIUM SERPL-MCNC: 9.4 MG/DL (ref 8.4–10.2)
CHLORIDE SERPL-SCNC: 100 MMOL/L (ref 97–108)
CLARITY UR: CLEAR
CO2 SERPL-SCNC: 28 MMOL/L (ref 22–30)
COLOR UR: ABNORMAL
CREAT SERPL-MCNC: 0.79 MG/DL (ref 0.7–1.5)
EOSINOPHIL # BLD AUTO: 0.29 THOUSAND/UL (ref 0–0.4)
EOSINOPHIL NFR BLD MANUAL: 6 % (ref 0–6)
ERYTHROCYTE [DISTWIDTH] IN BLOOD BY AUTOMATED COUNT: 14 %
GFR SERPL CREATININE-BSD FRML MDRD: 100 ML/MIN/1.73SQ M
GLUCOSE SERPL-MCNC: 93 MG/DL (ref 70–99)
GLUCOSE UR STRIP-MCNC: NEGATIVE MG/DL
HCT VFR BLD AUTO: 47.3 % (ref 41–53)
HGB BLD-MCNC: 15.6 G/DL (ref 13.5–17.5)
HGB UR QL STRIP.AUTO: 25
INR PPP: 0.96 (ref 0.84–1.19)
KETONES UR STRIP-MCNC: NEGATIVE MG/DL
LACTATE SERPL-SCNC: <0.5 MMOL/L (ref 0.7–2)
LEUKOCYTE ESTERASE UR QL STRIP: NEGATIVE
LYMPHOCYTES # BLD AUTO: 1.73 THOUSAND/UL (ref 0.5–4)
LYMPHOCYTES # BLD AUTO: 36 % (ref 25–45)
MCH RBC QN AUTO: 31 PG (ref 26–34)
MCHC RBC AUTO-ENTMCNC: 32.9 G/DL (ref 31–36)
MCV RBC AUTO: 94 FL (ref 80–100)
MONOCYTES # BLD AUTO: 0.24 THOUSAND/UL (ref 0.2–0.9)
MONOCYTES NFR BLD AUTO: 5 % (ref 1–10)
NEUTS SEG # BLD: 2.54 THOUSAND/UL (ref 1.8–7.8)
NEUTS SEG NFR BLD AUTO: 53 %
NITRITE UR QL STRIP: NEGATIVE
NON-SQ EPI CELLS URNS QL MICRO: NORMAL /HPF
PH UR STRIP.AUTO: 6 [PH]
PLATELET # BLD AUTO: 232 THOUSANDS/UL (ref 150–450)
PLATELET BLD QL SMEAR: ADEQUATE
PMV BLD AUTO: 7.8 FL (ref 8.9–12.7)
POTASSIUM SERPL-SCNC: 4.3 MMOL/L (ref 3.6–5)
PROT SERPL-MCNC: 7.1 G/DL (ref 5.9–8.4)
PROT UR STRIP-MCNC: NEGATIVE MG/DL
PROTHROMBIN TIME: 12.9 SECONDS (ref 11.6–14.5)
RBC # BLD AUTO: 5.03 MILLION/UL (ref 4.5–5.9)
RBC #/AREA URNS AUTO: NORMAL /HPF
RBC MORPH BLD: NORMAL
SODIUM SERPL-SCNC: 135 MMOL/L (ref 137–147)
SP GR UR STRIP.AUTO: 1.01 (ref 1–1.04)
TOTAL CELLS COUNTED SPEC: 100
UROBILINOGEN UA: NEGATIVE MG/DL
WBC # BLD AUTO: 4.8 THOUSAND/UL (ref 4.5–11)
WBC #/AREA URNS AUTO: NORMAL /HPF

## 2021-02-11 PROCEDURE — 80053 COMPREHEN METABOLIC PANEL: CPT | Performed by: EMERGENCY MEDICINE

## 2021-02-11 PROCEDURE — 96374 THER/PROPH/DIAG INJ IV PUSH: CPT

## 2021-02-11 PROCEDURE — 99284 EMERGENCY DEPT VISIT MOD MDM: CPT

## 2021-02-11 PROCEDURE — 99284 EMERGENCY DEPT VISIT MOD MDM: CPT | Performed by: EMERGENCY MEDICINE

## 2021-02-11 PROCEDURE — 96361 HYDRATE IV INFUSION ADD-ON: CPT

## 2021-02-11 PROCEDURE — 85027 COMPLETE CBC AUTOMATED: CPT | Performed by: EMERGENCY MEDICINE

## 2021-02-11 PROCEDURE — 85610 PROTHROMBIN TIME: CPT | Performed by: EMERGENCY MEDICINE

## 2021-02-11 PROCEDURE — 36415 COLL VENOUS BLD VENIPUNCTURE: CPT | Performed by: EMERGENCY MEDICINE

## 2021-02-11 PROCEDURE — 74177 CT ABD & PELVIS W/CONTRAST: CPT

## 2021-02-11 PROCEDURE — 81003 URINALYSIS AUTO W/O SCOPE: CPT | Performed by: EMERGENCY MEDICINE

## 2021-02-11 PROCEDURE — 85007 BL SMEAR W/DIFF WBC COUNT: CPT | Performed by: EMERGENCY MEDICINE

## 2021-02-11 PROCEDURE — 85730 THROMBOPLASTIN TIME PARTIAL: CPT | Performed by: EMERGENCY MEDICINE

## 2021-02-11 PROCEDURE — 83605 ASSAY OF LACTIC ACID: CPT | Performed by: EMERGENCY MEDICINE

## 2021-02-11 PROCEDURE — 81001 URINALYSIS AUTO W/SCOPE: CPT | Performed by: EMERGENCY MEDICINE

## 2021-02-11 RX ORDER — KETOROLAC TROMETHAMINE 30 MG/ML
30 INJECTION, SOLUTION INTRAMUSCULAR; INTRAVENOUS ONCE
Status: COMPLETED | OUTPATIENT
Start: 2021-02-11 | End: 2021-02-11

## 2021-02-11 RX ORDER — IBUPROFEN 600 MG/1
600 TABLET ORAL EVERY 6 HOURS PRN
Qty: 60 TABLET | Refills: 0 | Status: SHIPPED | OUTPATIENT
Start: 2021-02-11

## 2021-02-11 RX ADMIN — SODIUM CHLORIDE 1000 ML: 0.9 INJECTION, SOLUTION INTRAVENOUS at 13:20

## 2021-02-11 RX ADMIN — IOHEXOL 100 ML: 350 INJECTION, SOLUTION INTRAVENOUS at 14:37

## 2021-02-11 RX ADMIN — KETOROLAC TROMETHAMINE 30 MG: 30 INJECTION, SOLUTION INTRAMUSCULAR; INTRAVENOUS at 13:19

## 2021-02-11 NOTE — ED PROVIDER NOTES
History  Chief Complaint   Patient presents with    Groin Pain     states he has been having rt sided groin pain for about 1 month  states it started while he was taking out garbage     This is a 57-year-old male who ambulates emergency department reports of intermittent right groin pain  Patient states he has had pain for approximately month and is increasing in severity as well as frequency  Patient states that he did have hernia there in the past and did have it surgically repaired  States approximately 1 month ago he is picking up heavy garbage bag and he felt the pulling sensation again  States he is unable to carry anything heavy without feeling the pain  He denies any changes in bowel habits  Denies any blood in his stool  Denies history of SBO  Denies nausea vomiting diarrhea  Pain is aggravated with any lifting  Pain it is alleviated by bending over    Denies fever chills          Prior to Admission Medications   Prescriptions Last Dose Informant Patient Reported? Taking?    ALPRAZolam (XANAX) 0 5 mg tablet   No No   Sig: Take 1 tablet (0 5 mg total) by mouth 2 (two) times a day   Advair Diskus 500-50 MCG/DOSE inhaler 2/11/2021 at Unknown time  No Yes   Sig: INHALE 1 PUFF 2 (TWO) TIMES A DAY RINSE MOUTH AFTER USE    QUEtiapine (SEROquel) 50 mg tablet Past Month at Unknown time  No Yes   Sig: Take 3 tablets (150 mg total) by mouth daily at bedtime   albuterol (PROVENTIL HFA,VENTOLIN HFA) 90 mcg/act inhaler 2/11/2021 at Unknown time  No Yes   Sig: Inhale 1 puff every 6 (six) hours as needed for wheezing   atorvastatin (LIPITOR) 10 mg tablet Past Month at Unknown time  No Yes   Sig: Take 1 tablet (10 mg total) by mouth daily   escitalopram (LEXAPRO) 20 mg tablet Past Month at Unknown time  No Yes   Sig: Take 1 tablet (20 mg total) by mouth daily   famotidine (PEPCID) 20 mg tablet Past Month at Unknown time  No Yes   Sig: Take 1 tablet (20 mg total) by mouth 2 (two) times a day   pantoprazole (PROTONIX) 40 mg tablet Past Month at Unknown time  No Yes   Sig: Take 1 tablet (40 mg total) by mouth daily in the early morning   tamsulosin (FLOMAX) 0 4 mg 2/11/2021 at Unknown time  No Yes   Sig: Take 1 capsule (0 4 mg total) by mouth daily with dinner   tiotropium (Spiriva HandiHaler) 18 mcg inhalation capsule 2/11/2021 at Unknown time  No Yes   Sig: Place 1 capsule (18 mcg total) into inhaler and inhale daily   traZODone (DESYREL) 50 mg tablet Past Month at Unknown time  No Yes   Sig: Take 0 5 tablets (25 mg total) by mouth daily at bedtime      Facility-Administered Medications: None       Past Medical History:   Diagnosis Date    Anxiety     Asthma     [facility])     Depression     GERD (gastroesophageal reflux disease)     Hyperlipidemia     Hypertension     Suicide attempt [facility])        Past Surgical History:   Procedure Laterality Date    ESOPHAGOGASTRODUODENOSCOPY N/A 3/1/2019    Procedure: ESOPHAGOGASTRODUODENOSCOPY (EGD); Surgeon: Wanda Martin MD;  Location: [facility] GI [facility]; Service: Gastroenterology    FRACTURE SURGERY      ORIF    HERNIA REPAIR      SKIN GRAFT         History reviewed  No pertinent family history  I have reviewed and agree with the history as documented  E-Cigarette/Vaping    E-Cigarette Use Never User      E-Cigarette/Vaping Substances    Nicotine No     THC No     CBD No     Flavoring No     Other No     Unknown No      Social History     Tobacco Use    Smoking status: Heavy Tobacco Smoker     Packs/day: 1 00     Types: Cigarettes    Smokeless tobacco: Never Used   Substance Use Topics    Alcohol use: Yes     Comment: occ    Drug use: No       Review of Systems   Constitutional: Negative for activity change, appetite change, chills, diaphoresis, fatigue, fever and unexpected weight change     HENT: Negative for congestion, ear discharge, ear pain, mouth sores, sinus pressure, sinus pain, sneezing, sore throat, trouble swallowing and voice change  Eyes: Negative for photophobia, pain, discharge, redness, itching and visual disturbance  Respiratory: Negative for cough, chest tightness and shortness of breath  Cardiovascular: Negative for chest pain, palpitations and leg swelling  Gastrointestinal: Negative for abdominal pain, constipation, nausea and vomiting  Endocrine: Negative for cold intolerance, heat intolerance, polydipsia, polyphagia and polyuria  Genitourinary: Negative for decreased urine volume, difficulty urinating, dysuria, frequency, hematuria and urgency  Right groin pain as per HPI    Musculoskeletal: Negative for arthralgias, back pain, gait problem, joint swelling, myalgias, neck pain and neck stiffness  Skin: Negative for color change and rash  Allergic/Immunologic: Negative for immunocompromised state  Neurological: Negative for dizziness, tremors, seizures, syncope, speech difficulty, weakness, light-headedness, numbness and headaches  Hematological: Does not bruise/bleed easily  Psychiatric/Behavioral: Negative for behavioral problems and suicidal ideas  Physical Exam  Physical Exam  Vitals signs and nursing note reviewed  Constitutional:       General: He is not in acute distress  Appearance: Normal appearance  He is well-developed and normal weight  He is not ill-appearing, toxic-appearing or diaphoretic  HENT:      Head: Normocephalic and atraumatic  Right Ear: Tympanic membrane, ear canal and external ear normal  There is no impacted cerumen  Left Ear: Tympanic membrane, ear canal and external ear normal  There is no impacted cerumen  Nose: No congestion or rhinorrhea  Mouth/Throat:      Mouth: Mucous membranes are moist       Pharynx: Oropharynx is clear  No oropharyngeal exudate or posterior oropharyngeal erythema  Eyes:      General: No scleral icterus  Right eye: No discharge  Left eye: No discharge        Extraocular Movements: Extraocular movements intact  Conjunctiva/sclera: Conjunctivae normal       Pupils: Pupils are equal, round, and reactive to light  Neck:      Musculoskeletal: Normal range of motion and neck supple  No neck rigidity or muscular tenderness  Vascular: No JVD  Trachea: No tracheal deviation  Cardiovascular:      Rate and Rhythm: Normal rate and regular rhythm  Heart sounds: Normal heart sounds  No murmur  Pulmonary:      Effort: Pulmonary effort is normal  No respiratory distress  Breath sounds: Normal breath sounds  No wheezing or rales  Chest:      Chest wall: No tenderness  Abdominal:      General: Bowel sounds are normal       Palpations: Abdomen is soft  There is no mass  Tenderness: There is no abdominal tenderness  There is no right CVA tenderness, left CVA tenderness or guarding  Hernia: No hernia is present  Genitourinary:     Comments: Old healed surgical incision present of the right groin  No visible herniation  Area is tender to touch  Musculoskeletal: Normal range of motion  General: No swelling, tenderness, deformity or signs of injury  Right lower leg: No edema  Left lower leg: No edema  Lymphadenopathy:      Cervical: No cervical adenopathy  Skin:     General: Skin is warm and dry  Capillary Refill: Capillary refill takes less than 2 seconds  Findings: No bruising, erythema or rash  Neurological:      General: No focal deficit present  Mental Status: He is alert and oriented to person, place, and time  Mental status is at baseline  Cranial Nerves: No cranial nerve deficit  Sensory: No sensory deficit  Motor: No weakness or abnormal muscle tone  Coordination: Coordination normal       Gait: Gait normal       Deep Tendon Reflexes: Reflexes normal    Psychiatric:         Mood and Affect: Mood normal          Behavior: Behavior normal          Thought Content:  Thought content normal  Judgment: Judgment normal          Vital Signs  ED Triage Vitals   Temperature Pulse Respirations Blood Pressure SpO2   02/11/21 1237 02/11/21 1237 02/11/21 1237 02/11/21 1238 02/11/21 1237   97 6 °F (36 4 °C) 60 18 156/83 99 %      Temp Source Heart Rate Source Patient Position - Orthostatic VS BP Location FiO2 (%)   02/11/21 1237 02/11/21 1237 02/11/21 1237 02/11/21 1237 --   Tympanic Monitor Sitting Left arm       Pain Score       02/11/21 1237       8           Vitals:    02/11/21 1237 02/11/21 1238 02/11/21 1447   BP:  156/83 141/79   Pulse: 60  (!) 54   Patient Position - Orthostatic VS: Sitting  Lying         Visual Acuity      ED Medications  Medications   sodium chloride 0 9 % bolus 1,000 mL (0 mL Intravenous Stopped 2/11/21 1355)   ketorolac (TORADOL) injection 30 mg (30 mg Intravenous Given 2/11/21 1319)   iohexol (OMNIPAQUE) 350 MG/ML injection (SINGLE-DOSE) 100 mL (100 mL Intravenous Given 2/11/21 1437)       Diagnostic Studies  Results Reviewed     Procedure Component Value Units Date/Time    Urine Microscopic [[id number]]  (Normal) Collected: 02/11/21 1319    Lab Status: Final result Specimen: Urine, Other Updated: 02/11/21 1424     RBC, UA 0-1 /hpf      WBC, UA None Seen /hpf      Epithelial Cells Occasional /hpf      Bacteria, UA None Seen /hpf     Lactic acid [[id number]]  (Abnormal) Collected: 02/11/21 1319    Lab Status: Final result Specimen: Blood from Arm, Right Updated: 02/11/21 1404     LACTIC ACID <0 5 mmol/L     Narrative:      Result may be elevated if tourniquet was used during collection      Manual Differential (Non Wam) [[id number]] Collected: 02/11/21 1319    Lab Status: Final result Specimen: Blood from Arm, Right Updated: 02/11/21 1403     Segmented % 53 %      Lymphocytes % 36 %      Monocytes % 5 %      Eosinophils, % 6 %      Neutrophils Absolute 2 54 Thousand/uL      Lymphocytes Absolute 1 73 Thousand/uL      Monocytes Absolute 0 24 Thousand/uL      Eosinophils Absolute 0 29 Thousand/uL      Total Counted 100     RBC Morphology Normal     Platelet Estimate Adequate    Comprehensive metabolic panel [[id number]]  (Abnormal) Collected: 02/11/21 1319    Lab Status: Final result Specimen: Blood from Arm, Right Updated: 02/11/21 1351     Sodium 135 mmol/L      Potassium 4 3 mmol/L      Chloride 100 mmol/L      CO2 28 mmol/L      ANION GAP 7 mmol/L      BUN 13 mg/dL      Creatinine 0 79 mg/dL      Glucose 93 mg/dL      Calcium 9 4 mg/dL      AST 20 U/L      ALT 7 U/L      Alkaline Phosphatase 65 U/L      Total Protein 7 1 g/dL      Albumin 4 0 g/dL      Total Bilirubin 0 60 mg/dL      eGFR 100 ml/min/1 73sq m     Narrative:      National Kidney Disease Foundation guidelines for Chronic Kidney Disease (CKD):     Stage 1 with normal or high GFR (GFR > 90 mL/min/1 73 square meters)    Stage 2 Mild CKD (GFR = 60-89 mL/min/1 73 square meters)    Stage 3A Moderate CKD (GFR = 45-59 mL/min/1 73 square meters)    Stage 3B Moderate CKD (GFR = 30-44 mL/min/1 73 square meters)    Stage 4 Severe CKD (GFR = 15-29 mL/min/1 73 square meters)    Stage 5 End Stage CKD (GFR <15 mL/min/1 73 square meters)  Note: GFR calculation is accurate only with a steady state creatinine    Protime-INR [[id number]]  (Normal) Collected: 02/11/21 1319    Lab Status: Final result Specimen: Blood from Arm, Right Updated: 02/11/21 1351     Protime 12 9 seconds      INR 0 96    APTT [[id number]]  (Normal) Collected: 02/11/21 1319    Lab Status: Final result Specimen: Blood from Arm, Right Updated: 02/11/21 1351     PTT 31 seconds     CBC and differential [[id number]]  (Abnormal) Collected: 02/11/21 1319    Lab Status: Final result Specimen: Blood from Arm, Right Updated: 02/11/21 1345     WBC 4 80 Thousand/uL      RBC 5 03 Million/uL      Hemoglobin 15 6 g/dL      Hematocrit 47 3 %      MCV 94 fL      MCH 31 0 pg      MCHC 32 9 g/dL      RDW 14 0 %      MPV 7 8 fL      Platelets 805 Thousands/uL     UA w Reflex to Microscopic w Reflex to Culture [[id number]]  (Abnormal) Collected: 02/11/21 1319    Lab Status: Final result Specimen: Urine, Other Updated: 02/11/21 1345     Color, UA Straw     Clarity, UA Clear     Specific Gravity, UA 1 010     pH, UA 6 0     Leukocytes, UA Negative     Nitrite, UA Negative     Protein, UA Negative mg/dl      Glucose, UA Negative mg/dl      Ketones, UA Negative mg/dl      Bilirubin, UA Negative     Blood, UA 25 0     UROBILINOGEN UA Negative mg/dL                  CT abdomen pelvis with contrast   Final Result by Josias Sanchez MD (02/11 1533)      1  No acute inflammatory process in the abdomen or pelvis  2   Circumferential bladder wall thickening  This is nonspecific and there is no surrounding stranding though correlation with urinalysis is recommended  3   Small to moderate hiatal hernia  4   Possible subcentimeter pancreatic cyst   A 6 month follow-up nonemergent MRI/MRCP is recommended  The study was marked in [facility] for immediate notification  Workstation performed: [id number]                    Procedures  Procedures         ED Course  ED Course as of Feb 11 1548   Thu Feb 11, 2021   136 This 57-year-old male with a past history of right-sided inguinal hernia presents today after 1 week the pain after he took the pickup was bagged states he felt a strain  A physical exam there is no hernia present  Patient denies any bowel changes  Urinalysis is negative for acute findings  Lactate was normal   CBC was completely unremarkable  Clotting times were normal   CMP was unremarkable  Medicated patient with ketorolac x1 IV and he had complete relief of the pain  Hydrated with 1 L of normal saline  CT scan the abdomen pelvis was ordered to rule out an incarcerated strangulated hernia  As interpreted by the radiologist there are no acute findings  There was a incidental finding of a cyst on the pancreas    I did discuss with the patient and the need to follow this PCB for the MRI  Also flu disease discharge instructions  He verbalized understanding  For patient ibuprofen for outpatient   Patient was reexamined at this time and informed of laboratory and/or imaging results and was found to be stable for discharge  Return to emergency department criteria was reviewed with the patient who verbalized understanding and was agreeable to discharge and the treatment plan at this time  Portions of the record may have been created with voice recognition software  Occasional wrong word or "sound a like" substitutions may have occurred due to the inherent limitations of voice recognition software  Read the chart carefully and recognize, using context, where substitutions have occurred  SBIRT 20yo+      Most Recent Value   SBIRT (22 yo +)   In order to provide better care to our patients, we are screening all of our patients for alcohol and drug use  Would it be okay to ask you these screening questions? Yes Filed at: 02/11/2021 1325   Initial Alcohol Screen: US AUDIT-C    1  How often do you have a drink containing alcohol? 4 Filed at: 02/11/2021 1325   2  How many drinks containing alcohol do you have on a typical day you are drinking? 0 Filed at: 02/11/2021 1325   3a  Male UNDER 65: How often do you have five or more drinks on one occasion? 4 Filed at: 02/11/2021 1325   3b  FEMALE Any Age, or MALE 65+: How often do you have 4 or more drinks on one occassion? 0 Filed at: 02/11/2021 1325   Audit-C Score  (!) 8 Filed at: 02/11/2021 1325   Full Alcohol Screen: US AUDIT   4  How often during the last year have you found that you were not able to stop drinking once you had started? 0 Filed at: 02/11/2021 1325   5  How often during past year have you failed to do what was normally expected of you because of drinking? 0 Filed at: 02/11/2021 1325   6   How often in past year have you needed a first drink in the morning to get yourself going after a heavy drinking session? 0 Filed at: 02/11/2021 1325   7  How often in past year have you had feeling of guilt or remorse after drinking? 3 Filed at: 02/11/2021 1325   8  How often in past year have you been unable to remember what happened night before because you had been drinking? 0 Filed at: 02/11/2021 1325   9  Have you or someone else been injured as a result of your drinking? 0 Filed at: 02/11/2021 1325   10  Has a relative, friend, doctor or other health worker been concerned about your drinking and suggested you cut down? 4 Filed at: 02/11/2021 1325   AUDIT Total Score  15 Filed at: 02/11/2021 1325   JOSE A: How many times in the past year have you    Used an illegal drug or used a prescription medication for non-medical reasons? Never Filed at: 02/11/2021 1325                    MDM    Disposition  Final diagnoses:   Groin pain, right   Strain of groin     Time reflects when diagnosis was documented in both MDM as applicable and the Disposition within this note     Time User Action Codes Description Comment    2/11/2021  3:41 PM Gina Vancey Add [R10 31] Groin pain, right     2/11/2021  3:41 PM Geraline Barclay Add [H19 958A] Strain of groin       ED Disposition     ED Disposition Condition Date/Time Comment    Discharge Stable Thu Feb 11, 2021  3:42 PM Deirdre Thomas III discharge to home/self care  Follow-up Information    None         Patient's Medications   Discharge Prescriptions    IBUPROFEN (MOTRIN) 600 MG TABLET    Take 1 tablet (600 mg total) by mouth every 6 (six) hours as needed for mild pain or moderate pain       Start Date: 2/11/2021 End Date: --       Order Dose: 600 mg       Quantity: 60 tablet    Refills: 0     No discharge procedures on file      PDMP Review       Value Time User    PDMP Reviewed  Yes 2/11/2021  3:42 PM Jacques Story MD          ED Provider  Electronically Signed by           Jacques Story MD  02/11/21 9554

## 2021-02-11 NOTE — DISCHARGE INSTRUCTIONS
There was an incidental finding on your CT - the interpretation by the radiologist is a follows:    1  No acute inflammatory process in the abdomen or pelvis  2   Circumferential bladder wall thickening  This is nonspecific and there is no surrounding stranding though correlation with urinalysis is recommended  3   Small to moderate hiatal hernia    4   Possible subcentimeter pancreatic cyst   A 6 month follow-up nonemergent MRI/MRCP is recommended    AS stated, it is important to follow up with your PCP for outpatient follow - up

## 2021-03-26 DIAGNOSIS — Z09 FOLLOW UP: ICD-10-CM

## 2021-03-26 DIAGNOSIS — N40.0 BENIGN PROSTATIC HYPERPLASIA WITHOUT LOWER URINARY TRACT SYMPTOMS: ICD-10-CM

## 2021-03-26 DIAGNOSIS — J30.2 SEASONAL ALLERGIES: ICD-10-CM

## 2021-03-26 RX ORDER — LORATADINE 10 MG/1
10 TABLET ORAL DAILY
Qty: 1 TABLET | Refills: 0
Start: 2021-03-26 | End: 2022-02-03 | Stop reason: SDUPTHER

## 2021-03-26 RX ORDER — ATORVASTATIN CALCIUM 10 MG/1
10 TABLET, FILM COATED ORAL DAILY
Qty: 90 TABLET | Refills: 3 | Status: SHIPPED | OUTPATIENT
Start: 2021-03-26 | End: 2022-05-21

## 2021-03-26 RX ORDER — CETIRIZINE HYDROCHLORIDE 10 MG/1
TABLET ORAL
Qty: 90 TABLET | Refills: 3 | Status: SHIPPED | OUTPATIENT
Start: 2021-03-26 | End: 2021-12-15 | Stop reason: SDUPTHER

## 2021-05-17 ENCOUNTER — TELEPHONE (OUTPATIENT)
Dept: FAMILY MEDICINE CLINIC | Facility: CLINIC | Age: 56
End: 2021-05-17

## 2021-05-17 DIAGNOSIS — J44.9 CHRONIC OBSTRUCTIVE PULMONARY DISEASE, UNSPECIFIED COPD TYPE (HCC): Primary | ICD-10-CM

## 2021-05-17 NOTE — TELEPHONE ENCOUNTER
receieved fax stating the Advair Diskus is not a formulary medication  Listed below are alternatives, but if you wish to proceed with a PA please advise thank you  FLUTICASONE PROPION-SALMETEROL 250-50MCG/DOSE Dsdv    BUDESONIDE-FORMOTEROL I 60-4  5MCG/ACTUATION HFAA    SYMBICORT I 60-4 5 MCG/ACTUATION HFAA    ADVAIR HFA I 15-21 MCG  ACTUATION HFAA    DULERA 200-5 MCG/ACTUATION HFAA    FLOVENT HFA I 10MCG/ACTUATION HFAA    ANORO ELLIPTA

## 2021-06-21 DIAGNOSIS — Z09 FOLLOW UP: ICD-10-CM

## 2021-06-21 RX ORDER — TAMSULOSIN HYDROCHLORIDE 0.4 MG/1
0.4 CAPSULE ORAL
Qty: 90 CAPSULE | Refills: 3 | Status: SHIPPED | OUTPATIENT
Start: 2021-06-21 | End: 2022-03-08

## 2021-06-22 DIAGNOSIS — K21.9 GASTROESOPHAGEAL REFLUX DISEASE WITHOUT ESOPHAGITIS: ICD-10-CM

## 2021-06-22 RX ORDER — PANTOPRAZOLE SODIUM 40 MG/1
TABLET, DELAYED RELEASE ORAL
Qty: 60 TABLET | Refills: 3 | Status: SHIPPED | OUTPATIENT
Start: 2021-06-22 | End: 2022-01-06

## 2021-06-23 DIAGNOSIS — J44.1 COPD EXACERBATION (HCC): ICD-10-CM

## 2021-08-04 ENCOUNTER — PATIENT OUTREACH (OUTPATIENT)
Dept: OTHER | Facility: OTHER | Age: 56
End: 2021-08-04

## 2021-08-04 NOTE — PROGRESS NOTES
August 4, 2021    Encountered client during [facility] Nurse outreach hours at [employer]  Client brought with him a letter from the [facility] containing an activation code to finish completing an online account  Account was created and confirmed with client's e-mail  Client was given login information to keep  A replacement social security card was requested on his account  Client stated that his desires to apply for medical insurance and food stamps  Client stated that he has paper application and will fill out to drop off at the [facility]  Client was advised that if he is unable to drop off due to covid restrictions to return to [employer] during [facility] nurse outreach hours for assistance with filing for benefits online for benefits through [employer]  Client agreed with plan of care and expressed gratitude

## 2021-08-10 ENCOUNTER — PATIENT OUTREACH (OUTPATIENT)
Dept: OTHER | Facility: OTHER | Age: 56
End: 2021-08-10

## 2021-08-10 NOTE — PROGRESS NOTES
August 10, 2021    [facility] ELIAZAR lorenzo encountered client during outreach hours  Client stated that he is still waiting for the social security card from the [place] [phone] to arrive  Client brought a completed a application for PA Benefits and stated he was not able to leave it  [facility] ELIAZ[facility] nurses assisted client with creating a compass account  Client stated if he could use another mailing address as he lives with several people and wants to ensure his mail is safe  [facility] nurses referred to [address] [place], [place] [address]ille [facility] Emiliana  Client stated that he will go tomorrow and get permission to use address and return to [employer] during [address] outreach hours to finalize the compass benefits application with [employer]

## 2021-08-18 ENCOUNTER — PATIENT OUTREACH (OUTPATIENT)
Dept: OTHER | Facility: OTHER | Age: 56
End: 2021-08-18

## 2021-08-18 NOTE — PROGRESS NOTES
August 18, 2021    [address] [facility]  Client stated that he received permission from the [address] to use their address as his mailing address  Client wants to use the address to complete his application for benefits started with previous encounter at [employer]  Client provided letter stating that he has permission to use the address of the [address] as his mailing address  Client desires to have his mail sent to a safe place that could be held for him  Application was completed during this encounter  Emotional support given at this time as well  Client shared that he has been feeling sad as this is the anniversary week of the death of his significant other, Will Meyer  Encouraged client to go to hospital if feels he needs inpatient treatment  Client verbalized understanding

## 2021-11-17 ENCOUNTER — PATIENT OUTREACH (OUTPATIENT)
Dept: OTHER | Facility: OTHER | Age: 56
End: 2021-11-17

## 2021-12-01 ENCOUNTER — PATIENT OUTREACH (OUTPATIENT)
Dept: OTHER | Facility: OTHER | Age: 56
End: 2021-12-01

## 2021-12-14 ENCOUNTER — PATIENT OUTREACH (OUTPATIENT)
Dept: OTHER | Facility: OTHER | Age: 56
End: 2021-12-14

## 2021-12-15 ENCOUNTER — OFFICE VISIT (OUTPATIENT)
Dept: FAMILY MEDICINE CLINIC | Facility: CLINIC | Age: 56
End: 2021-12-15

## 2021-12-15 VITALS
TEMPERATURE: 97.7 F | SYSTOLIC BLOOD PRESSURE: 160 MMHG | HEART RATE: 77 BPM | RESPIRATION RATE: 16 BRPM | WEIGHT: 139 LBS | BODY MASS INDEX: 23.73 KG/M2 | DIASTOLIC BLOOD PRESSURE: 70 MMHG | HEIGHT: 64 IN | OXYGEN SATURATION: 98 %

## 2021-12-15 DIAGNOSIS — J30.2 SEASONAL ALLERGIES: ICD-10-CM

## 2021-12-15 DIAGNOSIS — F17.200 TOBACCO DEPENDENCE SYNDROME: ICD-10-CM

## 2021-12-15 DIAGNOSIS — J44.9 CHRONIC OBSTRUCTIVE PULMONARY DISEASE, UNSPECIFIED COPD TYPE (HCC): Primary | ICD-10-CM

## 2021-12-15 DIAGNOSIS — K21.9 GASTROESOPHAGEAL REFLUX DISEASE WITHOUT ESOPHAGITIS: ICD-10-CM

## 2021-12-15 DIAGNOSIS — E55.9 VITAMIN D DEFICIENCY: ICD-10-CM

## 2021-12-15 DIAGNOSIS — Z13.21 ENCOUNTER FOR VITAMIN DEFICIENCY SCREENING: ICD-10-CM

## 2021-12-15 DIAGNOSIS — Z13.220 SCREENING FOR HYPERLIPIDEMIA: ICD-10-CM

## 2021-12-15 DIAGNOSIS — Z12.5 SCREENING FOR PROSTATE CANCER: ICD-10-CM

## 2021-12-15 PROBLEM — M54.2 NECK PAIN: Status: RESOLVED | Noted: 2019-02-14 | Resolved: 2021-12-15

## 2021-12-15 PROBLEM — F39 MOOD DISORDER (HCC): Chronic | Status: RESOLVED | Noted: 2020-10-08 | Resolved: 2021-12-15

## 2021-12-15 PROCEDURE — 99213 OFFICE O/P EST LOW 20 MIN: CPT | Performed by: FAMILY MEDICINE

## 2021-12-15 PROCEDURE — 99406 BEHAV CHNG SMOKING 3-10 MIN: CPT | Performed by: FAMILY MEDICINE

## 2021-12-15 RX ORDER — FAMOTIDINE 20 MG/1
20 TABLET, FILM COATED ORAL 2 TIMES DAILY
Qty: 60 TABLET | Refills: 0 | Status: SHIPPED | OUTPATIENT
Start: 2021-12-15 | End: 2022-02-03 | Stop reason: SDUPTHER

## 2021-12-15 RX ORDER — CETIRIZINE HYDROCHLORIDE 10 MG/1
10 TABLET ORAL DAILY
Qty: 90 TABLET | Refills: 3 | Status: SHIPPED | OUTPATIENT
Start: 2021-12-15 | End: 2022-02-03

## 2021-12-21 ENCOUNTER — PATIENT OUTREACH (OUTPATIENT)
Dept: OTHER | Facility: OTHER | Age: 56
End: 2021-12-21

## 2021-12-22 ENCOUNTER — PATIENT OUTREACH (OUTPATIENT)
Dept: OTHER | Facility: OTHER | Age: 56
End: 2021-12-22

## 2022-01-03 DIAGNOSIS — K21.9 GASTROESOPHAGEAL REFLUX DISEASE WITHOUT ESOPHAGITIS: ICD-10-CM

## 2022-01-05 NOTE — PROGRESS NOTES
Pt  Requested COVID antigen test be performed  Signed authorization to share results with [facility] and [facility]  COVID test negative  Pt  Informed

## 2022-01-06 RX ORDER — PANTOPRAZOLE SODIUM 40 MG/1
TABLET, DELAYED RELEASE ORAL
Qty: 60 TABLET | Refills: 3 | Status: SHIPPED | OUTPATIENT
Start: 2022-01-06 | End: 2022-02-03 | Stop reason: SDUPTHER

## 2022-01-12 ENCOUNTER — PATIENT OUTREACH (OUTPATIENT)
Dept: OTHER | Facility: OTHER | Age: 57
End: 2022-01-12

## 2022-01-14 NOTE — PROGRESS NOTES
1/12/22: Requested antigen test   Signed authorization for results to be shared with LIZBETH and [facility] 2 weeks ago  Performed test: result negative  Pt informed  Gave information to prevent COVID

## 2022-01-21 ENCOUNTER — TELEPHONE (OUTPATIENT)
Dept: FAMILY MEDICINE CLINIC | Facility: CLINIC | Age: 57
End: 2022-01-21

## 2022-01-21 NOTE — TELEPHONE ENCOUNTER
Left voicemail to call back to r/s appt, did state in voicemail appt will be cancel, Dayou[facility] will not be available on 01/24/2022

## 2022-01-25 ENCOUNTER — PATIENT OUTREACH (OUTPATIENT)
Dept: OTHER | Facility: OTHER | Age: 57
End: 2022-01-25

## 2022-02-03 ENCOUNTER — TELEPHONE (OUTPATIENT)
Dept: FAMILY MEDICINE CLINIC | Facility: CLINIC | Age: 57
End: 2022-02-03

## 2022-02-03 ENCOUNTER — OFFICE VISIT (OUTPATIENT)
Dept: FAMILY MEDICINE CLINIC | Facility: CLINIC | Age: 57
End: 2022-02-03

## 2022-02-03 ENCOUNTER — PATIENT OUTREACH (OUTPATIENT)
Dept: OTHER | Facility: OTHER | Age: 57
End: 2022-02-03

## 2022-02-03 VITALS
WEIGHT: 141.9 LBS | HEART RATE: 76 BPM | DIASTOLIC BLOOD PRESSURE: 90 MMHG | OXYGEN SATURATION: 98 % | HEIGHT: 64 IN | RESPIRATION RATE: 18 BRPM | TEMPERATURE: 98 F | SYSTOLIC BLOOD PRESSURE: 158 MMHG | BODY MASS INDEX: 24.22 KG/M2

## 2022-02-03 DIAGNOSIS — J44.9 CHRONIC OBSTRUCTIVE PULMONARY DISEASE, UNSPECIFIED COPD TYPE (HCC): Primary | ICD-10-CM

## 2022-02-03 DIAGNOSIS — J30.9 ALLERGIC RHINITIS, UNSPECIFIED SEASONALITY, UNSPECIFIED TRIGGER: ICD-10-CM

## 2022-02-03 DIAGNOSIS — Z86.2 HISTORY OF ANEMIA: ICD-10-CM

## 2022-02-03 DIAGNOSIS — K21.9 GASTROESOPHAGEAL REFLUX DISEASE WITHOUT ESOPHAGITIS: ICD-10-CM

## 2022-02-03 PROBLEM — F31.9 BIPOLAR 1 DISORDER (HCC): Status: ACTIVE | Noted: 2022-02-03

## 2022-02-03 PROBLEM — F31.9 BIPOLAR 1 DISORDER (HCC): Status: RESOLVED | Noted: 2022-02-03 | Resolved: 2022-02-03

## 2022-02-03 PROBLEM — Z71.2 ENCOUNTER TO DISCUSS TEST RESULTS: Status: RESOLVED | Noted: 2020-02-26 | Resolved: 2022-02-03

## 2022-02-03 PROCEDURE — 99213 OFFICE O/P EST LOW 20 MIN: CPT | Performed by: FAMILY MEDICINE

## 2022-02-03 RX ORDER — ALBUTEROL SULFATE 90 UG/1
1 AEROSOL, METERED RESPIRATORY (INHALATION) EVERY 6 HOURS PRN
Qty: 8 G | Refills: 1 | Status: SHIPPED | OUTPATIENT
Start: 2022-02-03 | End: 2022-05-24

## 2022-02-03 RX ORDER — PANTOPRAZOLE SODIUM 40 MG/1
40 TABLET, DELAYED RELEASE ORAL DAILY
Qty: 60 TABLET | Refills: 3 | Status: SHIPPED | OUTPATIENT
Start: 2022-02-03

## 2022-02-03 RX ORDER — LORATADINE 10 MG/1
10 TABLET ORAL DAILY
Qty: 30 TABLET | Refills: 1
Start: 2022-02-03

## 2022-02-03 RX ORDER — FAMOTIDINE 20 MG/1
20 TABLET, FILM COATED ORAL 2 TIMES DAILY
Qty: 60 TABLET | Refills: 0 | Status: SHIPPED | OUTPATIENT
Start: 2022-02-03 | End: 2022-05-11

## 2022-02-03 NOTE — ASSESSMENT & PLAN NOTE
Well controlled with Advair and Spiriva  Does not have Albuterol   - Refill albuterol   - Discussed that symptoms may be directly related to smoking as patient currently smoking >1 ppd and not interested in quitting at the time  - Admits to have PFTs in the past and declines new ones

## 2022-02-03 NOTE — PROGRESS NOTES
Assessment/Plan:    COPD (chronic obstructive pulmonary disease) ([place] [address] )  Well controlled with Advair and Spiriva  Does not have Albuterol   - Refill albuterol   - Discussed that symptoms may be directly related to smoking as patient currently smoking >1 ppd and not interested in quitting at the time  - Admits to have PFTs in the past and declines new ones  Diagnoses and all orders for this visit:    Chronic obstructive pulmonary disease, unspecified COPD type ([place] [address] )  -     albuterol (PROVENTIL HFA,VENTOLIN HFA) 90 mcg/act inhaler; Inhale 1 puff every 6 (six) hours as needed for wheezing  -     Comprehensive metabolic panel; Future    Gastroesophageal reflux disease without esophagitis  -     pantoprazole (PROTONIX) 40 mg tablet; Take 1 tablet (40 mg total) by mouth daily  -     famotidine (PEPCID) 20 mg tablet; Take 1 tablet (20 mg total) by mouth 2 (two) times a day    Allergic rhinitis, unspecified seasonality, unspecified trigger  -     loratadine (CLARITIN) 10 mg tablet; Take 1 tablet (10 mg total) by mouth daily    History of anemia  -     CBC and differential; Future          Subjective:      Patient ID: Marija Wall is a 60 y o  male  Patient is a pleasant 60year old male who presents to the clinic for follow-up on COPD  He states that he has been taking Advair and Spiriva as prescribed but does not have any albuterol as rescue inhaler  He does have intermittent cough and does continue to smoke up to 2 packs per day  He is currently not interested in quitting    He would like refills on GERD medications, allergic rhinitis medications and repeat blood work as he believes he had anemia in the past       The following portions of the patient's history were reviewed and updated as appropriate:   He  has a past medical history of Anxiety, Asthma, COPD (chronic obstructive pulmonary disease) ([facility] ), Depression, GERD (gastroesophageal reflux disease), Hyperlipidemia, Hypertension, and Suicide attempt ([facility] )  He   Patient Active Problem List    Diagnosis Date Noted    Allergic rhinitis 02/03/2022    Prostate cancer screening 05/28/2020    Premature ejaculation 05/28/2020    Screen for STD (sexually transmitted disease) 02/20/2020    Chronic pain of both knees 10/23/2019    Other constipation 07/17/2019    Hiatal hernia 07/17/2019    OA (osteoarthritis) of neck 05/20/2019    Gastroesophageal reflux disease without esophagitis 01/30/2019    Seborrheic dermatitis 12/14/2018    Gastritis without bleeding 11/26/2018    Postnasal drip 10/25/2018    Anxiety 09/27/2018    Mixed hyperlipidemia 09/27/2018    Tobacco dependence syndrome 09/27/2018    COPD (chronic obstructive pulmonary disease) ([facility] ) 09/27/2018    BPH (benign prostatic hyperplasia) 09/27/2018    Major depression 05/21/2012     He  has a past surgical history that includes Hernia repair; Skin graft; Fracture surgery; and Esophagogastroduodenoscopy (N/A, 3/1/2019)  His family history is not on file  He  reports that he has been smoking cigarettes  He has been smoking about 1 00 pack per day  He has never used smokeless tobacco  He reports current alcohol use  He reports that he does not use drugs  Current Outpatient Medications   Medication Sig Dispense Refill    Advair Diskus 500-50 MCG/DOSE inhaler INHALE 1 PUFF 2 (TWO) TIMES A DAY RINSE MOUTH AFTER USE   60 blister 3    albuterol (PROVENTIL HFA,VENTOLIN HFA) 90 mcg/act inhaler Inhale 1 puff every 6 (six) hours as needed for wheezing 8 g 1    ALPRAZolam (XANAX) 0 5 mg tablet Take 1 tablet (0 5 mg total) by mouth 2 (two) times a day 60 tablet 0    atorvastatin (LIPITOR) 10 mg tablet TAKE 1 TABLET (10 MG TOTAL) BY MOUTH DAILY 90 tablet 3    escitalopram (LEXAPRO) 20 mg tablet Take 1 tablet (20 mg total) by mouth daily 30 tablet 1    famotidine (PEPCID) 20 mg tablet Take 1 tablet (20 mg total) by mouth 2 (two) times a day 60 tablet 0    fluticasone-salmeterol (Etha Safe) 500-50 mcg/dose inhaler Inhale 1 puff 2 (two) times a day Rinse mouth after use  1 each 3    ibuprofen (MOTRIN) 600 mg tablet Take 1 tablet (600 mg total) by mouth every 6 (six) hours as needed for mild pain or moderate pain 60 tablet 0    loratadine (CLARITIN) 10 mg tablet Take 1 tablet (10 mg total) by mouth daily 30 tablet 1    pantoprazole (PROTONIX) 40 mg tablet Take 1 tablet (40 mg total) by mouth daily 60 tablet 3    QUEtiapine (SEROquel) 50 mg tablet Take 3 tablets (150 mg total) by mouth daily at bedtime 90 tablet 1    tamsulosin (FLOMAX) 0 4 mg TAKE 1 CAPSULE (0 4 MG TOTAL) BY MOUTH DAILY WITH DINNER 90 capsule 3    tiotropium (Spiriva HandiHaler) 18 mcg inhalation capsule Place 1 capsule (18 mcg total) into inhaler and inhale daily 30 capsule 0    traZODone (DESYREL) 50 mg tablet Take 0 5 tablets (25 mg total) by mouth daily at bedtime 15 tablet 1     No current facility-administered medications for this visit  Current Outpatient Medications on File Prior to Visit   Medication Sig    Advair Diskus 500-50 MCG/DOSE inhaler INHALE 1 PUFF 2 (TWO) TIMES A DAY RINSE MOUTH AFTER USE   ALPRAZolam (XANAX) 0 5 mg tablet Take 1 tablet (0 5 mg total) by mouth 2 (two) times a day    atorvastatin (LIPITOR) 10 mg tablet TAKE 1 TABLET (10 MG TOTAL) BY MOUTH DAILY    escitalopram (LEXAPRO) 20 mg tablet Take 1 tablet (20 mg total) by mouth daily    fluticasone-salmeterol (ADVAIR, WIXELA) 500-50 mcg/dose inhaler Inhale 1 puff 2 (two) times a day Rinse mouth after use      ibuprofen (MOTRIN) 600 mg tablet Take 1 tablet (600 mg total) by mouth every 6 (six) hours as needed for mild pain or moderate pain    QUEtiapine (SEROquel) 50 mg tablet Take 3 tablets (150 mg total) by mouth daily at bedtime    tamsulosin (FLOMAX) 0 4 mg TAKE 1 CAPSULE (0 4 MG TOTAL) BY MOUTH DAILY WITH DINNER    tiotropium (Spiriva HandiHaler) 18 mcg inhalation capsule Place 1 capsule (18 mcg total) into inhaler and inhale daily    traZODone (DESYREL) 50 mg tablet Take 0 5 tablets (25 mg total) by mouth daily at bedtime    [DISCONTINUED] albuterol (PROVENTIL HFA,VENTOLIN HFA) 90 mcg/act inhaler Inhale 1 puff every 6 (six) hours as needed for wheezing    [DISCONTINUED] cetirizine (ZyrTEC) 10 mg tablet Take 1 tablet (10 mg total) by mouth daily    [DISCONTINUED] famotidine (PEPCID) 20 mg tablet Take 1 tablet (20 mg total) by mouth 2 (two) times a day    [DISCONTINUED] loratadine (CLARITIN) 10 mg tablet TAKE 1 TABLET (10 MG TOTAL) BY MOUTH DAILY    [DISCONTINUED] pantoprazole (PROTONIX) 40 mg tablet TAKE 1 TABLET (40 MG TOTAL) BY MOUTH DAILY     No current facility-administered medications on file prior to visit  He has No Known Allergies       Review of Systems   Constitutional: Negative for activity change and appetite change  HENT: Negative for congestion and rhinorrhea  Respiratory: Positive for cough  Negative for shortness of breath and wheezing  Cardiovascular: Negative for chest pain  Gastrointestinal: Negative for blood in stool and constipation  Objective:      /90 (BP Location: Right arm, Patient Position: Sitting, Cuff Size: Standard)   Pulse 76   Temp 98 °F (36 7 °C) (Temporal)   Resp 18   Ht 5' 4" (1 626 m)   Wt 64 4 kg (141 lb 14 4 oz)   SpO2 98%   BMI 24 36 kg/m²          Physical Exam  Vitals reviewed  Constitutional:       General: He is not in acute distress  Appearance: Normal appearance  He is obese  He is not ill-appearing, toxic-appearing or diaphoretic  HENT:      Head: Normocephalic and atraumatic  Right Ear: Tympanic membrane, ear canal and external ear normal  There is no impacted cerumen  Left Ear: Tympanic membrane, ear canal and external ear normal  There is no impacted cerumen  Nose: Nose normal  No congestion  Mouth/Throat:      Mouth: Mucous membranes are moist       Pharynx: Oropharynx is clear   No oropharyngeal exudate or posterior oropharyngeal erythema  Eyes:      General: No scleral icterus  Right eye: No discharge  Left eye: No discharge  Extraocular Movements: Extraocular movements intact  Conjunctiva/sclera: Conjunctivae normal    Cardiovascular:      Rate and Rhythm: Normal rate and regular rhythm  Pulses: Normal pulses  Heart sounds: No murmur heard  Pulmonary:      Effort: Pulmonary effort is normal  No respiratory distress  Breath sounds: No wheezing  Abdominal:      General: Abdomen is flat  Bowel sounds are normal  There is no distension  Tenderness: There is no abdominal tenderness  Musculoskeletal:         General: Normal range of motion  Cervical back: Normal range of motion and neck supple  No rigidity or tenderness  Right lower leg: No edema  Left lower leg: No edema  Lymphadenopathy:      Cervical: No cervical adenopathy  Skin:     General: Skin is warm  Capillary Refill: Capillary refill takes less than 2 seconds  Neurological:      General: No focal deficit present  Mental Status: He is alert     Psychiatric:         Mood and Affect: Mood normal

## 2022-02-03 NOTE — TELEPHONE ENCOUNTER
Pt is requesting med refills on      buPROPion [facility])     as per conversation with provider today during the visit

## 2022-02-03 NOTE — PROGRESS NOTES
Thursday February 3, 2022    Client called [address] [facility] [address] office  States he had his appointment today with a new PCP  States "I really like him "  He expressed that he was happy the doctor checked his ankles, his neck and was attentive to his needs  He shared he has an appointment again in May  He reported that he may be prescribed Wellbutrin to help with his cigarette cravings  [place] Nurse inquired if client would be interested in speaking with Everardo Gallagher who can assist with smoking cessation  Client shared he is interested to hear more about it on Tuesday during [employer] hours at [facility]  Client pleasant in this encounter  Emotional support given

## 2022-02-04 NOTE — TELEPHONE ENCOUNTER
Thank you for notifying me  I am unable to prescribe medication as I am not aware of his current psychiatric medications prescribed by his mental health provider  I recommend that he brings all his medications to the office at next visit and to review starting this medication with his psychiatrist  Thank you for your help

## 2022-02-08 ENCOUNTER — PATIENT OUTREACH (OUTPATIENT)
Dept: OTHER | Facility: OTHER | Age: 57
End: 2022-02-08

## 2022-02-08 NOTE — PROGRESS NOTES
2/8/22: Requested contacting Dr Piper Zaldivar to request Wellbutrin (which Dr Itzel Xiao had suggested in the last visit) to stop smoking  Johana Zaldivar  Suggested smoking cessation classes  Will consider and get back to us  Within a month

## 2022-02-09 ENCOUNTER — PATIENT OUTREACH (OUTPATIENT)
Dept: OTHER | Facility: OTHER | Age: 57
End: 2022-02-09

## 2022-02-09 NOTE — PROGRESS NOTES
Wednesday February 9, 2022     Encountered client at [address] [place] [address] during [address]  Client requests COVID Antigen test   Client previously signed Medical Information Release form giving authorization to share results with [facility], [employer], and the [employer]  Client result: Negative  Client informed  Gave information on infection prevention  Client requested a visit with his PCP to discuss Smoking Cessation  Appointment made for Monday 2/14/22 at 09:40 AM with Dr Alanis Watson  Client made aware     Client pleasant in this encounter

## 2022-02-14 ENCOUNTER — OFFICE VISIT (OUTPATIENT)
Dept: FAMILY MEDICINE CLINIC | Facility: CLINIC | Age: 57
End: 2022-02-14

## 2022-02-14 VITALS
DIASTOLIC BLOOD PRESSURE: 78 MMHG | OXYGEN SATURATION: 99 % | HEART RATE: 89 BPM | RESPIRATION RATE: 18 BRPM | HEIGHT: 64 IN | SYSTOLIC BLOOD PRESSURE: 136 MMHG | WEIGHT: 140 LBS | BODY MASS INDEX: 23.9 KG/M2 | TEMPERATURE: 97.6 F

## 2022-02-14 DIAGNOSIS — J42 CHRONIC BRONCHITIS, UNSPECIFIED CHRONIC BRONCHITIS TYPE (HCC): ICD-10-CM

## 2022-02-14 DIAGNOSIS — F17.200 TOBACCO DEPENDENCE SYNDROME: Primary | ICD-10-CM

## 2022-02-14 PROCEDURE — 99213 OFFICE O/P EST LOW 20 MIN: CPT | Performed by: INTERNAL MEDICINE

## 2022-02-14 PROCEDURE — 99407 BEHAV CHNG SMOKING > 10 MIN: CPT | Performed by: INTERNAL MEDICINE

## 2022-02-14 RX ORDER — BUPROPION HYDROCHLORIDE 150 MG/1
150 TABLET, EXTENDED RELEASE ORAL 2 TIMES DAILY
Qty: 60 TABLET | Refills: 2 | Status: SHIPPED | OUTPATIENT
Start: 2022-02-14 | End: 2022-05-23

## 2022-02-14 RX ORDER — TIOTROPIUM BROMIDE 18 UG/1
18 CAPSULE ORAL; RESPIRATORY (INHALATION) DAILY
Qty: 30 CAPSULE | Refills: 0 | Status: SHIPPED | OUTPATIENT
Start: 2022-02-14

## 2022-02-14 NOTE — ASSESSMENT & PLAN NOTE
Tobacco Cessation Counseling: Tobacco cessation counseling and education was provided  The patient is sincerely urged to quit consumption of tobacco  He is ready to quit tobacco  The numerous health risks of tobacco consumption were discussed  Prescribed the following medications: bupropion    - Follow-up in 3 months  - Counseled for 20 minutes

## 2022-02-14 NOTE — PROGRESS NOTES
Assessment/Plan:    Tobacco dependence syndrome  Tobacco Cessation Counseling: Tobacco cessation counseling and education was provided  The patient is sincerely urged to quit consumption of tobacco  He is ready to quit tobacco  The numerous health risks of tobacco consumption were discussed  Prescribed the following medications: bupropion    - Follow-up in 3 months  - Counseled for 20 minutes  Diagnoses and all orders for this visit:    Tobacco dependence syndrome  -     buPROPion (Wellbutrin SR) 150 mg 12 hr tablet; Take 1 tablet (150 mg total) by mouth 2 (two) times a day    Chronic bronchitis, unspecified chronic bronchitis type (HCC)  -     tiotropium (Spiriva HandiHaler) 18 mcg inhalation capsule; Place 1 capsule (18 mcg total) into inhaler and inhale daily          Subjective:      Patient ID: Juve Smith is a 60 y o  male  Patient is a pleasant 60year old male who presents to the clinic for tobacco cessation counseling  He smokes upto 2 packs per day for the past 20 years  He is interested in starting wellbutrin as smoking cessation  He is currently using lexapro and Seroquel that is prescribed by psychiatrist however he needs to schedule an appt to follow-up  The following portions of the patient's history were reviewed and updated as appropriate:   He  has a past medical history of Anxiety, Asthma, COPD (chronic obstructive pulmonary disease) ([facility] ), Depression, GERD (gastroesophageal reflux disease), Hyperlipidemia, Hypertension, and Suicide attempt ([facility] )    He   Patient Active Problem List    Diagnosis Date Noted    Allergic rhinitis 02/03/2022    Prostate cancer screening 05/28/2020    Premature ejaculation 05/28/2020    Screen for STD (sexually transmitted disease) 02/20/2020    Chronic pain of both knees 10/23/2019    Other constipation 07/17/2019    Hiatal hernia 07/17/2019    OA (osteoarthritis) of neck 05/20/2019    Gastroesophageal reflux disease without esophagitis 01/30/2019    Seborrheic dermatitis 12/14/2018    Gastritis without bleeding 11/26/2018    Postnasal drip 10/25/2018    Anxiety 09/27/2018    Mixed hyperlipidemia 09/27/2018    Tobacco dependence syndrome 09/27/2018    COPD (chronic obstructive pulmonary disease) ([facility] ) 09/27/2018    BPH (benign prostatic hyperplasia) 09/27/2018    Major depression 05/21/2012     He  has a past surgical history that includes Hernia repair; Skin graft; Fracture surgery; and Esophagogastroduodenoscopy (N/A, 3/1/2019)  His family history is not on file  He  reports that he has been smoking cigarettes  He has a 40 00 pack-year smoking history  He has never used smokeless tobacco  He reports current alcohol use  He reports that he does not use drugs  Current Outpatient Medications   Medication Sig Dispense Refill    Advair Diskus 500-50 MCG/DOSE inhaler INHALE 1 PUFF 2 (TWO) TIMES A DAY RINSE MOUTH AFTER USE  60 blister 2    albuterol (PROVENTIL HFA,VENTOLIN HFA) 90 mcg/act inhaler Inhale 1 puff every 6 (six) hours as needed for wheezing 8 g 1    ALPRAZolam (XANAX) 0 5 mg tablet Take 1 tablet (0 5 mg total) by mouth 2 (two) times a day 60 tablet 0    atorvastatin (LIPITOR) 10 mg tablet TAKE 1 TABLET (10 MG TOTAL) BY MOUTH DAILY 90 tablet 3    buPROPion (Wellbutrin SR) 150 mg 12 hr tablet Take 1 tablet (150 mg total) by mouth 2 (two) times a day 60 tablet 2    escitalopram (LEXAPRO) 20 mg tablet Take 1 tablet (20 mg total) by mouth daily 30 tablet 1    famotidine (PEPCID) 20 mg tablet Take 1 tablet (20 mg total) by mouth 2 (two) times a day 60 tablet 0    fluticasone-salmeterol (ADVAIR, WIXELA) 500-50 mcg/dose inhaler Inhale 1 puff 2 (two) times a day Rinse mouth after use   1 each 3    ibuprofen (MOTRIN) 600 mg tablet Take 1 tablet (600 mg total) by mouth every 6 (six) hours as needed for mild pain or moderate pain 60 tablet 0    loratadine (CLARITIN) 10 mg tablet Take 1 tablet (10 mg total) by mouth daily 30 tablet 1    pantoprazole (PROTONIX) 40 mg tablet Take 1 tablet (40 mg total) by mouth daily 60 tablet 3    QUEtiapine (SEROquel) 50 mg tablet Take 3 tablets (150 mg total) by mouth daily at bedtime 90 tablet 1    tamsulosin (FLOMAX) 0 4 mg TAKE 1 CAPSULE (0 4 MG TOTAL) BY MOUTH DAILY WITH DINNER 90 capsule 3    tiotropium (Spiriva HandiHaler) 18 mcg inhalation capsule Place 1 capsule (18 mcg total) into inhaler and inhale daily 30 capsule 0    traZODone (DESYREL) 50 mg tablet Take 0 5 tablets (25 mg total) by mouth daily at bedtime 15 tablet 1     No current facility-administered medications for this visit  Current Outpatient Medications on File Prior to Visit   Medication Sig    Advair Diskus 500-50 MCG/DOSE inhaler INHALE 1 PUFF 2 (TWO) TIMES A DAY RINSE MOUTH AFTER USE   albuterol (PROVENTIL HFA,VENTOLIN HFA) 90 mcg/act inhaler Inhale 1 puff every 6 (six) hours as needed for wheezing    ALPRAZolam (XANAX) 0 5 mg tablet Take 1 tablet (0 5 mg total) by mouth 2 (two) times a day    atorvastatin (LIPITOR) 10 mg tablet TAKE 1 TABLET (10 MG TOTAL) BY MOUTH DAILY    escitalopram (LEXAPRO) 20 mg tablet Take 1 tablet (20 mg total) by mouth daily    famotidine (PEPCID) 20 mg tablet Take 1 tablet (20 mg total) by mouth 2 (two) times a day    fluticasone-salmeterol (ADVAIR, WIXELA) 500-50 mcg/dose inhaler Inhale 1 puff 2 (two) times a day Rinse mouth after use      ibuprofen (MOTRIN) 600 mg tablet Take 1 tablet (600 mg total) by mouth every 6 (six) hours as needed for mild pain or moderate pain    loratadine (CLARITIN) 10 mg tablet Take 1 tablet (10 mg total) by mouth daily    pantoprazole (PROTONIX) 40 mg tablet Take 1 tablet (40 mg total) by mouth daily    QUEtiapine (SEROquel) 50 mg tablet Take 3 tablets (150 mg total) by mouth daily at bedtime    tamsulosin (FLOMAX) 0 4 mg TAKE 1 CAPSULE (0 4 MG TOTAL) BY MOUTH DAILY WITH DINNER    traZODone (DESYREL) 50 mg tablet Take 0 5 tablets (25 mg total) by mouth daily at bedtime    [DISCONTINUED] tiotropium (Spiriva HandiHaler) 18 mcg inhalation capsule Place 1 capsule (18 mcg total) into inhaler and inhale daily     No current facility-administered medications on file prior to visit  He has No Known Allergies       Review of Systems   Constitutional: Negative for fatigue  HENT: Negative for congestion, rhinorrhea and sore throat  Eyes: Negative for visual disturbance  Respiratory: Negative for cough, shortness of breath and wheezing  Cardiovascular: Negative for chest pain  Gastrointestinal: Negative for abdominal distention, abdominal pain, blood in stool, constipation, diarrhea, nausea and vomiting  Genitourinary: Negative for difficulty urinating and dysuria  Musculoskeletal: Negative for arthralgias  Skin: Negative for rash  Neurological: Negative for dizziness and headaches  Objective:      /78 (BP Location: Right arm, Patient Position: Sitting, Cuff Size: Standard)   Pulse 89   Temp 97 6 °F (36 4 °C) (Temporal)   Resp 18   Ht 5' 4" (1 626 m)   Wt 63 5 kg (140 lb)   SpO2 99%   BMI 24 03 kg/m²          Physical Exam  Vitals reviewed  Constitutional:       General: He is not in acute distress  Appearance: Normal appearance  He is obese  He is not ill-appearing, toxic-appearing or diaphoretic  HENT:      Head: Normocephalic and atraumatic  Right Ear: Tympanic membrane, ear canal and external ear normal  There is no impacted cerumen  Left Ear: Tympanic membrane, ear canal and external ear normal  There is no impacted cerumen  Nose: Nose normal  No congestion  Mouth/Throat:      Mouth: Mucous membranes are moist       Pharynx: Oropharynx is clear  No oropharyngeal exudate or posterior oropharyngeal erythema  Eyes:      General: No scleral icterus  Right eye: No discharge  Left eye: No discharge  Extraocular Movements: Extraocular movements intact  Conjunctiva/sclera: Conjunctivae normal    Cardiovascular:      Rate and Rhythm: Normal rate and regular rhythm  Pulses: Normal pulses  Heart sounds: No murmur heard  Pulmonary:      Effort: Pulmonary effort is normal  No respiratory distress  Breath sounds: No wheezing  Abdominal:      General: Abdomen is flat  Bowel sounds are normal  There is no distension  Tenderness: There is no abdominal tenderness  Musculoskeletal:         General: Normal range of motion  Cervical back: Normal range of motion and neck supple  No rigidity or tenderness  Right lower leg: No edema  Left lower leg: No edema  Lymphadenopathy:      Cervical: No cervical adenopathy  Skin:     General: Skin is warm  Capillary Refill: Capillary refill takes less than 2 seconds  Neurological:      General: No focal deficit present  Mental Status: He is alert     Psychiatric:         Mood and Affect: Mood normal

## 2022-02-15 ENCOUNTER — PATIENT OUTREACH (OUTPATIENT)
Dept: OTHER | Facility: OTHER | Age: 57
End: 2022-02-15
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
## 2022-02-16 ENCOUNTER — PATIENT OUTREACH (OUTPATIENT)
Dept: OTHER | Facility: OTHER | Age: 57
End: 2022-02-16

## 2022-02-17 NOTE — PROGRESS NOTES
2/15/22: Client requested COVID antigen test be performed  Had previously signed authorization to share results with [facility] and [place]  Test performed: result negative  Pt  Informed  Reinforced information to prevent infection

## 2022-02-18 NOTE — PROGRESS NOTES
2/16/22: Client asked for BP to be taken  Does not take BP meds  BP: 108/68  IS increasing dosage according to prescription  of Wellbutrin today  Will check progress next week  Alert and oriented to person, place and time

## 2022-03-02 ENCOUNTER — PATIENT OUTREACH (OUTPATIENT)
Dept: OTHER | Facility: OTHER | Age: 57
End: 2022-03-02

## 2022-03-02 NOTE — PROGRESS NOTES
Client requested antigen testing for COVId  Had previously signed authorization to share result with [facility] and [employer]  Performed test: result negative  Pt  Informed  Reviewed information to prevent infection

## 2022-03-07 DIAGNOSIS — Z09 FOLLOW UP: ICD-10-CM

## 2022-03-08 RX ORDER — TAMSULOSIN HYDROCHLORIDE 0.4 MG/1
0.4 CAPSULE ORAL
Qty: 90 CAPSULE | Refills: 3 | Status: SHIPPED | OUTPATIENT
Start: 2022-03-08

## 2022-03-30 ENCOUNTER — PATIENT OUTREACH (OUTPATIENT)
Dept: OTHER | Facility: OTHER | Age: 57
End: 2022-03-30

## 2022-03-30 NOTE — PROGRESS NOTES
3/30/22: Client requested COVID antigen test be performed  Had previously signed authorization to share results with [facility] and [employer]  Performed test: results negative  Pt  Informed  Reviewed instructions to prevent infections

## 2022-04-06 ENCOUNTER — PATIENT OUTREACH (OUTPATIENT)
Dept: OTHER | Facility: OTHER | Age: 57
End: 2022-04-06

## 2022-04-06 NOTE — PROGRESS NOTES
Wednesday April 6, 2022     [address] [place] [address] encountered client at [employer] during [address]  Client requests COVID Antigen test   Client previously signed Medical Information Release form giving authorization to share results with [employer], [employer], and the [employer]      Client result:Negative  Client informed  Given information on infection prevention        Client pleasant in this encounter  Emotional support given

## 2022-04-19 ENCOUNTER — PATIENT OUTREACH (OUTPATIENT)
Dept: OTHER | Facility: OTHER | Age: 57
End: 2022-04-19

## 2022-04-19 NOTE — PROGRESS NOTES
Tuesday April 19, 2022     Encountered client at [address] [facility] [employer] outreach hours  Client requested to know the time for his May 5 appointment with his PCP  Client informed it is for 10:45 on May 5  Client also inquired about a recommendation for a dermatologist  States has history of "bumps" on his head and had seen a dermatologist in the past but the dermatologist is no longer in practice  Shared it sometimes itches  Denies discharge  States they are "like little pimples with pus if scratched open"   Reports the dermatologist had given him a red shampoo but does not recall the name  He states he will discuss with his PCP at his May 5 appointment  Client requests COVID Antigen test during Free Antigen testing   Client previously signed Medical Information Release form giving authorization to share results with [facility] [employer], and the [facility]      Client result:Negative  Client informed  Given information on infection prevention  Client pleasant in this encounter  Emotional support given

## 2022-04-30 DIAGNOSIS — J44.1 COPD EXACERBATION (HCC): ICD-10-CM

## 2022-05-02 RX ORDER — FLUTICASONE PROPIONATE AND SALMETEROL 50; 500 UG/1; UG/1
POWDER RESPIRATORY (INHALATION)
Qty: 60 BLISTER | OUTPATIENT
Start: 2022-05-02

## 2022-05-02 RX ORDER — FLUTICASONE PROPIONATE AND SALMETEROL 500; 50 UG/1; UG/1
1 POWDER RESPIRATORY (INHALATION) 2 TIMES DAILY
Qty: 60 BLISTER | Refills: 2 | Status: SHIPPED | OUTPATIENT
Start: 2022-05-02

## 2022-05-05 ENCOUNTER — OFFICE VISIT (OUTPATIENT)
Dept: FAMILY MEDICINE CLINIC | Facility: CLINIC | Age: 57
End: 2022-05-05

## 2022-05-05 VITALS
WEIGHT: 133 LBS | BODY MASS INDEX: 22.83 KG/M2 | SYSTOLIC BLOOD PRESSURE: 110 MMHG | HEART RATE: 90 BPM | TEMPERATURE: 98.1 F | RESPIRATION RATE: 18 BRPM | OXYGEN SATURATION: 98 % | DIASTOLIC BLOOD PRESSURE: 60 MMHG

## 2022-05-05 DIAGNOSIS — J44.9 CHRONIC OBSTRUCTIVE PULMONARY DISEASE, UNSPECIFIED COPD TYPE (HCC): ICD-10-CM

## 2022-05-05 DIAGNOSIS — Z12.11 ENCOUNTER FOR COLORECTAL CANCER SCREENING: ICD-10-CM

## 2022-05-05 DIAGNOSIS — Z12.12 ENCOUNTER FOR COLORECTAL CANCER SCREENING: ICD-10-CM

## 2022-05-05 DIAGNOSIS — Z12.2 ENCOUNTER FOR SCREENING FOR LUNG CANCER: ICD-10-CM

## 2022-05-05 DIAGNOSIS — Z00.00 MEDICARE ANNUAL WELLNESS VISIT, INITIAL: Primary | ICD-10-CM

## 2022-05-05 DIAGNOSIS — Z87.891 PERSONAL HISTORY OF NICOTINE DEPENDENCE: ICD-10-CM

## 2022-05-05 PROCEDURE — G0438 PPPS, INITIAL VISIT: HCPCS | Performed by: FAMILY MEDICINE

## 2022-05-05 NOTE — ASSESSMENT & PLAN NOTE
Well controlled with Advair and Spiriva  Albuterol PRN   - Discussed that symptoms may be directly related to smoking as patient currently smoking >1 ppd and not interested in quitting at the time  - Admits to have PFTs in the past and declines new ones

## 2022-05-05 NOTE — PATIENT INSTRUCTIONS
Medicare Preventive Visit Patient Instructions  Thank you for completing your Welcome to Medicare Visit or Medicare Annual Wellness Visit today  Your next wellness visit will be due in one year (5/6/2023)  The screening/preventive services that you may require over the next 5-10 years are detailed below  Some tests may not apply to you based off risk factors and/or age  Screening tests ordered at today's visit but not completed yet may show as past due  Also, please note that scanned in results may not display below  Preventive Screenings:  Service Recommendations Previous Testing/Comments   Colorectal Cancer Screening  · Colonoscopy    · Fecal Occult Blood Test (FOBT)/Fecal Immunochemical Test (FIT)  · Fecal DNA/Cologuard Test  · Flexible Sigmoidoscopy Age: 54-65 years old   Colonoscopy: every 10 years (May be performed more frequently if at higher risk)  OR  FOBT/FIT: every 1 year  OR  Cologuard: every 3 years  OR  Sigmoidoscopy: every 5 years  Screening may be recommended earlier than age 48 if at higher risk for colorectal cancer  Also, an individualized decision between you and your healthcare provider will decide whether screening between the ages of 74-80 would be appropriate  Colonoscopy: 04/17/2012  FOBT/FIT: Not on file  Cologuard: Not on file  Sigmoidoscopy: Not on file    Screening Current     Prostate Cancer Screening Individualized decision between patient and health care provider in men between ages of 53-78   Medicare will cover every 12 months beginning on the day after your 50th birthday PSA: 1 2 ng/mL           Hepatitis C Screening Once for adults born between 1945 and 1965  More frequently in patients at high risk for Hepatitis C Hep C Antibody: 02/19/2020    Screening Current   Diabetes Screening 1-2 times per year if you're at risk for diabetes or have pre-diabetes Fasting glucose: 84 mg/dL   A1C: 4 9 %        Cholesterol Screening Once every 5 years if you don't have a lipid disorder   May order more often based on risk factors  Lipid panel: 10/07/2020    Screening Not Indicated  History Lipid Disorder      Other Preventive Screenings Covered by Medicare:  1  Abdominal Aortic Aneurysm (AAA) Screening: covered once if your at risk  You're considered to be at risk if you have a family history of AAA or a male between the age of 73-68 who smoking at least 100 cigarettes in your lifetime  2  Lung Cancer Screening: covers low dose CT scan once per year if you meet all of the following conditions: (1) Age 50-69; (2) No signs or symptoms of lung cancer; (3) Current smoker or have quit smoking within the last 15 years; (4) You have a tobacco smoking history of at least 30 pack years (packs per day x number of years you smoked); (5) You get a written order from a healthcare provider  3  Glaucoma Screening: covered annually if you're considered high risk: (1) You have diabetes OR (2) Family history of glaucoma OR (3)  aged 48 and older OR (3)  American aged 72 and older  3  Osteoporosis Screening: covered every 2 years if you meet one of the following conditions: (1) Have a vertebral abnormality; (2) On glucocorticoid therapy for more than 3 months; (3) Have primary hyperparathyroidism; (4) On osteoporosis medications and need to assess response to drug therapy  5  HIV Screening: covered annually if you're between the age of 12-76  Also covered annually if you are younger than 13 and older than 72 with risk factors for HIV infection  For pregnant patients, it is covered up to 3 times per pregnancy      Immunizations:  Immunization Recommendations   Influenza Vaccine Annual influenza vaccination during flu season is recommended for all persons aged >= 6 months who do not have contraindications   Pneumococcal Vaccine (Prevnar and Pneumovax)  * Prevnar = PCV13  * Pneumovax = PPSV23 Adults 25-60 years old: 1-3 doses may be recommended based on certain risk factors  Adults 72 years old: Prevnar (PCV13) vaccine recommended followed by Pneumovax (PPSV23) vaccine  If already received PPSV23 since turning 65, then PCV13 recommended at least one year after PPSV23 dose  Hepatitis B Vaccine 3 dose series if at intermediate or high risk (ex: diabetes, end stage renal disease, liver disease)   Tetanus (Td) Vaccine - COST NOT COVERED BY MEDICARE PART B Following completion of primary series, a booster dose should be given every 10 years to maintain immunity against tetanus  Td may also be given as tetanus wound prophylaxis  Tdap Vaccine - COST NOT COVERED BY MEDICARE PART B Recommended at least once for all adults  For pregnant patients, recommended with each pregnancy  Shingles Vaccine (Shingrix) - COST NOT COVERED BY MEDICARE PART B  2 shot series recommended in those aged 48 and above     Health Maintenance Due:      Topic Date Due    Lung Cancer Screening  08/12/2015    Colorectal Cancer Screening  04/17/2022    HIV Screening  Completed    Hepatitis C Screening  Completed     Immunizations Due:      Topic Date Due    DTaP,Tdap,and Td Vaccines (2 - Td or Tdap) 05/16/2021     Advance Directives   What are advance directives? Advance directives are legal documents that state your wishes and plans for medical care  These plans are made ahead of time in case you lose your ability to make decisions for yourself  Advance directives can apply to any medical decision, such as the treatments you want, and if you want to donate organs  What are the types of advance directives? There are many types of advance directives, and each state has rules about how to use them  You may choose a combination of any of the following:  · Living will: This is a written record of the treatment you want  You can also choose which treatments you do not want, which to limit, and which to stop at a certain time  This includes surgery, medicine, IV fluid, and tube feedings     · Durable power of  for healthcare [facility]): This is a written record that states who you want to make healthcare choices for you when you are unable to make them for yourself  This person, called a proxy, is usually a family member or a friend  You may choose more than 1 proxy  · Do not resuscitate (DNR) order:  A DNR order is used in case your heart stops beating or you stop breathing  It is a request not to have certain forms of treatment, such as CPR  A DNR order may be included in other types of advance directives  · Medical directive: This covers the care that you want if you are in a coma, near death, or unable to make decisions for yourself  You can list the treatments you want for each condition  Treatment may include pain medicine, surgery, blood transfusions, dialysis, IV or tube feedings, and a ventilator (breathing machine)  · Values history: This document has questions about your views, beliefs, and how you feel and think about life  This information can help others choose the care that you would choose  Why are advance directives important? An advance directive helps you control your care  Although spoken wishes may be used, it is better to have your wishes written down  Spoken wishes can be misunderstood, or not followed  Treatments may be given even if you do not want them  An advance directive may make it easier for your family to make difficult choices about your care  Cigarette Smoking and Your Health   Risks to your health if you smoke:  Nicotine and other chemicals found in tobacco damage every cell in your body  Even if you are a light smoker, you have an increased risk for cancer, heart disease, and lung disease  If you are pregnant or have diabetes, smoking increases your risk for complications  Benefits to your health if you stop smoking:   · You decrease respiratory symptoms such as coughing, wheezing, and shortness of breath     · You reduce your risk for cancers of the lung, mouth, throat, kidney, bladder, pancreas, stomach, and cervix  If you already have cancer, you increase the benefits of chemotherapy  You also reduce your risk for cancer returning or a second cancer from developing  · You reduce your risk for heart disease, blood clots, heart attack, and stroke  · You reduce your risk for lung infections, and diseases such as pneumonia, asthma, chronic bronchitis, and emphysema  · Your circulation improves  More oxygen can be delivered to your body  If you have diabetes, you lower your risk for complications, such as kidney, artery, and eye diseases  You also lower your risk for nerve damage  Nerve damage can lead to amputations, poor vision, and blindness  · You improve your body's ability to heal and to fight infections  For more information and support to stop smoking:   · [place]  gov  Phone: 7- 277 - 314-7817  Web Address: www [employer]    [address] 2018 Information is for End User's use only and may not be sold, redistributed or otherwise used for commercial purposes   All illustrations and images included in CareNotes® are the copyrighted property of [employer]  or [address] [place] [address]

## 2022-05-05 NOTE — PROGRESS NOTES
Assessment and Plan:     Problem List Items Addressed This Visit        Respiratory    COPD (chronic obstructive pulmonary disease) ([address] )     Well controlled with Advair and Spiriva  Albuterol PRN   - Discussed that symptoms may be directly related to smoking as patient currently smoking >1 ppd and not interested in quitting at the time  - Admits to have PFTs in the past and declines new ones  Other Visit Diagnoses     Medicare annual wellness visit, initial    -  Primary    Encounter for screening for lung cancer        Relevant Orders    CT lung screening program    Personal history of nicotine dependence        Relevant Orders    CT lung screening program    Encounter for colorectal cancer screening        Discussed risks and benefits of CT scan  Patient agreeable  Relevant Orders    Ambulatory Referral to Gastroenterology           Preventive health issues were discussed with patient, and age appropriate screening tests were ordered as noted in patient's After Visit Summary  Personalized health advice and appropriate referrals for health education or preventive services given if needed, as noted in patient's After Visit Summary  History of Present Illness:     Patient presents for Welcome to Medicare visit  Patient Care Team:  Aquilino Xiao MD as PCP - General (Family Medicine)  Mali Herrera DO (Pain Medicine)  Andrzej Brown, RN as Sushma El RN as [facility]     Review of Systems:     Review of Systems   Constitutional: Negative for fatigue  HENT: Negative for congestion, rhinorrhea and sore throat  Eyes: Negative for visual disturbance  Respiratory: Negative for cough, shortness of breath and wheezing  Cardiovascular: Negative for chest pain  Gastrointestinal: Negative for abdominal distention, abdominal pain, blood in stool, constipation, diarrhea, nausea and vomiting  Genitourinary: Negative for difficulty urinating and dysuria  Musculoskeletal: Negative for arthralgias  Skin: Negative for rash  Neurological: Negative for dizziness and headaches  Problem List:     Patient Active Problem List   Diagnosis    Anxiety    Major depression    Mixed hyperlipidemia    Tobacco dependence syndrome    [facility]    BPH (benign prostatic hyperplasia)    Postnasal drip    Gastritis without bleeding    Seborrheic dermatitis    Gastroesophageal reflux disease without esophagitis    OA (osteoarthritis) of neck    Other constipation    Hiatal hernia    Chronic pain of both knees    Screen for STD (sexually transmitted disease)    Prostate cancer screening    Premature ejaculation    Allergic rhinitis      Past Medical and Surgical History:     Past Medical History:   Diagnosis Date    Anxiety     Asthma     [facility]ronic obstructive [facility] disease) [facility]     Depression     GERD (gastroesophageal reflux disease)     Hyperlipidemia     Hypertension     Suicide attempt [facility])      Past Surgical History:   Procedure Laterality Date    ESOPHAGOGASTRODUODENOSCOPY N/A 3/1/2019    Procedure: ESOPHAGOGASTRODUODENOSCOPY (EGD); Surgeon: Lakesha White MD;  Location: [facility]; Service: Gastroenterology    FRACTURE SURGERY      ORIF    HERNIA REPAIR      SKIN GRAFT        Family History:     No family history on file     Social History:     Social History     Socioeconomic History    Marital status: Single     Spouse name: None    Number of children: None    Years of education: None    Highest education level: None   Occupational History    Occupation: unemployed   Tobacco Use    Smoking status: Heavy Tobacco Smoker     Packs/day: 2 00     Years: 20 00     Pack years: 40 00     Types: Cigarettes    Smokeless tobacco: Never Used   Vaping Use    Vaping Use: Never used   Substance and Sexual Activity    Alcohol use: Yes     Comment: occ    Drug use: No    Sexual activity: Not Currently   Other Topics Concern    None   Social History Narrative    Lives independently     Social Determinants of Health     Financial Resource Strain: Low Risk     Difficulty of Paying Living Expenses: Not hard at all   Food Insecurity: No Food Insecurity    Worried About Running Out of Food in the Last Year: Never true    [place] of Food in the Last Year: Never true   Transportation Needs: No Transportation Needs    Lack of Transportation (Medical): No    Lack of Transportation (Non-Medical): No   Physical Activity: Not on file   Stress: Not on file   Social Connections: Not on file   Intimate Partner Violence: Not on file   Housing Stability: Not on file      Medications and Allergies:     Current Outpatient Medications   Medication Sig Dispense Refill    albuterol (PROVENTIL HFA,VENTOLIN HFA) 90 mcg/act inhaler Inhale 1 puff every 6 (six) hours as needed for wheezing 8 g 1    ALPRAZolam (XANAX) 0 5 mg tablet Take 1 tablet (0 5 mg total) by mouth 2 (two) times a day 60 tablet 0    atorvastatin (LIPITOR) 10 mg tablet TAKE 1 TABLET (10 MG TOTAL) BY MOUTH DAILY 90 tablet 3    buPROPion (Wellbutrin SR) 150 mg 12 hr tablet Take 1 tablet (150 mg total) by mouth 2 (two) times a day 60 tablet 2    escitalopram (LEXAPRO) 20 mg tablet Take 1 tablet (20 mg total) by mouth daily 30 tablet 1    famotidine (PEPCID) 20 mg tablet Take 1 tablet (20 mg total) by mouth 2 (two) times a day 60 tablet 0    Fluticasone-Salmeterol (Advair Diskus) 500-50 mcg/dose inhaler Inhale 1 puff 2 (two) times a day Rinse mouth after use  60 blister 2    fluticasone-salmeterol (ADVAIR, WIXELA) 500-50 mcg/dose inhaler Inhale 1 puff 2 (two) times a day Rinse mouth after use   1 each 3    ibuprofen (MOTRIN) 600 mg tablet Take 1 tablet (600 mg total) by mouth every 6 (six) hours as needed for mild pain or moderate pain 60 tablet 0    loratadine (CLARITIN) 10 mg tablet Take 1 tablet (10 mg total) by mouth daily 30 tablet 1    pantoprazole (PROTONIX) 40 mg tablet Take 1 tablet (40 mg total) by mouth daily 60 tablet 3    QUEtiapine (SEROquel) 50 mg tablet Take 3 tablets (150 mg total) by mouth daily at bedtime 90 tablet 1    tamsulosin (FLOMAX) 0 4 mg TAKE 1 CAPSULE (0 4 MG TOTAL) BY MOUTH DAILY WITH DINNER 90 capsule 3    tiotropium (Spiriva HandiHaler) 18 mcg inhalation capsule Place 1 capsule (18 mcg total) into inhaler and inhale daily 30 capsule 0    traZODone (DESYREL) 50 mg tablet Take 0 5 tablets (25 mg total) by mouth daily at bedtime 15 tablet 1     No current facility-administered medications for this visit  No Known Allergies   Immunizations:     Immunization History   Administered Date(s) Administered    COVID-19 MODERNA VACC 0 5 ML IM 04/07/2021, 05/05/2021, 12/01/2021    INFLUENZA 11/15/2010, 11/22/2011    Influenza Quadrivalent Preservative Free 3 years and older IM 11/19/2021    Influenza, recombinant, quadrivalent,injectable, preservative free 10/25/2018, 10/07/2020    Influenza, seasonal, injectable 11/22/2011    MMR 05/16/2011    Pneumococcal Polysaccharide PPV23 05/16/2011    Tdap 05/16/2011    Tuberculin Skin Test-PPD Intradermal 11/15/2010      Health Maintenance:         Topic Date Due    Lung Cancer Screening  08/12/2015    Colorectal Cancer Screening  04/17/2022    HIV Screening  Completed    Hepatitis C Screening  Completed     There are no preventive care reminders to display for this patient  Medicare Screening Tests and Risk Assessments:         Health Risk Assessment:   Patient rates overall health as good  Patient feels that their physical health rating is same  Patient is satisfied with their life  Eyesight was rated as same  Hearing was rated as same  Patient feels that their emotional and mental health rating is much better  Patients states they are sometimes angry  Patient states they are sometimes unusually tired/fatigued  Pain experienced in the last 7 days has been none   Patient states that he has experienced no weight loss or gain in last 6 months  Depression Screening:   PHQ-9 Score: 4      Fall Risk Screening: In the past year, patient has experienced: no history of falling in past year      Home Safety:  Patient has trouble with stairs inside or outside of their home  Patient has working smoke alarms and has working carbon monoxide detector  Home safety hazards include: none  Nutrition:   Current diet is Regular  Medications:   Patient is not currently taking any over-the-counter supplements  Patient is able to manage medications  Activities of Daily Living (ADLs)/Instrumental Activities of Daily Living (IADLs):   Walk and transfer into and out of bed and chair?: Yes  Dress and groom yourself?: Yes    Bathe or shower yourself?: Yes    Feed yourself?  Yes  Do your laundry/housekeeping?: Yes  Manage your money, pay your bills and track your expenses?: No  Make your own meals?: Yes    Do your own shopping?: Yes    Previous Hospitalizations:   Any hospitalizations or ED visits within the last 12 months?: No      Advance Care Planning:   Living will: No    Advanced directive: No    Five wishes given: Yes      PREVENTIVE SCREENINGS      Cardiovascular Screening:    General: Screening Not Indicated and History Lipid Disorder    Due for: Lipid Panel      Diabetes Screening:     General: Screening Not Indicated      Colorectal Cancer Screening:     General: Screening Current    Due for: Colonoscopy - Low Risk      Prostate Cancer Screening:    General: Risks and Benefits Discussed    Due for: PSA      Osteoporosis Screening:    General: Screening Not Indicated      Abdominal Aortic Aneurysm (AAA) Screening:    Risk factors include: tobacco use        General: Screening Not Indicated      Lung Cancer Screening:     General: Risks and Benefits Discussed    Due for: Low Dose CT (LDCT)      Hepatitis C Screening:    General: Screening Current    Screening, Brief Intervention, and Referral to Treatment (SBIRT)    Screening  Typical number of drinks in a day: 2  Typical number of drinks in a week: 14  Interpretation: Low risk drinking behavior  Single Item Drug Screening:  How often have you used an illegal drug (including marijuana) or a prescription medication for non-medical reasons in the past year? never    Single Item Drug Screen Score: 0  Interpretation: Negative screen for possible drug use disorder    No exam data present     Physical Exam:     /60 (BP Location: Left arm, Patient Position: Sitting, Cuff Size: Adult)   Pulse 90   Temp 98 1 °F (36 7 °C) (Temporal)   Resp 18   Wt 60 3 kg (133 lb)   SpO2 98%   BMI 22 83 kg/m²     Physical Exam  Constitutional:       General: He is not in acute distress  Appearance: Normal appearance  He is normal weight  He is not ill-appearing, toxic-appearing or diaphoretic  HENT:      Head: Normocephalic and atraumatic  Right Ear: External ear normal       Left Ear: External ear normal       Nose: Nose normal  No congestion  Mouth/Throat:      Mouth: Mucous membranes are moist       Pharynx: Oropharynx is clear  No oropharyngeal exudate  Eyes:      General: No scleral icterus  Right eye: No discharge  Left eye: No discharge  Extraocular Movements: Extraocular movements intact  Conjunctiva/sclera: Conjunctivae normal       Pupils: Pupils are equal, round, and reactive to light  Cardiovascular:      Rate and Rhythm: Normal rate and regular rhythm  Pulses: Normal pulses  Heart sounds: Normal heart sounds  No murmur heard  Pulmonary:      Effort: Pulmonary effort is normal  No respiratory distress  Breath sounds: Normal breath sounds  No wheezing  Abdominal:      General: Abdomen is flat  Bowel sounds are normal  There is no distension  Tenderness: There is no abdominal tenderness  Musculoskeletal:         General: Normal range of motion        Cervical back: Normal range of motion and neck supple  No rigidity or tenderness  Right lower leg: No edema  Left lower leg: No edema  Lymphadenopathy:      Cervical: No cervical adenopathy  Skin:     General: Skin is warm  Capillary Refill: Capillary refill takes less than 2 seconds  Neurological:      General: No focal deficit present  Mental Status: He is alert     Psychiatric:         Mood and Affect: Mood normal           Charlene Amaro MD

## 2022-05-09 ENCOUNTER — APPOINTMENT (OUTPATIENT)
Dept: LAB | Facility: HOSPITAL | Age: 57
End: 2022-05-09
Payer: MEDICARE

## 2022-05-09 DIAGNOSIS — Z13.220 SCREENING FOR HYPERLIPIDEMIA: ICD-10-CM

## 2022-05-09 DIAGNOSIS — Z12.5 SCREENING FOR PROSTATE CANCER: ICD-10-CM

## 2022-05-09 DIAGNOSIS — J44.9 CHRONIC OBSTRUCTIVE PULMONARY DISEASE, UNSPECIFIED COPD TYPE (HCC): ICD-10-CM

## 2022-05-09 DIAGNOSIS — Z86.2 HISTORY OF ANEMIA: ICD-10-CM

## 2022-05-09 DIAGNOSIS — E55.9 VITAMIN D DEFICIENCY: ICD-10-CM

## 2022-05-09 LAB
25(OH)D3 SERPL-MCNC: 18.5 NG/ML (ref 30–100)
ALBUMIN SERPL BCP-MCNC: 4 G/DL (ref 3–5.2)
ALP SERPL-CCNC: 63 U/L (ref 43–122)
ALT SERPL W P-5'-P-CCNC: 13 U/L
ANION GAP SERPL CALCULATED.3IONS-SCNC: 6 MMOL/L (ref 5–14)
AST SERPL W P-5'-P-CCNC: 20 U/L (ref 17–59)
BASOPHILS # BLD AUTO: 0.04 THOUSANDS/ΜL (ref 0–0.1)
BASOPHILS NFR BLD AUTO: 1 % (ref 0–1)
BILIRUB SERPL-MCNC: 0.85 MG/DL
BUN SERPL-MCNC: 15 MG/DL (ref 5–25)
CALCIUM SERPL-MCNC: 9 MG/DL (ref 8.4–10.2)
CHLORIDE SERPL-SCNC: 101 MMOL/L (ref 97–108)
CHOLEST SERPL-MCNC: 154 MG/DL
CO2 SERPL-SCNC: 29 MMOL/L (ref 22–30)
CREAT SERPL-MCNC: 0.96 MG/DL (ref 0.7–1.5)
EOSINOPHIL # BLD AUTO: 0.15 THOUSAND/ΜL (ref 0–0.61)
EOSINOPHIL NFR BLD AUTO: 3 % (ref 0–6)
ERYTHROCYTE [DISTWIDTH] IN BLOOD BY AUTOMATED COUNT: 12.1 % (ref 11.6–15.1)
GFR SERPL CREATININE-BSD FRML MDRD: 86 ML/MIN/1.73SQ M
GLUCOSE P FAST SERPL-MCNC: 90 MG/DL (ref 70–99)
HCT VFR BLD AUTO: 45.4 % (ref 36.5–49.3)
HDLC SERPL-MCNC: 65 MG/DL
HGB BLD-MCNC: 14.8 G/DL (ref 12–17)
IMM GRANULOCYTES # BLD AUTO: 0.01 THOUSAND/UL (ref 0–0.2)
IMM GRANULOCYTES NFR BLD AUTO: 0 % (ref 0–2)
LDLC SERPL CALC-MCNC: 75 MG/DL
LYMPHOCYTES # BLD AUTO: 1.68 THOUSANDS/ΜL (ref 0.6–4.47)
LYMPHOCYTES NFR BLD AUTO: 29 % (ref 14–44)
MCH RBC QN AUTO: 31.4 PG (ref 26.8–34.3)
MCHC RBC AUTO-ENTMCNC: 32.6 G/DL (ref 31.4–37.4)
MCV RBC AUTO: 96 FL (ref 82–98)
MONOCYTES # BLD AUTO: 0.62 THOUSAND/ΜL (ref 0.17–1.22)
MONOCYTES NFR BLD AUTO: 11 % (ref 4–12)
NEUTROPHILS # BLD AUTO: 3.26 THOUSANDS/ΜL (ref 1.85–7.62)
NEUTS SEG NFR BLD AUTO: 56 % (ref 43–75)
NONHDLC SERPL-MCNC: 89 MG/DL
NRBC BLD AUTO-RTO: 0 /100 WBCS
PLATELET # BLD AUTO: 240 THOUSANDS/UL (ref 149–390)
PMV BLD AUTO: 9 FL (ref 8.9–12.7)
POTASSIUM SERPL-SCNC: 4.2 MMOL/L (ref 3.6–5)
PROT SERPL-MCNC: 7.1 G/DL (ref 5.9–8.4)
PSA SERPL-MCNC: 1.3 NG/ML (ref 0–4)
RBC # BLD AUTO: 4.71 MILLION/UL (ref 3.88–5.62)
SODIUM SERPL-SCNC: 136 MMOL/L (ref 137–147)
TRIGL SERPL-MCNC: 70 MG/DL
WBC # BLD AUTO: 5.76 THOUSAND/UL (ref 4.31–10.16)

## 2022-05-09 PROCEDURE — 80053 COMPREHEN METABOLIC PANEL: CPT

## 2022-05-09 PROCEDURE — 80061 LIPID PANEL: CPT

## 2022-05-09 PROCEDURE — 85025 COMPLETE CBC W/AUTO DIFF WBC: CPT

## 2022-05-09 PROCEDURE — G0103 PSA SCREENING: HCPCS

## 2022-05-09 PROCEDURE — 36415 COLL VENOUS BLD VENIPUNCTURE: CPT

## 2022-05-09 PROCEDURE — 82306 VITAMIN D 25 HYDROXY: CPT

## 2022-05-11 DIAGNOSIS — K21.9 GASTROESOPHAGEAL REFLUX DISEASE WITHOUT ESOPHAGITIS: ICD-10-CM

## 2022-05-11 RX ORDER — FAMOTIDINE 20 MG/1
20 TABLET, FILM COATED ORAL 2 TIMES DAILY
Qty: 60 TABLET | Refills: 0 | Status: SHIPPED | OUTPATIENT
Start: 2022-05-11 | End: 2022-06-06

## 2022-05-12 DIAGNOSIS — E55.9 VITAMIN D DEFICIENCY: Primary | ICD-10-CM

## 2022-05-12 RX ORDER — MELATONIN
1000 DAILY
Qty: 56 TABLET | Refills: 0 | Status: SHIPPED | OUTPATIENT
Start: 2022-05-12 | End: 2022-07-07

## 2022-05-18 ENCOUNTER — PATIENT OUTREACH (OUTPATIENT)
Dept: OTHER | Facility: OTHER | Age: 57
End: 2022-05-18

## 2022-05-19 NOTE — PROGRESS NOTES
5/18/22: Client requested antigen testing for COVID  Had previously signed authorization to share results with Cr  Performed test: result negative  Client informed  Reviewed infection prevention information  Had seen client earlier in the day at 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

## 2022-05-20 DIAGNOSIS — N40.0 BENIGN PROSTATIC HYPERPLASIA WITHOUT LOWER URINARY TRACT SYMPTOMS: ICD-10-CM

## 2022-05-21 RX ORDER — ATORVASTATIN CALCIUM 10 MG/1
10 TABLET, FILM COATED ORAL DAILY
Qty: 90 TABLET | Refills: 3 | Status: SHIPPED | OUTPATIENT
Start: 2022-05-21

## 2022-05-23 DIAGNOSIS — J44.9 CHRONIC OBSTRUCTIVE PULMONARY DISEASE, UNSPECIFIED COPD TYPE (HCC): ICD-10-CM

## 2022-05-24 RX ORDER — ALBUTEROL SULFATE 90 UG/1
1 AEROSOL, METERED RESPIRATORY (INHALATION) EVERY 6 HOURS PRN
Qty: 8.5 G | Refills: 0 | Status: SHIPPED | OUTPATIENT
Start: 2022-05-24 | End: 2022-06-15

## 2022-05-31 ENCOUNTER — TELEPHONE (OUTPATIENT)
Dept: FAMILY MEDICINE CLINIC | Facility: CLINIC | Age: 57
End: 2022-05-31

## 2022-05-31 ENCOUNTER — PATIENT OUTREACH (OUTPATIENT)
Dept: OTHER | Facility: OTHER | Age: 57
End: 2022-05-31

## 2022-06-01 ENCOUNTER — PATIENT OUTREACH (OUTPATIENT)
Dept: OTHER | Facility: OTHER | Age: 57
End: 2022-06-01

## 2022-06-01 NOTE — PROGRESS NOTES
6/1/22: Client requested COVID antigen testing be performed  Performed test: result negative  Client informed  Reviewed information for infection prevention

## 2022-06-05 DIAGNOSIS — K21.9 GASTROESOPHAGEAL REFLUX DISEASE WITHOUT ESOPHAGITIS: ICD-10-CM

## 2022-06-06 RX ORDER — FAMOTIDINE 20 MG/1
20 TABLET, FILM COATED ORAL 2 TIMES DAILY
Qty: 60 TABLET | Refills: 0 | Status: SHIPPED | OUTPATIENT
Start: 2022-06-06

## 2022-06-14 ENCOUNTER — PATIENT OUTREACH (OUTPATIENT)
Dept: OTHER | Facility: OTHER | Age: 57
End: 2022-06-14

## 2022-06-14 ENCOUNTER — TELEPHONE (OUTPATIENT)
Dept: FAMILY MEDICINE CLINIC | Facility: CLINIC | Age: 57
End: 2022-06-14

## 2022-06-14 NOTE — PROGRESS NOTES
6/14/22:Client requested COVID antigen testing be performed  Performed test  Result negative  Pt informed  Reviewed infection prevention with client  Also requested name and appointment with Jose Juan Blanc in July or August  Gave information

## 2022-06-14 NOTE — TELEPHONE ENCOUNTER
06/14/22    first attempt to contact patient  no answer  Left a detailed message  Appt needs to be reschedule, PCP wont be available  If pt contacts office, please assist pt with rescheduling appt      Appt: COPD

## 2022-06-16 NOTE — TELEPHONE ENCOUNTER
Previous attempts made, encounter from 5/31/22 with no success reaching patient, ;abdon has also been mailed to home, I will make 2 more attempts to patient before cancelling appt

## 2022-06-22 ENCOUNTER — PATIENT OUTREACH (OUTPATIENT)
Dept: OTHER | Facility: OTHER | Age: 57
End: 2022-06-22

## 2022-06-22 NOTE — PROGRESS NOTES
6/22/22: Client requested COVID antigen test be performed  Performed test: result negative  Client informed  Reviewed Infection prevention information

## 2022-07-13 ENCOUNTER — PATIENT OUTREACH (OUTPATIENT)
Dept: OTHER | Facility: OTHER | Age: 57
End: 2022-07-13

## 2022-07-13 NOTE — PROGRESS NOTES
Wednesday July 13, 2022    Encountered client at [facility] during outreach hours  Client states he knows he had a doctor's appointment soon but can't remember the date  [place] Nurse (PN) informed client appointment has been cancelled by provider  PN with client called Lori and new appointment made for Wednesday 8/3/22 at 13:40 with Dr Pena  Client shared he didn't receive cancellation call because he puts phone on" Do Not Disturb" when he is listening to music or sleeping  Client pleasant and verbalizes appreciation in this encounter

## 2022-07-20 ENCOUNTER — PATIENT OUTREACH (OUTPATIENT)
Dept: OTHER | Facility: OTHER | Age: 57
End: 2022-07-20

## 2022-07-20 NOTE — PROGRESS NOTES
7/20/22: Client requested COVID antigen test to be performed  Performed antigen test: result negative  Client informed  Reviewed Infection prevention information

## 2022-08-02 ENCOUNTER — PATIENT OUTREACH (OUTPATIENT)
Dept: OTHER | Facility: OTHER | Age: 57
End: 2022-08-02

## 2022-08-02 NOTE — PROGRESS NOTES
Tuesday August 2, 2022    Encountered client at [employer] during [employer] hours  Client shared that he had filled out his application for food stamps but never mailed it out and has now lost it  Client has an appointment with the PCP  [place] Nurse (PN)submitted a request for the [facility] to assist with submitting a new application through [employer]  Client pleasant in this encounter  Client verbalized appreciation for assistance  Emotional support given  PN will remain available for assistance and will follow-up

## 2022-08-02 NOTE — PROGRESS NOTES
Assessment/Plan:    1  Chronic obstructive pulmonary disease, unspecified COPD type ([facility] [address] )  Assessment & Plan:   Controlled  On Spiriva and Advair  Not in exacerbation  Continue management with inhalers and avoid triggers and encourage smoking sensation    Orders:  -     Spacer Device for Inhaler    2  Tobacco dependence syndrome  Assessment & Plan:  Discussed tobacco cessation  Discussed the effects of smoking on cardiovascular system, skin and lungs  Patient verbalized understanding and is ready to quit  Discussed tips for smoking cessation:  Set a quit date, Change environment (avoid tobacco exposure, avoid situations where you previously smoked), dispose of all cigarettes and ashtrays  Involve family, friends, and co-workers for support  Patient verbalized understanding  Orders:  -     CT lung screening program; Future; Expected date: 08/03/2022    3  Encounter for colorectal cancer screening  -     Ambulatory Referral to Social Work Care Management Program; Future    4  Encounter for screening  -     Ambulatory Referral to General Surgery; Future       Subjective:      Patient ID: Shyrl Ao is a 59 y o  male  Past medical history significant for tobacco abuse smoking 2 packs a day, COPD on Spiriva and Advair come in for follow-up visit  Patient has no complaints at this time though he states he does get winded from time to time walking from 1 area to another  Patient smokes tobacco smokes 2 packs a day and has no plans on quitting at this time as he states is  His stress coping mechanism  Patient denies any current shortness of breath, he denies any fevers, chills or systemic signs of infection        The following portions of the patient's history were reviewed and updated as appropriate: allergies, current medications, past family history, past medical history, past social history, past surgical history, and problem list     Review of Systems   Constitutional: Negative for fatigue  HENT: Negative for congestion, rhinorrhea and sore throat  Eyes: Negative for visual disturbance  Respiratory: Negative for cough, shortness of breath and wheezing  Cardiovascular: Negative for chest pain  Gastrointestinal: Negative for abdominal distention, abdominal pain, blood in stool, constipation, diarrhea, nausea and vomiting  Genitourinary: Negative for difficulty urinating and dysuria  Musculoskeletal: Negative for arthralgias  Skin: Negative for rash  Neurological: Negative for dizziness and headaches  Objective:      /70 (BP Location: Left arm, Patient Position: Sitting, Cuff Size: Adult)   Pulse 71   Temp 98 1 °F (36 7 °C) (Temporal)   Resp 18   Wt 59 4 kg (131 lb)   SpO2 96%   BMI 22 49 kg/m²          Physical Exam  Vitals reviewed  Constitutional:       General: He is not in acute distress  Appearance: Normal appearance  He is obese  He is not ill-appearing, toxic-appearing or diaphoretic  HENT:      Head: Normocephalic and atraumatic  Right Ear: Tympanic membrane, ear canal and external ear normal  There is no impacted cerumen  Left Ear: Tympanic membrane, ear canal and external ear normal  There is no impacted cerumen  Nose: Nose normal  No congestion  Mouth/Throat:      Mouth: Mucous membranes are moist       Pharynx: Oropharynx is clear  No oropharyngeal exudate or posterior oropharyngeal erythema  Eyes:      General: No scleral icterus  Right eye: No discharge  Left eye: No discharge  Extraocular Movements: Extraocular movements intact  Conjunctiva/sclera: Conjunctivae normal    Cardiovascular:      Rate and Rhythm: Normal rate and regular rhythm  Pulses: Normal pulses  Heart sounds: No murmur heard  Pulmonary:      Effort: Pulmonary effort is normal  No respiratory distress  Breath sounds: No wheezing        Comments: Decreased breath sounds in lower bases  Abdominal: General: Abdomen is flat  Bowel sounds are normal  There is no distension  Tenderness: There is no abdominal tenderness  Musculoskeletal:         General: Normal range of motion  Cervical back: Normal range of motion and neck supple  No rigidity or tenderness  Right lower leg: No edema  Left lower leg: No edema  Lymphadenopathy:      Cervical: No cervical adenopathy  Skin:     General: Skin is warm  Capillary Refill: Capillary refill takes less than 2 seconds  Neurological:      General: No focal deficit present  Mental Status: He is alert     Psychiatric:         Mood and Affect: Mood normal            Lisa Duval DO   Family Medicine PGY-1   8/3/2022 Yes

## 2022-08-03 ENCOUNTER — PATIENT OUTREACH (OUTPATIENT)
Dept: FAMILY MEDICINE CLINIC | Facility: CLINIC | Age: 57
End: 2022-08-03

## 2022-08-03 ENCOUNTER — OFFICE VISIT (OUTPATIENT)
Dept: FAMILY MEDICINE CLINIC | Facility: CLINIC | Age: 57
End: 2022-08-03

## 2022-08-03 VITALS
SYSTOLIC BLOOD PRESSURE: 150 MMHG | WEIGHT: 131 LBS | OXYGEN SATURATION: 96 % | BODY MASS INDEX: 22.49 KG/M2 | RESPIRATION RATE: 18 BRPM | HEART RATE: 71 BPM | TEMPERATURE: 98.1 F | DIASTOLIC BLOOD PRESSURE: 70 MMHG

## 2022-08-03 DIAGNOSIS — Z12.11 ENCOUNTER FOR COLORECTAL CANCER SCREENING: ICD-10-CM

## 2022-08-03 DIAGNOSIS — Z12.12 ENCOUNTER FOR COLORECTAL CANCER SCREENING: ICD-10-CM

## 2022-08-03 DIAGNOSIS — Z59.9 FINANCIAL DIFFICULTIES: Primary | ICD-10-CM

## 2022-08-03 DIAGNOSIS — J44.9 CHRONIC OBSTRUCTIVE PULMONARY DISEASE, UNSPECIFIED COPD TYPE (HCC): Primary | ICD-10-CM

## 2022-08-03 DIAGNOSIS — Z13.9 ENCOUNTER FOR SCREENING: ICD-10-CM

## 2022-08-03 DIAGNOSIS — F17.200 TOBACCO DEPENDENCE SYNDROME: ICD-10-CM

## 2022-08-03 PROCEDURE — 99213 OFFICE O/P EST LOW 20 MIN: CPT | Performed by: FAMILY MEDICINE

## 2022-08-03 SDOH — ECONOMIC STABILITY - INCOME SECURITY: PROBLEM RELATED TO HOUSING AND ECONOMIC CIRCUMSTANCES, UNSPECIFIED: Z59.9

## 2022-08-03 NOTE — ASSESSMENT & PLAN NOTE
Controlled  On Spiriva and Advair  Not in exacerbation    Continue management with inhalers and avoid triggers and encourage smoking sensation
Discussed tobacco cessation  Discussed the effects of smoking on cardiovascular system, skin and lungs  Patient verbalized understanding and is ready to quit  Discussed tips for smoking cessation:  Set a quit date, Change environment (avoid tobacco exposure, avoid situations where you previously smoked), dispose of all cigarettes and ashtrays  Involve family, friends, and co-workers for support  Patient verbalized understanding 
Yes
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
## 2022-08-04 ENCOUNTER — PATIENT OUTREACH (OUTPATIENT)
Dept: FAMILY MEDICINE CLINIC | Facility: CLINIC | Age: 57
End: 2022-08-04

## 2022-08-04 NOTE — PROGRESS NOTES
Outgoing Call / Yarelis Slipper:  8/4/2022    [facility] did call pt to discuss referral received from [address], stating that pt is interested in applying for SNAP benefits  Message left and letter sent  Next outreach is scheduled for 8/8/2022

## 2022-08-04 NOTE — LETTER
08/04/22    Dear David Sapp,    I am a [facility] with [address] [address]    I have made several attempts to call you by phone  It is important that you contact me back at [phone] so that I can assist with your care needs       Sincerely,         [employer], [facility]

## 2022-08-11 ENCOUNTER — PATIENT OUTREACH (OUTPATIENT)
Dept: FAMILY MEDICINE CLINIC | Facility: CLINIC | Age: 57
End: 2022-08-11

## 2022-08-11 NOTE — PROGRESS NOTES
Outgoing Call:  8/11/2022    [facility] did call pt to discuss referral received for interest in applying for SNAP benefits  Voice message left  Final outreach is scheduled for 8/15/2022

## 2022-08-15 ENCOUNTER — PATIENT OUTREACH (OUTPATIENT)
Dept: FAMILY MEDICINE CLINIC | Facility: CLINIC | Age: 57
End: 2022-08-15

## 2022-08-16 ENCOUNTER — PATIENT OUTREACH (OUTPATIENT)
Dept: FAMILY MEDICINE CLINIC | Facility: CLINIC | Age: 57
End: 2022-08-16

## 2022-08-16 ENCOUNTER — PATIENT OUTREACH (OUTPATIENT)
Dept: OTHER | Facility: OTHER | Age: 57
End: 2022-08-16

## 2022-08-16 NOTE — PROGRESS NOTES
2022    Encountered client at [facility] during [address] (PN) Outreach hours  Client requested to visit with PN  States he received the SNAP benefit application in the mail at  [employer] [place], Which he uses as his mailing address  He lives with his  girlfriend's son  He doesn't feel comfortable in getting his mail there  PN noted in Epic chart that AZALIA HURTADO has been unsuccessful in contacting client  Client did share that he often places his phone in Do Not Disturb mode  Client made aware of numerous attempts by [facility]  to reach him and referral is now closed  PN inquired if he would like referral reopened  He stated he will ask Shelia Hankins to assist    Client pleasant and verbalizes appreciation for visit  PN remains available and will continue to follow-up  Emotional support given

## 2022-08-16 NOTE — PROGRESS NOTES
AZALIA HURTADO received updated from [facility] that referral was closed due to lack of response from the patient  She had placed 2 calls and 2 letters to assist in applying for SNAP benefits  AZALIA HURTADO will close referral as this was the only goal identified by the patient  AZALIA HURTADO will remain available for future psychosocial support as needed

## 2022-09-27 ENCOUNTER — OFFICE VISIT (OUTPATIENT)
Dept: FAMILY MEDICINE CLINIC | Facility: CLINIC | Age: 57
End: 2022-09-27

## 2022-09-27 ENCOUNTER — APPOINTMENT (OUTPATIENT)
Dept: LAB | Facility: HOSPITAL | Age: 57
End: 2022-09-27
Payer: MEDICARE

## 2022-09-27 VITALS
TEMPERATURE: 97.8 F | SYSTOLIC BLOOD PRESSURE: 140 MMHG | DIASTOLIC BLOOD PRESSURE: 84 MMHG | HEART RATE: 69 BPM | OXYGEN SATURATION: 98 % | HEIGHT: 64 IN | RESPIRATION RATE: 18 BRPM | WEIGHT: 133.6 LBS | BODY MASS INDEX: 22.81 KG/M2

## 2022-09-27 DIAGNOSIS — N40.1 BENIGN PROSTATIC HYPERPLASIA WITH INCOMPLETE BLADDER EMPTYING: ICD-10-CM

## 2022-09-27 DIAGNOSIS — Z11.59 ENCOUNTER FOR HEPATITIS C SCREENING TEST FOR LOW RISK PATIENT: ICD-10-CM

## 2022-09-27 DIAGNOSIS — R30.0 DYSURIA: ICD-10-CM

## 2022-09-27 DIAGNOSIS — R30.0 DYSURIA: Primary | ICD-10-CM

## 2022-09-27 DIAGNOSIS — R39.14 BENIGN PROSTATIC HYPERPLASIA WITH INCOMPLETE BLADDER EMPTYING: ICD-10-CM

## 2022-09-27 DIAGNOSIS — Z11.4 ENCOUNTER FOR SCREENING FOR HIV: ICD-10-CM

## 2022-09-27 DIAGNOSIS — K59.00 CONSTIPATION, UNSPECIFIED CONSTIPATION TYPE: ICD-10-CM

## 2022-09-27 LAB
BACTERIA UR QL AUTO: NORMAL /HPF
BILIRUB UR QL STRIP: NEGATIVE
CLARITY UR: CLEAR
COLOR UR: ABNORMAL
GLUCOSE UR STRIP-MCNC: NEGATIVE MG/DL
HGB UR QL STRIP.AUTO: 25
KETONES UR STRIP-MCNC: NEGATIVE MG/DL
LEUKOCYTE ESTERASE UR QL STRIP: NEGATIVE
NITRITE UR QL STRIP: NEGATIVE
NON-SQ EPI CELLS URNS QL MICRO: NORMAL /HPF
PH UR STRIP.AUTO: 7 [PH]
PROT UR STRIP-MCNC: NEGATIVE MG/DL
RBC #/AREA URNS AUTO: NORMAL /HPF
RPR SER QL: NORMAL
SL AMB  POCT GLUCOSE, UA: NORMAL
SL AMB LEUKOCYTE ESTERASE,UA: NORMAL
SL AMB POCT BILIRUBIN,UA: NORMAL
SL AMB POCT BLOOD,UA: 250
SL AMB POCT CLARITY,UA: CLEAR
SL AMB POCT COLOR,UA: YELLOW
SL AMB POCT KETONES,UA: NORMAL
SL AMB POCT NITRITE,UA: NORMAL
SL AMB POCT PH,UA: 7
SL AMB POCT SPECIFIC GRAVITY,UA: 1.01
SL AMB POCT URINE PROTEIN: NORMAL
SL AMB POCT UROBILINOGEN: NORMAL
SP GR UR STRIP.AUTO: 1.01 (ref 1–1.04)
UROBILINOGEN UA: NEGATIVE MG/DL
WBC #/AREA URNS AUTO: NORMAL /HPF

## 2022-09-27 PROCEDURE — 81002 URINALYSIS NONAUTO W/O SCOPE: CPT | Performed by: NURSE PRACTITIONER

## 2022-09-27 PROCEDURE — 86803 HEPATITIS C AB TEST: CPT

## 2022-09-27 PROCEDURE — 87591 N.GONORRHOEAE DNA AMP PROB: CPT | Performed by: NURSE PRACTITIONER

## 2022-09-27 PROCEDURE — 86592 SYPHILIS TEST NON-TREP QUAL: CPT

## 2022-09-27 PROCEDURE — 87491 CHLMYD TRACH DNA AMP PROBE: CPT | Performed by: NURSE PRACTITIONER

## 2022-09-27 PROCEDURE — 81001 URINALYSIS AUTO W/SCOPE: CPT

## 2022-09-27 PROCEDURE — 87389 HIV-1 AG W/HIV-1&-2 AB AG IA: CPT

## 2022-09-27 PROCEDURE — 81003 URINALYSIS AUTO W/O SCOPE: CPT

## 2022-09-27 PROCEDURE — 36415 COLL VENOUS BLD VENIPUNCTURE: CPT

## 2022-09-27 PROCEDURE — 99214 OFFICE O/P EST MOD 30 MIN: CPT | Performed by: NURSE PRACTITIONER

## 2022-09-27 RX ORDER — DOCUSATE SODIUM 100 MG/1
100 CAPSULE, LIQUID FILLED ORAL 2 TIMES DAILY
Qty: 60 CAPSULE | Refills: 2 | Status: SHIPPED | OUTPATIENT
Start: 2022-09-27

## 2022-09-27 NOTE — PROGRESS NOTES
Assessment/Plan:     Mariajose Frank was seen today for possible uti  Diagnoses and all orders for this visit:    Dysuria  -     POCT urine dip  -     Chlamydia/GC amplified DNA by PCR  -     RPR; Future  -     UA w Reflex to Microscopic w Reflex to Culture -Lab Collect; Future    Benign prostatic hyperplasia with incomplete bladder emptying  -     Ambulatory Referral to Urology; Future    Constipation, unspecified constipation type  -     docusate sodium (COLACE) 100 mg capsule; Take 1 capsule (100 mg total) by mouth 2 (two) times a day    Encounter for screening for HIV  -     HIV 1/2 Antigen/Antibody (4th Generation) w Reflex [facility]; Future    Encounter for hepatitis C screening test for low risk patient  -     Hepatitis C antibody; Future      Discussed avoidance of bladder irritants, decreasing fluids 2-3 hours before bedtime  Sx likely 2/2 prostate enlargement, patient is taking Flomax daily w/o improvement in sx  Recommend STI testing, referral to urology and possible addition of finasteride to medication regimen  Will await lab results prior to initiating  Subjective:     Parviz Stanley is a 59 y o  male who  has a past medical history of Anxiety, Asthma, COPD (chronic obstructive pulmonary disease) ([facility] [address] [facility] Depression, GERD (gastroesophageal reflux disease), Hyperlipidemia, Hypertension, and [facility] ([facility] )  who presented to the office today for same day visit         The following portions of the patient's history were reviewed and updated as appropriate: allergies, current medications, past family history, past medical history, past social history, past surgical history and problem list     Current Outpatient Medications on File Prior to Visit   Medication Sig Dispense Refill    atorvastatin (LIPITOR) 10 mg tablet TAKE 1 TABLET (10 MG TOTAL) BY MOUTH DAILY 90 tablet 3    albuterol (PROVENTIL HFA,VENTOLIN HFA) 90 mcg/act inhaler INHALE 1 PUFF EVERY 6 (SIX) HOURS AS NEEDED FOR WHEEZING 8 5 g 0  ALPRAZolam (XANAX) 0 5 mg tablet Take 1 tablet (0 5 mg total) by mouth 2 (two) times a day 60 tablet 0    buPROPion (WELLBUTRIN SR) 150 mg 12 hr tablet TAKE 1 TABLET (150 MG TOTAL) BY MOUTH 2 (TWO) TIMES A DAY 60 tablet 2    cholecalciferol (VITAMIN D3) 1,000 units tablet Take 1 tablet (1,000 Units total) by mouth in the morning  56 tablet 0    escitalopram (LEXAPRO) 20 mg tablet Take 1 tablet (20 mg total) by mouth daily 30 tablet 1    famotidine (PEPCID) 20 mg tablet TAKE 1 TABLET (20 MG TOTAL) BY MOUTH 2 (TWO) TIMES A DAY 60 tablet 0    Fluticasone-Salmeterol (Advair Diskus) 500-50 mcg/dose inhaler Inhale 1 puff 2 (two) times a day Rinse mouth after use  60 blister 2    fluticasone-salmeterol (ADVAIR, WIXELA) 500-50 mcg/dose inhaler Inhale 1 puff 2 (two) times a day Rinse mouth after use  1 each 3    ibuprofen (MOTRIN) 600 mg tablet Take 1 tablet (600 mg total) by mouth every 6 (six) hours as needed for mild pain or moderate pain 60 tablet 0    loratadine (CLARITIN) 10 mg tablet Take 1 tablet (10 mg total) by mouth daily 30 tablet 1    pantoprazole (PROTONIX) 40 mg tablet Take 1 tablet (40 mg total) by mouth daily 60 tablet 3    QUEtiapine (SEROquel) 50 mg tablet Take 3 tablets (150 mg total) by mouth daily at bedtime 90 tablet 1    tamsulosin (FLOMAX) 0 4 mg TAKE 1 CAPSULE (0 4 MG TOTAL) BY MOUTH DAILY WITH DINNER 90 capsule 3    tiotropium (Spiriva HandiHaler) 18 mcg inhalation capsule Place 1 capsule (18 mcg total) into inhaler and inhale daily 30 capsule 0    traZODone (DESYREL) 50 mg tablet Take 0 5 tablets (25 mg total) by mouth daily at bedtime 15 tablet 1     No current facility-administered medications on file prior to visit  Review of Systems   Constitutional: Negative for chills and fever  HENT: Negative for ear pain and sore throat  Eyes: Negative for pain and visual disturbance  Respiratory: Negative for cough and shortness of breath      Cardiovascular: Negative for chest pain and palpitations  Gastrointestinal: Negative for abdominal pain and vomiting  Genitourinary: Positive for difficulty urinating and dysuria  Negative for hematuria  Musculoskeletal: Negative for arthralgias and back pain  Skin: Negative for color change and rash  Neurological: Negative for seizures and syncope  All other systems reviewed and are negative  Objective:    /84 (BP Location: Right arm, Patient Position: Sitting, Cuff Size: Standard)   Pulse 69   Temp 97 8 °F (36 6 °C) (Temporal)   Resp 18   Ht 5' 4" (1 626 m)   Wt 60 6 kg (133 lb 9 6 oz)   SpO2 98%   BMI 22 93 kg/m²     Physical Exam  Vitals and nursing note reviewed  Constitutional:       General: He is not in acute distress  Appearance: He is not toxic-appearing  HENT:      Head: Normocephalic and atraumatic  Cardiovascular:      Rate and Rhythm: Normal rate and regular rhythm  Pulmonary:      Effort: Pulmonary effort is normal  No respiratory distress  Breath sounds: Normal breath sounds  Abdominal:      General: There is no distension  Palpations: Abdomen is soft  Tenderness: There is no abdominal tenderness  There is no right CVA tenderness or left CVA tenderness  Neurological:      Mental Status: He is alert and oriented to person, place, and time     Psychiatric:         Behavior: Behavior normal          SISSY Cardozo  09/27/22  10:31 AM

## 2022-09-28 ENCOUNTER — PATIENT OUTREACH (OUTPATIENT)
Dept: OTHER | Facility: OTHER | Age: 57
End: 2022-09-28

## 2022-09-28 LAB
C TRACH DNA SPEC QL NAA+PROBE: NEGATIVE
HCV AB SER QL: NORMAL
HIV 1+2 AB+HIV1 P24 AG SERPL QL IA: NORMAL
N GONORRHOEA DNA SPEC QL NAA+PROBE: NEGATIVE

## 2022-09-28 NOTE — PROGRESS NOTES
Wednesday September 28, 2022    Encountered client during [facility] hours at [facility]  Client states he went to the doctor yesterday and was given a referral to a urologist  Client requests assistance in making appointment  Call placed to [facility] urology  Appointment made for Tuesday Oct  11 2:30 PM     Contacted [employer] - transportation arranged for Suzanne with pickup at 1:30 PM at his home [address]  Dropped off at [address], [address] [address] [facility] entrance  Instructed client to call [phone] for transportation back home  Client verbalized appreciation for assistance  Emotional support given

## 2022-10-11 ENCOUNTER — TELEPHONE (OUTPATIENT)
Dept: UROLOGY | Facility: CLINIC | Age: 57
End: 2022-10-11

## 2022-10-11 NOTE — TELEPHONE ENCOUNTER
Tried reaching patient in regards to his appointment today with Jb Sousa at [facility] at 2:30 needing to be canceled due to provider being out sick, patients mailbox is full and unable to leave a message

## 2022-10-12 ENCOUNTER — PATIENT OUTREACH (OUTPATIENT)
Dept: OTHER | Facility: OTHER | Age: 57
End: 2022-10-12

## 2022-10-12 NOTE — PROGRESS NOTES
10/12/22: Client requested COVID antigen test be performed  Performed test: result negative  Informed client  Reviewed infection control information  Also spoke with Mehran Garay about the rescheduled urology due to physician being sick  He will go next week  Also moved transportation to next week

## 2022-10-18 ENCOUNTER — PATIENT OUTREACH (OUTPATIENT)
Dept: OTHER | Facility: OTHER | Age: 57
End: 2022-10-18

## 2022-10-18 ENCOUNTER — OFFICE VISIT (OUTPATIENT)
Dept: UROLOGY | Facility: CLINIC | Age: 57
End: 2022-10-18
Payer: MEDICARE

## 2022-10-18 VITALS
DIASTOLIC BLOOD PRESSURE: 84 MMHG | HEIGHT: 66 IN | OXYGEN SATURATION: 98 % | SYSTOLIC BLOOD PRESSURE: 142 MMHG | BODY MASS INDEX: 21.53 KG/M2 | WEIGHT: 134 LBS | HEART RATE: 72 BPM

## 2022-10-18 DIAGNOSIS — R39.14 BENIGN PROSTATIC HYPERPLASIA WITH INCOMPLETE BLADDER EMPTYING: ICD-10-CM

## 2022-10-18 DIAGNOSIS — N40.1 BENIGN PROSTATIC HYPERPLASIA WITH INCOMPLETE BLADDER EMPTYING: ICD-10-CM

## 2022-10-18 DIAGNOSIS — N50.819 TESTICULAR DISCOMFORT: ICD-10-CM

## 2022-10-18 DIAGNOSIS — Z12.5 PROSTATE CANCER SCREENING: ICD-10-CM

## 2022-10-18 DIAGNOSIS — R30.0 DYSURIA: ICD-10-CM

## 2022-10-18 DIAGNOSIS — N40.1 BENIGN PROSTATIC HYPERPLASIA WITH LOWER URINARY TRACT SYMPTOMS, SYMPTOM DETAILS UNSPECIFIED: Primary | ICD-10-CM

## 2022-10-18 LAB
BACTERIA UR QL AUTO: ABNORMAL /HPF
BILIRUB UR QL STRIP: NEGATIVE
CLARITY UR: CLEAR
COLOR UR: YELLOW
GLUCOSE UR STRIP-MCNC: NEGATIVE MG/DL
HGB UR QL STRIP.AUTO: NEGATIVE
KETONES UR STRIP-MCNC: NEGATIVE MG/DL
LEUKOCYTE ESTERASE UR QL STRIP: NEGATIVE
MUCOUS THREADS UR QL AUTO: ABNORMAL
NITRITE UR QL STRIP: NEGATIVE
NON-SQ EPI CELLS URNS QL MICRO: ABNORMAL /HPF
PH UR STRIP.AUTO: 6 [PH]
POST-VOID RESIDUAL VOLUME, ML POC: 58 ML
PROT UR STRIP-MCNC: NEGATIVE MG/DL
RBC #/AREA URNS AUTO: ABNORMAL /HPF
SL AMB  POCT GLUCOSE, UA: ABNORMAL
SL AMB LEUKOCYTE ESTERASE,UA: ABNORMAL
SL AMB POCT BILIRUBIN,UA: ABNORMAL
SL AMB POCT BLOOD,UA: ABNORMAL
SL AMB POCT CLARITY,UA: CLEAR
SL AMB POCT COLOR,UA: YELLOW
SL AMB POCT KETONES,UA: ABNORMAL
SL AMB POCT NITRITE,UA: ABNORMAL
SL AMB POCT PH,UA: 5
SL AMB POCT SPECIFIC GRAVITY,UA: 1.01
SL AMB POCT URINE PROTEIN: ABNORMAL
SL AMB POCT UROBILINOGEN: 0.2
SP GR UR STRIP.AUTO: 1.02 (ref 1–1.03)
UROBILINOGEN UR STRIP-ACNC: <2 MG/DL
WBC #/AREA URNS AUTO: ABNORMAL /HPF

## 2022-10-18 PROCEDURE — 51798 US URINE CAPACITY MEASURE: CPT | Performed by: NURSE PRACTITIONER

## 2022-10-18 PROCEDURE — 81002 URINALYSIS NONAUTO W/O SCOPE: CPT | Performed by: NURSE PRACTITIONER

## 2022-10-18 PROCEDURE — 87086 URINE CULTURE/COLONY COUNT: CPT | Performed by: NURSE PRACTITIONER

## 2022-10-18 PROCEDURE — 81001 URINALYSIS AUTO W/SCOPE: CPT | Performed by: NURSE PRACTITIONER

## 2022-10-18 PROCEDURE — 99204 OFFICE O/P NEW MOD 45 MIN: CPT | Performed by: NURSE PRACTITIONER

## 2022-10-18 RX ORDER — CLONIDINE HYDROCHLORIDE 0.2 MG/1
0.2 TABLET ORAL 2 TIMES DAILY
COMMUNITY

## 2022-10-18 RX ORDER — HYDROXYZINE PAMOATE 50 MG/1
50 CAPSULE ORAL
COMMUNITY
Start: 2022-08-30

## 2022-10-18 RX ORDER — NALTREXONE HYDROCHLORIDE 50 MG/1
50 TABLET, FILM COATED ORAL DAILY
COMMUNITY
Start: 2022-09-01

## 2022-10-18 RX ORDER — CARIPRAZINE 1.5 MG/1
1.5 CAPSULE, GELATIN COATED ORAL
COMMUNITY
Start: 2022-09-26

## 2022-10-18 RX ORDER — CETIRIZINE HYDROCHLORIDE 10 MG/1
10 TABLET ORAL DAILY
COMMUNITY
Start: 2022-09-23

## 2022-10-18 NOTE — PROGRESS NOTES
10/18/22: Client requested assistance to make appointment for CT lung screening scan  Called [facility] scheduling  10/24/22 at [id number]      Also reported having 2 US scheduled for 1/16/23

## 2022-10-18 NOTE — ASSESSMENT & PLAN NOTE
· Continue flomax 0 4 mg daily  · Send urine micro and culture  · Avoid bladder irritants  · Increase water intake  · PVR 58 ml  · Schedule ultrasound kidney bladder  · Follow up 3 months for reheck

## 2022-10-18 NOTE — PATIENT INSTRUCTIONS
BEHAVIORAL THERAPY FOR IMPROVED BLADDER CONTROL      1  Urge Control Techniques       A  Stop whatever you are doing and concentrate on lore your pelvic floor muscles  B   Contract your pelvic floor muscles repetitively (as in your "flick" exercises)  C   Once the urgency has subsided, realize that sometimes the urgency is because you have a             full bladder and have to urinate  Other times the urgency is a false signal and you do not have a full bladder  D  If you have urgency triggered by running water, "key in the door," getting up from a sitting position, etc , contract your pelvic floor muscles prior to       initiating the activity  2   Daily Fluid Intake       A  Avoid drinking too little (less than 3 cups/day) or drinking too much (more than 6             cups/day)  B   Avoid caffeinated beverages  C   If voiding frequently at night, limit your liquid intake after 7 p m  3   Bowel Regularity--Constipation Makes Bladder Symptoms Worse       A  Drink enough liquids, eat plenty of high fiber food  B  Exercise       C  Avoid laxatives and enemas on a regular basis, this decreases the bowel's normal function  4   Voiding Schedules       A  Empty your bladder at 1½ to 2 hour intervals even if you may not have the urge to urinate             at that time  You may gradually increase the time between voidings to 30  minutes, until you can comfortably void every 3 hours  B  If you are voiding more frequently than every 1½ to 2 hours, resist the urge to go  by using urge control techniques (described in 1 )  BLADDER HEALTH    WHAT IS CONSIDERED NORMAL? The average bladder can hold about 2 cups of urine before it needs to be emptied  The normal range of voiding urine is 6 to 8 times during a 24 hour period   As we get older, our bladder capacity can get smaller and we may need to pass urine more frequently but usually not more than every 2 hours  Urine should flow easily without discomfort in a good, steady stream until the bladder is empty  No pushing or straining is necessary to empty the bladder  An urge is a signal that you feel as the bladder stretches to fill with urine  Urges can be felt even if the bladder is not full  Urges are not commands to go to the toilet, merely a signal and can be controlled  WHAT ARE GOOD BLADDER HABITS? Take your time when emptying your bladder  Don’t strain or push to empty your bladder  Make sure you empty your bladder completely each time you pass urine  Do not rush the process  Consistently ignoring the urge to go (waiting more than 4 hours between toileting) or urinating too infrequently may be convenient but not healthy for your bladder  Avoid going to the toilet “just in case” or more often than every 2 hours  It is usually not necessary to go when you feel the first urge  Try to go only when your bladder is full  Urgency and frequency of urination can be improved by retraining the bladder and spacing your fluid intake throughout the day  Practice good toilet habits  Don’t let your bladder control your life  TIPS TO MAINTAIN GOOD BLADDER HABITS  Maintain a good fluid intake  Depending on your body size and environment, drink 6 -8 cups (8 oz each) of fluid per day unless otherwise advised by your doctor  Not enough fluid creates a foul odor and dark color of the urine  Limit the amount of caffeine (coffee, cola, chocolate or tea) and citrus foods that you consume as these foods can be associated with increased sensation of urinary urgency and frequency  Limit the amount of alcohol you drink  Alcohol increases urine production and makes it difficult for the brain to coordinate bladder control  Avoid constipation by maintaining a balanced diet of dietary fiber  Cigarette smoking is also irritating to the bladder surface and is associated with bladder cancer    In addition, the coughing associated with smoking may lead to increased incontinent episodes because of the increased pressure  HOW DIET CAN AFFECT YOUR BLADDER  Although there is no particular "diet" that can cure bladder control, there are certain dietary suggestions you can use to help control the problem  There are 2 points to consider when evaluating how your habits and diet may affect your bladder:    Foods and fluids can irritate the bladder  Some foods and beverages are thought to contribute to bladder leakage and irritability  However their effect on the bladder is not completely understood and you may want to see if eliminating one or all of these items improves your bladder control  If you are unable to give them up completely, it is recommended that you use the following items in moderation:  Acidic beverages and foods (orange juice, grapefruit juice, lemonade etc)  Alcoholic beverages  Vinegar  Coffee (regular and decaf)  Tea (regular and decaf)  Caffeinated beverages  Carbonated beverages          Drinking enough and the right kinds of fluids  Many people with bladder control issues decrease their intake of liquids in hope that they will need to urinate less frequently or have less urinary leakage  You should not restrict fluids to control your bladder  While a decrease in liquid intake does result in a decrease in the volume of urine, the smaller amount of urine may be more highly concentrated  Highly concentrated, dark yellow urine is irritating to the bladder surface and may actually cause you to go to the bathroom more frequently  It also encourages the growth of bacteria, which may lead to infections resulting in incontinence  Substitutions for Bladder Irritants: water is always the best beverage choice  Grape and apple juice are thirst quenchers are good selections and are not as irritating to the bladder    Low acid fruits:  Pears, apricots, papaya, watermelon  For coffee drinkers: KAVA®, Postum®, Cameron®, Kaffree Lesvia®  For tea drinkers:  non-citrus or herbal and sun brewed tea

## 2022-10-18 NOTE — PROGRESS NOTES
Assessment and plan:     Prostate cancer screening  · PSA 1 3  · +family hx in uncle  · Follow up 1 year    BPH (benign prostatic hyperplasia)  · Continue flomax 0 4 mg daily  · Send urine micro and culture  · Avoid bladder irritants  · Increase water intake  · PVR 58 ml  · Schedule ultrasound kidney bladder  · Follow up 3 months for reheck    Dysuria  · Send urine micro and culture  · Increase water intake and avoid bladder irritants  · Schedule ultrasound kidney and bladder  · Recommend cystoscopy if urine testing negative  · Follow up 3 months for recheck    Testicular discomfort  · Schedule ultrasound scrotum and testicles  · Send urine microscopic and culture  · Follow-up 3 months          Georgina Cordova    History of Present Illness     Page Monahan III is a 59 y o  new patient who presents for urinary frequency, painful urination  He had recent new sexual partner  He drinks coffee "constantly", water, 2 cans of beer daily  When it is cold he has to urinate every 10-15 minutes  Denies anal intercourse  He thinks he urinates over 10 times a day  He does report constipation  He does not drink much water a day, maybe 1-2 glasses a day  He is also a smoker  Recent urine microscopic unremarkable  GC and chlamydia, HIV negative September 2022  His creatinine 0 96 with a GFR of 86  His PSA is low and stable at 1 3 May 2022  He denies prior history of kidney stones  He was previously seen by our practice last in 2019 for BPH and premature ejaculation  He was managed with Flomax 0 4 mg nightly, oxybutynin 10 mg daily and Paxil 20 mg 1 hour prior to intercourse  He is not taking oxybutynin      Laboratory     Lab Results   Component Value Date    BUN 15 05/09/2022    CREATININE 0 96 05/09/2022       No components found for: GFR    Lab Results   Component Value Date    GLUCOSE 93 08/17/2014    CALCIUM 9 0 05/09/2022     (L) 08/17/2014    K 4 2 05/09/2022    CO2 29 05/09/2022     05/09/2022 Lab Results   Component Value Date    WBC 5 76 05/09/2022    HGB 14 8 05/09/2022    HCT 45 4 05/09/2022    MCV 96 05/09/2022     05/09/2022       Lab Results   Component Value Date    PSA 1 3 05/09/2022    PSA 1 2 02/11/2020    PSA 0 9 03/14/2019       Recent Results (from the past 1 hour(s))   POCT urine dip    Collection Time: 10/18/22  8:20 AM   Result Value Ref Range    LEUKOCYTE ESTERASE,UA n     NITRITE,UA n     SL AMB POCT UROBILINOGEN 0 2     POCT URINE PROTEIN n      PH,UA 5 0     BLOOD,UA trace     SPECIFIC GRAVITY,UA 1 015     KETONES,UA n     BILIRUBIN,UA n     GLUCOSE, UA n      COLOR,UA yellow     CLARITY,UA clear    POCT Measure PVR    Collection Time: 10/18/22  8:28 AM   Result Value Ref Range    POST-VOID RESIDUAL VOLUME, ML POC 58 mL       @RESULT(URINEMICROSCOPIC)@    @RESULT(URINECULTURE)@    Radiology     CT ABDOMEN AND PELVIS WITH IV CONTRAST 02/11/2021     INDICATION:   right groin pain      COMPARISON:  4/5/2015     TECHNIQUE:  CT examination of the abdomen and pelvis was performed  Axial, sagittal, and coronal 2D reformatted images were created from the source data and submitted for interpretation      Radiation dose length product (DLP) for this visit:  316 mGy-cm   This examination, like all CT scans performed in the [facility], was performed utilizing techniques to minimize radiation dose exposure, including the use of iterative   reconstruction and automated exposure control      IV Contrast:  100 mL of iohexol (OMNIPAQUE)  Enteric Contrast:  Enteric contrast was not administered      FINDINGS:     ABDOMEN     LOWER CHEST:  Minimal density at the right base likely represents atelectasis  There is a small to moderate hiatal hernia      LIVER/BILIARY TREE:  Unremarkable      GALLBLADDER:  No calcified gallstones   No pericholecystic inflammatory change      SPLEEN:  Unremarkable      PANCREAS:  There is a 6 mm hypodensity along the anterior pancreas on series 3, image 39, possibly volume averaging though a small cyst is not excluded      ADRENAL GLANDS:  Unremarkable      KIDNEYS/URETERS:  Unremarkable  No hydronephrosis      STOMACH AND BOWEL:  Unremarkable      APPENDIX:  A normal appendix was visualized      ABDOMINOPELVIC CAVITY:  No ascites  No pneumoperitoneum  No lymphadenopathy      VESSELS:  Unremarkable for patient's age      PELVIS     REPRODUCTIVE ORGANS:  Unremarkable for patient's age      URINARY BLADDER:  There is circumferential bladder wall thickening      ABDOMINAL WALL/INGUINAL REGIONS:  Unremarkable      OSSEOUS STRUCTURES:  No acute fracture or destructive osseous lesion  There are old left-sided pelvic fractures      IMPRESSION:     1  No acute inflammatory process in the abdomen or pelvis      2   Circumferential bladder wall thickening  This is nonspecific and there is no surrounding stranding though correlation with urinalysis is recommended      3   Small to moderate hiatal hernia      4   Possible subcentimeter pancreatic cyst   A 6 month follow-up nonemergent MRI/MRCP is recommended      The study was marked in EPIC for immediate notification  Review of Systems     Review of Systems   Constitutional: Negative for activity change, appetite change, chills, fatigue, fever and unexpected weight change  HENT: Negative for facial swelling  Eyes: Negative for discharge  Respiratory: Negative  Negative for cough and shortness of breath  Cardiovascular: Negative for chest pain and leg swelling  Gastrointestinal: Negative  Negative for abdominal distention, abdominal pain, constipation, diarrhea, nausea and vomiting  Endocrine: Negative  Genitourinary: Positive for dysuria and frequency  Negative for decreased urine volume, difficulty urinating, enuresis, flank pain, genital sores, hematuria and urgency  Musculoskeletal: Negative for back pain and myalgias  Skin: Negative for pallor and rash     Allergic/Immunologic: Negative  Negative for immunocompromised state  Neurological: Negative for facial asymmetry and speech difficulty  Psychiatric/Behavioral: Negative for agitation and confusion  Allergies     No Known Allergies    Physical Exam     Physical Exam  Vitals reviewed  Constitutional:       General: He is not in acute distress  Appearance: Normal appearance  He is normal weight  He is not ill-appearing, toxic-appearing or diaphoretic  HENT:      Head: Normocephalic and atraumatic  Mouth/Throat:      Comments: Missing teeth  Eyes:      General: No scleral icterus  Cardiovascular:      Rate and Rhythm: Normal rate  Pulmonary:      Effort: Pulmonary effort is normal  No respiratory distress  Abdominal:      General: Abdomen is flat  There is no distension  Palpations: Abdomen is soft  Tenderness: There is no abdominal tenderness  There is no right CVA tenderness, left CVA tenderness, guarding or rebound  Genitourinary:     Penis: Circumcised  No hypospadias, erythema, tenderness, discharge, swelling or lesions  Testes:         Right: Tenderness present  Mass or swelling not present  Right testis is descended  Left: Tenderness present  Mass or swelling not present  Left testis is descended  Epididymis:      Right: Not inflamed  No tenderness  Left: Not inflamed  No tenderness  Comments: Prostate smooth nontender without appreciable nodules or indurations  Musculoskeletal:         General: No swelling  Cervical back: Normal range of motion  Right lower leg: No edema  Left lower leg: No edema  Skin:     General: Skin is warm and dry  Coloration: Skin is not jaundiced or pale  Findings: No rash  Neurological:      General: No focal deficit present  Mental Status: He is alert and oriented to person, place, and time        Gait: Gait normal    Psychiatric:         Mood and Affect: Mood normal          Behavior: Behavior normal  Thought Content:  Thought content normal          Judgment: Judgment normal          Vital Signs     Vitals:    10/18/22 0820   BP: 142/84   Pulse: 72   SpO2: 98%   Weight: 60 8 kg (134 lb)   Height: 5' 6" (1 676 m)       Current Medications       Current Outpatient Medications:   •  albuterol (PROVENTIL HFA,VENTOLIN HFA) 90 mcg/act inhaler, INHALE 1 PUFF EVERY 6 (SIX) HOURS AS NEEDED FOR WHEEZING, Disp: 8 5 g, Rfl: 0  •  atorvastatin (LIPITOR) 10 mg tablet, TAKE 1 TABLET (10 MG TOTAL) BY MOUTH DAILY, Disp: 90 tablet, Rfl: 3  •  buPROPion (WELLBUTRIN SR) 150 mg 12 hr tablet, TAKE 1 TABLET (150 MG TOTAL) BY MOUTH 2 (TWO) TIMES A DAY, Disp: 60 tablet, Rfl: 2  •  cetirizine (ZyrTEC) 10 mg tablet, Take 10 mg by mouth daily, Disp: , Rfl:   •  cloNIDine (CATAPRES) 0 2 mg tablet, Take 0 2 mg by mouth 2 (two) times a day, Disp: , Rfl:   •  Diclofenac Sodium (VOLTAREN) 1 %, APPLY 1 INCH TO EACH KNEE TWICE DAILY, Disp: , Rfl:   •  docusate sodium (COLACE) 100 mg capsule, Take 1 capsule (100 mg total) by mouth 2 (two) times a day, Disp: 60 capsule, Rfl: 2  •  escitalopram (LEXAPRO) 20 mg tablet, Take 1 tablet (20 mg total) by mouth daily, Disp: 30 tablet, Rfl: 1  •  famotidine (PEPCID) 20 mg tablet, TAKE 1 TABLET (20 MG TOTAL) BY MOUTH 2 (TWO) TIMES A DAY, Disp: 60 tablet, Rfl: 0  •  Fluticasone-Salmeterol (Advair Diskus) 500-50 mcg/dose inhaler, Inhale 1 puff 2 (two) times a day Rinse mouth after use , Disp: 60 blister, Rfl: 2  •  fluticasone-salmeterol (ADVAIR, WIXELA) 500-50 mcg/dose inhaler, Inhale 1 puff 2 (two) times a day Rinse mouth after use , Disp: 1 each, Rfl: 3  •  hydrOXYzine pamoate (VISTARIL) 50 mg capsule, Take 50 mg by mouth daily at bedtime, Disp: , Rfl:   •  ibuprofen (MOTRIN) 600 mg tablet, Take 1 tablet (600 mg total) by mouth every 6 (six) hours as needed for mild pain or moderate pain, Disp: 60 tablet, Rfl: 0  •  loratadine (CLARITIN) 10 mg tablet, Take 1 tablet (10 mg total) by mouth daily, Disp: 30 tablet, Rfl: 1  •  naltrexone (REVIA) 50 mg tablet, Take 50 mg by mouth daily, Disp: , Rfl:   •  pantoprazole (PROTONIX) 40 mg tablet, Take 1 tablet (40 mg total) by mouth daily, Disp: 60 tablet, Rfl: 3  •  QUEtiapine (SEROquel) 50 mg tablet, Take 3 tablets (150 mg total) by mouth daily at bedtime, Disp: 90 tablet, Rfl: 1  •  tamsulosin (FLOMAX) 0 4 mg, TAKE 1 CAPSULE (0 4 MG TOTAL) BY MOUTH DAILY WITH DINNER, Disp: 90 capsule, Rfl: 3  •  tiotropium (Spiriva HandiHaler) 18 mcg inhalation capsule, Place 1 capsule (18 mcg total) into inhaler and inhale daily, Disp: 30 capsule, Rfl: 0  •  traZODone (DESYREL) 50 mg tablet, Take 0 5 tablets (25 mg total) by mouth daily at bedtime, Disp: 15 tablet, Rfl: 1  •  Vraylar 1 5 MG capsule, Take 1 5 mg by mouth daily at bedtime, Disp: , Rfl:   •  ALPRAZolam (XANAX) 0 5 mg tablet, Take 1 tablet (0 5 mg total) by mouth 2 (two) times a day, Disp: 60 tablet, Rfl: 0  •  cholecalciferol (VITAMIN D3) 1,000 units tablet, Take 1 tablet (1,000 Units total) by mouth in the morning , Disp: 56 tablet, Rfl: 0    Active Problems     Patient Active Problem List   Diagnosis   • Anxiety   • Major depression   • Mixed hyperlipidemia   • Tobacco dependence syndrome   • COPD (chronic obstructive pulmonary disease) ([facility])   • BPH (benign prostatic hyperplasia)   • Postnasal drip   • Gastritis without bleeding   • Seborrheic dermatitis   • Gastroesophageal reflux disease without esophagitis   • OA (osteoarthritis) of neck   • Other constipation   • Hiatal hernia   • Chronic pain of both knees   • Screen for STD (sexually transmitted disease)   • Prostate cancer screening   • Premature ejaculation   • Allergic rhinitis   • Dysuria   • Testicular discomfort       Past Medical History     Past Medical History:   Diagnosis Date   • Anxiety    • Asthma    • COPD (chronic obstructive pulmonary disease) (HCC)    • Depression    • GERD (gastroesophageal reflux disease)    • Hyperlipidemia    • Hypertension    • Suicide attempt [facility])        Surgical History     Past Surgical History:   Procedure Laterality Date   • ESOPHAGOGASTRODUODENOSCOPY N/A 3/1/2019    Procedure: ESOPHAGOGASTRODUODENOSCOPY (EGD); Surgeon: Ginette Rm MD;  Location: [facility]; Service: Gastroenterology   • FRACTURE SURGERY      ORIF   • HERNIA REPAIR     • SKIN GRAFT         Family History     Family History   Problem Relation Age of Onset   • Prostate cancer Paternal Uncle        Social History     Social History     Social History     Tobacco Use   Smoking Status Heavy Tobacco Smoker   • Packs/day: 2 00   • Years: 20 00   • Pack years: 40 00   • Types: Cigarettes   Smokeless Tobacco Never Used       Past Surgical History:   Procedure Laterality Date   • ESOPHAGOGASTRODUODENOSCOPY N/A 3/1/2019    Procedure: ESOPHAGOGASTRODUODENOSCOPY (EGD); Surgeon: Ginette Rm MD;  Location: [facility]; Service: Gastroenterology   • FRACTURE SURGERY      ORIF   • HERNIA REPAIR     • SKIN GRAFT           The following portions of the patient's history were reviewed and updated as appropriate: allergies, current medications, past family history, past medical history, past social history, past surgical history and problem list    Please note :  Voice dictation software has been used to create this document  There may be inadvertent transcription errors      [id number] [address]

## 2022-10-18 NOTE — ASSESSMENT & PLAN NOTE
· Send urine micro and culture  · Increase water intake and avoid bladder irritants  · Schedule ultrasound kidney and bladder  · Recommend cystoscopy if urine testing negative  · Follow up 3 months for recheck

## 2022-10-18 NOTE — ASSESSMENT & PLAN NOTE
· Schedule ultrasound scrotum and testicles  · Send urine microscopic and culture  · Follow-up 3 months

## 2022-10-19 DIAGNOSIS — J44.9 CHRONIC OBSTRUCTIVE PULMONARY DISEASE, UNSPECIFIED COPD TYPE (HCC): Primary | ICD-10-CM

## 2022-10-19 LAB — BACTERIA UR CULT: NORMAL

## 2022-10-19 RX ORDER — FLUTICASONE PROPIONATE AND SALMETEROL 50; 500 UG/1; UG/1
POWDER RESPIRATORY (INHALATION)
Qty: 60 BLISTER | Refills: 3 | Status: SHIPPED | OUTPATIENT
Start: 2022-10-19

## 2022-10-24 ENCOUNTER — HOSPITAL ENCOUNTER (OUTPATIENT)
Dept: CT IMAGING | Facility: HOSPITAL | Age: 57
Discharge: HOME | End: 2022-10-24
Payer: MEDICARE

## 2022-10-24 DIAGNOSIS — F17.200 TOBACCO DEPENDENCE SYNDROME: ICD-10-CM

## 2022-10-24 PROCEDURE — 71271 CT THORAX LUNG CANCER SCR C-: CPT

## 2022-10-25 ENCOUNTER — PATIENT OUTREACH (OUTPATIENT)
Dept: OTHER | Facility: OTHER | Age: 57
End: 2022-10-25

## 2022-10-25 NOTE — PROGRESS NOTES
10/25/22: Client reported he had COVID booster yesterday with resulting muscle spasm in right upper dorsal thigh about 6-7 hours after injection  Stated it is slightly better so is walking slowly  Had lung CT scan yesterday  To get results from PCP  Has urology appointments: 2 US's scheduled from 1/16/23 then a follow-up with the urologist on 1/23/23

## 2022-11-02 ENCOUNTER — PATIENT OUTREACH (OUTPATIENT)
Dept: OTHER | Facility: OTHER | Age: 57
End: 2022-11-02

## 2022-11-02 DIAGNOSIS — K59.00 CONSTIPATION, UNSPECIFIED CONSTIPATION TYPE: ICD-10-CM

## 2022-11-03 DIAGNOSIS — K21.9 GASTROESOPHAGEAL REFLUX DISEASE WITHOUT ESOPHAGITIS: ICD-10-CM

## 2022-11-03 RX ORDER — PANTOPRAZOLE SODIUM 40 MG/1
40 TABLET, DELAYED RELEASE ORAL DAILY
Qty: 60 TABLET | Refills: 3 | Status: SHIPPED | OUTPATIENT
Start: 2022-11-03

## 2022-11-03 RX ORDER — DOCUSATE SODIUM 100 MG/1
100 CAPSULE, LIQUID FILLED ORAL 2 TIMES DAILY
Qty: 60 CAPSULE | Refills: 2 | Status: SHIPPED | OUTPATIENT
Start: 2022-11-03

## 2022-11-03 NOTE — PROGRESS NOTES
11/2/22: Client asked several questions including the results of the lung screening which were in the letter sent to him  When told they were negative, he became very happy  Also asked to have the [address] [place] [address] and follow up appointments with his urologist reviewed which was also completed  Finally, he asked about his SNAP benefits that were applied for in August  He stated he has not received anything from that time  I said I will pursue this information and get back to him

## 2022-11-08 ENCOUNTER — PATIENT OUTREACH (OUTPATIENT)
Dept: FAMILY MEDICINE CLINIC | Facility: CLINIC | Age: 57
End: 2022-11-08

## 2022-11-08 NOTE — PROGRESS NOTES
AZALIA HURTADO had received message from Cori Garcia, RN with [address] [facility] [address] regarding recently seeing the patient  The patient was wanting to know the status of his SNAP benefits  AZALIA HURTADO had met with patient in August and at that time patient declined phone number to apply via phone for [facility] benefits  He had wanted assistance applying online  Maribell Carrillo had made multiple attempts to contact patient regarding same but patient never returned calls  AZALIA HURTADO did place 2 consecutive calls to the patient, Hugo Davidson to follow up  AZALIA HURTADO was unable to leave a voice message as voice box was full  AZALIA HURTADO noted in chart that he does not have appt at [address] until May 2023  SW will remain available for future psychosocial support as needed

## 2022-11-24 DIAGNOSIS — Z09 ENCOUNTER FOR FOLLOW-UP: ICD-10-CM

## 2022-11-24 RX ORDER — TAMSULOSIN HYDROCHLORIDE 0.4 MG/1
0.4 CAPSULE ORAL
Qty: 90 CAPSULE | Refills: 3 | Status: SHIPPED | OUTPATIENT
Start: 2022-11-24

## 2022-11-27 DIAGNOSIS — J44.1 COPD EXACERBATION (HCC): Primary | ICD-10-CM

## 2022-11-29 RX ORDER — CETIRIZINE HYDROCHLORIDE 10 MG/1
TABLET ORAL
Qty: 90 TABLET | Refills: 5 | Status: SHIPPED | OUTPATIENT
Start: 2022-11-29

## 2022-12-10 DIAGNOSIS — J44.9 CHRONIC OBSTRUCTIVE PULMONARY DISEASE, UNSPECIFIED COPD TYPE (HCC): ICD-10-CM

## 2022-12-13 RX ORDER — ALBUTEROL SULFATE 90 UG/1
1 AEROSOL, METERED RESPIRATORY (INHALATION) EVERY 6 HOURS PRN
Qty: 17 G | Refills: 10 | Status: SHIPPED | OUTPATIENT
Start: 2022-12-13

## 2022-12-14 ENCOUNTER — PATIENT OUTREACH (OUTPATIENT)
Dept: OTHER | Facility: OTHER | Age: 57
End: 2022-12-14

## 2022-12-14 DIAGNOSIS — K21.9 GASTROESOPHAGEAL REFLUX DISEASE WITHOUT ESOPHAGITIS: ICD-10-CM

## 2022-12-14 NOTE — PROGRESS NOTES
12/14/22: I leadership requested client have COVID antigen test following exposure on Sunday  Client consented to having test performed  Performed test  Result negative  Client informed  Reviewed infection prevention information

## 2022-12-15 RX ORDER — FAMOTIDINE 20 MG/1
20 TABLET, FILM COATED ORAL 2 TIMES DAILY
Qty: 60 TABLET | Refills: 0 | Status: SHIPPED | OUTPATIENT
Start: 2022-12-15

## 2022-12-21 ENCOUNTER — PATIENT OUTREACH (OUTPATIENT)
Dept: OTHER | Facility: OTHER | Age: 57
End: 2022-12-21

## 2022-12-21 NOTE — PROGRESS NOTES
12/21/22: Client requested COVID antigen testing  Denied symptoms  Was exposed recently to GF who had COVID  Performed test  Result: negative  Informed client  Reviewed infection prevention information

## 2023-01-04 ENCOUNTER — PATIENT OUTREACH (OUTPATIENT)
Dept: OTHER | Facility: OTHER | Age: 58
End: 2023-01-04

## 2023-01-04 NOTE — PROGRESS NOTES
1/4/23: PCP appointment on 5/8/23 at 11 AM    Also asked to make dental appointment at [facility] - he was placed on wait-list for summer  He will be called in July 2023   Confirmed his phone number with the

## 2023-01-09 DIAGNOSIS — K21.9 GASTROESOPHAGEAL REFLUX DISEASE WITHOUT ESOPHAGITIS: ICD-10-CM

## 2023-01-10 ENCOUNTER — PATIENT OUTREACH (OUTPATIENT)
Dept: OTHER | Facility: OTHER | Age: 58
End: 2023-01-10

## 2023-01-10 NOTE — PROGRESS NOTES
1/10/23: Client came to confirm dental appointment at [facility]  Stated he thinks it is 1/27 at 1:40  However, the appointment is for his PCP  I called dental who said there is a cancellation tomorrow at  11: 30 AM  He is able to make this appointment  Gave him card with appointment noted

## 2023-01-11 ENCOUNTER — OFFICE VISIT (OUTPATIENT)
Dept: DENTISTRY | Facility: CLINIC | Age: 58
End: 2023-01-11

## 2023-01-11 ENCOUNTER — PATIENT OUTREACH (OUTPATIENT)
Dept: OTHER | Facility: OTHER | Age: 58
End: 2023-01-11

## 2023-01-11 VITALS — HEART RATE: 79 BPM | TEMPERATURE: 98 F | SYSTOLIC BLOOD PRESSURE: 112 MMHG | DIASTOLIC BLOOD PRESSURE: 68 MMHG

## 2023-01-11 DIAGNOSIS — K08.89 NON-RESTORABLE TOOTH: Primary | ICD-10-CM

## 2023-01-11 RX ORDER — AMOXICILLIN 500 MG/1
500 CAPSULE ORAL EVERY 8 HOURS SCHEDULED
Qty: 21 CAPSULE | Refills: 0 | Status: SHIPPED | OUTPATIENT
Start: 2023-01-11 | End: 2023-01-18

## 2023-01-11 NOTE — PROGRESS NOTES
Comprehensive Exam    Hyla Record III presents for a comprehensive exam  Verbal consent for treatment given in addition to the forms  Reviewed health history - Patient is ASA Class II  Consents signed: Yes  EXAM DR TOM    Perio: Generalized  Pain Scale: 0  Caries Assessment: high  Radiographs:Panorex, Selective PA  #20-22 and # 27-29    Non restorable teeth  PAP noted   Advanced bone loss and decay    Treatment Plan:  1   Referred to [facility] for extraction of remaining lower 6 teeth and to have frenum repositioned for  futuremaxillary CUD  Dr Senaida Closs prescribed antibiotics for infection    Prognosis is fair  Referrals needed:[facility]  Next visit:   CUD and eval for Lower CD after extractons

## 2023-01-11 NOTE — PROGRESS NOTES
1/11/23: Client reported that he needs to have all 6 remaining teeth that are in his mouth pulled  Was given a script for antibiotics and a list of phone numbers to call for surgery  [facility] which is [facility] his [facility]  He stated he will  the antibiotic tomorrow (instructed to take all to eliminate the infection) and call the number to schedule the appointment  Will follow up

## 2023-01-12 RX ORDER — FAMOTIDINE 20 MG/1
20 TABLET, FILM COATED ORAL 2 TIMES DAILY
Qty: 60 TABLET | Refills: 0 | Status: SHIPPED | OUTPATIENT
Start: 2023-01-12

## 2023-01-15 DIAGNOSIS — K59.00 CONSTIPATION, UNSPECIFIED CONSTIPATION TYPE: ICD-10-CM

## 2023-01-16 ENCOUNTER — HOSPITAL ENCOUNTER (OUTPATIENT)
Dept: ULTRASOUND IMAGING | Facility: HOSPITAL | Age: 58
Discharge: HOME | End: 2023-01-16

## 2023-01-16 DIAGNOSIS — N50.819 TESTICULAR DISCOMFORT: ICD-10-CM

## 2023-01-16 DIAGNOSIS — N40.1 BENIGN PROSTATIC HYPERPLASIA WITH LOWER URINARY TRACT SYMPTOMS, SYMPTOM DETAILS UNSPECIFIED: ICD-10-CM

## 2023-01-16 RX ORDER — DOCUSATE SODIUM 100 MG/1
100 CAPSULE, LIQUID FILLED ORAL 2 TIMES DAILY
Qty: 60 CAPSULE | Refills: 2 | OUTPATIENT
Start: 2023-01-16

## 2023-01-17 ENCOUNTER — PATIENT OUTREACH (OUTPATIENT)
Dept: OTHER | Facility: OTHER | Age: 58
End: 2023-01-17

## 2023-01-17 NOTE — PROGRESS NOTES
1/17/23: requested confirmation of his follow up of urology appointment: 1/24/23 at [facility]  Also has follow up with oral surgeon on [address] [facility] on 1/28 at 2:50  Said they are pulling all his teeth  They will make dentures for him

## 2023-01-24 ENCOUNTER — PATIENT OUTREACH (OUTPATIENT)
Dept: OTHER | Facility: OTHER | Age: 58
End: 2023-01-24

## 2023-01-24 ENCOUNTER — OFFICE VISIT (OUTPATIENT)
Dept: UROLOGY | Facility: CLINIC | Age: 58
End: 2023-01-24

## 2023-01-24 VITALS
WEIGHT: 139.8 LBS | DIASTOLIC BLOOD PRESSURE: 84 MMHG | SYSTOLIC BLOOD PRESSURE: 134 MMHG | HEIGHT: 66 IN | BODY MASS INDEX: 22.47 KG/M2 | OXYGEN SATURATION: 99 % | HEART RATE: 59 BPM

## 2023-01-24 DIAGNOSIS — Z12.5 PROSTATE CANCER SCREENING: ICD-10-CM

## 2023-01-24 DIAGNOSIS — N40.1 BENIGN PROSTATIC HYPERPLASIA WITH LOWER URINARY TRACT SYMPTOMS, SYMPTOM DETAILS UNSPECIFIED: ICD-10-CM

## 2023-01-24 DIAGNOSIS — R30.0 DYSURIA: Primary | ICD-10-CM

## 2023-01-24 DIAGNOSIS — N50.819 TESTICULAR DISCOMFORT: ICD-10-CM

## 2023-01-24 NOTE — ASSESSMENT & PLAN NOTE
• Continue flomax 0 4 mg daily  • urine micro and culture unremarkable  • Avoid bladder irritants  • Increase water intake  • ultrasound kidney bladder unremarkable  • AUA 24

## 2023-01-24 NOTE — ASSESSMENT & PLAN NOTE
· Ultrasound of scrotum and testicles unremarkable  · Urine testing unremarkable  · Scrotal support and elevation  · Ice and NSAIDs for comfort  · Follow-up as needed

## 2023-01-24 NOTE — PROGRESS NOTES
1/24/23: Edgardo Norton reviewed his results: no kidney stones but slight enlargement of prostate  Continues to have pain on urination  Will have bladder test "witH camera" on 1/16  Will follow

## 2023-01-24 NOTE — PROGRESS NOTES
Assessment and plan:     BPH (benign prostatic hyperplasia)  • Continue flomax 0 4 mg daily  • urine micro and culture unremarkable  • Avoid bladder irritants  • Increase water intake  • ultrasound kidney bladder unremarkable  • AUA 24    Dysuria  · Urine testing unremarkable  · Ultrasound kidney and bladder unremarkable  · Schedule cystoscopy for ongoing dysuria    Prostate cancer screening  • PSA 1 3  • +family hx in uncle  • Follow up 1 year    Testicular discomfort  · Ultrasound of scrotum and testicles unremarkable  · Urine testing unremarkable  · Scrotal support and elevation  · Ice and NSAIDs for comfort  · Follow-up as needed    SISSY Chavez    History of Present Illness     Amelia Messer III is a 59 y o   male who presents for follow up of urinary frequency, painful urination  He had recent new sexual partner  He drinks coffee "constantly", water, 2 cans of beer daily  When it is cold he has to urinate every 10-15 minutes  Denies anal intercourse  He thinks he urinates over 10 times a day  He does report constipation  He does not drink much water a day, maybe 1-2 glasses a day  He is also a smoker  He has ongoing dysuria with normal micro and culture      Recent urine microscopic unremarkable  GC and chlamydia, HIV negative September 2022  His creatinine 0 96 with a GFR of 86  His PSA is low and stable at 1 3 May 2022  He denies prior history of kidney stones      He was previously seen by our practice last in 2019 for BPH and premature ejaculation  He was managed with Flomax 0 4 mg nightly, oxybutynin 10 mg daily and Paxil 20 mg 1 hour prior to intercourse  He is not taking oxybutynin      Laboratory     Lab Results   Component Value Date    BUN 15 05/09/2022    CREATININE 0 96 05/09/2022       No components found for: GFR    Lab Results   Component Value Date    GLUCOSE 93 08/17/2014    CALCIUM 9 0 05/09/2022     (L) 08/17/2014    K 4 2 05/09/2022    CO2 29 05/09/2022     05/09/2022       Lab Results   Component Value Date    WBC 5 76 05/09/2022    HGB 14 8 05/09/2022    HCT 45 4 05/09/2022    MCV 96 05/09/2022     05/09/2022       Lab Results   Component Value Date    PSA 1 3 05/09/2022    PSA 1 2 02/11/2020    PSA 0 9 03/14/2019       No results found for this or any previous visit (from the past 1 hour(s))  @RESULT(URINEMICROSCOPIC)@    @RESULT(URINECULTURE)@    Radiology   SCROTAL ULTRASOUND 1/16/2023     INDICATION:    N50 819: Testicular pain, unspecified      COMPARISON: None     TECHNIQUE:   Ultrasound the scrotal contents was performed with a high frequency linear transducer utilizing volumetric sweep imaging as well as standard still image techniques  Imaging performed in longitudinal and transverse orientation  Color and   spectral Doppler evaluation also performed bilaterally      FINDINGS:     TESTES:   RIGHT testis = 4 2 x 1 8 x 2 4 cm  Volume 9 5 mL  Normal contour with homogeneous smooth echotexture  No intratesticular mass lesion or calcifications      LEFT testis = 3 8 x 1 5 x 2 2 cm  Volume 6 6 mL  Normal contour with homogeneous smooth echotexture  No intratesticular mass lesion or calcifications  Mildly dilated rete testis      Doppler flow within both testes is present and symmetric      EPIDIDYMIDES:   Normal Size  Doppler ultrasound demonstrates normal blood flow  0 4 cm cyst right epididymal tail and 0 8 cm cyst left epididymal head      HYDROCELE:  No significant fluid present      VARICOCELE:  Left varicocele with vessels measuring 0 4 cm diameter      SCROTUM:  Scrotal thickness and appearance within normal limits  No evidence for extratesticular mass or hernia demonstrated      IMPRESSION:     Asymmetrically larger right testis without evidence of intratesticular mass      Left varicocele       RENAL ULTRASOUND 1/16/2023     INDICATION:   N40 1: Benign prostatic hyperplasia with lower urinary tract symptoms      COMPARISON: Prior ultrasound study dated 2/7/2019      TECHNIQUE:   Ultrasound of the retroperitoneum was performed with a curvilinear transducer utilizing volumetric sweeps and still imaging techniques       FINDINGS:     KIDNEYS:  Symmetric and normal size  Right kidney:  11 0 x 4 1 x 4 4 cm  Volume 105 0 mL  Left kidney:  10 7 x 4 5 x 4 4 cm  Volume 110 4 mL     Right kidney  Normal echogenicity and contour  No mass is identified  No hydronephrosis  No shadowing calculi  No perinephric fluid collections      Left kidney  Normal echogenicity and contour  There is a 1 1 x 1 7 x 1 3 cm cyst seen in the interpolar region  No hydronephrosis  No shadowing calculi  No perinephric fluid collections      URETERS:  Nonvisualized      BLADDER:   Not well distended  No focal thickening or mass lesions  A left ureteral jet identified  A right ureteral jet could not be visualized during the examination  The prostate measures 3 5 x 4 6 x 3 9 cm for a volume of 36 2 mL         IMPRESSION:     No hydronephrosis  Left renal cyst      Review of Systems     Review of Systems   Constitutional: Negative for activity change, appetite change, chills, fatigue, fever and unexpected weight change  HENT: Negative for facial swelling  Eyes: Negative for discharge  Respiratory: Positive for cough  Negative for shortness of breath  Cardiovascular: Negative for chest pain and leg swelling  Gastrointestinal: Negative  Negative for abdominal distention, abdominal pain, constipation, diarrhea, nausea and vomiting  Endocrine: Negative  Genitourinary: Positive for dysuria and frequency  Negative for decreased urine volume, difficulty urinating, enuresis, flank pain, genital sores, hematuria and urgency  Post void dribbling, intermittent stream, nocturia x1 and strains to urinate   Musculoskeletal: Negative for back pain and myalgias  Skin: Negative for pallor and rash  Allergic/Immunologic: Negative    Negative for immunocompromised state    Neurological: Negative for facial asymmetry and speech difficulty  Psychiatric/Behavioral: Negative for agitation and confusion  AUA SYMPTOM SCORE    Flowsheet Row Most Recent Value   AUA SYMPTOM SCORE    How often have you had a sensation of not emptying your bladder completely after you finished urinating? 3   How often have you had to urinate again less than two hours after you finished urinating? 3   How often have you found you stopped and started again several times when you urinate? 4   How often have you found it difficult to postpone urination? 4   How often have you had a weak urinary stream? 4   How often have you had to push or strain to begin urination? 4   How many times did you most typically get up to urinate from the time you went to bed at night until the time you got up in the morning?  2   Quality of Life: If you were to spend the rest of your life with your urinary condition just the way it is now, how would you feel about that? 3   AUA SYMPTOM SCORE 24                Allergies     No Known Allergies    Physical Exam     Physical Exam    Vital Signs     Vitals:    01/24/23 0944   BP: 134/84   BP Location: Left arm   Patient Position: Sitting   Cuff Size: Adult   Pulse: 59   SpO2: 99%   Weight: 63 4 kg (139 lb 12 8 oz)   Height: 5' 6" (1 676 m)       Current Medications       Current Outpatient Medications:   •  Advair Diskus 500-50 MCG/ACT inhaler, INHALE 1 PUFF 2 (TWO) TIMES A DAY RINSE MOUTH AFTER USE , Disp: 60 blister, Rfl: 3  •  atorvastatin (LIPITOR) 10 mg tablet, TAKE 1 TABLET (10 MG TOTAL) BY MOUTH DAILY, Disp: 90 tablet, Rfl: 3  •  buPROPion (WELLBUTRIN SR) 150 mg 12 hr tablet, TAKE 1 TABLET (150 MG TOTAL) BY MOUTH 2 (TWO) TIMES A DAY, Disp: 60 tablet, Rfl: 2  •  cetirizine (ZyrTEC) 10 mg tablet, TAKE 1 TABLET (10 MG TOTAL) BY MOUTH DAILY, Disp: 90 tablet, Rfl: 5  •  cholecalciferol (VITAMIN D3) 1,000 units tablet, Take 1 tablet (1,000 Units total) by mouth in the morning , Disp: 56 tablet, Rfl: 0  •  cloNIDine (CATAPRES) 0 2 mg tablet, Take 0 2 mg by mouth 2 (two) times a day, Disp: , Rfl:   •  docusate sodium (COLACE) 100 mg capsule, TAKE 1 CAPSULE (100 MG TOTAL) BY MOUTH 2 (TWO) TIMES A DAY, Disp: 60 capsule, Rfl: 2  •  escitalopram (LEXAPRO) 20 mg tablet, Take 1 tablet (20 mg total) by mouth daily, Disp: 30 tablet, Rfl: 1  •  famotidine (PEPCID) 20 mg tablet, TAKE 1 TABLET (20 MG TOTAL) BY MOUTH 2 (TWO) TIMES A DAY, Disp: 60 tablet, Rfl: 0  •  Fluticasone-Salmeterol (Advair Diskus) 500-50 mcg/dose inhaler, Inhale 1 puff 2 (two) times a day Rinse mouth after use , Disp: 60 blister, Rfl: 2  •  fluticasone-salmeterol (ADVAIR, WIXELA) 500-50 mcg/dose inhaler, Inhale 1 puff 2 (two) times a day Rinse mouth after use , Disp: 1 each, Rfl: 3  •  hydrOXYzine pamoate (VISTARIL) 50 mg capsule, Take 50 mg by mouth daily at bedtime, Disp: , Rfl:   •  ibuprofen (MOTRIN) 600 mg tablet, Take 1 tablet (600 mg total) by mouth every 6 (six) hours as needed for mild pain or moderate pain, Disp: 60 tablet, Rfl: 0  •  loratadine (CLARITIN) 10 mg tablet, Take 1 tablet (10 mg total) by mouth daily, Disp: 30 tablet, Rfl: 1  •  QUEtiapine (SEROquel) 50 mg tablet, Take 3 tablets (150 mg total) by mouth daily at bedtime, Disp: 90 tablet, Rfl: 1  •  tamsulosin (FLOMAX) 0 4 mg, TAKE 1 CAPSULE (0 4 MG TOTAL) BY MOUTH DAILY WITH DINNER, Disp: 90 capsule, Rfl: 3  •  tiotropium (Spiriva HandiHaler) 18 mcg inhalation capsule, Place 1 capsule (18 mcg total) into inhaler and inhale daily, Disp: 30 capsule, Rfl: 0  •  traZODone (DESYREL) 50 mg tablet, Take 0 5 tablets (25 mg total) by mouth daily at bedtime, Disp: 15 tablet, Rfl: 1  •  albuterol (PROVENTIL HFA,VENTOLIN HFA) 90 mcg/act inhaler, INHALE 1 PUFF EVERY 6 (SIX) HOURS AS NEEDED FOR WHEEZING (Patient not taking: Reported on 1/11/2023), Disp: 17 g, Rfl: 10  •  ALPRAZolam (XANAX) 0 5 mg tablet, Take 1 tablet (0 5 mg total) by mouth 2 (two) times a day (Patient not taking: Reported on 1/24/2023), Disp: 60 tablet, Rfl: 0  •  Diclofenac Sodium (VOLTAREN) 1 %, APPLY 1 INCH TO EACH KNEE TWICE DAILY (Patient not taking: Reported on 1/11/2023), Disp: , Rfl:   •  naltrexone (REVIA) 50 mg tablet, Take 50 mg by mouth daily (Patient not taking: Reported on 1/11/2023), Disp: , Rfl:   •  pantoprazole (PROTONIX) 40 mg tablet, TAKE 1 TABLET (40 MG TOTAL) BY MOUTH DAILY (Patient not taking: Reported on 1/11/2023), Disp: 60 tablet, Rfl: 3  •  Vraylar 1 5 MG capsule, Take 1 5 mg by mouth daily at bedtime (Patient not taking: Reported on 1/11/2023), Disp: , Rfl:     Active Problems     Patient Active Problem List   Diagnosis   • Anxiety   • Major depression   • Mixed hyperlipidemia   • Tobacco dependence syndrome   • COPD (chronic obstructive pulmonary disease) ([facility])   • BPH (benign prostatic hyperplasia)   • Postnasal drip   • Gastritis without bleeding   • Seborrheic dermatitis   • Gastroesophageal reflux disease without esophagitis   • OA (osteoarthritis) of neck   • Other constipation   • Hiatal hernia   • Chronic pain of both knees   • Screen for STD (sexually transmitted disease)   • Prostate cancer screening   • Premature ejaculation   • Allergic rhinitis   • Dysuria   • Testicular discomfort       Past Medical History     Past Medical History:   Diagnosis Date   • Anxiety    • Asthma    • COPD (chronic obstructive pulmonary disease) ([facility] [address] )    • Depression    • GERD (gastroesophageal reflux disease)    • Hyperlipidemia    • Hypertension    • Suicide attempt [facility])        Surgical History     Past Surgical History:   Procedure Laterality Date   • ESOPHAGOGASTRODUODENOSCOPY N/A 3/1/2019    Procedure: ESOPHAGOGASTRODUODENOSCOPY (EGD); Surgeon: Dev Rodriguez MD;  Location: [facility] GI LAB;   Service: Gastroenterology   • FRACTURE SURGERY      ORIF   • HERNIA REPAIR     • SKIN GRAFT         Family History     Family History   Problem Relation Age of Onset   • Prostate cancer Paternal Uncle        Social History     Social History     Social History     Tobacco Use   Smoking Status Heavy Smoker   • Packs/day: 1 00   • Years: 20 00   • Pack years: 20 00   • Types: Cigarettes   Smokeless Tobacco Never       Past Surgical History:   Procedure Laterality Date   • ESOPHAGOGASTRODUODENOSCOPY N/A 3/1/2019    Procedure: ESOPHAGOGASTRODUODENOSCOPY (EGD); Surgeon: Krunal Pineda MD;  Location: [facility] GI LAB; Service: Gastroenterology   • FRACTURE SURGERY      ORIF   • HERNIA REPAIR     • SKIN GRAFT           The following portions of the patient's history were reviewed and updated as appropriate: allergies, current medications, past family history, past medical history, past social history, past surgical history and problem list    Please note :  Voice dictation software has been used to create this document  There may be inadvertent transcription errors      [id number] [address] [place]

## 2023-01-24 NOTE — PATIENT INSTRUCTIONS
BLADDER HEALTH    WHAT IS CONSIDERED NORMAL? The average bladder can hold about 2 cups of urine before it needs to be emptied  The normal range of voiding urine is 6 to 8 times during a 24 hour period  As we get older, our bladder capacity can get smaller and we may need to pass urine more frequently but usually not more than every 2 hours  Urine should flow easily without discomfort in a good, steady stream until the bladder is empty  No pushing or straining is necessary to empty the bladder  An urge is a signal that you feel as the bladder stretches to fill with urine  Urges can be felt even if the bladder is not full  Urges are not commands to go to the toilet, merely a signal and can be controlled  WHAT ARE GOOD BLADDER HABITS? Take your time when emptying your bladder  Don’t strain or push to empty your bladder  Make sure you empty your bladder completely each time you pass urine  Do not rush the process  Consistently ignoring the urge to go (waiting more than 4 hours between toileting) or urinating too infrequently may be convenient but not healthy for your bladder  Avoid going to the toilet “just in case” or more often than every 2 hours  It is usually not necessary to go when you feel the first urge  Try to go only when your bladder is full  Urgency and frequency of urination can be improved by retraining the bladder and spacing your fluid intake throughout the day  Practice good toilet habits  Don’t let your bladder control your life  TIPS TO MAINTAIN GOOD BLADDER HABITS  Maintain a good fluid intake  Depending on your body size and environment, drink 6 -8 cups (8 oz each) of fluid per day unless otherwise advised by your doctor  Not enough fluid creates a foul odor and dark color of the urine  Limit the amount of caffeine (coffee, cola, chocolate or tea) and citrus foods that you consume as these foods can be associated with increased sensation of urinary urgency and frequency  Limit the amount of alcohol you drink  Alcohol increases urine production and makes it difficult for the brain to coordinate bladder control  Avoid constipation by maintaining a balanced diet of dietary fiber  Cigarette smoking is also irritating to the bladder surface and is associated with bladder cancer  In addition, the coughing associated with smoking may lead to increased incontinent episodes because of the increased pressure  HOW DIET CAN AFFECT YOUR BLADDER  Although there is no particular "diet" that can cure bladder control, there are certain dietary suggestions you can use to help control the problem  There are 2 points to consider when evaluating how your habits and diet may affect your bladder:    Foods and fluids can irritate the bladder  Some foods and beverages are thought to contribute to bladder leakage and irritability  However their effect on the bladder is not completely understood and you may want to see if eliminating one or all of these items improves your bladder control  If you are unable to give them up completely, it is recommended that you use the following items in moderation:  Acidic beverages and foods (orange juice, grapefruit juice, lemonade etc)  Alcoholic beverages  Vinegar  Coffee (regular and decaf)  Tea (regular and decaf)  Caffeinated beverages  Carbonated beverages          Drinking enough and the right kinds of fluids  Many people with bladder control issues decrease their intake of liquids in hope that they will need to urinate less frequently or have less urinary leakage  You should not restrict fluids to control your bladder  While a decrease in liquid intake does result in a decrease in the volume of urine, the smaller amount of urine may be more highly concentrated  Highly concentrated, dark yellow urine is irritating to the bladder surface and may actually cause you to go to the bathroom more frequently        It also encourages the growth of bacteria, which may lead to infections resulting in incontinence  Substitutions for Bladder Irritants: water is always the best beverage choice  Grape and apple juice are thirst quenchers are good selections and are not as irritating to the bladder    Low acid fruits:  Pears, apricots, papaya, watermelon  For coffee drinkers: KAVA®, Postum®, Cameron®, Kaffree Lesvia®  For tea drinkers:  non-citrus or herbal and sun brewed tea

## 2023-01-24 NOTE — ASSESSMENT & PLAN NOTE
· Urine testing unremarkable  · Ultrasound kidney and bladder unremarkable  · Schedule cystoscopy for ongoing dysuria

## 2023-01-25 ENCOUNTER — PATIENT OUTREACH (OUTPATIENT)
Dept: OTHER | Facility: OTHER | Age: 58
End: 2023-01-25

## 2023-01-25 NOTE — PROGRESS NOTES
1/25/23: Client reported that he has had stomach pain since this morning  He also said he has had vomiting in the morning after coffee for almost a week  He asked if nerves could cause the stomach pain which it can  He said he gets nervous due to snow  Also said he had allergies which could be causing the stomach upset from swallowing mucous from allergies  Instructed him to go to ED or call PCP if issues escalate  Has an appointment on Friday with PCP  Instructed to inform Dr Heather Polanco of update

## 2023-01-26 DIAGNOSIS — K21.9 GASTROESOPHAGEAL REFLUX DISEASE WITHOUT ESOPHAGITIS: ICD-10-CM

## 2023-01-27 ENCOUNTER — OFFICE VISIT (OUTPATIENT)
Dept: FAMILY MEDICINE CLINIC | Facility: CLINIC | Age: 58
End: 2023-01-27

## 2023-01-27 VITALS
SYSTOLIC BLOOD PRESSURE: 108 MMHG | TEMPERATURE: 98.3 F | DIASTOLIC BLOOD PRESSURE: 72 MMHG | WEIGHT: 142 LBS | HEIGHT: 66 IN | RESPIRATION RATE: 18 BRPM | HEART RATE: 86 BPM | OXYGEN SATURATION: 98 % | BODY MASS INDEX: 22.82 KG/M2

## 2023-01-27 DIAGNOSIS — K52.9 GASTROENTERITIS: Primary | ICD-10-CM

## 2023-01-27 DIAGNOSIS — E55.9 VITAMIN D DEFICIENCY: ICD-10-CM

## 2023-01-27 DIAGNOSIS — N40.0 BENIGN PROSTATIC HYPERPLASIA WITHOUT LOWER URINARY TRACT SYMPTOMS: ICD-10-CM

## 2023-01-27 LAB
SARS-COV-2 AG UPPER RESP QL IA: NEGATIVE
VALID CONTROL: NORMAL

## 2023-01-27 RX ORDER — ONDANSETRON 4 MG/1
4 TABLET, FILM COATED ORAL EVERY 8 HOURS PRN
Qty: 20 TABLET | Refills: 0 | Status: SHIPPED | OUTPATIENT
Start: 2023-01-27 | End: 2023-02-03

## 2023-01-27 RX ORDER — OSELTAMIVIR PHOSPHATE 75 MG/1
75 CAPSULE ORAL EVERY 12 HOURS SCHEDULED
Qty: 10 CAPSULE | Refills: 0 | Status: SHIPPED | OUTPATIENT
Start: 2023-01-27 | End: 2023-01-28

## 2023-01-27 RX ORDER — FAMOTIDINE 20 MG/1
20 TABLET, FILM COATED ORAL 2 TIMES DAILY
Qty: 60 TABLET | Refills: 0 | Status: SHIPPED | OUTPATIENT
Start: 2023-01-27 | End: 2023-02-06

## 2023-01-28 PROBLEM — K52.9 GASTROENTERITIS: Status: ACTIVE | Noted: 2018-11-26

## 2023-01-28 RX ORDER — MELATONIN
1000 DAILY
Qty: 56 TABLET | Refills: 0 | Status: SHIPPED | OUTPATIENT
Start: 2023-01-28 | End: 2023-03-25

## 2023-01-28 RX ORDER — ATORVASTATIN CALCIUM 10 MG/1
10 TABLET, FILM COATED ORAL DAILY
Qty: 90 TABLET | Refills: 3 | Status: SHIPPED | OUTPATIENT
Start: 2023-01-28

## 2023-01-28 NOTE — PROGRESS NOTES
Assessment/Plan:    1  Gastroenteritis  Assessment & Plan:  Could be secondary to bacterial or viral infection  Influenza on the differential   Rapid COVID-negative  Abdominal exam only impressive for mild tenderness to palpation  Plan  -We will do flu influenza swab sent for lab  -Conservative management with Zofran prior to meals  -Fluid for hydration  -We will follow-up with patient in a week to assess for resolution of symptoms   -Consider Imodium for diarrheal infection  Orders:  -     POCT Rapid Covid Ag  -     Covid/Flu- Office Collect  -     ondansetron (ZOFRAN) 4 mg tablet; Take 1 tablet (4 mg total) by mouth every 8 (eight) hours as needed for nausea or vomiting    2  Benign prostatic hyperplasia without lower urinary tract symptoms  -     atorvastatin (LIPITOR) 10 mg tablet; Take 1 tablet (10 mg total) by mouth daily    3  Vitamin D deficiency  -     cholecalciferol (VITAMIN D3) 1,000 units tablet; Take 1 tablet (1,000 Units total) by mouth daily       Subjective:      Patient ID: Emily Biswas is a 59 y o  male  Past medical history notable for BPH, alcohol use disorder, coming in with chief complaint of abdominal pain, nausea, vomiting, diarrhea, myalgia for period of 5 days  Patient denies any fevers  He denies any sick contacts  Patient reports that he is mildly feeling better however has not been able to tolerate p o  for the last few days  Patient denies any recent travels  Patient denies any fevers at home  Patient states that he got his flu shot earlier this year  The following portions of the patient's history were reviewed and updated as appropriate: allergies, current medications, past family history, past medical history, past social history, past surgical history, and problem list     Review of Systems   Constitutional: Negative for chills and fever  HENT: Negative for ear pain and sore throat  Eyes: Negative for pain and visual disturbance     Respiratory: Negative for cough and shortness of breath  Cardiovascular: Negative for chest pain and palpitations  Gastrointestinal: Positive for abdominal pain, diarrhea, nausea and vomiting  Negative for abdominal distention, blood in stool and constipation  Genitourinary: Negative for dysuria and hematuria  Musculoskeletal: Negative for arthralgias and back pain  Skin: Negative for color change and rash  Neurological: Negative for seizures and syncope  All other systems reviewed and are negative  Objective:      /72 (BP Location: Right arm, Patient Position: Sitting, Cuff Size: Standard)   Pulse 86   Temp 98 3 °F (36 8 °C) (Temporal)   Resp 18   Ht 5' 6" (1 676 m)   Wt 64 4 kg (142 lb)   SpO2 98%   BMI 22 92 kg/m²          Physical Exam  Constitutional:       General: He is not in acute distress  Appearance: Normal appearance  He is not toxic-appearing or diaphoretic  HENT:      Head: Normocephalic  Mouth/Throat:      Mouth: Mucous membranes are moist    Eyes:      Extraocular Movements: Extraocular movements intact  Pupils: Pupils are equal, round, and reactive to light  Cardiovascular:      Rate and Rhythm: Normal rate and regular rhythm  Pulmonary:      Effort: Pulmonary effort is normal  No tachypnea  Breath sounds: Normal breath sounds  No wheezing or rales  Chest:      Chest wall: No edema  There is no dullness to percussion  Abdominal:      General: Abdomen is flat  There is no distension  Palpations: Abdomen is soft  There is no mass  Tenderness: There is abdominal tenderness (Epigastric area)  There is no guarding or rebound  Hernia: No hernia is present  Genitourinary:     Penis: Normal        Testes: Normal    Musculoskeletal:      Right lower leg: No edema  Left lower leg: No edema  Skin:     Capillary Refill: Capillary refill takes less than 2 seconds  Neurological:      General: No focal deficit present        Mental Status: He is alert and oriented to person, place, and time     Psychiatric:         Mood and Affect: Mood normal          Behavior: Behavior normal            Mary Albarado DO   Family Medicine PGY-2  1/28/2023

## 2023-01-29 DIAGNOSIS — K52.9 GASTROENTERITIS: ICD-10-CM

## 2023-01-29 LAB
FLUAV RNA RESP QL NAA+PROBE: NEGATIVE
FLUBV RNA RESP QL NAA+PROBE: NEGATIVE
SARS-COV-2 RNA RESP QL NAA+PROBE: NEGATIVE

## 2023-01-29 NOTE — ASSESSMENT & PLAN NOTE
Could be secondary to bacterial or viral infection  Influenza on the differential   Rapid COVID-negative  Abdominal exam only impressive for mild tenderness to palpation  Plan  -We will do flu influenza swab sent for lab  -Conservative management with Zofran prior to meals  -Fluid for hydration  -We will follow-up with patient in a week to assess for resolution of symptoms   -Consider Imodium for diarrheal infection

## 2023-01-31 ENCOUNTER — PATIENT OUTREACH (OUTPATIENT)
Dept: OTHER | Facility: OTHER | Age: 58
End: 2023-01-31

## 2023-01-31 RX ORDER — ONDANSETRON 4 MG/1
4 TABLET, FILM COATED ORAL EVERY 8 HOURS PRN
Qty: 20 TABLET | Refills: 0 | OUTPATIENT
Start: 2023-01-31

## 2023-01-31 NOTE — PROGRESS NOTES
1/31/23: Client reported having some belly sharp pain while at [facility]  It was short-lived  He said that he has discontinued the Colace and is using the new medication ordered by Dr Riley Cea  Will see PCP on Friday  Went through schedule for next few visits

## 2023-02-01 ENCOUNTER — PATIENT OUTREACH (OUTPATIENT)
Dept: OTHER | Facility: OTHER | Age: 58
End: 2023-02-01

## 2023-02-01 NOTE — PROGRESS NOTES
2/1/23: Client requested COVID antigen testing  Performed antigen test  Results negative   Client informed  Reviewed infection prevention information

## 2023-02-02 DIAGNOSIS — K59.00 CONSTIPATION, UNSPECIFIED CONSTIPATION TYPE: ICD-10-CM

## 2023-02-02 RX ORDER — DOCUSATE SODIUM 100 MG/1
100 CAPSULE, LIQUID FILLED ORAL 2 TIMES DAILY
Qty: 60 CAPSULE | Refills: 2 | Status: SHIPPED | OUTPATIENT
Start: 2023-02-02

## 2023-02-03 ENCOUNTER — OFFICE VISIT (OUTPATIENT)
Dept: FAMILY MEDICINE CLINIC | Facility: CLINIC | Age: 58
End: 2023-02-03

## 2023-02-03 VITALS
OXYGEN SATURATION: 97 % | BODY MASS INDEX: 22.87 KG/M2 | HEIGHT: 66 IN | DIASTOLIC BLOOD PRESSURE: 88 MMHG | TEMPERATURE: 97.8 F | SYSTOLIC BLOOD PRESSURE: 146 MMHG | HEART RATE: 84 BPM | RESPIRATION RATE: 18 BRPM | WEIGHT: 142.3 LBS

## 2023-02-03 DIAGNOSIS — K52.9 GASTROENTERITIS: Primary | ICD-10-CM

## 2023-02-03 DIAGNOSIS — F41.9 ANXIETY: ICD-10-CM

## 2023-02-03 DIAGNOSIS — Z13.9 ENCOUNTER FOR HEALTH-RELATED SCREENING: ICD-10-CM

## 2023-02-04 RX ORDER — ONDANSETRON 4 MG/1
4 TABLET, FILM COATED ORAL EVERY 8 HOURS PRN
Qty: 20 TABLET | Refills: 3 | Status: SHIPPED | OUTPATIENT
Start: 2023-02-04

## 2023-02-04 NOTE — ASSESSMENT & PLAN NOTE
Negative COVID and influenza swab  Diarrhea now resolved  Continues to have some nausea associated with eating  Plan  -Continue with Zofran 30 minutes prior to eating  -Advance diet as tolerated  -Follow-up for ongoing issues if persist longer than another week

## 2023-02-04 NOTE — ASSESSMENT & PLAN NOTE
Patient requesting Xanax for his anxiety  Patient reports that he is taking Xanax every day twice a day for the past year  Patient reportedly obtains this Xanax from his psychiatry  Upon further PDMP review it appears that patient has not had this medication filled since 2021  When confronted regarding this issue, patient reports that he obtains his Xanax from other sources  Continues to have inconsistent history as to how he is obtaining the Xanax  At minimum, patient requires follow-up with mental health services  Social work consulted during this visit to help patient obtain these services in addition to addressing his other social determinants of health

## 2023-02-04 NOTE — PROGRESS NOTES
Assessment/Plan:    1  Gastroenteritis  Assessment & Plan:  Negative COVID and influenza swab  Diarrhea now resolved  Continues to have some nausea associated with eating  Plan  -Continue with Zofran 30 minutes prior to eating  -Advance diet as tolerated  -Follow-up for ongoing issues if persist longer than another week  Orders:  -     ondansetron (ZOFRAN) 4 mg tablet; Take 1 tablet (4 mg total) by mouth every 8 (eight) hours as needed for nausea or vomiting    2  Anxiety  Assessment & Plan:  Patient requesting Xanax for his anxiety  Patient reports that he is taking Xanax every day twice a day for the past year  Patient reportedly obtains this Xanax from his psychiatry  Upon further PDMP review it appears that patient has not had this medication filled since 2021  When confronted regarding this issue, patient reports that he obtains his Xanax from other sources  Continues to have inconsistent history as to how he is obtaining the Xanax  At minimum, patient requires follow-up with mental health services  Social work consulted during this visit to help patient obtain these services in addition to addressing his other social determinants of health  3  Encounter for health-related screening  Comments:  Acute visit regarding his GI symptoms  We will address vaccine and appropriate age-related screening in subsequent visits  Subjective:      Patient ID: Jorge Oakley is a 59 y o  male  Past medical history notable for recent gastroenteritis infection last week where he was unable to tolerate p o  Conducted a COVID and influenza swab at that time which came back negative  Patient was also treated with Zofran to help with his nausea and vomiting and assist with p o  intake  Patient reports that his diarrhea has since resolved however he he states that he is now constipated  Patient reports improvement in his nausea vomiting  He states that he no longer has abdominal pain    He denies any fevers or chills  He denies any recent travels  The following portions of the patient's history were reviewed and updated as appropriate: allergies, current medications, past family history, past medical history, past social history, past surgical history, and problem list     Review of Systems   Constitutional: Negative for chills and fever  HENT: Negative for ear pain and sore throat  Eyes: Negative for pain and visual disturbance  Respiratory: Negative for cough and shortness of breath  Cardiovascular: Negative for chest pain and palpitations  Gastrointestinal: Negative for abdominal pain and vomiting  Genitourinary: Negative for dysuria and hematuria  Musculoskeletal: Negative for arthralgias and back pain  Skin: Negative for color change and rash  Neurological: Negative for seizures and syncope  All other systems reviewed and are negative  Objective:      /88 (BP Location: Left arm, Patient Position: Sitting, Cuff Size: Adult)   Pulse 84   Temp 97 8 °F (36 6 °C) (Temporal)   Resp 18   Ht 5' 6" (1 676 m)   Wt 64 5 kg (142 lb 4 8 oz)   SpO2 97%   BMI 22 97 kg/m²          Physical Exam  Constitutional:       General: He is not in acute distress  Appearance: Normal appearance  He is not toxic-appearing or diaphoretic  HENT:      Head: Normocephalic  Mouth/Throat:      Mouth: Mucous membranes are moist    Eyes:      Extraocular Movements: Extraocular movements intact  Pupils: Pupils are equal, round, and reactive to light  Cardiovascular:      Rate and Rhythm: Normal rate and regular rhythm  Pulmonary:      Effort: Pulmonary effort is normal  No tachypnea  Breath sounds: Normal breath sounds  No wheezing or rales  Chest:      Chest wall: No edema  There is no dullness to percussion  Abdominal:      General: Abdomen is flat  There is no distension  Palpations: Abdomen is soft  Tenderness:  There is no abdominal tenderness  Musculoskeletal:      Right lower leg: No edema  Left lower leg: No edema  Skin:     Capillary Refill: Capillary refill takes less than 2 seconds  Neurological:      General: No focal deficit present  Mental Status: He is alert and oriented to person, place, and time     Psychiatric:         Mood and Affect: Mood normal          Behavior: Behavior normal            Maxine Downs DO   Family Medicine PGY-2  2/4/2023

## 2023-02-06 DIAGNOSIS — K21.9 GASTROESOPHAGEAL REFLUX DISEASE WITHOUT ESOPHAGITIS: ICD-10-CM

## 2023-02-06 RX ORDER — FAMOTIDINE 20 MG/1
20 TABLET, FILM COATED ORAL 2 TIMES DAILY
Qty: 60 TABLET | Refills: 0 | Status: SHIPPED | OUTPATIENT
Start: 2023-02-06

## 2023-02-07 ENCOUNTER — PATIENT OUTREACH (OUTPATIENT)
Dept: OTHER | Facility: OTHER | Age: 58
End: 2023-02-07

## 2023-02-07 NOTE — PROGRESS NOTES
2/7/23: Client's friend Alisia Christiansen reported being very concerned about his GI symptoms since last week Said he is experiencing nausea and vomiting with achiness and fatigue  Sleeps a lot  He has not eaten well  Drinks some water  Tried to call but unable to leave message or speak to the patient  Will call again  Recommended that he should call his PCP and/or go to ED    1:48: Called again and able to leave a message to contact Dr Alanna Viramontes or go to the ED for evaluation  2:05: Luz Husain who reported that he has had these symptoms since before he went to PCP  Instructed to eat light foods and fluids like water and ginger ale  Go to ED if worsens     Will route to Dr Lia Bill

## 2023-02-08 ENCOUNTER — PATIENT OUTREACH (OUTPATIENT)
Dept: OTHER | Facility: OTHER | Age: 58
End: 2023-02-08

## 2023-02-08 ENCOUNTER — PATIENT OUTREACH (OUTPATIENT)
Dept: FAMILY MEDICINE CLINIC | Facility: CLINIC | Age: 58
End: 2023-02-08

## 2023-02-08 DIAGNOSIS — J44.9 CHRONIC OBSTRUCTIVE PULMONARY DISEASE, UNSPECIFIED COPD TYPE (HCC): ICD-10-CM

## 2023-02-08 DIAGNOSIS — Z59.41 FOOD INSECURITY: Primary | ICD-10-CM

## 2023-02-08 RX ORDER — FLUTICASONE PROPIONATE AND SALMETEROL XINAFOATE 115; 21 UG/1; UG/1
2 AEROSOL, METERED RESPIRATORY (INHALATION) 2 TIMES DAILY
Qty: 36 G | Refills: 3 | Status: SHIPPED | OUTPATIENT
Start: 2023-02-08

## 2023-02-08 SDOH — ECONOMIC STABILITY - FOOD INSECURITY: FOOD INSECURITY: Z59.41

## 2023-02-08 NOTE — PROGRESS NOTES
Late Entry 2/3/23:    AZALIA HURTADO had been consulted by Dr Kelly Gómez regarding patient having multiple social needs and requested AZALIA HURTADO meet with patient prior to office visit  AZALIA HURTADO did then meet with patient, Vandana Robins in patient room  Heena Ortez had previously attempted to help patient apply for SNAP benefits but was unable to get in touch with patient  Vandana Julienne indicated that he was going to [facility] for psychiatry  He reported he was being prescribed Xanax and then was switched to Klonopin which had negative side effects, including anxiety attacks, unable to tie his shoes and being agitated  He was drinking 1 beer every two weeks but recently quit altogether  Vandana Robins reported that there had been issues with him not having a phone for a while  He tried to re-establish in January with [facility] but was told there was no availability  He had last been there approximately 5 months ago  Vandana Robins had gone to [facility] in the past and wanted AZALIA HURTADO to call them to schedule him an appointment  AZALIA HURTADO provided him the contact information and advised will follow up with him  He said is bad at remember to call places  AZALIA HURTADO encouraged him to put [facility]IA HURTADO contact information and [facility] in his contact list  He reported will do so later and did not want to during this visit  Lore RN and Lilia Byrd RN with [address] [facility] team help him make medical appointments  He volunteers at [facility] twice/week  [place] breakfast and lunch  Vandana Robins is still at [employer] and this arrangement is still working  Vandana Robins will help in whatever ways he can such as taking out the garbage, doing dishes, etc  He receives approximately $900/mo  The nephew, Leah Nye will take money out for rent and then give him $5/da for spending  Vandana Bloodmarcel is okay with this arrangement  It is informal as Leah Nye is not his rep payee or power of   He is unsure if the amount he takes for rent and he does not mind not knowing  Ramakrishna Christy is interested in applying for SNAP benefits  He agreed to be in contact with [facility] this time  He reported it is best to text him  AZALIA HURTADO confirmed the phone number on chart  His email is [email or web address]  AZALIA HURTADO sent test email to Ramakrishna Harrison so he can reply directly to that email to get Na November is wanting to be started again on Xanax  AZALIA HURTADO advised there is no recent Xanax on medication list and it is ultimately up to provider to prescribe him anything  AZALIA HURTADO encouraged him to have conversation with provider about it and stressed importance of connecting with [facility]  AZALIA HURTADO verbally updated provider

## 2023-02-08 NOTE — PROGRESS NOTES
2/8/23: Better since yesterday  Stopped new med prescribed by Dr Moise Ricks who called him yesterday and agreed to returning to [facility]  Ate twice today without nausea, vomiting or diarrhea  Went to [facility]- scheduled appointment with Gregory Akhtar, his therapist  Is no longer on psych med, Clonidine, since last Thursday because he ran out  Will see Gregory Akhtar tomorrow  Yunier Wrightter he will tell Gregory Akhtar about not having the meds  Pain resolved in mouth  Has one more antibiotic pill to take  Stated he has a fear of not awakening from Anesthesia which he will have on 2/28  Receiving twilight anesthesia

## 2023-02-08 NOTE — TELEPHONE ENCOUNTER
Called patient regarding GI symptoms  Followed up  Patient reports to be feeling better  Patient has no questions at this time

## 2023-02-13 ENCOUNTER — PATIENT OUTREACH (OUTPATIENT)
Dept: FAMILY MEDICINE CLINIC | Facility: CLINIC | Age: 58
End: 2023-02-13

## 2023-02-13 NOTE — PROGRESS NOTES
AZALIA HURTADO had received update from Liliana Dang RN with [address] [facility] team that the patient has scheduled an appointment with his previous therapist at [facility]  Additionally, based upon clinical assessment of patient's symptoms, Lore advised that patient likely was withdrawing from his medications  AZALIA HURTADO had sent email to the patient as this is his preferred method of communication (other than text message) to follow up regarding the appointment and if anything is needed from AZALIA VIEYRA CM placed call to [facility] and left message inquiring if patient did re-establish care  AZALIA HURTADO will continue to remain available for psychosocial support as needed

## 2023-02-13 NOTE — PROGRESS NOTES
SMS / Email:  2/13/2023    [facility] received a referral from pt's Gracie Mansfield, stating pt is interested in applying for SNAP benefits  Per [place] pt prefers to be reached by email or text  [facility] did both and informed pt another outreach will be attempted in a few days  Next outreach is scheduled for 2/15/2023

## 2023-02-14 ENCOUNTER — PATIENT OUTREACH (OUTPATIENT)
Dept: OTHER | Facility: OTHER | Age: 58
End: 2023-02-14

## 2023-02-14 NOTE — PROGRESS NOTES
2/14/23: Client reported he is back on his medications  He continues to have nervous symptoms at times: stomach ache, slight loose bowels, nausea  He thinks he is afraid for the dental surgery he will have on 2/28  He is hopeful that he will not awaken during the surgery  Which will be twilight  I reassured him that it works  His nephew will take him and bring him home  He has a plan to get blood drawn on 3/9 in preparation for his urology appointment on 3/16 at 1PM    Will follow  Requested a COVID test tomorrow

## 2023-02-15 ENCOUNTER — PATIENT OUTREACH (OUTPATIENT)
Dept: OTHER | Facility: OTHER | Age: 58
End: 2023-02-15

## 2023-02-15 ENCOUNTER — PATIENT OUTREACH (OUTPATIENT)
Dept: FAMILY MEDICINE CLINIC | Facility: CLINIC | Age: 58
End: 2023-02-15

## 2023-02-15 NOTE — PROGRESS NOTES
Outgoing Call:  2/15/2023    [facility] did call pt as call was missed from him  SMS notification was sent to pt  Letter sent as well  Final outreach is scheduled for 2/20/2023  Incoming Call:  [facility] received a return call from pt  [facility] introduced herself and her role  [facility] assessment was completed and pt agreed to services  [facility] informed of documents needed to apply for SNAP benefits  Pt expressed understanding  Will meet at end of week to apply for SNAP benefits       Next outreach is scheduled for 2/17/2023 at [facility]

## 2023-02-15 NOTE — LETTER
02/15/23    Dear Lorriane Cabot,    I am a [facility] with [address] [address]    I have made several attempts to call you by phone  It is important that you contact me back at [phone] so that I can assist with your care needs       Sincerely,         [employer], [address] [place] [address]

## 2023-02-15 NOTE — PROGRESS NOTES
2/15/23: Met at [facility] and connected him to [address] [place] [address] who assisted him with obtaining SNAP benefits  The came to [facility] asking for a COVID test  Performed test  Result: negative  Client informed of result

## 2023-02-17 ENCOUNTER — PATIENT OUTREACH (OUTPATIENT)
Dept: FAMILY MEDICINE CLINIC | Facility: CLINIC | Age: 58
End: 2023-02-17

## 2023-02-17 NOTE — PROGRESS NOTES
Incoming Call:  2/17/2023    [facility] received a call from pt and requested to reschedule appointment  Pt agreed to meet next week       Next outreach is scheduled for 2/21/2023 at [facility]

## 2023-02-21 ENCOUNTER — PATIENT OUTREACH (OUTPATIENT)
Dept: OTHER | Facility: OTHER | Age: 58
End: 2023-02-21

## 2023-02-21 ENCOUNTER — PATIENT OUTREACH (OUTPATIENT)
Dept: FAMILY MEDICINE CLINIC | Facility: CLINIC | Age: 58
End: 2023-02-21

## 2023-02-21 NOTE — PROGRESS NOTES
In Person:  2/21/2023    [facility] did meet with pt for scheduled appointment at [address] arrived much earlier than scheduled time and [facility] was able to see pt sooner  [facility] completed a SNAP application via [employer]  Pt provided letter from person he rents from and scanned to [facility] via [employer]  Pt is aware he may receive a call from [facility] to complete a phone interview  [facility] informed she will also check status on her end as well  Pt thanked [facility] for her time  Next outreach is scheduled for 2/28/2023

## 2023-02-21 NOTE — PROGRESS NOTES
2/21/23: Met with MARCELO De La Cruz to pursue SNAP card  Very happy with service provided  Asked about OMS appointment: 1/28/23 at 2:50 PM  IS nervous about it  Will follow up

## 2023-02-27 DIAGNOSIS — J44.9 CHRONIC OBSTRUCTIVE PULMONARY DISEASE, UNSPECIFIED COPD TYPE (HCC): ICD-10-CM

## 2023-02-28 ENCOUNTER — PATIENT OUTREACH (OUTPATIENT)
Dept: FAMILY MEDICINE CLINIC | Facility: CLINIC | Age: 58
End: 2023-02-28

## 2023-02-28 NOTE — PROGRESS NOTES
Outgoing Calls / Letter:  2/28/2023    [facility] completed chart review and called [employer] to seek status of SNAP application  [facility] was informed pt needs to complete his SNAP phone interview  Pt will need to call [phone] to complete  [facility] attempted to call pt however VM is full and SMS notice was sent  Letter sent as well  Next outreach is scheduled for 3/7/2023

## 2023-02-28 NOTE — LETTER
02/28/23    Dear Kam Soriano,    I am a [facility] with [address] [address]    I have made several attempts to call you by phone  It is important that you contact me back at [phone] so that I can assist with your care needs       Sincerely,         [employer], [facility]

## 2023-03-01 DIAGNOSIS — K21.9 GASTROESOPHAGEAL REFLUX DISEASE WITHOUT ESOPHAGITIS: ICD-10-CM

## 2023-03-01 RX ORDER — FLUTICASONE PROPIONATE AND SALMETEROL 50; 500 UG/1; UG/1
POWDER RESPIRATORY (INHALATION)
Qty: 60 BLISTER | Refills: 3 | Status: SHIPPED | OUTPATIENT
Start: 2023-03-01

## 2023-03-01 RX ORDER — FAMOTIDINE 20 MG/1
20 TABLET, FILM COATED ORAL 2 TIMES DAILY
Qty: 60 TABLET | Refills: 0 | Status: SHIPPED | OUTPATIENT
Start: 2023-03-01

## 2023-03-07 ENCOUNTER — PATIENT OUTREACH (OUTPATIENT)
Dept: FAMILY MEDICINE CLINIC | Facility: CLINIC | Age: 58
End: 2023-03-07

## 2023-03-07 NOTE — PROGRESS NOTES
Chart Review:  3/7/2023    [facility] completed chart review and checked on status of SNAP application via [employer]  Status is still pending  Pt is in need of completing phone interview with [employer]  [facility] did leave detailed VM for pt last week, letter was sent as well  Pt has not responded  Text message sent to pt requesting a call in return  [facility] to f/u  Final outreach is scheduled for 3/13/2023

## 2023-03-08 ENCOUNTER — PATIENT OUTREACH (OUTPATIENT)
Dept: OTHER | Facility: OTHER | Age: 58
End: 2023-03-08

## 2023-03-08 NOTE — PROGRESS NOTES
3/8/23: Client reported that he continues to have stomach upset with occasional nausea  I asked how he was taking his Famotidine and he stated once/day  I explained that in the notes from Dr Robin Davila, he is to take it two times/day  He said he didn't know  He will increase his medication dose  We discussed his worrisome nature  Gave support  It's good to talk about it and take his [address] meds  Also not thrilled about going to urology on 3/16  But he will go! Will follow up

## 2023-03-09 ENCOUNTER — APPOINTMENT (OUTPATIENT)
Dept: LAB | Facility: HOSPITAL | Age: 58
End: 2023-03-09

## 2023-03-09 DIAGNOSIS — Z12.5 PROSTATE CANCER SCREENING: ICD-10-CM

## 2023-03-09 LAB — PSA SERPL-MCNC: 1.3 NG/ML (ref 0–4)

## 2023-03-14 ENCOUNTER — PATIENT OUTREACH (OUTPATIENT)
Dept: FAMILY MEDICINE CLINIC | Facility: CLINIC | Age: 58
End: 2023-03-14

## 2023-03-14 NOTE — PROGRESS NOTES
Outgoing Calls:  3/14/2023    CMOC called pt at [employer] and spoke with Oni Gardner who was able to place pt on the phone  Pt spoke with [facility] and stated that he knew why she was calling and was not interested in moving forward with SNAP application  [facility] asked why and pt stated that he does not want to deal with [employer] and may not be approved anyway  [facility] encouraged pt to move forward with application and offered to complete phone interview with him  Pt agreed  [facility] completed a three way call to [facility] line and interview was completed  We were informed pt's CW will review and make a decision by 3/22/23  [facility] called Kentrell Solis, with pt and left detailed VM requesting a call in return  [facility]OC to f/u  Next outreach is scheduled for 3/17/2023
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
## 2023-03-16 ENCOUNTER — PROCEDURE VISIT (OUTPATIENT)
Dept: UROLOGY | Facility: CLINIC | Age: 58
End: 2023-03-16

## 2023-03-16 VITALS
HEART RATE: 73 BPM | WEIGHT: 134.4 LBS | OXYGEN SATURATION: 98 % | BODY MASS INDEX: 21.6 KG/M2 | DIASTOLIC BLOOD PRESSURE: 82 MMHG | SYSTOLIC BLOOD PRESSURE: 128 MMHG | HEIGHT: 66 IN

## 2023-03-16 DIAGNOSIS — R30.0 DYSURIA: ICD-10-CM

## 2023-03-16 DIAGNOSIS — N50.819 TESTICULAR DISCOMFORT: Primary | ICD-10-CM

## 2023-03-16 LAB
SL AMB  POCT GLUCOSE, UA: ABNORMAL
SL AMB LEUKOCYTE ESTERASE,UA: ABNORMAL
SL AMB POCT BILIRUBIN,UA: ABNORMAL
SL AMB POCT BLOOD,UA: ABNORMAL
SL AMB POCT CLARITY,UA: ABNORMAL
SL AMB POCT COLOR,UA: ABNORMAL
SL AMB POCT KETONES,UA: ABNORMAL
SL AMB POCT NITRITE,UA: ABNORMAL
SL AMB POCT PH,UA: 5
SL AMB POCT SPECIFIC GRAVITY,UA: 1.03
SL AMB POCT URINE PROTEIN: ABNORMAL
SL AMB POCT UROBILINOGEN: 0.2

## 2023-03-16 RX ORDER — BUSPIRONE HYDROCHLORIDE 10 MG/1
TABLET ORAL
COMMUNITY
Start: 2023-03-08

## 2023-03-16 RX ORDER — FINASTERIDE 5 MG/1
5 TABLET, FILM COATED ORAL DAILY
Qty: 90 TABLET | Refills: 3 | Status: SHIPPED | OUTPATIENT
Start: 2023-03-16 | End: 2023-06-14

## 2023-03-16 RX ORDER — CLONIDINE HYDROCHLORIDE 0.1 MG/1
TABLET ORAL
COMMUNITY
Start: 2023-03-08

## 2023-03-16 NOTE — PROGRESS NOTES
Cystoscopy     Date/Time 3/16/2023 5:16 PM     Performed by  Erendira Stephenson MD     Authorized by SISSY Short      Universal Protocol:  Yuko Serrano called at: 3/16/2023 5:16 PM       Procedure Details:    Patient tolerance: Patient tolerated the procedure well with no immediate complications    Additional Procedure Details:   Cystoscopy procedure note:  Risk and benefits of flexible cystoscopy were discussed  Informed consent was obtained  Urine dip was adequate for cystoscopy  The patient was placed in the supine position  His genitalia was prepped with Betadine and draped in a sterile fashion  Viscous 2% lidocaine jelly was instilled into the urethra and allowed dwell time for local anesthesia  Flexible cystoscopy was then performed using a 16F scope  Although the distal urethra was normal in course and caliber, I had some significant difficulty advancing the scope through the bulbar urethra sphincter complex  Patient was uncomfortable and it was difficult to assess whether he was coaptation his pelvic musculature  Once I was in the prostatic urethra, there were clear signs of bilobar hypertrophy with an elevated bladder neck  Patient had a ventral lip that was more prominently visualized on retroflexion  Once inside the bladder, it was carefully inspected in a 360 degree fashion  There was no evidence of mucosal abnormalities, lesions, diverticula or stones  Both ureteral orifices in their orthotopic location were visualized with clear efflux of urine  Retroflexion for evaluation of the bladder neck was normal   Overall this was a cystoscopy with bladder outlet obstruction and stenosis or narrowing towards the apex of the prostate

## 2023-03-16 NOTE — PROGRESS NOTES
UROLOGY PROCEDURE NOTE     CHIEF COMPLAINT   Junior Alessandra RYAN is a 59 y o  male with a complaint of   Chief Complaint   Patient presents with   • Cystoscopy       History of Present Illness:   Jorge Oakley is a 59 y o  male here for evaluation of urinary bother including frequency and painful urination  Patient drinks copious amounts of coffee, beer and water daily  Urinary testing was performed by the advanced practitioner team the patient was scheduled for cystoscopy  Presents today for this procedure  He is inquiring why he needs the procedure on being brought to the examination room  Lab Results   Component Value Date    PSA 1 3 03/09/2023    PSA 1 3 05/09/2022    PSA 1 2 02/11/2020     Urinary Score(s)                Past Medical History:     Past Medical History:   Diagnosis Date   • Anxiety    • Asthma    • COPD (chronic obstructive pulmonary disease) ([facility] )    • Depression    • GERD (gastroesophageal reflux disease)    • Hyperlipidemia    • Hypertension    • Suicide attempt ([facility] )        PAST SURGICAL HISTORY:     Past Surgical History:   Procedure Laterality Date   • ESOPHAGOGASTRODUODENOSCOPY N/A 3/1/2019    Procedure: ESOPHAGOGASTRODUODENOSCOPY (EGD); Surgeon: Tiffanie iDaz MD;  Location: [facility] GI LAB; Service: Gastroenterology   • FRACTURE SURGERY      ORIF   • HERNIA REPAIR     • SKIN GRAFT         CURRENT MEDICATIONS:     Current Outpatient Medications   Medication Sig Dispense Refill   • Advair Diskus 500-50 MCG/ACT inhaler INHALE 1 PUFF 2 (TWO) TIMES A DAY RINSE MOUTH AFTER USE   60 blister 3   • atorvastatin (LIPITOR) 10 mg tablet Take 1 tablet (10 mg total) by mouth daily 90 tablet 3   • buPROPion (WELLBUTRIN SR) 150 mg 12 hr tablet TAKE 1 TABLET (150 MG TOTAL) BY MOUTH 2 (TWO) TIMES A DAY 60 tablet 2   • busPIRone (BUSPAR) 10 mg tablet TAKE 1 TABLET(S) BY ORAL ROUTE 2 TIME(S) PER DAY     • cetirizine (ZyrTEC) 10 mg tablet TAKE 1 TABLET (10 MG TOTAL) BY MOUTH DAILY 90 tablet 5 • cholecalciferol (VITAMIN D3) 1,000 units tablet Take 1 tablet (1,000 Units total) by mouth daily 56 tablet 0   • cloNIDine (CATAPRES) 0 1 mg tablet TAKE 1 TABLET BY ORAL ROUTE 2 TIMES PER DAY     • docusate sodium (COLACE) 100 mg capsule TAKE 1 CAPSULE (100 MG TOTAL) BY MOUTH 2 (TWO) TIMES A DAY 60 capsule 2   • escitalopram (LEXAPRO) 20 mg tablet Take 1 tablet (20 mg total) by mouth daily 30 tablet 1   • famotidine (PEPCID) 20 mg tablet TAKE 1 TABLET (20 MG TOTAL) BY MOUTH 2 (TWO) TIMES A DAY 60 tablet 0   • finasteride (PROSCAR) 5 mg tablet Take 1 tablet (5 mg total) by mouth daily for 90 doses 90 tablet 3   • Fluticasone-Salmeterol (Advair Diskus) 500-50 mcg/dose inhaler Inhale 1 puff 2 (two) times a day Rinse mouth after use  60 blister 2   • fluticasone-salmeterol (Advair HFA) 115-21 MCG/ACT inhaler Inhale 2 puffs 2 (two) times a day 36 g 3   • fluticasone-salmeterol (ADVAIR, WIXELA) 500-50 mcg/dose inhaler Inhale 1 puff 2 (two) times a day Rinse mouth after use   1 each 3   • hydrOXYzine pamoate (VISTARIL) 50 mg capsule Take 50 mg by mouth daily at bedtime     • ibuprofen (MOTRIN) 600 mg tablet Take 1 tablet (600 mg total) by mouth every 6 (six) hours as needed for mild pain or moderate pain 60 tablet 0   • loratadine (CLARITIN) 10 mg tablet Take 1 tablet (10 mg total) by mouth daily 30 tablet 1   • naltrexone (REVIA) 50 mg tablet Take 50 mg by mouth daily     • ondansetron (ZOFRAN) 4 mg tablet Take 1 tablet (4 mg total) by mouth every 8 (eight) hours as needed for nausea or vomiting 20 tablet 3   • pantoprazole (PROTONIX) 40 mg tablet TAKE 1 TABLET (40 MG TOTAL) BY MOUTH DAILY 60 tablet 3   • tamsulosin (FLOMAX) 0 4 mg TAKE 1 CAPSULE (0 4 MG TOTAL) BY MOUTH DAILY WITH DINNER 90 capsule 3   • traZODone (DESYREL) 50 mg tablet Take 0 5 tablets (25 mg total) by mouth daily at bedtime 15 tablet 1   • albuterol (PROVENTIL HFA,VENTOLIN HFA) 90 mcg/act inhaler INHALE 1 PUFF EVERY 6 (SIX) HOURS AS NEEDED FOR WHEEZING (Patient not taking: Reported on 1/11/2023) 17 g 10   • ALPRAZolam (XANAX) 0 5 mg tablet Take 1 tablet (0 5 mg total) by mouth 2 (two) times a day (Patient not taking: Reported on 1/24/2023) 60 tablet 0   • cloNIDine (CATAPRES) 0 2 mg tablet Take 0 2 mg by mouth 2 (two) times a day (Patient not taking: Reported on 3/16/2023)     • Diclofenac Sodium (VOLTAREN) 1 % APPLY 1 INCH TO EACH KNEE TWICE DAILY (Patient not taking: Reported on 1/11/2023)     • QUEtiapine (SEROquel) 50 mg tablet Take 3 tablets (150 mg total) by mouth daily at bedtime (Patient not taking: Reported on 3/16/2023) 90 tablet 1   • tiotropium (Spiriva HandiHaler) 18 mcg inhalation capsule Place 1 capsule (18 mcg total) into inhaler and inhale daily (Patient not taking: Reported on 3/16/2023) 30 capsule 0   • Vraylar 1 5 MG capsule Take 1 5 mg by mouth daily at bedtime (Patient not taking: Reported on 1/11/2023)       No current facility-administered medications for this visit  ALLERGIES:     Allergies   Allergen Reactions   • Seasonal Ic [Cholestatin] Sneezing       SOCIAL HISTORY:     Social History     Socioeconomic History   • Marital status:       Spouse name: None   • Number of children: None   • Years of education: None   • Highest education level: None   Occupational History   • Occupation: unemployed   Tobacco Use   • Smoking status: Heavy Smoker     Packs/day: 1 00     Years: 20 00     Pack years: 20 00     Types: Cigarettes   • Smokeless tobacco: Never   Vaping Use   • Vaping Use: Never used   Substance and Sexual Activity   • Alcohol use: Yes     Comment: every other day 1-2 cans   • Drug use: Never   • Sexual activity: Not Currently   Other Topics Concern   • None   Social History Narrative    Lives independently     Social Determinants of Health     Financial Resource Strain: Low Risk    • Difficulty of Paying Living Expenses: Not very hard   Food Insecurity: No Food Insecurity   • Worried About Running Out of Food in the Last Year: Never true   • Ran Out of Food in the Last Year: Never true   Transportation Needs: No Transportation Needs   • Lack of Transportation (Medical): No   • Lack of Transportation (Non-Medical): No   Physical Activity: Not on file   Stress: No Stress Concern Present   • Feeling of Stress : Not at all   Social Connections: Not on file   Intimate Partner Violence: Not on file   Housing Stability: Unknown   • Unable to Pay for Housing in the Last Year: No   • Number of Places Lived in the Last Year: Not on file   • Unstable Housing in the Last Year: No       SOCIAL HISTORY:     Family History   Problem Relation Age of Onset   • Prostate cancer Paternal Uncle        REVIEW OF SYSTEMS:     Review of Systems   Constitutional: Negative  Respiratory: Negative  Cardiovascular: Negative  Gastrointestinal: Negative  Genitourinary: Positive for dysuria, frequency and urgency  Musculoskeletal: Negative  Skin: Negative  Psychiatric/Behavioral: Negative  PHYSICAL EXAM:     /82 (BP Location: Left arm, Patient Position: Sitting, Cuff Size: Adult)   Pulse 73   Ht 5' 6" (1 676 m)   Wt 61 kg (134 lb 6 4 oz)   SpO2 98%   BMI 21 69 kg/m²     Physical Exam  HENT:      Head: Normocephalic  Mouth/Throat:      Mouth: Mucous membranes are moist    Eyes:      Pupils: Pupils are equal, round, and reactive to light  Cardiovascular:      Rate and Rhythm: Normal rate  Pulmonary:      Effort: Pulmonary effort is normal    Abdominal:      General: Abdomen is flat  Musculoskeletal:         General: Normal range of motion  Neurological:      General: No focal deficit present  Mental Status: He is alert     Psychiatric:         Mood and Affect: Mood normal          LABS:     CBC:   Lab Results   Component Value Date    WBC 5 76 05/09/2022    HGB 14 8 05/09/2022    HCT 45 4 05/09/2022    MCV 96 05/09/2022     05/09/2022       BMP:   Lab Results   Component Value Date GLUCOSE 93 2014    CALCIUM 9 0 2022     (L) 2014    K 4 2 2022    CO2 29 2022     2022    BUN 15 2022    CREATININE 0 96 2022         IMAGIN/16/23  RENAL ULTRASOUND     INDICATION:   N40 1: Benign prostatic hyperplasia with lower urinary tract symptoms      COMPARISON: Prior ultrasound study dated 2019      TECHNIQUE:   Ultrasound of the retroperitoneum was performed with a curvilinear transducer utilizing volumetric sweeps and still imaging techniques       FINDINGS:     KIDNEYS:  Symmetric and normal size  Right kidney:  11 0 x 4 1 x 4 4 cm  Volume 105 0 mL  Left kidney:  10 7 x 4 5 x 4 4 cm  Volume 110 4 mL     Right kidney  Normal echogenicity and contour  No mass is identified  No hydronephrosis  No shadowing calculi  No perinephric fluid collections      Left kidney  Normal echogenicity and contour  There is a 1 1 x 1 7 x 1 3 cm cyst seen in the interpolar region  No hydronephrosis  No shadowing calculi  No perinephric fluid collections      URETERS:  Nonvisualized      BLADDER:   Not well distended  No focal thickening or mass lesions  A left ureteral jet identified  A right ureteral jet could not be visualized during the examination  The prostate measures 3 5 x 4 6 x 3 9 cm for a volume of 36 2 mL         IMPRESSION:     No hydronephrosis  Left renal cyst     PROCEDURE:     SEE NOTE    ASSESSMENT:     59 y o  male  with urinary frequency dysuria and complaints    PLAN:     After discussion with the patient and review of his most recent note, he was agreeable to proceed with cystoscopy  Patient was noted to have some stenosis at the apex of the prostate and sphincter  It was difficult to assess whether this was related to patient discomfort and muscular contraction or stricture or stenosis  He was noted to have an enlarged prostate with coaptation and a intravesical component of the prostate    Given his ongoing symptoms, I think it would be reasonable to consider adding medication to his regimen  He reports he is no longer sexually active and I have recommended the addition of finasteride to his regimen of Flomax  Patient to be reassessed in 6 months time

## 2023-03-17 ENCOUNTER — PATIENT OUTREACH (OUTPATIENT)
Dept: FAMILY MEDICINE CLINIC | Facility: CLINIC | Age: 58
End: 2023-03-17

## 2023-03-18 NOTE — PROGRESS NOTES
Chart Review:  3/17/2023    [facility] completed chart review to check on status of SNAP application  Status states pt has been approved for SNAP  [facility] attempted to call pt and VM was left  Next outreach is scheduled for 3/20/2023

## 2023-03-20 ENCOUNTER — PATIENT OUTREACH (OUTPATIENT)
Dept: FAMILY MEDICINE CLINIC | Facility: CLINIC | Age: 58
End: 2023-03-20

## 2023-03-20 NOTE — PROGRESS NOTES
Outgoing Calls:  3/20/2023    [facility] called [facility] to inquire about amount of SNAP benefits pt was approved for as well as status of EBT card  [facility]OC was informed pt was approved for $281 a month and can have a new EBT card mailed to him which will take up to two weeks to arrive or go to the local office and pick one up in person  [facility] called [facility] to speak with pt and was informed agency is closed to clients today however will reopen tomorrow at [address] called pt and he did answer call  Pt states he will go to local [facility] office in person sometime this week to  a new card  Pt is aware this referral will be closed as needs have been met  No further outreach is scheduled

## 2023-03-20 NOTE — PROGRESS NOTES
AZALIA HURTADO received in-basket from [facility] Nancie indicating [facility] referral will be closed as patient was approved $281/mo in SNAP benefits/food stamps  A new EBT card is available and patient will pick it up in person at local [facility] office  AZALIA HURTADO will close referral as goals are met at this time  Patient has active SNAP benefits and was re-established at [facility]

## 2023-03-21 ENCOUNTER — PATIENT OUTREACH (OUTPATIENT)
Dept: OTHER | Facility: OTHER | Age: 58
End: 2023-03-21

## 2023-03-21 DIAGNOSIS — E55.9 VITAMIN D DEFICIENCY: ICD-10-CM

## 2023-03-21 NOTE — PROGRESS NOTES
3/21/23: Received SNAP card yesterday  Reported that he went to urology for testing  Was told he is doing well  Prostate is slightly enlarged but does not need surgery  To return to see urologist in 6 months

## 2023-03-22 ENCOUNTER — PATIENT OUTREACH (OUTPATIENT)
Dept: OTHER | Facility: OTHER | Age: 58
End: 2023-03-22

## 2023-03-22 RX ORDER — MELATONIN
1000 DAILY
Qty: 56 TABLET | Refills: 0 | Status: SHIPPED | OUTPATIENT
Start: 2023-03-22 | End: 2023-05-17

## 2023-03-22 NOTE — PROGRESS NOTES
3/22/23: Client requested COVID antigen testing be performed  Performed test  Result negative  Client informed  Reviewed infection prevention information

## 2023-03-28 ENCOUNTER — PATIENT OUTREACH (OUTPATIENT)
Dept: OTHER | Facility: OTHER | Age: 58
End: 2023-03-28

## 2023-03-28 NOTE — PROGRESS NOTES
3/28/23: Received $280 00 in SNAP benefits  Also has about $21 in cash benefits  Also reported having right elbow pain in lateral aspect of elbow  Has sharp pain with heavy lifting  Has occurred for over a month  Stated I will report to PCP

## 2023-03-29 ENCOUNTER — PATIENT OUTREACH (OUTPATIENT)
Dept: OTHER | Facility: OTHER | Age: 58
End: 2023-03-29

## 2023-03-29 PROBLEM — K52.9 GASTROENTERITIS: Status: RESOLVED | Noted: 2018-11-26 | Resolved: 2023-03-29

## 2023-03-30 NOTE — PROGRESS NOTES
3/29/23: Client requested a COVID antigen test to be performed  Performed test: result negative  Informed client of results  Requested test because he visits his friend in a rehabilitation center and she remains vulnerable

## 2023-04-25 ENCOUNTER — PATIENT OUTREACH (OUTPATIENT)
Dept: OTHER | Facility: OTHER | Age: 58
End: 2023-04-25

## 2023-04-25 NOTE — PROGRESS NOTES
4/25/23: Client  Reported having sharp pain in right knee: 8/10 with change in seasons  Unsure of what to do  Recommended cold or heat depending on what works for him  Or use icy-hot  to avoid using oral medication  Will route note to PCP  Elbow has improved post injection

## 2023-04-26 ENCOUNTER — PATIENT OUTREACH (OUTPATIENT)
Dept: OTHER | Facility: OTHER | Age: 58
End: 2023-04-26

## 2023-04-26 DIAGNOSIS — K52.9 GASTROENTERITIS: ICD-10-CM

## 2023-04-26 DIAGNOSIS — J44.9 CHRONIC OBSTRUCTIVE PULMONARY DISEASE, UNSPECIFIED COPD TYPE (HCC): ICD-10-CM

## 2023-04-26 NOTE — PROGRESS NOTES
4/26/23: Reported left pain which is sharp - 8/10- which he stated is almost constant  Knee pain vacillates  Reported vomiting about 15 minutes after many meals although is taking meds appropriately  Took Tylenol 30 minutes ago- no change in pain  Called [facility] to make sooner appointment than 5/8: call dropped x 2   Routed note to Dr Bernice Mack returned call: 4/28/23 at 11:20 new appointment for focused visit

## 2023-04-27 RX ORDER — ONDANSETRON 4 MG/1
4 TABLET, FILM COATED ORAL EVERY 8 HOURS PRN
Qty: 20 TABLET | Refills: 3 | Status: SHIPPED | OUTPATIENT
Start: 2023-04-27

## 2023-04-27 RX ORDER — FLUTICASONE PROPIONATE AND SALMETEROL 50; 500 UG/1; UG/1
POWDER RESPIRATORY (INHALATION)
Qty: 60 BLISTER | Refills: 0 | Status: SHIPPED | OUTPATIENT
Start: 2023-04-27

## 2023-04-28 ENCOUNTER — OFFICE VISIT (OUTPATIENT)
Dept: FAMILY MEDICINE CLINIC | Facility: CLINIC | Age: 58
End: 2023-04-28

## 2023-04-28 ENCOUNTER — HOSPITAL ENCOUNTER (OUTPATIENT)
Dept: RADIOLOGY | Facility: HOSPITAL | Age: 58
Discharge: HOME | End: 2023-04-28

## 2023-04-28 VITALS
BODY MASS INDEX: 22.02 KG/M2 | RESPIRATION RATE: 16 BRPM | WEIGHT: 137 LBS | SYSTOLIC BLOOD PRESSURE: 120 MMHG | DIASTOLIC BLOOD PRESSURE: 80 MMHG | OXYGEN SATURATION: 99 % | TEMPERATURE: 97.4 F | HEIGHT: 66 IN | HEART RATE: 73 BPM

## 2023-04-28 DIAGNOSIS — Z13.9 ENCOUNTER FOR HEALTH-RELATED SCREENING: ICD-10-CM

## 2023-04-28 DIAGNOSIS — Z23 ENCOUNTER FOR IMMUNIZATION: ICD-10-CM

## 2023-04-28 DIAGNOSIS — M25.512 LEFT SHOULDER PAIN, UNSPECIFIED CHRONICITY: ICD-10-CM

## 2023-04-28 DIAGNOSIS — M25.552 PAIN OF LEFT HIP: ICD-10-CM

## 2023-04-28 DIAGNOSIS — M25.552 PAIN OF LEFT HIP: Primary | ICD-10-CM

## 2023-04-29 PROBLEM — M25.512 LEFT SHOULDER PAIN: Status: ACTIVE | Noted: 2023-04-29

## 2023-04-29 PROBLEM — M25.552 PAIN OF LEFT HIP: Status: ACTIVE | Noted: 2023-04-29

## 2023-04-29 NOTE — PROGRESS NOTES
Assessment/Plan:    1  Pain of left hip  Assessment & Plan:  Differential includes septic joint, however less likely given that patient is afebrile and joint is not red and hot  Other consideration can be SCFE however not appropriate age range  Plan  Will initiate plain film  We will do ambulatory referral to physical therapy    Orders:  -     Ambulatory Referral to Physical Therapy; Future    2  Left shoulder pain, unspecified chronicity  Assessment & Plan:  Point tenderness in the muscle of suprascapular  Joint is not tender to palpation  Range of motion intact  Plan  We will obtain plain film  We will refer to physical therapy    Orders:  -     Ambulatory Referral to Physical Therapy; Future  -     XR shoulder 2+ vw left; Future; Expected date: 04/28/2023    3  Encounter for immunization  -     Pneumococcal Conjugate Vaccine 20-valent (PCV20)  -     HEPATITIS A VACCINE ADULT IM    4  Encounter for health-related screening  Comments: Will address colonoscopy and subsequent visit  Subjective:      Patient ID: Enrique Cheng is a 59 y o  male  Coming in for left-sided hip pain  He states that it started a couple days ago while doing work at the house  Patient reports that the pain is exacerbated with ambulating or when he lays on it  Patient denies any fevers, chills or systemic signs of infection  Patient also states that he has left shoulder pain  He states that this has been chronic in nature however is also exacerbated when he lays on his side  Patient would like to get evaluated  The following portions of the patient's history were reviewed and updated as appropriate: allergies, current medications, past family history, past medical history, past social history, past surgical history, and problem list     Review of Systems   Constitutional: Negative for chills and fever  HENT: Negative for ear pain and sore throat  Eyes: Negative for pain and visual disturbance  "  Respiratory: Negative for cough and shortness of breath  Cardiovascular: Negative for chest pain and palpitations  Gastrointestinal: Negative for abdominal pain and vomiting  Genitourinary: Negative for dysuria and hematuria  Musculoskeletal: Positive for arthralgias  Negative for back pain  Skin: Negative for color change and rash  Neurological: Negative for seizures and syncope  All other systems reviewed and are negative  Objective:      /80 (BP Location: Left arm, Patient Position: Sitting, Cuff Size: Standard)   Pulse 73   Temp (!) 97 4 °F (36 3 °C) (Temporal)   Resp 16   Ht 5' 6\" (1 676 m)   Wt 62 1 kg (137 lb)   SpO2 99%   BMI 22 11 kg/m²          Physical Exam  Constitutional:       General: He is not in acute distress  Appearance: Normal appearance  He is not toxic-appearing or diaphoretic  HENT:      Head: Normocephalic  Mouth/Throat:      Mouth: Mucous membranes are moist    Eyes:      Extraocular Movements: Extraocular movements intact  Pupils: Pupils are equal, round, and reactive to light  Cardiovascular:      Rate and Rhythm: Normal rate and regular rhythm  Pulmonary:      Effort: Pulmonary effort is normal  No tachypnea  Breath sounds: Normal breath sounds  No wheezing or rales  Chest:      Chest wall: No edema  There is no dullness to percussion  Abdominal:      General: Abdomen is flat  There is no distension  Palpations: Abdomen is soft  Tenderness: There is no abdominal tenderness  Musculoskeletal:      Right shoulder: No swelling, deformity or bony tenderness  Left shoulder: Bony tenderness (Suprascapular) present  No swelling or deformity  Right hip: No deformity, lacerations, tenderness, bony tenderness or crepitus  Normal range of motion  Normal strength  Left hip: Tenderness present  No deformity, lacerations, bony tenderness or crepitus  Normal range of motion  Normal strength        Right lower leg: " No edema  Left lower leg: No edema  Skin:     Capillary Refill: Capillary refill takes less than 2 seconds  Neurological:      General: No focal deficit present  Mental Status: He is alert and oriented to person, place, and time     Psychiatric:         Mood and Affect: Mood normal          Behavior: Behavior normal            Meghan Loya DO   Family Medicine PGY-2  4/29/2023

## 2023-04-29 NOTE — ASSESSMENT & PLAN NOTE
Point tenderness in the muscle of suprascapular  Joint is not tender to palpation  Range of motion intact      Plan  We will obtain plain film  We will refer to physical therapy

## 2023-04-29 NOTE — ASSESSMENT & PLAN NOTE
Differential includes septic joint, however less likely given that patient is afebrile and joint is not red and hot  Other consideration can be SCFE however not appropriate age range      Plan  Will initiate plain film  We will do ambulatory referral to physical therapy

## 2023-05-08 ENCOUNTER — OFFICE VISIT (OUTPATIENT)
Dept: FAMILY MEDICINE CLINIC | Facility: CLINIC | Age: 58
End: 2023-05-08

## 2023-05-08 VITALS
HEIGHT: 66 IN | OXYGEN SATURATION: 98 % | HEART RATE: 84 BPM | SYSTOLIC BLOOD PRESSURE: 138 MMHG | BODY MASS INDEX: 21.86 KG/M2 | RESPIRATION RATE: 18 BRPM | TEMPERATURE: 97.6 F | DIASTOLIC BLOOD PRESSURE: 84 MMHG | WEIGHT: 136 LBS

## 2023-05-08 DIAGNOSIS — K52.9 GASTROENTERITIS: ICD-10-CM

## 2023-05-08 DIAGNOSIS — K21.00 GASTROESOPHAGEAL REFLUX DISEASE WITH ESOPHAGITIS, UNSPECIFIED WHETHER HEMORRHAGE: Primary | ICD-10-CM

## 2023-05-08 DIAGNOSIS — Z12.11 ENCOUNTER FOR SCREENING COLONOSCOPY: ICD-10-CM

## 2023-05-08 RX ORDER — ONDANSETRON 4 MG/1
4 TABLET, FILM COATED ORAL EVERY 8 HOURS PRN
Qty: 20 TABLET | Refills: 3 | Status: SHIPPED | OUTPATIENT
Start: 2023-05-08

## 2023-05-08 NOTE — PATIENT INSTRUCTIONS
Medicare Preventive Visit Patient Instructions  Thank you for completing your Welcome to Medicare Visit or Medicare Annual Wellness Visit today  Your next wellness visit will be due in one year (5/8/2024)  The screening/preventive services that you may require over the next 5-10 years are detailed below  Some tests may not apply to you based off risk factors and/or age  Screening tests ordered at today's visit but not completed yet may show as past due  Also, please note that scanned in results may not display below  Preventive Screenings:  Service Recommendations Previous Testing/Comments   Colorectal Cancer Screening  · Colonoscopy    · Fecal Occult Blood Test (FOBT)/Fecal Immunochemical Test (FIT)  · Fecal DNA/Cologuard Test  · Flexible Sigmoidoscopy Age: 39-70 years old   Colonoscopy: every 10 years (May be performed more frequently if at higher risk)  OR  FOBT/FIT: every 1 year  OR  Cologuard: every 3 years  OR  Sigmoidoscopy: every 5 years  Screening may be recommended earlier than age 39 if at higher risk for colorectal cancer  Also, an individualized decision between you and your healthcare provider will decide whether screening between the ages of 74-80 would be appropriate   Colonoscopy: 04/17/2012  FOBT/FIT: Not on file  Cologuard: Not on file  Sigmoidoscopy: Not on file          Prostate Cancer Screening Individualized decision between patient and health care provider in men between ages of 53-78   Medicare will cover every 12 months beginning on the day after your 50th birthday PSA: 1 3 ng/mL     Screening Current     Hepatitis C Screening Once for adults born between 1945 and 1965  More frequently in patients at high risk for Hepatitis C Hep C Antibody: 09/27/2022    Screening Current   Diabetes Screening 1-2 times per year if you're at risk for diabetes or have pre-diabetes Fasting glucose: 90 mg/dL (5/9/2022)  A1C: 4 9 % (2/11/2020)  Screening Current   Cholesterol Screening Once every 5 years if you don't have a lipid disorder  May order more often based on risk factors  Lipid panel: 05/09/2022  Screening Not Indicated  History Lipid Disorder      Other Preventive Screenings Covered by Medicare:  1  Abdominal Aortic Aneurysm (AAA) Screening: covered once if your at risk  You're considered to be at risk if you have a family history of AAA or a male between the age of 73-68 who smoking at least 100 cigarettes in your lifetime  2  Lung Cancer Screening: covers low dose CT scan once per year if you meet all of the following conditions: (1) Age 50-69; (2) No signs or symptoms of lung cancer; (3) Current smoker or have quit smoking within the last 15 years; (4) You have a tobacco smoking history of at least 20 pack years (packs per day x number of years you smoked); (5) You get a written order from a healthcare provider  3  Glaucoma Screening: covered annually if you're considered high risk: (1) You have diabetes OR (2) Family history of glaucoma OR (3)  aged 48 and older OR (3)  American aged 72 and older  3  Osteoporosis Screening: covered every 2 years if you meet one of the following conditions: (1) Have a vertebral abnormality; (2) On glucocorticoid therapy for more than 3 months; (3) Have primary hyperparathyroidism; (4) On osteoporosis medications and need to assess response to drug therapy  5  HIV Screening: covered annually if you're between the age of 12-76  Also covered annually if you are younger than 13 and older than 72 with risk factors for HIV infection  For pregnant patients, it is covered up to 3 times per pregnancy      Immunizations:  Immunization Recommendations   Influenza Vaccine Annual influenza vaccination during flu season is recommended for all persons aged >= 6 months who do not have contraindications   Pneumococcal Vaccine   * Pneumococcal conjugate vaccine = PCV13 (Prevnar 13), PCV15 (Vaxneuvance), PCV20 (Prevnar 20)  * Pneumococcal polysaccharide vaccine = PPSV23 (Pneumovax) Adults 2364 years old: 1-3 doses may be recommended based on certain risk factors  Adults 72 years old: 1-2 doses may be recommended based off what pneumonia vaccine you previously received   Hepatitis B Vaccine 3 dose series if at intermediate or high risk (ex: diabetes, end stage renal disease, liver disease)   Tetanus (Td) Vaccine - COST NOT COVERED BY MEDICARE PART B Following completion of primary series, a booster dose should be given every 10 years to maintain immunity against tetanus  Td may also be given as tetanus wound prophylaxis  Tdap Vaccine - COST NOT COVERED BY MEDICARE PART B Recommended at least once for all adults  For pregnant patients, recommended with each pregnancy  Shingles Vaccine (Shingrix) - COST NOT COVERED BY MEDICARE PART B  2 shot series recommended in those aged 48 and above     Health Maintenance Due:      Topic Date Due   • Colorectal Cancer Screening  04/17/2022   • Lung Cancer Screening  10/24/2023   • HIV Screening  Completed   • Hepatitis C Screening  Completed     Immunizations Due:  There are no preventive care reminders to display for this patient  Advance Directives   What are advance directives? Advance directives are legal documents that state your wishes and plans for medical care  These plans are made ahead of time in case you lose your ability to make decisions for yourself  Advance directives can apply to any medical decision, such as the treatments you want, and if you want to donate organs  What are the types of advance directives? There are many types of advance directives, and each state has rules about how to use them  You may choose a combination of any of the following:  · Living will: This is a written record of the treatment you want  You can also choose which treatments you do not want, which to limit, and which to stop at a certain time  This includes surgery, medicine, IV fluid, and tube feedings     · Durable power of  for healthcare [facility] [place]): This is a written record that states who you want to make healthcare choices for you when you are unable to make them for yourself  This person, called a proxy, is usually a family member or a friend  You may choose more than 1 proxy  · Do not resuscitate (DNR) order:  A DNR order is used in case your heart stops beating or you stop breathing  It is a request not to have certain forms of treatment, such as CPR  A DNR order may be included in other types of advance directives  · Medical directive: This covers the care that you want if you are in a coma, near death, or unable to make decisions for yourself  You can list the treatments you want for each condition  Treatment may include pain medicine, surgery, blood transfusions, dialysis, IV or tube feedings, and a ventilator (breathing machine)  · Values history: This document has questions about your views, beliefs, and how you feel and think about life  This information can help others choose the care that you would choose  Why are advance directives important? An advance directive helps you control your care  Although spoken wishes may be used, it is better to have your wishes written down  Spoken wishes can be misunderstood, or not followed  Treatments may be given even if you do not want them  An advance directive may make it easier for your family to make difficult choices about your care  Cigarette Smoking and Your Health   Risks to your health if you smoke:  Nicotine and other chemicals found in tobacco damage every cell in your body  Even if you are a light smoker, you have an increased risk for cancer, heart disease, and lung disease  If you are pregnant or have diabetes, smoking increases your risk for complications  Benefits to your health if you stop smoking:   · You decrease respiratory symptoms such as coughing, wheezing, and shortness of breath     · You reduce your risk for cancers of the lung, mouth, throat, kidney, bladder, pancreas, stomach, and cervix  If you already have cancer, you increase the benefits of chemotherapy  You also reduce your risk for cancer returning or a second cancer from developing  · You reduce your risk for heart disease, blood clots, heart attack, and stroke  · You reduce your risk for lung infections, and diseases such as pneumonia, asthma, chronic bronchitis, and emphysema  · Your circulation improves  More oxygen can be delivered to your body  If you have diabetes, you lower your risk for complications, such as kidney, artery, and eye diseases  You also lower your risk for nerve damage  Nerve damage can lead to amputations, poor vision, and blindness  · You improve your body's ability to heal and to fight infections  For more information and support to stop smoking:   · Smokefree  gov  Phone: 2- 251 - 216-8545  Web Address: www [employer]  [facility] [employer] 2018 Information is for End User's use only and may not be sold, redistributed or otherwise used for commercial purposes   All illustrations and images included in CareNotes® are the copyrighted property of A D A M , Inc  or [address] [place] [address]

## 2023-05-08 NOTE — PROGRESS NOTES
Assessment and Plan:     Problem List Items Addressed This Visit    None  Visit Diagnoses     Gastroesophageal reflux disease with esophagitis, unspecified whether hemorrhage    -  Primary    Not responsive to Protonix therapy  Would likely benefit from an endoscopy to rule out H  pylori  Relevant Orders    Ambulatory Referral to General Surgery    Encounter for screening colonoscopy        Relevant Orders    Ambulatory Referral to General Surgery           Preventive health issues were discussed with patient, and age appropriate screening tests were ordered as noted in patient's After Visit Summary  Personalized health advice and appropriate referrals for health education or preventive services given if needed, as noted in patient's After Visit Summary  History of Present Illness:     Patient presents for a Medicare Wellness Visit     Patient is here for his medical wellness visit  Patient also here to follow-up on plain films for his left shoulder and left hip  Both of which showed arthritic changes  Patient is scheduled for physical therapy appointment tomorrow  Otherwise patient is doing well  Patient Care Team:  Bakari Thapa DO as PCP - General (Family Medicine)  Vanessa Morales DO (Pain Medicine)  Russ Marrero RN as Sushma El RN as [facility] Nursing     Review of Systems:     Review of Systems   Constitutional: Negative for chills and fever  HENT: Negative for ear pain and sore throat  Eyes: Negative for pain and visual disturbance  Respiratory: Negative for cough and shortness of breath  Cardiovascular: Negative for chest pain and palpitations  Gastrointestinal: Negative for abdominal pain and vomiting  Genitourinary: Negative for dysuria and hematuria  Musculoskeletal: Positive for arthralgias  Negative for back pain  Skin: Negative for color change and rash  Neurological: Negative for seizures and syncope     All other systems reviewed and are negative  Problem List:     Patient Active Problem List   Diagnosis   • Anxiety   • Major depression   • Mixed hyperlipidemia   • Tobacco dependence syndrome   • COPD (chronic obstructive pulmonary disease) ([facility])   • BPH (benign prostatic hyperplasia)   • Postnasal drip   • Seborrheic dermatitis   • Gastroesophageal reflux disease without esophagitis   • OA (osteoarthritis) of neck   • Other constipation   • Hiatal hernia   • Chronic pain of both knees   • Screen for STD (sexually transmitted disease)   • Prostate cancer screening   • Premature ejaculation   • Allergic rhinitis   • Dysuria   • Testicular discomfort   • Pain of left hip   • Left shoulder pain      Past Medical and Surgical History:     Past Medical History:   Diagnosis Date   • Anxiety    • Asthma    • COPD (chronic obstructive pulmonary disease) ([facility] [address] )    • Depression    • GERD (gastroesophageal reflux disease)    • Hyperlipidemia    • Hypertension    • Suicide attempt [facility])      Past Surgical History:   Procedure Laterality Date   • ESOPHAGOGASTRODUODENOSCOPY N/A 3/1/2019    Procedure: ESOPHAGOGASTRODUODENOSCOPY (EGD); Surgeon: Rosario Gutierrez MD;  Location: [facility] GI LAB; Service: Gastroenterology   • FRACTURE SURGERY      ORIF   • HERNIA REPAIR     • SKIN GRAFT        Family History:     Family History   Problem Relation Age of Onset   • Prostate cancer Paternal Uncle       Social History:     Social History     Socioeconomic History   • Marital status:       Spouse name: None   • Number of children: None   • Years of education: None   • Highest education level: None   Occupational History   • Occupation: unemployed   Tobacco Use   • Smoking status: Heavy Smoker     Packs/day: 1 00     Years: 20 00     Pack years: 20 00     Types: Cigarettes     Passive exposure: Current   • Smokeless tobacco: Never   Vaping Use   • Vaping Use: Never used   Substance and Sexual Activity   • Alcohol use: Yes     Comment: every other day 1-2 cans   • Drug use: Never   • Sexual activity: Not Currently   Other Topics Concern   • None   Social History Narrative    Lives independently     Social Determinants of Health     Financial Resource Strain: Low Risk    • Difficulty of Paying Living Expenses: Not very hard   Food Insecurity: No Food Insecurity   • Worried About Running Out of Food in the Last Year: Never true   • Ran Out of Food in the Last Year: Never true   Transportation Needs: No Transportation Needs   • Lack of Transportation (Medical): No   • Lack of Transportation (Non-Medical): No   Physical Activity: Not on file   Stress: No Stress Concern Present   • Feeling of Stress : Not at all   Social Connections: Not on file   Intimate Partner Violence: Not on file   Housing Stability: Unknown   • Unable to Pay for Housing in the Last Year: No   • Number of Places Lived in the Last Year: Not on file   • Unstable Housing in the Last Year: No      Medications and Allergies:     Current Outpatient Medications   Medication Sig Dispense Refill   • Advair Diskus 500-50 MCG/ACT inhaler INHALE 1 PUFF 2 (TWO) TIMES A DAY RINSE MOUTH AFTER USE   60 blister 0   • albuterol (PROVENTIL HFA,VENTOLIN HFA) 90 mcg/act inhaler INHALE 1 PUFF EVERY 6 (SIX) HOURS AS NEEDED FOR WHEEZING (Patient not taking: Reported on 1/11/2023) 17 g 10   • ALPRAZolam (XANAX) 0 5 mg tablet Take 1 tablet (0 5 mg total) by mouth 2 (two) times a day (Patient not taking: Reported on 1/24/2023) 60 tablet 0   • atorvastatin (LIPITOR) 10 mg tablet Take 1 tablet (10 mg total) by mouth daily 90 tablet 3   • buPROPion (WELLBUTRIN SR) 150 mg 12 hr tablet TAKE 1 TABLET (150 MG TOTAL) BY MOUTH 2 (TWO) TIMES A DAY 60 tablet 2   • busPIRone (BUSPAR) 10 mg tablet TAKE 1 TABLET(S) BY ORAL ROUTE 2 TIME(S) PER DAY     • cetirizine (ZyrTEC) 10 mg tablet TAKE 1 TABLET (10 MG TOTAL) BY MOUTH DAILY 90 tablet 5   • cholecalciferol (VITAMIN D3) 1,000 units tablet TAKE 1 TABLET (1,000 UNITS TOTAL) BY MOUTH DAILY 56 tablet 0   • cloNIDine (CATAPRES) 0 1 mg tablet TAKE 1 TABLET BY ORAL ROUTE 2 TIMES PER DAY     • cloNIDine (CATAPRES) 0 2 mg tablet Take 0 2 mg by mouth 2 (two) times a day (Patient not taking: Reported on 3/16/2023)     • Diclofenac Sodium (VOLTAREN) 1 % APPLY 1 INCH TO EACH KNEE TWICE DAILY (Patient not taking: Reported on 1/11/2023)     • docusate sodium (COLACE) 100 mg capsule TAKE 1 CAPSULE (100 MG TOTAL) BY MOUTH 2 (TWO) TIMES A DAY 60 capsule 2   • escitalopram (LEXAPRO) 20 mg tablet Take 1 tablet (20 mg total) by mouth daily 30 tablet 1   • famotidine (PEPCID) 20 mg tablet TAKE 1 TABLET (20 MG TOTAL) BY MOUTH 2 (TWO) TIMES A DAY 60 tablet 0   • finasteride (PROSCAR) 5 mg tablet Take 1 tablet (5 mg total) by mouth daily for 90 doses 90 tablet 3   • Fluticasone-Salmeterol (Advair Diskus) 500-50 mcg/dose inhaler Inhale 1 puff 2 (two) times a day Rinse mouth after use  60 blister 2   • fluticasone-salmeterol (Advair HFA) 115-21 MCG/ACT inhaler Inhale 2 puffs 2 (two) times a day 36 g 3   • fluticasone-salmeterol (ADVAIR, WIXELA) 500-50 mcg/dose inhaler Inhale 1 puff 2 (two) times a day Rinse mouth after use   1 each 3   • hydrOXYzine pamoate (VISTARIL) 50 mg capsule Take 50 mg by mouth daily at bedtime     • ibuprofen (MOTRIN) 600 mg tablet Take 1 tablet (600 mg total) by mouth every 6 (six) hours as needed for mild pain or moderate pain 60 tablet 0   • loratadine (CLARITIN) 10 mg tablet Take 1 tablet (10 mg total) by mouth daily 30 tablet 1   • naltrexone (REVIA) 50 mg tablet Take 50 mg by mouth daily     • ondansetron (ZOFRAN) 4 mg tablet TAKE 1 TABLET (4 MG TOTAL) BY MOUTH EVERY 8 (EIGHT) HOURS AS NEEDED FOR NAUSEA OR VOMITING 20 tablet 3   • pantoprazole (PROTONIX) 40 mg tablet TAKE 1 TABLET (40 MG TOTAL) BY MOUTH DAILY 60 tablet 3   • QUEtiapine (SEROquel) 50 mg tablet Take 3 tablets (150 mg total) by mouth daily at bedtime (Patient not taking: Reported on 3/16/2023) 90 tablet 1   • tamsulosin (FLOMAX) 0 4 mg TAKE 1 CAPSULE (0 4 MG TOTAL) BY MOUTH DAILY WITH DINNER 90 capsule 3   • tiotropium (Spiriva HandiHaler) 18 mcg inhalation capsule Place 1 capsule (18 mcg total) into inhaler and inhale daily (Patient not taking: Reported on 3/16/2023) 30 capsule 0   • traZODone (DESYREL) 50 mg tablet Take 0 5 tablets (25 mg total) by mouth daily at bedtime 15 tablet 1   • Vraylar 1 5 MG capsule Take 1 5 mg by mouth daily at bedtime (Patient not taking: Reported on 1/11/2023)       No current facility-administered medications for this visit  Allergies   Allergen Reactions   • Seasonal Ic [Cholestatin] Sneezing      Immunizations:     Immunization History   Administered Date(s) Administered   • COVID-19 MODERNA VACC 0 5 ML IM 04/07/2021, 05/05/2021, 12/01/2021   • COVID-19 Pfizer Vac BIVALENT Elio-sucrose 12 Yr+ IM (BOOSTER ONLY) 10/24/2022   • Hep A, adult 04/28/2023   • INFLUENZA 11/15/2010, 11/22/2011   • Influenza Quadrivalent Preservative Free 3 years and older IM 11/19/2021   • Influenza, recombinant, quadrivalent,injectable, preservative free 10/25/2018, 10/07/2020   • Influenza, seasonal, injectable 11/22/2011   • MMR 05/16/2011   • Pneumococcal Conjugate Vaccine 20-valent (Pcv20), Polysace 04/28/2023   • Pneumococcal Polysaccharide PPV23 05/16/2011   • Tdap 05/16/2011   • Tuberculin Skin Test-PPD Intradermal 11/15/2010      Health Maintenance:         Topic Date Due   • Colorectal Cancer Screening  04/17/2022   • Lung Cancer Screening  10/24/2023   • HIV Screening  Completed   • Hepatitis C Screening  Completed     There are no preventive care reminders to display for this patient  Medicare Screening Tests and Risk Assessments:     Suresh Cooper is here for his Subsequent Wellness visit  Health Risk Assessment:   Patient rates overall health as fair  Patient feels that their physical health rating is same  Patient is satisfied with their life  Eyesight was rated as same  Hearing was rated as same   Patient feels that their emotional and mental health rating is slightly worse  Patients states they are sometimes angry  Patient states they are often unusually tired/fatigued  Pain experienced in the last 7 days has been some  Patient's pain rating has been 5/10  Patient states that he has experienced no weight loss or gain in last 6 months  Patient on high risk for depression  Follow-up plan will include engagement with therapist   Patient currently working with Alejandra Chairez and states that this has been a good source of support for him  Fall Risk Screening: In the past year, patient has experienced: no history of falling in past year      Home Safety:  Patient has trouble with stairs inside or outside of their home  Patient has working smoke alarms and has working carbon monoxide detector  Home safety hazards include: none  Nutrition:   Current diet is Regular and Limited junk food  Medications:   Patient is not currently taking any over-the-counter supplements  Patient is able to manage medications  Activities of Daily Living (ADLs)/Instrumental Activities of Daily Living (IADLs):   Walk and transfer into and out of bed and chair?: Yes  Dress and groom yourself?: Yes    Bathe or shower yourself?: Yes    Feed yourself?  Yes  Do your laundry/housekeeping?: Yes  Make your own meals?: No    Do your own shopping?: Yes    Previous Hospitalizations:   Any hospitalizations or ED visits within the last 12 months?: No      Advance Care Planning:   Living will: No      PREVENTIVE SCREENINGS      Cardiovascular Screening:    General: Screening Not Indicated and History Lipid Disorder      Diabetes Screening:     General: Screening Current      Prostate Cancer Screening:    General: Screening Current      Abdominal Aortic Aneurysm (AAA) Screening:    Risk factors include: tobacco use        Lung Cancer Screening:     General: Screening Current      Hepatitis C Screening:    General: Screening "Current    Screening, Brief Intervention, and Referral to Treatment (SBIRT)    Screening  Typical number of drinks in a day: 2  Typical number of drinks in a week: 10  Interpretation: Low risk drinking behavior  Single Item Drug Screening:  How often have you used an illegal drug (including marijuana) or a prescription medication for non-medical reasons in the past year? never    Single Item Drug Screen Score: 0  Interpretation: Negative screen for possible drug use disorder    Brief Intervention  Healthy alcohol use/limits discussed  Health risks of current substance use discussed  No results found  Physical Exam:     /84 (BP Location: Left arm, Patient Position: Sitting, Cuff Size: Standard)   Pulse 84   Temp 97 6 °F (36 4 °C) (Temporal)   Resp 18   Ht 5' 6\" (1 676 m)   Wt 61 7 kg (136 lb)   SpO2 98%   BMI 21 95 kg/m²     Physical Exam  Vitals and nursing note reviewed  Constitutional:       General: He is not in acute distress  Appearance: He is well-developed  HENT:      Head: Normocephalic and atraumatic  Eyes:      Conjunctiva/sclera: Conjunctivae normal    Cardiovascular:      Rate and Rhythm: Normal rate and regular rhythm  Heart sounds: No murmur heard  Pulmonary:      Effort: Pulmonary effort is normal  No respiratory distress  Breath sounds: Normal breath sounds  Abdominal:      Palpations: Abdomen is soft  Tenderness: There is no abdominal tenderness  Musculoskeletal:         General: No swelling, tenderness (Tenderness to palpation on the hip joint and shoulder joint), deformity or signs of injury  Cervical back: Neck supple  Right lower leg: No edema  Left lower leg: No edema  Skin:     General: Skin is warm and dry  Capillary Refill: Capillary refill takes less than 2 seconds  Neurological:      Mental Status: He is alert     Psychiatric:         Mood and Affect: Mood normal           David Pires DO  "

## 2023-05-09 ENCOUNTER — PATIENT OUTREACH (OUTPATIENT)
Dept: OTHER | Facility: OTHER | Age: 58
End: 2023-05-09

## 2023-05-09 ENCOUNTER — EVALUATION (OUTPATIENT)
Dept: PHYSICAL THERAPY | Facility: CLINIC | Age: 58
End: 2023-05-09

## 2023-05-09 DIAGNOSIS — M25.512 LEFT SHOULDER PAIN, UNSPECIFIED CHRONICITY: Primary | ICD-10-CM

## 2023-05-09 DIAGNOSIS — M25.552 PAIN OF LEFT HIP: ICD-10-CM
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
## 2023-05-10 DIAGNOSIS — K59.00 CONSTIPATION, UNSPECIFIED CONSTIPATION TYPE: ICD-10-CM

## 2023-05-11 RX ORDER — DOCUSATE SODIUM 100 MG/1
100 CAPSULE, LIQUID FILLED ORAL 2 TIMES DAILY
Qty: 60 CAPSULE | Refills: 2 | Status: SHIPPED | OUTPATIENT
Start: 2023-05-11

## 2023-05-15 ENCOUNTER — OFFICE VISIT (OUTPATIENT)
Dept: PHYSICAL THERAPY | Facility: CLINIC | Age: 58
End: 2023-05-15

## 2023-05-15 DIAGNOSIS — M25.512 LEFT SHOULDER PAIN, UNSPECIFIED CHRONICITY: Primary | ICD-10-CM

## 2023-05-15 DIAGNOSIS — M25.552 PAIN OF LEFT HIP: ICD-10-CM

## 2023-05-15 NOTE — PROGRESS NOTES
"Daily Note     Today's date: 5/15/2023  Patient name: Any Dumont  : 1965  MRN: [id number]  Referring provider: Kristopher Sanchez*  Dx:   Encounter Diagnosis     ICD-10-CM    1  Left shoulder pain, unspecified chronicity  M25 512       2  Pain of left hip  M25 552           Start Time: 1202  Stop Time: 1248  Total time in clinic (min): 46 minutes    Subjective: Pt reports to therapy reporting compliance with HEP and some discomfort/soreness after the exercises  Objective: See treatment diary below      Assessment: Tolerated treatment well  Patient required VC during SL ER and SL abduction to decrease compensatory rolling  Cuing was provided during lateral walks to avoid hip ER compensations  Continue to progress pt as tolerated next visit  Plan: Continue per plan of care        Precautions: GERD, hiatal hernia, COPD, anxiety, OA of neck, major depression, pain of L hip, pain of L shoulder      Manuals 5/9 5/15           AC mob nv                                                   Neuro Re-Ed 5/9 5/15           SL clams (HEP ptb) 15x ea side 15x ea side ptb           SL abduction (HEP) 10x ea side 15x ea side           scap retractions (HEP) 15x 5\" hold 2x15           No monies (HEP ptb) 15x ptb 2x15 ptb           bridges  ptb abduction 2x15           Rows/ext nv 2x15 rows 8#           Resisted ER/IR nv            Prone I, T nv            Ther Ex 5/9 5/15           Piriformis stretch (HEP) 30\" ea            UBE nv 3'/3'           SA press nv            Lateral walks nv 5x at mirror otb           HS curls nv            Supine hip flexion nv            LP nv 115# 2x10                        Ther Activity 5/9 5/15                                     Gait Training 5/9 5/15                                     Modalities 5/9 5/15                                          "

## 2023-05-17 DIAGNOSIS — E55.9 VITAMIN D DEFICIENCY: ICD-10-CM

## 2023-05-18 ENCOUNTER — OFFICE VISIT (OUTPATIENT)
Dept: PHYSICAL THERAPY | Facility: CLINIC | Age: 58
End: 2023-05-18

## 2023-05-18 DIAGNOSIS — M25.512 LEFT SHOULDER PAIN, UNSPECIFIED CHRONICITY: Primary | ICD-10-CM

## 2023-05-18 DIAGNOSIS — M25.552 PAIN OF LEFT HIP: ICD-10-CM

## 2023-05-18 DIAGNOSIS — K21.9 GASTROESOPHAGEAL REFLUX DISEASE WITHOUT ESOPHAGITIS: ICD-10-CM

## 2023-05-18 RX ORDER — MELATONIN
1000 DAILY
Qty: 56 TABLET | Refills: 0 | Status: SHIPPED | OUTPATIENT
Start: 2023-05-18 | End: 2023-07-13

## 2023-05-18 RX ORDER — FAMOTIDINE 20 MG/1
20 TABLET, FILM COATED ORAL 2 TIMES DAILY
Qty: 60 TABLET | Refills: 0 | Status: SHIPPED | OUTPATIENT
Start: 2023-05-18

## 2023-05-18 NOTE — PROGRESS NOTES
"Daily Note     Today's date: 2023  Patient name: Allen Hernandez  : 1965  MRN: [id number]  Referring provider: Renetta Jennings*  Dx:   Encounter Diagnosis     ICD-10-CM    1  Left shoulder pain, unspecified chronicity  M25 512       2  Pain of left hip  M25 552                      Subjective: pt reports hip soreness after LV, lasting for one day  Objective: See treatment diary below      Assessment: Tolerated treatment well  New RTC PRE's were tolerable, ER was challenging  Postural corrections with standing tasks, overactive UT at times  EDU to differentiate between hip pin and muscle fatigue  Form with arnaldo rows and extensions needed cuing, with compensatory patterns  Hip abd weakness noted with limited ROM in SL and lateral walks  Continued PT would be beneficial to improve function         Patient performed UBE to increase blood flow to the area being treated, prepare the muscles for strength training and stretching, improve overall tolerance to activity, and aerobic endurance  Plan: Continue per plan of care         Precautions: GERD, hiatal hernia, COPD, anxiety, OA of neck, major depression, pain of L hip, pain of L shoulder      Manuals 5/9 5/15 5/18          AC mob nv                                                   Neuro Re-Ed 5/9 5/15 5/18          SL clams (HEP ptb) 15x ea side 15x ea side ptb 2x10 ea side ptb          SL abduction (HEP) 10x ea side 15x ea side 15x ea side          scap retractions (HEP) 15x 5\" hold 2x15 15x          No monies (HEP ptb) 15x ptb 2x15 ptb 2x15 ptb          bridges  ptb abduction 2x15 ptb abduction 2x15          Rows/ext nv 2x15 rows 8# 2x15 rows 8#, ext 5#          Resisted ER/IR nv  YTB 2x10          Prone I, T nv            Ther Ex 5/9 5/15 5/18          Piriformis stretch (HEP) 30\" ea            UBE nv 3'/3' 3'/3'          SA press nv            Lateral walks nv 5x at mirror otb 5x at mirror otb          HS curls nv          " Supine hip flexion nv            LP nv 115# 2x10 115# 2x10                       Ther Activity 5/9 5/15 5/18                                    Gait Training 5/9 5/15 5/18                                    Modalities 5/9 5/15 5/18

## 2023-05-20 DIAGNOSIS — N40.0 BENIGN PROSTATIC HYPERPLASIA WITHOUT LOWER URINARY TRACT SYMPTOMS: ICD-10-CM

## 2023-05-21 RX ORDER — ATORVASTATIN CALCIUM 10 MG/1
10 TABLET, FILM COATED ORAL DAILY
Qty: 90 TABLET | Refills: 3 | Status: SHIPPED | OUTPATIENT
Start: 2023-05-21

## 2023-05-22 ENCOUNTER — OFFICE VISIT (OUTPATIENT)
Dept: PHYSICAL THERAPY | Facility: CLINIC | Age: 58
End: 2023-05-22

## 2023-05-22 DIAGNOSIS — M25.552 PAIN OF LEFT HIP: ICD-10-CM

## 2023-05-22 DIAGNOSIS — M25.512 LEFT SHOULDER PAIN, UNSPECIFIED CHRONICITY: Primary | ICD-10-CM

## 2023-05-22 NOTE — PROGRESS NOTES
"Daily Note     Today's date: 2023  Patient name: Munir Miller  : 1965  MRN: [id number]  Referring provider: Isadore Nissen*  Dx:   Encounter Diagnosis     ICD-10-CM    1  Left shoulder pain, unspecified chronicity  M25 512       2  Pain of left hip  M25 552           Start Time: 1003  Stop Time: 1047  Total time in clinic (min): 44 minutes    Subjective: Pt reports his hip and shoulder are feeling a little better recently  Objective: See treatment diary below      Assessment: Tolerated treatment well  Patient progressed bridges, SL clams, SL abduction, and resisted ER/IR with increased resistance  Pt experienced L lateral hip pain during SL clams at the end of the second set  Continued cuing was required during bridges to maintain tension on the band into abduction  Prone I and T were performed with reported L shoulder fatigue/discomfort during prone T's and significant middle trap weakness noted  Continue to progress pt as tolerated next visit  Update exercises and resistance bands for HEP nv        Plan: Continue per plan of care  Precautions: GERD, hiatal hernia, COPD, anxiety, OA of neck, major depression, pain of L hip, pain of L shoulder      Manuals 5/9 5/15 5/18 5/22         AC mob nv            Update exercises    nv                                   Neuro Re-Ed 5/9 5/15 5/18 5/22         SL clams (HEP ptb) 15x ea side 15x ea side ptb 2x10 ea side ptb 2x10 gtb; hold progressions pn!  During second set at end         SL abduction (HEP) 10x ea side 15x ea side 15x ea side 2x10 1# ea         scap retractions (HEP) 15x 5\" hold 2x15 15x          No monies (HEP ptb) 15x ptb 2x15 ptb 2x15 ptb Nv; updated bands nv for home         bridges  ptb abduction 2x15 ptb abduction 2x15 2x10 gtb abduction         Rows/ext nv 2x15 rows 8# 2x15 rows 8#, ext 5#          Resisted ER/IR nv  YTB 2x10 rtb 2x15 ea side         Prone I, T nv   2x10 ea          Ther Ex 5/9 5/15 5/18 5/22       " "  Piriformis stretch (HEP) 30\" ea            UBE nv 3'/3' 3'/3' 3'/3'         SA press nv            Lateral walks nv 5x at mirror otb 5x at mirror otb          HS curls nv   gtb 15x ea side         Supine hip flexion nv            LP nv 115# 2x10 115# 2x10                       Ther Activity 5/9 5/15 5/18 5/22                                   Gait Training 5/9 5/15 5/18 5/22                                   Modalities 5/9 5/15 5/18 5/22                                        "

## 2023-05-23 ENCOUNTER — PATIENT OUTREACH (OUTPATIENT)
Dept: OTHER | Facility: OTHER | Age: 58
End: 2023-05-23

## 2023-05-23 ENCOUNTER — CONSULT (OUTPATIENT)
Dept: SURGERY | Facility: CLINIC | Age: 58
End: 2023-05-23

## 2023-05-23 VITALS
OXYGEN SATURATION: 100 % | HEART RATE: 64 BPM | SYSTOLIC BLOOD PRESSURE: 130 MMHG | TEMPERATURE: 97.3 F | BODY MASS INDEX: 21.86 KG/M2 | WEIGHT: 136 LBS | HEIGHT: 66 IN | DIASTOLIC BLOOD PRESSURE: 80 MMHG

## 2023-05-23 DIAGNOSIS — K59.00 CONSTIPATION, UNSPECIFIED CONSTIPATION TYPE: ICD-10-CM

## 2023-05-23 DIAGNOSIS — K21.00 GASTROESOPHAGEAL REFLUX DISEASE WITH ESOPHAGITIS, UNSPECIFIED WHETHER HEMORRHAGE: ICD-10-CM

## 2023-05-23 DIAGNOSIS — Z12.11 ENCOUNTER FOR SCREENING COLONOSCOPY: Primary | ICD-10-CM

## 2023-05-23 RX ORDER — BISACODYL 5 MG/1
5 TABLET, DELAYED RELEASE ORAL SEE ADMIN INSTRUCTIONS
Qty: 4 TABLET | Refills: 0 | Status: SHIPPED | OUTPATIENT
Start: 2023-05-23

## 2023-05-23 RX ORDER — POLYETHYLENE GLYCOL 3350 17 G/17G
17 POWDER, FOR SOLUTION ORAL DAILY
Qty: 510 G | Refills: 0 | Status: SHIPPED | OUTPATIENT
Start: 2023-05-23 | End: 2023-06-22

## 2023-05-23 RX ORDER — POLYETHYLENE GLYCOL 3350 17 G/17G
238 POWDER, FOR SOLUTION ORAL SEE ADMIN INSTRUCTIONS
Qty: 238 G | Refills: 0 | Status: SHIPPED | OUTPATIENT
Start: 2023-05-23

## 2023-05-23 NOTE — H&P (VIEW-ONLY)
Assessment/Plan:  Reflux with dysphagia, persistent despite PPI therapy  He has never had a swallow study which would likely be of benefit given that he has sensation of regurgitation  Discussed EGD and screening colonoscopy  Risks of procedures were explained and informed consent was obtained  Bowel prep was discussed in detail and prescription sent to the pharmacy  He should also take MiraLAX daily for his constipation  Explained this can be taken twice a day if he has gone several days that a bowel movement and can be titrated down to once a day or every other day as bowel movements become more regular  No problem-specific Assessment & Plan notes found for this encounter  Diagnoses and all orders for this visit:    Encounter for screening colonoscopy  -     Ambulatory Referral to General Surgery  -     polyethylene glycol (GLYCOLAX) 17 GM/SCOOP powder; Take 238 g by mouth see administration instructions Mix 238 g with 64 oz of clear liquid  Drink half starting at noon on the day prior to colonoscopy  Drink remaining half 6 hours prior to procedure on day of colonoscopy  -     bisacodyl (DULCOLAX) 5 mg EC tablet; Take 1 tablet (5 mg total) by mouth see administration instructions Take 2 tablets at 12:00 p m  on day prior to colonoscopy  Take 2 tablets at 4:00 p m  on day prior to colonoscopy    Gastroesophageal reflux disease with esophagitis, unspecified whether hemorrhage  Comments:  Not responsive to Protonix therapy  Would likely benefit from an endoscopy to rule out H  pylori  Orders:  -     Ambulatory Referral to General Surgery    Constipation, unspecified constipation type  -     polyethylene glycol (GLYCOLAX) 17 GM/SCOOP powder; Take 17 g by mouth daily          Subjective:      Patient ID: Raf Bobo is a 59 y o  male  He presents with long history of reflux and food regurgitation    He says that after every meal of solids as well as liquids he will have the sensation of regurgitation of food as well as nausea and vomiting  At times he will does have mucus that he will vomit  You started on reflux medication which she says does help the heartburn but has not helped the food coming back up  He says he did have an EGD a very long time ago  He reports inadvertent weight loss of maybe 10 pounds over the last 9 years  Denies any pain with swallowing or difficulty with swallowing  He is a smoker and attempted to quit in the past but now says that it is his choice and he is fine with continuing to smoke despite the risks  He is also due for colonoscopy, does have abdominal pain that is mid epigastric associated with the reflux but no other areas abdominal pain, does report constipation and was started on stool softeners without much improvement  No blood in his stool or loose stools  A chart review was performed and previous primary care visit notes were reviewed  All applicable imaging studies were reviewed and images were reviewed personally  All applicable laboratory studies were reviewed personally  Care everywhere review was performed if  available and all pertinent notes were reviewed  The following portions of the patient's history were reviewed and updated as appropriate:   He  has a past medical history of Anxiety, Asthma, COPD (chronic obstructive pulmonary disease) ([facility] [address] ), Depression, GERD (gastroesophageal reflux disease), Hyperlipidemia, Hypertension, and Suicide attempt ([facility] )    He   Patient Active Problem List    Diagnosis Date Noted   • Pain of left hip 04/29/2023   • Left shoulder pain 04/29/2023   • Dysuria 10/18/2022   • Testicular discomfort 10/18/2022   • Allergic rhinitis 02/03/2022   • Prostate cancer screening 05/28/2020   • Premature ejaculation 05/28/2020   • Screen for STD (sexually transmitted disease) 02/20/2020   • Chronic pain of both knees 10/23/2019   • Other constipation 07/17/2019   • Hiatal hernia 07/17/2019   • OA (osteoarthritis) of neck 05/20/2019   • Gastroesophageal reflux disease without esophagitis 01/30/2019   • Seborrheic dermatitis 12/14/2018   • Postnasal drip 10/25/2018   • Anxiety 09/27/2018   • Mixed hyperlipidemia 09/27/2018   • Tobacco dependence syndrome 09/27/2018   • COPD (chronic obstructive pulmonary disease) ([facility] [address] ) 09/27/2018   • BPH (benign prostatic hyperplasia) 09/27/2018   • Major depression 05/21/2012     He  has a past surgical history that includes Hernia repair; Skin graft; Fracture surgery; and Esophagogastroduodenoscopy (N/A, 3/1/2019)  His family history includes Prostate cancer in his paternal uncle  He  reports that he has been smoking cigarettes  He has a 20 00 pack-year smoking history  He has been exposed to tobacco smoke  He has never used smokeless tobacco  He reports current alcohol use  He reports that he does not use drugs  Current Outpatient Medications   Medication Sig Dispense Refill   • Advair Diskus 500-50 MCG/ACT inhaler INHALE 1 PUFF 2 (TWO) TIMES A DAY RINSE MOUTH AFTER USE  60 blister 0   • atorvastatin (LIPITOR) 10 mg tablet TAKE 1 TABLET (10 MG TOTAL) BY MOUTH DAILY 90 tablet 3   • bisacodyl (DULCOLAX) 5 mg EC tablet Take 1 tablet (5 mg total) by mouth see administration instructions Take 2 tablets at 12:00 p m  on day prior to colonoscopy    Take 2 tablets at 4:00 p m  on day prior to colonoscopy 4 tablet 0   • buPROPion (WELLBUTRIN SR) 150 mg 12 hr tablet TAKE 1 TABLET (150 MG TOTAL) BY MOUTH 2 (TWO) TIMES A DAY 60 tablet 2   • busPIRone (BUSPAR) 10 mg tablet TAKE 1 TABLET(S) BY ORAL ROUTE 2 TIME(S) PER DAY     • cetirizine (ZyrTEC) 10 mg tablet TAKE 1 TABLET (10 MG TOTAL) BY MOUTH DAILY 90 tablet 5   • cholecalciferol (VITAMIN D3) 1,000 units tablet TAKE 1 TABLET (1,000 UNITS TOTAL) BY MOUTH DAILY 56 tablet 0   • cloNIDine (CATAPRES) 0 1 mg tablet TAKE 1 TABLET BY ORAL ROUTE 2 TIMES PER DAY     • docusate sodium (COLACE) 100 mg capsule TAKE 1 CAPSULE (100 MG TOTAL) BY MOUTH 2 (TWO) TIMES A DAY 60 capsule 2   • escitalopram (LEXAPRO) 20 mg tablet Take 1 tablet (20 mg total) by mouth daily 30 tablet 1   • famotidine (PEPCID) 20 mg tablet TAKE 1 TABLET (20 MG TOTAL) BY MOUTH 2 (TWO) TIMES A DAY 60 tablet 0   • finasteride (PROSCAR) 5 mg tablet Take 1 tablet (5 mg total) by mouth daily for 90 doses 90 tablet 3   • hydrOXYzine pamoate (VISTARIL) 50 mg capsule Take 50 mg by mouth daily at bedtime     • ibuprofen (MOTRIN) 600 mg tablet Take 1 tablet (600 mg total) by mouth every 6 (six) hours as needed for mild pain or moderate pain 60 tablet 0   • loratadine (CLARITIN) 10 mg tablet Take 1 tablet (10 mg total) by mouth daily 30 tablet 1   • naltrexone (REVIA) 50 mg tablet Take 50 mg by mouth daily     • ondansetron (ZOFRAN) 4 mg tablet TAKE 1 TABLET (4 MG TOTAL) BY MOUTH EVERY 8 (EIGHT) HOURS AS NEEDED FOR NAUSEA OR VOMITING 20 tablet 3   • pantoprazole (PROTONIX) 40 mg tablet TAKE 1 TABLET (40 MG TOTAL) BY MOUTH DAILY 60 tablet 3   • polyethylene glycol (GLYCOLAX) 17 GM/SCOOP powder Take 238 g by mouth see administration instructions Mix 238 g with 64 oz of clear liquid  Drink half starting at noon on the day prior to colonoscopy  Drink remaining half 6 hours prior to procedure on day of colonoscopy   238 g 0   • polyethylene glycol (GLYCOLAX) 17 GM/SCOOP powder Take 17 g by mouth daily 510 g 0   • QUEtiapine (SEROquel) 50 mg tablet Take 3 tablets (150 mg total) by mouth daily at bedtime 90 tablet 1   • tamsulosin (FLOMAX) 0 4 mg TAKE 1 CAPSULE (0 4 MG TOTAL) BY MOUTH DAILY WITH DINNER 90 capsule 3   • tiotropium (Spiriva HandiHaler) 18 mcg inhalation capsule Place 1 capsule (18 mcg total) into inhaler and inhale daily 30 capsule 0   • traZODone (DESYREL) 50 mg tablet Take 0 5 tablets (25 mg total) by mouth daily at bedtime 15 tablet 1   • albuterol (PROVENTIL HFA,VENTOLIN HFA) 90 mcg/act inhaler INHALE 1 PUFF EVERY 6 (SIX) HOURS AS NEEDED FOR WHEEZING (Patient not taking: Reported on 1/11/2023) 17 g 10 "  • ALPRAZolam (XANAX) 0 5 mg tablet Take 1 tablet (0 5 mg total) by mouth 2 (two) times a day (Patient not taking: Reported on 1/24/2023) 60 tablet 0   • cloNIDine (CATAPRES) 0 2 mg tablet Take 0 2 mg by mouth 2 (two) times a day (Patient not taking: Reported on 3/16/2023)     • Diclofenac Sodium (VOLTAREN) 1 % APPLY 1 INCH TO EACH KNEE TWICE DAILY (Patient not taking: Reported on 1/11/2023)     • Fluticasone-Salmeterol (Advair Diskus) 500-50 mcg/dose inhaler Inhale 1 puff 2 (two) times a day Rinse mouth after use  (Patient not taking: Reported on 5/23/2023) 60 blister 2   • fluticasone-salmeterol (Advair HFA) 115-21 MCG/ACT inhaler Inhale 2 puffs 2 (two) times a day (Patient not taking: Reported on 5/23/2023) 36 g 3   • fluticasone-salmeterol (ADVAIR, WIXELA) 500-50 mcg/dose inhaler Inhale 1 puff 2 (two) times a day Rinse mouth after use  (Patient not taking: Reported on 5/23/2023) 1 each 3   • Vraylar 1 5 MG capsule Take 1 5 mg by mouth daily at bedtime (Patient not taking: Reported on 1/11/2023)       No current facility-administered medications for this visit  He is allergic to seasonal ic [cholestatin]       Review of Systems   Constitutional: Positive for unexpected weight change  Gastrointestinal: Positive for abdominal pain, constipation, nausea and vomiting  Negative for anal bleeding, blood in stool, diarrhea and rectal pain  All other systems reviewed and are negative  Objective:      /80 (BP Location: Left arm, Patient Position: Sitting, Cuff Size: Standard)   Pulse 64   Temp (!) 97 3 °F (36 3 °C) (Tympanic)   Ht 5' 6\" (1 676 m)   Wt 61 7 kg (136 lb)   SpO2 100%   BMI 21 95 kg/m²          Physical Exam  Vitals reviewed  Constitutional:       General: He is not in acute distress  Appearance: Normal appearance  He is not toxic-appearing  HENT:      Head: Normocephalic and atraumatic        Nose: Nose normal       Mouth/Throat:      Mouth: Mucous membranes are moist       " Pharynx: Oropharynx is clear  Eyes:      Extraocular Movements: Extraocular movements intact  Conjunctiva/sclera: Conjunctivae normal       Pupils: Pupils are equal, round, and reactive to light  Cardiovascular:      Rate and Rhythm: Normal rate and regular rhythm  Heart sounds: Normal heart sounds  Pulmonary:      Effort: No respiratory distress  Breath sounds: Normal breath sounds  Abdominal:      General: Abdomen is flat  There is no distension  Palpations: Abdomen is soft  Tenderness: There is no abdominal tenderness  Musculoskeletal:         General: No swelling or tenderness  Normal range of motion  Cervical back: Normal range of motion and neck supple  Skin:     General: Skin is warm and dry  Neurological:      General: No focal deficit present  Mental Status: He is alert and oriented to person, place, and time

## 2023-05-23 NOTE — PROGRESS NOTES
5/23/23: Updated nurse on GI status  Continues to vomit  Vomits almost every time he eats  Has been constipated also  Is having endoscopy and colonoscopy on June 7th in the morning  Unsure of why the symptoms but anxious to find out why  Nephew will take him and pick him up from [facility]  Continues to have some pain on urination  Drinks mostly iced tea  Instructed to drink water to hydrate  Continues to go to PT twice a week for exercise on left shoulder and hip  Right elbow improved since injection

## 2023-05-23 NOTE — PROGRESS NOTES
Assessment/Plan:  Reflux with dysphagia, persistent despite PPI therapy  He has never had a swallow study which would likely be of benefit given that he has sensation of regurgitation  Discussed EGD and screening colonoscopy  Risks of procedures were explained and informed consent was obtained  Bowel prep was discussed in detail and prescription sent to the pharmacy  He should also take MiraLAX daily for his constipation  Explained this can be taken twice a day if he has gone several days that a bowel movement and can be titrated down to once a day or every other day as bowel movements become more regular  No problem-specific Assessment & Plan notes found for this encounter  Diagnoses and all orders for this visit:    Encounter for screening colonoscopy  -     Ambulatory Referral to General Surgery  -     polyethylene glycol (GLYCOLAX) 17 GM/SCOOP powder; Take 238 g by mouth see administration instructions Mix 238 g with 64 oz of clear liquid  Drink half starting at noon on the day prior to colonoscopy  Drink remaining half 6 hours prior to procedure on day of colonoscopy  -     bisacodyl (DULCOLAX) 5 mg EC tablet; Take 1 tablet (5 mg total) by mouth see administration instructions Take 2 tablets at 12:00 p m  on day prior to colonoscopy  Take 2 tablets at 4:00 p m  on day prior to colonoscopy    Gastroesophageal reflux disease with esophagitis, unspecified whether hemorrhage  Comments:  Not responsive to Protonix therapy  Would likely benefit from an endoscopy to rule out H  pylori  Orders:  -     Ambulatory Referral to General Surgery    Constipation, unspecified constipation type  -     polyethylene glycol (GLYCOLAX) 17 GM/SCOOP powder; Take 17 g by mouth daily          Subjective:      Patient ID: Curly Brown is a 59 y o  male  He presents with long history of reflux and food regurgitation    He says that after every meal of solids as well as liquids he will have the sensation of regurgitation of food as well as nausea and vomiting  At times he will does have mucus that he will vomit  You started on reflux medication which she says does help the heartburn but has not helped the food coming back up  He says he did have an EGD a very long time ago  He reports inadvertent weight loss of maybe 10 pounds over the last 9 years  Denies any pain with swallowing or difficulty with swallowing  He is a smoker and attempted to quit in the past but now says that it is his choice and he is fine with continuing to smoke despite the risks  He is also due for colonoscopy, does have abdominal pain that is mid epigastric associated with the reflux but no other areas abdominal pain, does report constipation and was started on stool softeners without much improvement  No blood in his stool or loose stools  A chart review was performed and previous primary care visit notes were reviewed  All applicable imaging studies were reviewed and images were reviewed personally  All applicable laboratory studies were reviewed personally  Care everywhere review was performed if  available and all pertinent notes were reviewed  The following portions of the patient's history were reviewed and updated as appropriate:   He  has a past medical history of Anxiety, Asthma, COPD (chronic obstructive pulmonary disease) ([facility] [address] ), Depression, GERD (gastroesophageal reflux disease), Hyperlipidemia, Hypertension, and Suicide attempt ([facility] )    He   Patient Active Problem List    Diagnosis Date Noted   • Pain of left hip 04/29/2023   • Left shoulder pain 04/29/2023   • Dysuria 10/18/2022   • Testicular discomfort 10/18/2022   • Allergic rhinitis 02/03/2022   • Prostate cancer screening 05/28/2020   • Premature ejaculation 05/28/2020   • Screen for STD (sexually transmitted disease) 02/20/2020   • Chronic pain of both knees 10/23/2019   • Other constipation 07/17/2019   • Hiatal hernia 07/17/2019   • OA (osteoarthritis) of neck 05/20/2019   • Gastroesophageal reflux disease without esophagitis 01/30/2019   • Seborrheic dermatitis 12/14/2018   • Postnasal drip 10/25/2018   • Anxiety 09/27/2018   • Mixed hyperlipidemia 09/27/2018   • Tobacco dependence syndrome 09/27/2018   • COPD (chronic obstructive pulmonary disease) ([facility] [address] ) 09/27/2018   • BPH (benign prostatic hyperplasia) 09/27/2018   • Major depression 05/21/2012     He  has a past surgical history that includes Hernia repair; Skin graft; Fracture surgery; and Esophagogastroduodenoscopy (N/A, 3/1/2019)  His family history includes Prostate cancer in his paternal uncle  He  reports that he has been smoking cigarettes  He has a 20 00 pack-year smoking history  He has been exposed to tobacco smoke  He has never used smokeless tobacco  He reports current alcohol use  He reports that he does not use drugs  Current Outpatient Medications   Medication Sig Dispense Refill   • Advair Diskus 500-50 MCG/ACT inhaler INHALE 1 PUFF 2 (TWO) TIMES A DAY RINSE MOUTH AFTER USE  60 blister 0   • atorvastatin (LIPITOR) 10 mg tablet TAKE 1 TABLET (10 MG TOTAL) BY MOUTH DAILY 90 tablet 3   • bisacodyl (DULCOLAX) 5 mg EC tablet Take 1 tablet (5 mg total) by mouth see administration instructions Take 2 tablets at 12:00 p m  on day prior to colonoscopy    Take 2 tablets at 4:00 p m  on day prior to colonoscopy 4 tablet 0   • buPROPion (WELLBUTRIN SR) 150 mg 12 hr tablet TAKE 1 TABLET (150 MG TOTAL) BY MOUTH 2 (TWO) TIMES A DAY 60 tablet 2   • busPIRone (BUSPAR) 10 mg tablet TAKE 1 TABLET(S) BY ORAL ROUTE 2 TIME(S) PER DAY     • cetirizine (ZyrTEC) 10 mg tablet TAKE 1 TABLET (10 MG TOTAL) BY MOUTH DAILY 90 tablet 5   • cholecalciferol (VITAMIN D3) 1,000 units tablet TAKE 1 TABLET (1,000 UNITS TOTAL) BY MOUTH DAILY 56 tablet 0   • cloNIDine (CATAPRES) 0 1 mg tablet TAKE 1 TABLET BY ORAL ROUTE 2 TIMES PER DAY     • docusate sodium (COLACE) 100 mg capsule TAKE 1 CAPSULE (100 MG TOTAL) BY MOUTH 2 (TWO) TIMES A DAY 60 capsule 2   • escitalopram (LEXAPRO) 20 mg tablet Take 1 tablet (20 mg total) by mouth daily 30 tablet 1   • famotidine (PEPCID) 20 mg tablet TAKE 1 TABLET (20 MG TOTAL) BY MOUTH 2 (TWO) TIMES A DAY 60 tablet 0   • finasteride (PROSCAR) 5 mg tablet Take 1 tablet (5 mg total) by mouth daily for 90 doses 90 tablet 3   • hydrOXYzine pamoate (VISTARIL) 50 mg capsule Take 50 mg by mouth daily at bedtime     • ibuprofen (MOTRIN) 600 mg tablet Take 1 tablet (600 mg total) by mouth every 6 (six) hours as needed for mild pain or moderate pain 60 tablet 0   • loratadine (CLARITIN) 10 mg tablet Take 1 tablet (10 mg total) by mouth daily 30 tablet 1   • naltrexone (REVIA) 50 mg tablet Take 50 mg by mouth daily     • ondansetron (ZOFRAN) 4 mg tablet TAKE 1 TABLET (4 MG TOTAL) BY MOUTH EVERY 8 (EIGHT) HOURS AS NEEDED FOR NAUSEA OR VOMITING 20 tablet 3   • pantoprazole (PROTONIX) 40 mg tablet TAKE 1 TABLET (40 MG TOTAL) BY MOUTH DAILY 60 tablet 3   • polyethylene glycol (GLYCOLAX) 17 GM/SCOOP powder Take 238 g by mouth see administration instructions Mix 238 g with 64 oz of clear liquid  Drink half starting at noon on the day prior to colonoscopy  Drink remaining half 6 hours prior to procedure on day of colonoscopy   238 g 0   • polyethylene glycol (GLYCOLAX) 17 GM/SCOOP powder Take 17 g by mouth daily 510 g 0   • QUEtiapine (SEROquel) 50 mg tablet Take 3 tablets (150 mg total) by mouth daily at bedtime 90 tablet 1   • tamsulosin (FLOMAX) 0 4 mg TAKE 1 CAPSULE (0 4 MG TOTAL) BY MOUTH DAILY WITH DINNER 90 capsule 3   • tiotropium (Spiriva HandiHaler) 18 mcg inhalation capsule Place 1 capsule (18 mcg total) into inhaler and inhale daily 30 capsule 0   • traZODone (DESYREL) 50 mg tablet Take 0 5 tablets (25 mg total) by mouth daily at bedtime 15 tablet 1   • albuterol (PROVENTIL HFA,VENTOLIN HFA) 90 mcg/act inhaler INHALE 1 PUFF EVERY 6 (SIX) HOURS AS NEEDED FOR WHEEZING (Patient not taking: Reported on 1/11/2023) 17 g 10 "  • ALPRAZolam (XANAX) 0 5 mg tablet Take 1 tablet (0 5 mg total) by mouth 2 (two) times a day (Patient not taking: Reported on 1/24/2023) 60 tablet 0   • cloNIDine (CATAPRES) 0 2 mg tablet Take 0 2 mg by mouth 2 (two) times a day (Patient not taking: Reported on 3/16/2023)     • Diclofenac Sodium (VOLTAREN) 1 % APPLY 1 INCH TO EACH KNEE TWICE DAILY (Patient not taking: Reported on 1/11/2023)     • Fluticasone-Salmeterol (Advair Diskus) 500-50 mcg/dose inhaler Inhale 1 puff 2 (two) times a day Rinse mouth after use  (Patient not taking: Reported on 5/23/2023) 60 blister 2   • fluticasone-salmeterol (Advair HFA) 115-21 MCG/ACT inhaler Inhale 2 puffs 2 (two) times a day (Patient not taking: Reported on 5/23/2023) 36 g 3   • fluticasone-salmeterol (ADVAIR, WIXELA) 500-50 mcg/dose inhaler Inhale 1 puff 2 (two) times a day Rinse mouth after use  (Patient not taking: Reported on 5/23/2023) 1 each 3   • Vraylar 1 5 MG capsule Take 1 5 mg by mouth daily at bedtime (Patient not taking: Reported on 1/11/2023)       No current facility-administered medications for this visit  He is allergic to seasonal ic [cholestatin]       Review of Systems   Constitutional: Positive for unexpected weight change  Gastrointestinal: Positive for abdominal pain, constipation, nausea and vomiting  Negative for anal bleeding, blood in stool, diarrhea and rectal pain  All other systems reviewed and are negative  Objective:      /80 (BP Location: Left arm, Patient Position: Sitting, Cuff Size: Standard)   Pulse 64   Temp (!) 97 3 °F (36 3 °C) (Tympanic)   Ht 5' 6\" (1 676 m)   Wt 61 7 kg (136 lb)   SpO2 100%   BMI 21 95 kg/m²          Physical Exam  Vitals reviewed  Constitutional:       General: He is not in acute distress  Appearance: Normal appearance  He is not toxic-appearing  HENT:      Head: Normocephalic and atraumatic        Nose: Nose normal       Mouth/Throat:      Mouth: Mucous membranes are moist       " Pharynx: Oropharynx is clear  Eyes:      Extraocular Movements: Extraocular movements intact  Conjunctiva/sclera: Conjunctivae normal       Pupils: Pupils are equal, round, and reactive to light  Cardiovascular:      Rate and Rhythm: Normal rate and regular rhythm  Heart sounds: Normal heart sounds  Pulmonary:      Effort: No respiratory distress  Breath sounds: Normal breath sounds  Abdominal:      General: Abdomen is flat  There is no distension  Palpations: Abdomen is soft  Tenderness: There is no abdominal tenderness  Musculoskeletal:         General: No swelling or tenderness  Normal range of motion  Cervical back: Normal range of motion and neck supple  Skin:     General: Skin is warm and dry  Neurological:      General: No focal deficit present  Mental Status: He is alert and oriented to person, place, and time

## 2023-05-25 ENCOUNTER — OFFICE VISIT (OUTPATIENT)
Dept: PHYSICAL THERAPY | Facility: CLINIC | Age: 58
End: 2023-05-25

## 2023-05-25 DIAGNOSIS — M25.552 PAIN OF LEFT HIP: ICD-10-CM

## 2023-05-25 DIAGNOSIS — M25.512 LEFT SHOULDER PAIN, UNSPECIFIED CHRONICITY: Primary | ICD-10-CM

## 2023-05-25 NOTE — PROGRESS NOTES
"Daily Note     Today's date: 2023  Patient name: Braulio Benavides  : 1965  MRN: [id number]  Referring provider: Anna Matthews*  Dx:   Encounter Diagnosis     ICD-10-CM    1  Left shoulder pain, unspecified chronicity  M25 512       2  Pain of left hip  M25 552           Start Time: 801  Stop Time: 08  Total time in clinic (min): 40 minutes    Subjective: Pt reports his shoulder, low back, and knees are bothering him today  Objective: See treatment diary below      Assessment: Tolerated treatment well  Patient was visibly challenged with SL clams this session with decreased height and increased rest breaks required  Pt was educated on soreness and muscle fatigue vs pain experienced during exercises  Pt was provided with TC during SL abduction to avoid hip ER compensation  TC was provided during resisted ER/IR to avoid compensation with trunk rotation  Continue to progress pt as tolerated next visit  Plan: Continue per plan of care  Precautions: GERD, hiatal hernia, COPD, anxiety, OA of neck, major depression, pain of L hip, pain of L shoulder      Manuals 5/9 5/15 5/18 5/22 5/25        AC mob nv            Update exercises    nv                                   Neuro Re-Ed 5/9 5/15 5/18 5/22 5/25        SL clams (HEP ptb) 15x ea side 15x ea side ptb 2x10 ea side ptb 2x10 gtb; hold progressions pn!  During second set at end 2x15 ea gtb        SL abduction (HEP) 10x ea side 15x ea side 15x ea side 2x10 1# ea 2x15 ea 1#        scap retractions (HEP) 15x 5\" hold 2x15 15x          No monies (HEP ptb) 15x ptb 2x15 ptb 2x15 ptb Nv; updated bands nv for home 2x15 ptb        bridges  ptb abduction 2x15 ptb abduction 2x15 2x10 gtb abduction 2x15 gtb abduction        Rows/ext nv 2x15 rows 8# 2x15 rows 8#, ext 5#          Resisted ER/IR nv  YTB 2x10 rtb 2x15 ea side rtb 2x15 ea side        Prone I, T nv   2x10 ea          Ther Ex 5/9 5/15 5/18 5/22 5/25        Piriformis stretch " "(HEP) 30\" ea            UBE nv 3'/3' 3'/3' 3'/3' 3'/3'        SA press nv            Lateral walks nv 5x at mirror otb 5x at mirror otb          HS curls nv   gtb 15x ea side         Supine hip flexion nv            LP nv 115# 2x10 115# 2x10          Pt edu     NS        Ther Activity 5/9 5/15 5/18 5/22 5/25                                  Gait Training 5/9 5/15 5/18 5/22 5/25                                  Modalities 5/9 5/15 5/18 5/22 5/25                                       "

## 2023-05-30 ENCOUNTER — PATIENT OUTREACH (OUTPATIENT)
Dept: OTHER | Facility: OTHER | Age: 58
End: 2023-05-30

## 2023-05-30 ENCOUNTER — OFFICE VISIT (OUTPATIENT)
Dept: PHYSICAL THERAPY | Facility: CLINIC | Age: 58
End: 2023-05-30

## 2023-05-30 DIAGNOSIS — M25.512 LEFT SHOULDER PAIN, UNSPECIFIED CHRONICITY: Primary | ICD-10-CM

## 2023-05-30 DIAGNOSIS — M25.552 PAIN OF LEFT HIP: ICD-10-CM

## 2023-05-30 NOTE — PROGRESS NOTES
"Daily Note     Today's date: 2023  Patient name: Aravind Tobar  : 1965  MRN: [id number]  Referring provider: Yolanda Claros*  Dx:   Encounter Diagnosis     ICD-10-CM    1  Left shoulder pain, unspecified chronicity  M25 512       2  Pain of left hip  M25 552           Start Time: 1001  Stop Time: 1047  Total time in clinic (min): 46 minutes    Subjective: Pt reports he's been sick the past three days and he's still recovering  Objective: See treatment diary below      Assessment: Tolerated treatment well  Patient shows improvement in function of L hip as per FOTO  However, pt shows no improvement in FOTO outcome measure for L shoulder  Due to this, shoulder exercises were emphasized this session  Pt continues to display difficulty engaging scapular retractors, requiring tactile cuing throughout session  TC was also provided during resisted ER to avoid compensatory trunk rotation  Continue to progress pt as tolerated next visit, emphasizing strengthening of scapular stabilizers  Plan: Continue per plan of care  Precautions: GERD, hiatal hernia, COPD, anxiety, OA of neck, major depression, pain of L hip, pain of L shoulder      Manuals 5/9 5/15 5/18 5/22 5/25 5/30       AC mob nv            Update exercises    nv                                   Neuro Re-Ed 5/9 5/15 5/18 5/22 5/25 5/30       SL clams (HEP ptb) 15x ea side 15x ea side ptb 2x10 ea side ptb 2x10 gtb; hold progressions pn!  During second set at end 2x15 ea gtb        SL abduction (HEP) 10x ea side 15x ea side 15x ea side 2x10 1# ea 2x15 ea 1#        scap retractions (HEP) 15x 5\" hold 2x15 15x          No monies (HEP ptb) 15x ptb 2x15 ptb 2x15 ptb Nv; updated bands nv for home 2x15 ptb 2x15 gtb       bridges  ptb abduction 2x15 ptb abduction 2x15 2x10 gtb abduction 2x15 gtb abduction        Rows/ext nv 2x15 rows 8# 2x15 rows 8#, ext 5#   2x15 ea 10# rows/ext       Resisted ER/IR nv  YTB 2x10 rtb 2x15 ea side " "rtb 2x15 ea side gtb 2x15 ER/IR       Prone I, T nv   2x10 ea   2x10 ea        Ther Ex 5/9 5/15 5/18 5/22 5/25 5/30       Piriformis stretch (HEP) 30\" ea            UBE nv 3'/3' 3'/3' 3'/3' 3'/3' 3'/3'       SA press nv     2x15 qped       Lateral walks nv 5x at mirror otb 5x at mirror otb          HS curls nv   gtb 15x ea side         Supine hip flexion nv            LP nv 115# 2x10 115# 2x10          Pt edu     NS        Ther Activity 5/9 5/15 5/18 5/22 5/25 5/30                                 Gait Training 5/9 5/15 5/18 5/22 5/25 5/30                                 Modalities 5/9 5/15 5/18 5/22 5/25 5/30                                      "

## 2023-05-30 NOTE — PROGRESS NOTES
5/30/23: Client reported that he has been experiencing head stuffiness, loss of voice, coughing with some expectoration (green), occasional sweatiness over the last 3 years  Called for same day visit  Unable to get through  Dr Roxana Salvador his lungs- no rhonci, no wheeze or abnormal breath sounds noted  Recommended he  Robitussin and Musinex for symptoms  Sent to [address] for OTC meds

## 2023-05-31 ENCOUNTER — PATIENT OUTREACH (OUTPATIENT)
Dept: OTHER | Facility: OTHER | Age: 58
End: 2023-05-31

## 2023-05-31 ENCOUNTER — OFFICE VISIT (OUTPATIENT)
Dept: FAMILY MEDICINE CLINIC | Facility: CLINIC | Age: 58
End: 2023-05-31

## 2023-05-31 ENCOUNTER — HOSPITAL ENCOUNTER (OUTPATIENT)
Dept: RADIOLOGY | Facility: HOSPITAL | Age: 58
Discharge: HOME | End: 2023-05-31
Payer: MEDICARE

## 2023-05-31 VITALS
BODY MASS INDEX: 21.38 KG/M2 | DIASTOLIC BLOOD PRESSURE: 76 MMHG | HEIGHT: 66 IN | WEIGHT: 133 LBS | RESPIRATION RATE: 18 BRPM | SYSTOLIC BLOOD PRESSURE: 128 MMHG | HEART RATE: 87 BPM | OXYGEN SATURATION: 97 % | TEMPERATURE: 97.7 F

## 2023-05-31 DIAGNOSIS — J40 BRONCHITIS: ICD-10-CM

## 2023-05-31 DIAGNOSIS — J40 BRONCHITIS: Primary | ICD-10-CM

## 2023-05-31 PROCEDURE — 71046 X-RAY EXAM CHEST 2 VIEWS: CPT

## 2023-05-31 RX ORDER — ONDANSETRON 4 MG/1
4 TABLET, ORALLY DISINTEGRATING ORAL EVERY 6 HOURS PRN
Qty: 20 TABLET | Refills: 0 | Status: SHIPPED | OUTPATIENT
Start: 2023-05-31

## 2023-05-31 RX ORDER — BENZONATATE 100 MG/1
100 CAPSULE ORAL 3 TIMES DAILY PRN
Qty: 20 CAPSULE | Refills: 0 | Status: SHIPPED | OUTPATIENT
Start: 2023-05-31

## 2023-05-31 RX ORDER — FLUTICASONE PROPIONATE 50 MCG
1 SPRAY, SUSPENSION (ML) NASAL DAILY
Qty: 18.2 ML | Refills: 0 | Status: SHIPPED | OUTPATIENT
Start: 2023-05-31

## 2023-05-31 RX ORDER — AZITHROMYCIN 250 MG/1
TABLET, FILM COATED ORAL
Qty: 6 TABLET | Refills: 0 | Status: SHIPPED | OUTPATIENT
Start: 2023-05-31 | End: 2023-06-04

## 2023-05-31 NOTE — PROGRESS NOTES
5/31/23: Continues to be congested and uncomfortable  Obtained the Robitussin DM at [facility] yesterday  Called [facility] at [place] for same day appointment  Made appointment for 3:30 PM    Also requested COVID antigen test which was performed  Result: negative  Encouraged him to keep mask over nose and mouth

## 2023-05-31 NOTE — PROGRESS NOTES
Name: Veronica Laguna      : 1965      MRN: [id number]  Encounter Provider: [facility]  Encounter Date: 2023   Encounter department: [facility]    Assessment & Plan     1  Bronchitis  Assessment & Plan:  Due to smoking and COPD history will start azithromycin x 5 days   Chest xray -future r/o infectious origin   Continue with tussin-dm   Start Flonase, benzonatate, Zofran for symptoms relief  Follow up if symptoms worsen or do not improve     Orders:  -     XR chest pa & lateral; Future; Expected date: 2023  -     fluticasone (FLONASE) 50 mcg/act nasal spray; 1 spray into each nostril daily  -     azithromycin (ZITHROMAX) 250 mg tablet; Take 2 tablets today then 1 tablet daily x 4 days  -     benzonatate (TESSALON PERLES) 100 mg capsule; Take 1 capsule (100 mg total) by mouth 3 (three) times a day as needed for cough  -     ondansetron (ZOFRAN-ODT) 4 mg disintegrating tablet; Take 1 tablet (4 mg total) by mouth every 6 (six) hours as needed for nausea or vomiting         Subjective      [facility] 59 y o  male has a past medical history of Anxiety, Asthma, COPD (chronic obstructive pulmonary disease) ([facility] [address] ), Depression, GERD (gastroesophageal reflux disease), Hyperlipidemia, Hypertension, and [facility] )  Presenting today for a same day visit for evaluation of cough  Patient seen by [place] nurse who prescribed tussin DM; patient has taking it for two day reports no improvement  Cough is worse in the am upon awakening, with associated symptoms of burning sensation and sweating  Denies fatigue, headaches, dizziness, blurred vision, nausea, palpitation, chest pain, SOB, urinary changes, weakness, bowel changes, sleep problems,  sick contacts, red flag signs,  or recent travel; at the time of the visit  Cough  This is a new problem  The current episode started in the past 7 days   The problem has been waxing and waning  The problem occurs every few hours  The cough is productive of sputum (green sputum )  Associated symptoms include nasal congestion and rhinorrhea  Pertinent negatives include no chest pain, chills, ear pain, fever, rash, sore throat or shortness of breath  Associated symptoms comments: nausea  Nothing aggravates the symptoms  Treatments tried: guafenasin  The treatment provided mild relief  Review of Systems   Constitutional: Negative for chills and fever  HENT: Positive for rhinorrhea  Negative for ear pain and sore throat  Eyes: Negative for pain and visual disturbance  Respiratory: Positive for cough  Negative for shortness of breath  Cardiovascular: Negative for chest pain and palpitations  Gastrointestinal: Negative for abdominal pain and vomiting  Genitourinary: Negative for dysuria and hematuria  Musculoskeletal: Negative for arthralgias and back pain  Skin: Negative for color change and rash  Neurological: Negative for seizures and syncope  All other systems reviewed and are negative  Current Outpatient Medications on File Prior to Visit   Medication Sig   • Advair Diskus 500-50 MCG/ACT inhaler INHALE 1 PUFF 2 (TWO) TIMES A DAY RINSE MOUTH AFTER USE  • albuterol (PROVENTIL HFA,VENTOLIN HFA) 90 mcg/act inhaler INHALE 1 PUFF EVERY 6 (SIX) HOURS AS NEEDED FOR WHEEZING (Patient not taking: Reported on 1/11/2023)   • ALPRAZolam (XANAX) 0 5 mg tablet Take 1 tablet (0 5 mg total) by mouth 2 (two) times a day (Patient not taking: Reported on 1/24/2023)   • atorvastatin (LIPITOR) 10 mg tablet TAKE 1 TABLET (10 MG TOTAL) BY MOUTH DAILY   • bisacodyl (DULCOLAX) 5 mg EC tablet Take 1 tablet (5 mg total) by mouth see administration instructions Take 2 tablets at 12:00 p m  on day prior to colonoscopy    Take 2 tablets at 4:00 p m  on day prior to colonoscopy   • buPROPion (WELLBUTRIN SR) 150 mg 12 hr tablet TAKE 1 TABLET (150 MG TOTAL) BY MOUTH 2 (TWO) TIMES A DAY   • busPIRone (BUSPAR) 10 mg tablet TAKE 1 TABLET(S) BY ORAL ROUTE 2 TIME(S) PER DAY   • cetirizine (ZyrTEC) 10 mg tablet TAKE 1 TABLET (10 MG TOTAL) BY MOUTH DAILY   • cholecalciferol (VITAMIN D3) 1,000 units tablet TAKE 1 TABLET (1,000 UNITS TOTAL) BY MOUTH DAILY   • cloNIDine (CATAPRES) 0 1 mg tablet TAKE 1 TABLET BY ORAL ROUTE 2 TIMES PER DAY   • cloNIDine (CATAPRES) 0 2 mg tablet Take 0 2 mg by mouth 2 (two) times a day (Patient not taking: Reported on 3/16/2023)   • Diclofenac Sodium (VOLTAREN) 1 % APPLY 1 INCH TO EACH KNEE TWICE DAILY (Patient not taking: Reported on 1/11/2023)   • docusate sodium (COLACE) 100 mg capsule TAKE 1 CAPSULE (100 MG TOTAL) BY MOUTH 2 (TWO) TIMES A DAY   • escitalopram (LEXAPRO) 20 mg tablet Take 1 tablet (20 mg total) by mouth daily   • famotidine (PEPCID) 20 mg tablet TAKE 1 TABLET (20 MG TOTAL) BY MOUTH 2 (TWO) TIMES A DAY   • finasteride (PROSCAR) 5 mg tablet Take 1 tablet (5 mg total) by mouth daily for 90 doses   • Fluticasone-Salmeterol (Advair Diskus) 500-50 mcg/dose inhaler Inhale 1 puff 2 (two) times a day Rinse mouth after use  (Patient not taking: Reported on 5/23/2023)   • fluticasone-salmeterol (Advair HFA) 115-21 MCG/ACT inhaler Inhale 2 puffs 2 (two) times a day (Patient not taking: Reported on 5/23/2023)   • fluticasone-salmeterol (ADVAIR, WIXELA) 500-50 mcg/dose inhaler Inhale 1 puff 2 (two) times a day Rinse mouth after use   (Patient not taking: Reported on 5/23/2023)   • hydrOXYzine pamoate (VISTARIL) 50 mg capsule Take 50 mg by mouth daily at bedtime   • ibuprofen (MOTRIN) 600 mg tablet Take 1 tablet (600 mg total) by mouth every 6 (six) hours as needed for mild pain or moderate pain   • loratadine (CLARITIN) 10 mg tablet Take 1 tablet (10 mg total) by mouth daily   • naltrexone (REVIA) 50 mg tablet Take 50 mg by mouth daily   • ondansetron (ZOFRAN) 4 mg tablet TAKE 1 TABLET (4 MG TOTAL) BY MOUTH EVERY 8 (EIGHT) HOURS AS NEEDED FOR NAUSEA OR VOMITING   • "pantoprazole (PROTONIX) 40 mg tablet TAKE 1 TABLET (40 MG TOTAL) BY MOUTH DAILY   • polyethylene glycol (GLYCOLAX) 17 GM/SCOOP powder Take 238 g by mouth see administration instructions Mix 238 g with 64 oz of clear liquid  Drink half starting at noon on the day prior to colonoscopy  Drink remaining half 6 hours prior to procedure on day of colonoscopy  • polyethylene glycol (GLYCOLAX) 17 GM/SCOOP powder Take 17 g by mouth daily   • QUEtiapine (SEROquel) 50 mg tablet Take 3 tablets (150 mg total) by mouth daily at bedtime   • tamsulosin (FLOMAX) 0 4 mg TAKE 1 CAPSULE (0 4 MG TOTAL) BY MOUTH DAILY WITH DINNER   • tiotropium (Spiriva HandiHaler) 18 mcg inhalation capsule Place 1 capsule (18 mcg total) into inhaler and inhale daily   • traZODone (DESYREL) 50 mg tablet Take 0 5 tablets (25 mg total) by mouth daily at bedtime   • Vraylar 1 5 MG capsule Take 1 5 mg by mouth daily at bedtime (Patient not taking: Reported on 1/11/2023)       Objective     /76 (BP Location: Left arm, Patient Position: Sitting, Cuff Size: Standard)   Pulse 87   Temp 97 7 °F (36 5 °C) (Temporal)   Resp 18   Ht 5' 6\" (1 676 m)   Wt 60 3 kg (133 lb)   SpO2 97%   BMI 21 47 kg/m²     Physical Exam  Vitals and nursing note reviewed  Constitutional:       General: He is not in acute distress  Appearance: Normal appearance  He is not ill-appearing  HENT:      Head: Normocephalic and atraumatic  Right Ear: Tympanic membrane, ear canal and external ear normal       Left Ear: Tympanic membrane, ear canal and external ear normal       Nose: Nose normal       Mouth/Throat:      Mouth: Mucous membranes are moist    Eyes:      General:         Right eye: No discharge  Left eye: No discharge  Pupils: Pupils are equal, round, and reactive to light  Cardiovascular:      Rate and Rhythm: Normal rate and regular rhythm  Pulses: Normal pulses  Heart sounds: Normal heart sounds     Pulmonary:      Effort: " Pulmonary effort is normal  No respiratory distress  Breath sounds: Normal breath sounds  No wheezing  Abdominal:      General: Bowel sounds are normal       Palpations: Abdomen is soft  Tenderness: There is no abdominal tenderness  There is no right CVA tenderness or left CVA tenderness  Musculoskeletal:         General: Normal range of motion  Cervical back: Normal range of motion  Skin:     General: Skin is warm and dry  Neurological:      General: No focal deficit present  Mental Status: He is alert and oriented to person, place, and time         [facility]

## 2023-05-31 NOTE — ASSESSMENT & PLAN NOTE
Due to smoking and COPD history will start azithromycin x 5 days   Chest xray -future r/o infectious origin   Continue with tussin-dm   Start Flonase, benzonatate, Zofran for symptoms relief     Follow up if symptoms worsen or do not improve

## 2023-06-01 ENCOUNTER — OFFICE VISIT (OUTPATIENT)
Dept: PHYSICAL THERAPY | Facility: CLINIC | Age: 58
End: 2023-06-01

## 2023-06-01 DIAGNOSIS — M25.512 LEFT SHOULDER PAIN, UNSPECIFIED CHRONICITY: Primary | ICD-10-CM

## 2023-06-01 DIAGNOSIS — M25.552 PAIN OF LEFT HIP: ICD-10-CM

## 2023-06-01 NOTE — PROGRESS NOTES
"Daily Note     Today's date: 2023  Patient name: Warden Alejo  : 1965  MRN: [id number]  Referring provider: Deneice Hodgkin*  Dx:   Encounter Diagnosis     ICD-10-CM    1  Left shoulder pain, unspecified chronicity  M25 512       2  Pain of left hip  M25 552           Start Time: 1028  Stop Time: 1113  Total time in clinic (min): 45 minutes    Subjective: Pt reports his COVID test came back negative but he got chest x-rays and was put on antibiotics  He still feels sick but reports he feels ok enough to do therapy today  Objective: See treatment diary below      Assessment: Tolerated treatment well  Patient continues to require cuing to engage scapular retractors during rows and prone I's and T's  Pt tried SA press with 5# but required decrease in weight due to weakness/difficulty  Resisted ER/IR were performed with good form following cuing to avoid body rotation compensation  SL abduction was also progressed with increased weight with good form  Continue to progress pt as tolerated next visit  Plan: Continue per plan of care  Precautions: GERD, hiatal hernia, COPD, anxiety, OA of neck, major depression, pain of L hip, pain of L shoulder      Manuals 5/9 5/15 5/18 5/22 5/25 5/30 6/1      AC mob nv            Update exercises    nv                                   Neuro Re-Ed 5/9 5/15 5/18 5/22 5/25 5/30 6/1      SL clams (HEP ptb) 15x ea side 15x ea side ptb 2x10 ea side ptb 2x10 gtb; hold progressions pn!  During second set at end 2x15 ea gtb        SL abduction (HEP) 10x ea side 15x ea side 15x ea side 2x10 1# ea 2x15 ea 1#  2x10 2#      scap retractions (HEP) 15x 5\" hold 2x15 15x          No monies (HEP ptb) 15x ptb 2x15 ptb 2x15 ptb Nv; updated bands nv for home 2x15 ptb 2x15 gtb       bridges  ptb abduction 2x15 ptb abduction 2x15 2x10 gtb abduction 2x15 gtb abduction        Rows/ext nv 2x15 rows 8# 2x15 rows 8#, ext 5#   2x15 ea 10# rows/ext 2x15 rows/extensions " "11#      Resisted ER/IR nv  YTB 2x10 rtb 2x15 ea side rtb 2x15 ea side gtb 2x15 ER/IR gtb 2x15      Prone I, T nv   2x10 ea   2x10 ea  2x15       Ther Ex 5/9 5/15 5/18 5/22 5/25 5/30 6/1      Piriformis stretch (HEP) 30\" ea            UBE nv 3'/3' 3'/3' 3'/3' 3'/3' 3'/3' 3'/3'      SA press nv     2x15 qped 4# 2x10      Lateral walks nv 5x at mirror otb 5x at mirror otb          HS curls nv   gtb 15x ea side         Supine hip flexion nv            LP nv 115# 2x10 115# 2x10          Pt edu     NS        Ther Activity 5/9 5/15 5/18 5/22 5/25 5/30 6/1                                Gait Training 5/9 5/15 5/18 5/22 5/25 5/30 6/1                                Modalities 5/9 5/15 5/18 5/22 5/25 5/30 6/1                                     "

## 2023-06-07 ENCOUNTER — ANESTHESIA EVENT (OUTPATIENT)
Dept: GASTROENTEROLOGY | Facility: HOSPITAL | Age: 58
End: 2023-06-07

## 2023-06-07 ENCOUNTER — APPOINTMENT (OUTPATIENT)
Dept: PHYSICAL THERAPY | Facility: CLINIC | Age: 58
End: 2023-06-07
Payer: MEDICARE

## 2023-06-07 ENCOUNTER — HOSPITAL ENCOUNTER (OUTPATIENT)
Dept: GASTROENTEROLOGY | Facility: HOSPITAL | Age: 58
Setting detail: OUTPATIENT SURGERY
Discharge: HOME | End: 2023-06-07
Attending: SURGERY
Payer: MEDICARE

## 2023-06-07 ENCOUNTER — PATIENT OUTREACH (OUTPATIENT)
Dept: OTHER | Facility: OTHER | Age: 58
End: 2023-06-07

## 2023-06-07 ENCOUNTER — ANESTHESIA (OUTPATIENT)
Dept: GASTROENTEROLOGY | Facility: HOSPITAL | Age: 58
End: 2023-06-07

## 2023-06-07 VITALS
HEIGHT: 66 IN | TEMPERATURE: 97.9 F | SYSTOLIC BLOOD PRESSURE: 190 MMHG | DIASTOLIC BLOOD PRESSURE: 86 MMHG | HEART RATE: 70 BPM | OXYGEN SATURATION: 98 % | WEIGHT: 133 LBS | BODY MASS INDEX: 21.38 KG/M2 | RESPIRATION RATE: 18 BRPM

## 2023-06-07 DIAGNOSIS — K21.9 GASTROESOPHAGEAL REFLUX DISEASE WITHOUT ESOPHAGITIS: ICD-10-CM

## 2023-06-07 DIAGNOSIS — K21.00 GASTROESOPHAGEAL REFLUX DISEASE WITH ESOPHAGITIS, UNSPECIFIED WHETHER HEMORRHAGE: ICD-10-CM

## 2023-06-07 DIAGNOSIS — Z12.11 ENCOUNTER FOR SCREENING COLONOSCOPY: ICD-10-CM

## 2023-06-07 PROCEDURE — 88305 TISSUE EXAM BY PATHOLOGIST: CPT | Performed by: PATHOLOGY

## 2023-06-07 PROCEDURE — 43239 EGD BIOPSY SINGLE/MULTIPLE: CPT | Performed by: SURGERY

## 2023-06-07 PROCEDURE — 45385 COLONOSCOPY W/LESION REMOVAL: CPT | Performed by: SURGERY

## 2023-06-07 PROCEDURE — 45380 COLONOSCOPY AND BIOPSY: CPT | Performed by: SURGERY

## 2023-06-07 RX ORDER — SODIUM CHLORIDE, SODIUM LACTATE, POTASSIUM CHLORIDE, CALCIUM CHLORIDE 600; 310; 30; 20 MG/100ML; MG/100ML; MG/100ML; MG/100ML
100 INJECTION, SOLUTION INTRAVENOUS CONTINUOUS
Status: CANCELLED | OUTPATIENT
Start: 2023-06-07

## 2023-06-07 RX ORDER — LIDOCAINE HYDROCHLORIDE 10 MG/ML
INJECTION, SOLUTION EPIDURAL; INFILTRATION; INTRACAUDAL; PERINEURAL AS NEEDED
Status: DISCONTINUED | OUTPATIENT
Start: 2023-06-07 | End: 2023-06-07

## 2023-06-07 RX ORDER — SODIUM CHLORIDE, SODIUM LACTATE, POTASSIUM CHLORIDE, CALCIUM CHLORIDE 600; 310; 30; 20 MG/100ML; MG/100ML; MG/100ML; MG/100ML
125 INJECTION, SOLUTION INTRAVENOUS CONTINUOUS
Status: DISCONTINUED | OUTPATIENT
Start: 2023-06-07 | End: 2023-06-11 | Stop reason: HOSPADM

## 2023-06-07 RX ORDER — PROPOFOL 10 MG/ML
INJECTION, EMULSION INTRAVENOUS AS NEEDED
Status: DISCONTINUED | OUTPATIENT
Start: 2023-06-07 | End: 2023-06-07

## 2023-06-07 RX ORDER — SODIUM CHLORIDE, SODIUM LACTATE, POTASSIUM CHLORIDE, CALCIUM CHLORIDE 600; 310; 30; 20 MG/100ML; MG/100ML; MG/100ML; MG/100ML
INJECTION, SOLUTION INTRAVENOUS CONTINUOUS PRN
Status: DISCONTINUED | OUTPATIENT
Start: 2023-06-07 | End: 2023-06-07

## 2023-06-07 RX ORDER — PROPOFOL 10 MG/ML
INJECTION, EMULSION INTRAVENOUS CONTINUOUS PRN
Status: DISCONTINUED | OUTPATIENT
Start: 2023-06-07 | End: 2023-06-07

## 2023-06-07 RX ADMIN — PROPOFOL 50 MG: 10 INJECTION, EMULSION INTRAVENOUS at 09:26

## 2023-06-07 RX ADMIN — PROPOFOL 50 MG: 10 INJECTION, EMULSION INTRAVENOUS at 09:21

## 2023-06-07 RX ADMIN — SODIUM CHLORIDE, SODIUM LACTATE, POTASSIUM CHLORIDE, AND CALCIUM CHLORIDE 125 ML/HR: .6; .31; .03; .02 INJECTION, SOLUTION INTRAVENOUS at 08:10

## 2023-06-07 RX ADMIN — SODIUM CHLORIDE, SODIUM LACTATE, POTASSIUM CHLORIDE, AND CALCIUM CHLORIDE: .6; .31; .03; .02 INJECTION, SOLUTION INTRAVENOUS at 08:10

## 2023-06-07 RX ADMIN — PROPOFOL 100 MG: 10 INJECTION, EMULSION INTRAVENOUS at 09:24

## 2023-06-07 RX ADMIN — PROPOFOL 50 MG: 10 INJECTION, EMULSION INTRAVENOUS at 09:29

## 2023-06-07 RX ADMIN — PROPOFOL 75 MCG/KG/MIN: 10 INJECTION, EMULSION INTRAVENOUS at 09:40

## 2023-06-07 RX ADMIN — PROPOFOL 50 MG: 10 INJECTION, EMULSION INTRAVENOUS at 09:32

## 2023-06-07 RX ADMIN — LIDOCAINE HYDROCHLORIDE 50 MG: 10 INJECTION, SOLUTION EPIDURAL; INFILTRATION; INTRACAUDAL at 09:24

## 2023-06-07 NOTE — INTERVAL H&P NOTE
H&P reviewed  After examining the patient I find no changes in the patients condition since the H&P had been written      Vitals:    06/07/23 0638   BP: 167/96   Pulse: 64   Resp: 18   Temp: (!) 96 °F (35 6 °C)   SpO2: 99%

## 2023-06-07 NOTE — ANESTHESIA PREPROCEDURE EVALUATION
Procedure:  COLONOSCOPY  EGD    Relevant Problems   CARDIO   (+) Mixed hyperlipidemia      GI/HEPATIC   (+) Gastroesophageal reflux disease without esophagitis   (+) Hiatal hernia      /RENAL   (+) BPH (benign prostatic hyperplasia)      MUSCULOSKELETAL   (+) Hiatal hernia   (+) OA (osteoarthritis) of neck      NEURO/PSYCH   (+) Anxiety   (+) Major depression      PULMONARY   (+) COPD (chronic obstructive pulmonary disease) (HCC)      Respiratory   (+) Allergic rhinitis   (+) Bronchitis      Musculoskeletal and Integument   (+) Seborrheic dermatitis      Other   (+) Chronic pain of both knees   (+) Dysuria   (+) Left shoulder pain   (+) Other constipation   (+) Pain of left hip   (+) Postnasal drip   (+) Premature ejaculation   (+) Prostate cancer screening   (+) Screen for STD (sexually transmitted disease)   (+) Testicular discomfort   (+) Tobacco dependence syndrome        Physical Exam    Airway    Mallampati score: II  TM Distance: >3 FB  Neck ROM: full     Dental       Cardiovascular  Cardiovascular exam normal    Pulmonary  Pulmonary exam normal     Other Findings        Anesthesia Plan  ASA Score- 3     Anesthesia Type- IV sedation with anesthesia with ASA Monitors  Additional Monitors:   Airway Plan:     Comment: Labs one year ago  No recent EKG  Plan Factors-Exercise tolerance (METS): >4 METS  Chart reviewed  Existing labs reviewed  Patient is a current smoker  Patient instructed to abstain from smoking on day of procedure  Patient smoked on day of surgery  Induction- intravenous  Postoperative Plan-     Informed Consent- Anesthetic plan and risks discussed with patient  I personally reviewed this patient with the CRNA  Discussed and agreed on the Anesthesia Plan with the ROB Mcgill

## 2023-06-07 NOTE — ANESTHESIA POSTPROCEDURE EVALUATION
Post-Op Assessment Note    CV Status:  Stable  Pain Score: 0    Pain management: adequate     Mental Status:  Alert and awake   Hydration Status:  Stable   PONV Controlled:  None   Airway Patency:  Patent      Post Op Vitals Reviewed: Yes      Staff: CRNA   Comments: Patient had extremely loose tooth pre-op and when he woke up was coughing with large amounts of mucous  He reached in while coughing and removed the loose tooth  It was placed in a container  No notable events documented      /78 (06/07/23 1047)    Temp 97 9 °F (36 6 °C) (06/07/23 1047)    Pulse 66 (06/07/23 1047)   Resp 18 (06/07/23 1047)    SpO2 99 % (06/07/23 1047)

## 2023-06-07 NOTE — PROGRESS NOTES
6/7/23: Client reported he had successful colonoscopy and endoscopy  Had polyps removed  No results as yet  Assume they will make an appointment to give results  Will follow up

## 2023-06-09 ENCOUNTER — OFFICE VISIT (OUTPATIENT)
Dept: PHYSICAL THERAPY | Facility: CLINIC | Age: 58
End: 2023-06-09
Payer: MEDICARE

## 2023-06-09 DIAGNOSIS — M25.512 LEFT SHOULDER PAIN, UNSPECIFIED CHRONICITY: Primary | ICD-10-CM

## 2023-06-09 DIAGNOSIS — M25.552 PAIN OF LEFT HIP: ICD-10-CM

## 2023-06-09 PROCEDURE — 97140 MANUAL THERAPY 1/> REGIONS: CPT

## 2023-06-09 PROCEDURE — 97112 NEUROMUSCULAR REEDUCATION: CPT

## 2023-06-09 PROCEDURE — 97110 THERAPEUTIC EXERCISES: CPT

## 2023-06-09 RX ORDER — FAMOTIDINE 20 MG/1
20 TABLET, FILM COATED ORAL 2 TIMES DAILY
Qty: 60 TABLET | Refills: 0 | Status: SHIPPED | OUTPATIENT
Start: 2023-06-09

## 2023-06-09 NOTE — PROGRESS NOTES
"Daily Note     Today's date: 2023  Patient name: Gissel Wallis  : 1965  MRN: [id number]  Referring provider: Aman Solitario*  Dx:   Encounter Diagnosis     ICD-10-CM    1  Left shoulder pain, unspecified chronicity  M25 512       2  Pain of left hip  M25 552                      Subjective: Pt presents to PT reporting pain in L superior shoulder with palpation and when trying to sleep  He also c/o pain on side of L leg  Pt denies increased pain post PT session  Objective: See treatment diary below      Assessment: Tolerated treatment well  Added standing shoulder ABD and flexion noting difficulty with ABD  Noted muscle restriction in proximal UT and L paraspinals  TTP also noted along L supraspinatus  Patient demonstrated fatigue post treatment, exhibited good technique with therapeutic exercises and would benefit from continued PT to increase flexibility, strength and function  Plan: Continue per plan of care  Precautions: GERD, hiatal hernia, COPD, anxiety, OA of neck, major depression, pain of L hip, pain of L shoulder      Manuals 5/9 5/15 5/18 5/22 5/25 5/30 6/1 6/9     AC mob nv            Update exercises    nv                                   Neuro Re-Ed 5/9 5/15 5/18 5/22 5/25 5/30 6/1 6/9     SL clams (HEP ptb) 15x ea side 15x ea side ptb 2x10 ea side ptb 2x10 gtb; hold progressions pn!  During second set at end 2x15 ea gtb        SL abduction (HEP) 10x ea side 15x ea side 15x ea side 2x10 1# ea 2x15 ea 1#  2x10 2# 2x10 2#     scap retractions (HEP) 15x 5\" hold 2x15 15x          No monies (HEP ptb) 15x ptb 2x15 ptb 2x15 ptb Nv; updated bands nv for home 2x15 ptb 2x15 gtb       bridges  ptb abduction 2x15 ptb abduction 2x15 2x10 gtb abduction 2x15 gtb abduction        Rows/ext nv 2x15 rows 8# 2x15 rows 8#, ext 5#   2x15 ea 10# rows/ext 2x15 rows/extensions 11# 2x15 rows/extensions 11#     Resisted ER/IR nv  YTB 2x10 rtb 2x15 ea side rtb 2x15 ea side gtb 2x15 " "ER/IR gtb 2x15 gtb 2x15     Prone I, T nv   2x10 ea   2x10 ea  2x15  1# x 15 ea     Ther Ex 5/9 5/15 5/18 5/22 5/25 5/30 6/1 6/9     Piriformis stretch (HEP) 30\" ea            UBE nv 3'/3' 3'/3' 3'/3' 3'/3' 3'/3' 3'/3' 3'/3'     SA press nv     2x15 qped 4# 2x10 4# 2x10     Lateral walks nv 5x at mirror otb 5x at [employer] otb          Stand shoulder abd & flexion        1# 2 x 10     HS curls nv   gtb 15x ea side         Supine hip flexion nv            LP nv 115# 2x10 115# 2x10          Pt edu     NS        Ther Activity 5/9 5/15 5/18 5/22 5/25 5/30 6/1 6/9                               Gait Training 5/9 5/15 5/18 5/22 5/25 5/30 6/1 6/9                               Modalities 5/9 5/15 5/18 5/22 5/25 5/30 6/1 6/9                                    "

## 2023-06-12 ENCOUNTER — OFFICE VISIT (OUTPATIENT)
Dept: PHYSICAL THERAPY | Facility: CLINIC | Age: 58
End: 2023-06-12
Payer: MEDICARE

## 2023-06-12 DIAGNOSIS — M25.512 LEFT SHOULDER PAIN, UNSPECIFIED CHRONICITY: Primary | ICD-10-CM

## 2023-06-12 DIAGNOSIS — M25.552 PAIN OF LEFT HIP: ICD-10-CM

## 2023-06-12 PROCEDURE — 97112 NEUROMUSCULAR REEDUCATION: CPT

## 2023-06-12 PROCEDURE — 88305 TISSUE EXAM BY PATHOLOGIST: CPT | Performed by: PATHOLOGY

## 2023-06-12 PROCEDURE — 97110 THERAPEUTIC EXERCISES: CPT

## 2023-06-12 NOTE — PROGRESS NOTES
Daily Note     Today's date: 2023  Patient name: Raf Bobo  : 1965  MRN: [id number]  Referring provider: Brayden Brooks*  Dx:   Encounter Diagnosis     ICD-10-CM    1  Left shoulder pain, unspecified chronicity  M25 512       2  Pain of left hip  M25 552           Start Time: 1031  Stop Time: 1113  Total time in clinic (min): 42 minutes    Subjective: Pt reports to therapy reporting over the weekend, he was walking back from [place] and he walked up his steps and his calf cramped up so bad it stopped him from walking  His calf continues to feel sore today and is causing antalgic gait that pt presents with  Pt was educated on staying hydrated, especially when walking in the heat  Also dicussed adding HR isometrics and calf stretches at home  Objective: See treatment diary below      Assessment: Tolerated treatment well  Patient was educated on staying hydrated, especially when walking in the heat  Also dicussed adding HR isometrics and calf stretches at home  Calf stretch and HR isometrics were performed at the beginning of the session due to calf pain limiting treatment of L hip  Following added exercises, pt displayed improvement in antalgic gait  SL clams were progressed with increased resistance with pt experiencing cramping, requiring increased rest breaks  Lateral walks were also progressed with increased resistance  Despite cuing, pt displayed minor compensations during exercise  Continue to progress pt as tolerated next visit  Plan: Continue per plan of care  Precautions: GERD, hiatal hernia, COPD, anxiety, OA of neck, major depression, pain of L hip, pain of L shoulder      Manuals 5/9 5/15 5/18 5/22 5/25 5/30 6/1 6/9 6/12    AC mob nv            Update exercises    nv                                   Neuro Re-Ed 5/9 5/15 5/18 5/22 5/25 5/30 6/1 6/9 6/12    SL clams (HEP ptb) 15x ea side 15x ea side ptb 2x10 ea side ptb 2x10 gtb; hold progressions pn!  During "second set at end 2x15 ea gtb    btb 15x ea side    SL abduction (HEP) 10x ea side 15x ea side 15x ea side 2x10 1# ea 2x15 ea 1#  2x10 2# 2x10 2# 2x15 ea 2#    scap retractions (HEP) 15x 5\" hold 2x15 15x          No monies (HEP ptb) 15x ptb 2x15 ptb 2x15 ptb Nv; updated bands nv for home 2x15 ptb 2x15 gtb   2x15 gtb    bridges  ptb abduction 2x15 ptb abduction 2x15 2x10 gtb abduction 2x15 gtb abduction        Rows/ext nv 2x15 rows 8# 2x15 rows 8#, ext 5#   2x15 ea 10# rows/ext 2x15 rows/extensions 11# 2x15 rows/extensions 11#     Resisted ER/IR nv  YTB 2x10 rtb 2x15 ea side rtb 2x15 ea side gtb 2x15 ER/IR gtb 2x15 gtb 2x15     Prone I, T nv   2x10 ea   2x10 ea  2x15  1# x 15 ea     Ther Ex 5/9 5/15 5/18 5/22 5/25 5/30 6/1 6/9 6/12    Piriformis stretch (HEP) 30\" ea            UBE nv 3'/3' 3'/3' 3'/3' 3'/3' 3'/3' 3'/3' 3'/3' 3'/3'    Calf stretch         2x1' lvl 2    SA press nv     2x15 qped 4# 2x10 4# 2x10     Lateral walks nv 5x at mirror otb 5x at mirror otb      gtb 5x at [employer] shoulder abd & flexion        1# 2 x 10     HS curls nv   gtb 15x ea side         Supine hip flexion nv            HR DL         10x 10\"    LP nv 115# 2x10 115# 2x10          Pt edu     NS        Ther Activity 5/9 5/15 5/18 5/22 5/25 5/30 6/1 6/9 6/12                              Gait Training 5/9 5/15 5/18 5/22 5/25 5/30 6/1 6/9 6/12                              Modalities 5/9 5/15 5/18 5/22 5/25 5/30 6/1 6/9 6/12                                   "

## 2023-06-13 ENCOUNTER — PATIENT OUTREACH (OUTPATIENT)
Dept: OTHER | Facility: OTHER | Age: 58
End: 2023-06-13

## 2023-06-13 NOTE — PROGRESS NOTES
6/13/23: reported that he had not received a report from GI  Also does not have a follow up appointment  Continues to vomit liquids and has 4 diarrhea stools/day  Called [facility] [address]  To gain information  Results are back but Dr Purvi Tiwari has not seen them yet  When she does, she will call Yulia Madrid, will call to review with him  Hopefully tomorrow

## 2023-06-14 ENCOUNTER — TELEPHONE (OUTPATIENT)
Dept: SURGERY | Facility: CLINIC | Age: 58
End: 2023-06-14

## 2023-06-14 ENCOUNTER — PATIENT OUTREACH (OUTPATIENT)
Dept: OTHER | Facility: OTHER | Age: 58
End: 2023-06-14

## 2023-06-14 NOTE — PROGRESS NOTES
6/14/23: Client reported that he has not received a call from the GI doctor yet  Later returned to say he received a call stating he has a hiatal hernia and also had 5 polyps removed- all benign  Has an appointment with general surgery on 6/20 at 10:45 for a consult for hernia  Client is agreeable for this

## 2023-06-15 ENCOUNTER — EVALUATION (OUTPATIENT)
Dept: PHYSICAL THERAPY | Facility: CLINIC | Age: 58
End: 2023-06-15
Payer: MEDICARE

## 2023-06-15 DIAGNOSIS — M25.512 LEFT SHOULDER PAIN, UNSPECIFIED CHRONICITY: Primary | ICD-10-CM

## 2023-06-15 DIAGNOSIS — M25.552 PAIN OF LEFT HIP: ICD-10-CM

## 2023-06-15 PROCEDURE — 97164 PT RE-EVAL EST PLAN CARE: CPT

## 2023-06-15 PROCEDURE — 97110 THERAPEUTIC EXERCISES: CPT

## 2023-06-15 PROCEDURE — 97140 MANUAL THERAPY 1/> REGIONS: CPT

## 2023-06-15 NOTE — PROGRESS NOTES
PT Re-evaluation    Today's date: 6/15/2023  Patient name: Gwendolyn Jenkins  : 1965  MRN: [id number]  Referring provider: Nory Palacio*  Dx:   Encounter Diagnosis     ICD-10-CM    1  Left shoulder pain, unspecified chronicity  M25 512       2  Pain of left hip  M25 552           Start Time: 1034  Stop Time: 1128  Total time in clinic (min): 54 minutes    Subjective: Pt reports improvement in hip pain most significantly since initial evaluation  He reports the L hip doesn't bother him much but the L shoulder continues to bother him  Pain  Current pain ratin (5/10 hip)  At best pain ratin (4/10)  At worst pain ratin (5/10 hip)  Quality: discomfort (aching/sharp hip)  Relieving factors: medications (tylenol)  Aggravating factors: lifting and sitting (sitting for long periods of time for the hip, laying on L shoulder and holding items)  Progression: improved      Objective: See treatment diary below    Shoulder Comments   Cervical ROM  Flex: WNL  Ext: WNL   R SB: tightness in L UT moderately limited  L RB: minimally limited  R rot: Minimally limited L sided tightness  L rot: minimally limited L sided pain     UE ROM  Shoulder flex: WNL bilat with L shoulder pain  Shoulder Abd: WNL bilat with L shoulder pain  Functional ER: T1 L, T3 R  Functional IR: T7 R, T8 L     UE strength  UT Shru/5 bilat  Shoulder flex: 4/5 L forearm with pain, 4+/5 R  Shoulder abd: 4+/5 R, 4/5 L with pain shoulder anterior/lateral  ER: 5/5 R, 4+/5 L with posterior shoulder pain  IR: 5/5 L, 5/5 R     Palpation: ttp L UT, levator scap           Hip Comments   Hip ROM  -Flexion:  WNL  -ER: minimally limited bilat  -IR: moderately limited LLE with lateral hip pain, WNL R     Strength  Hip flexion: 4/5 R, 4/5 L with cramping and tightness  Knee ext: 4+/5 R, 4+/5 L   Knee flex: 5/5  Hip abduct: 4+/5 bilat  Hip ER: 4+/5 L, 4/5 R     Special tests  ORTEGA: (-) bilat  FADDIR: (-) L with no reproduction of pain      Assessment: Tolerated treatment well  Patient presents to therapy for re-evaluation for L hip and L shoulder pain  Pt reports improvement in L hip pain most significantly, though he continues to experience pain in L shoulder and neck  Pt shows improvement in levels of pain compared to initial evaluation, as well as overall strength of L shoulder and L hip  Pt has met all short term goals and is progressing well towards long term goals  However, despite improvements, pt continues to display L shoulder weakness greater than the contralateral side and significantly limited cervical ROM in most planes  Due to this, pt would benefit from continued therapy twice a week for 4 more weeks to address impairments, improve function, and improve pain  Goals  Short term goals (3-4 weeks)  1  Pt will display independence with understanding and performance of HEP to allow for carryover of plan of care at home  (met)   2  Pt will improve FOTO score from initial evaluation to show improvement in pain and function  (met-hip)  3  Pt will improve L shoulder ER strength to 4/5 to improve shoulder stability with lifting and reaching  (met)  4  Pt will improve hip ER strength bilaterally to 4/5 to improve standing/ambulation  (met)  5  Pt will improve hip abduction strength bilaterally to 4/5 to improve standing/ambulation  (met)    Long term goals (6-8 weeks)  1  Pt will score equal or better than projected score on FOTO to show improvement in pain and function  (progressing)  2  Pt will improve L shoulder ER strength to 4+/5 to improve shoulder stability with lifting and reaching  (progressing)  3  Pt will improve hip ER strength bilaterally to 4+/5 to improve standing/ambulation   (progressing)  4  Pt will improve hip abduction strength bilaterally to 4+/5 to improve standing/ambulation   (progressing)      Plan: Continue per plan of care        Precautions: GERD, hiatal hernia, COPD, anxiety, OA of neck, major depression, "pain of L hip, pain of L shoulder      Manuals 5/9 5/15 5/18 5/22 5/25 5/30 6/1 6/9 6/12 6/15   AC mob nv            Update exercises    nv         C/s ROM           NS   Lateral glides c/s          NS/MM grade 3 bilat   Neuro Re-Ed 5/9 5/15 5/18 5/22 5/25 5/30 6/1 6/9 6/12 6/15   SL clams (HEP ptb) 15x ea side 15x ea side ptb 2x10 ea side ptb 2x10 gtb; hold progressions pn!  During second set at end 2x15 ea gtb    btb 15x ea side    SL abduction (HEP) 10x ea side 15x ea side 15x ea side 2x10 1# ea 2x15 ea 1#  2x10 2# 2x10 2# 2x15 ea 2#    scap retractions (HEP) 15x 5\" hold 2x15 15x          No monies (HEP ptb) 15x ptb 2x15 ptb 2x15 ptb Nv; updated bands nv for home 2x15 ptb 2x15 gtb   2x15 gtb    bridges  ptb abduction 2x15 ptb abduction 2x15 2x10 gtb abduction 2x15 gtb abduction        Rows/ext nv 2x15 rows 8# 2x15 rows 8#, ext 5#   2x15 ea 10# rows/ext 2x15 rows/extensions 11# 2x15 rows/extensions 11#     Resisted ER/IR nv  YTB 2x10 rtb 2x15 ea side rtb 2x15 ea side gtb 2x15 ER/IR gtb 2x15 gtb 2x15     Prone I, T nv   2x10 ea   2x10 ea  2x15  1# x 15 ea     Ther Ex 5/9 5/15 5/18 5/22 5/25 5/30 6/1 6/9 6/12 6/15   Piriformis stretch (HEP) 30\" ea            UBE nv 3'/3' 3'/3' 3'/3' 3'/3' 3'/3' 3'/3' 3'/3' 3'/3' 3'/3'   Calf stretch         2x1' lvl 2    SA press nv     2x15 qped 4# 2x10 4# 2x10     Lateral walks nv 5x at mirror otb 5x at mirror otb      gtb 5x at [employer] shoulder abd & flexion        1# 2 x 10     HS curls nv   gtb 15x ea side         Supine hip flexion nv            HR DL         10x 10\"    LP nv 115# 2x10 115# 2x10          Pt edu     NS     NS   Ther Activity 5/9 5/15 5/18 5/22 5/25 5/30 6/1 6/9 6/12 6/15                             Gait Training 5/9 5/15 5/18 5/22 5/25 5/30 6/1 6/9 6/12 6/15                             Modalities 5/9 5/15 5/18 5/22 5/25 5/30 6/1 6/9 6/12 6/15                                  "

## 2023-06-17 DIAGNOSIS — K59.00 CONSTIPATION, UNSPECIFIED CONSTIPATION TYPE: ICD-10-CM

## 2023-06-19 ENCOUNTER — OFFICE VISIT (OUTPATIENT)
Dept: PHYSICAL THERAPY | Facility: CLINIC | Age: 58
End: 2023-06-19
Payer: MEDICARE

## 2023-06-19 DIAGNOSIS — M25.552 PAIN OF LEFT HIP: ICD-10-CM

## 2023-06-19 DIAGNOSIS — M25.512 LEFT SHOULDER PAIN, UNSPECIFIED CHRONICITY: Primary | ICD-10-CM

## 2023-06-19 PROCEDURE — 97140 MANUAL THERAPY 1/> REGIONS: CPT

## 2023-06-19 PROCEDURE — 97112 NEUROMUSCULAR REEDUCATION: CPT

## 2023-06-19 RX ORDER — POLYETHYLENE GLYCOL 3350 17 G/17G
17 POWDER, FOR SOLUTION ORAL DAILY
Qty: 510 G | Refills: 0 | Status: SHIPPED | OUTPATIENT
Start: 2023-06-19 | End: 2023-07-19

## 2023-06-19 NOTE — PROGRESS NOTES
"Daily Note     Today's date: 2023  Patient name: Elizabeth Romo  : 1965  MRN: [id number]  Referring provider: Margo Lewis*  Dx:   Encounter Diagnosis     ICD-10-CM    1  Left shoulder pain, unspecified chronicity  M25 512       2  Pain of left hip  M25 552           Start Time: 1034  Stop Time: 1115  Total time in clinic (min): 41 minutes    Subjective: Pt reports he continues to have the stiffness on the L side of his neck and shoulder  He is extra sore today  Objective: See treatment diary below      Assessment: Tolerated treatment well  Patient tried UT shrugs this session with report of \"tightness\" in shoulders at the end of each set  Increase ease was noted with resisted ER; due to this, increased resistance will be tried next visit  Chin tucks and cervical isometrics were introduced this address cervical tightness  Pt reported no adverse symptoms with exercises, but was significantly limited in cervical retraction mobility actively and passively  STM was added to address L sided cervical tightness, though pt had increased tenderness and required modification of pressure  C/s PROM was performed due to continued ROM limitation with mild improvement noted following manual therapy  MHP was tried to improve c/s tightness at the end of the session  Continue to progress pt as tolerated next visit  Plan: Continue per plan of care  Precautions: GERD, hiatal hernia, COPD, anxiety, OA of neck, major depression, pain of L hip, pain of L shoulder      Manuals  6 6/9 6/12 6/15   AC mob             Update exercises    nv         STM C/s erector spinae, UT, levator scap            C/s ROM  NS         NS   Lateral glides c/s          NS/MM grade 3 bilat   Neuro Re-Ed  6 6/9 6/12 6/15   SL clams (HEP ptb) btb 15x ea side  2x10 ea side ptb 2x10 gtb; hold progressions pn!  During second set at end 2x15 ea gtb    btb 15x ea side  " "  Chin tucks supine 15x 5\" hold            Cervical isometrics 10x SB ea direction 5\" hold            SL abduction (HEP)   15x ea side 2x10 1# ea 2x15 ea 1#  2x10 2# 2x10 2# 2x15 ea 2#    scap retractions (HEP)   15x          No monies (HEP ptb)   2x15 ptb Nv; updated bands nv for home 2x15 ptb 2x15 gtb   2x15 gtb    bridges btb abduction 2x10  ptb abduction 2x15 2x10 gtb abduction 2x15 gtb abduction        Rows/ext   2x15 rows 8#, ext 5#   2x15 ea 10# rows/ext 2x15 rows/extensions 11# 2x15 rows/extensions 11#     Resisted ER/IR gtb 2x15 ea; btb nv  YTB 2x10 rtb 2x15 ea side rtb 2x15 ea side gtb 2x15 ER/IR gtb 2x15 gtb 2x15     Prone I, T    2x10 ea   2x10 ea  2x15  1# x 15 ea     Ther Ex 6/19 5/18 5/22 5/25 5/30 6/1 6/9 6/12 6/15   Piriformis stretch (HEP)             UT shrugs 2x10 8#            UBE 3'/3'  3'/3' 3'/3' 3'/3' 3'/3' 3'/3' 3'/3' 3'/3' 3'/3'   Calf stretch         2x1' lvl 2    SA press      2x15 qped 4# 2x10 4# 2x10     Lateral walks   5x at mirror otb      gtb 5x at [employer] shoulder abd & flexion        1# 2 x 10     HS curls    gtb 15x ea side         Supine hip flexion             HR DL         10x 10\"    LP   115# 2x10          Pt edu     NS     NS   Ther Activity 6/19 5/18 5/22 5/25 5/30 6/1 6/9 6/12 6/15                             Gait Training 6/19 5/18 5/22 5/25 5/30 6/1 6/9 6/12 6/15                             Modalities 6/19  5/18 5/22 5/25 5/30 6/1 6/9 6/12 6/15   MHP 10' c/s                              "

## 2023-06-20 ENCOUNTER — PATIENT OUTREACH (OUTPATIENT)
Dept: OTHER | Facility: OTHER | Age: 58
End: 2023-06-20

## 2023-06-20 ENCOUNTER — CONSULT (OUTPATIENT)
Dept: SURGERY | Facility: CLINIC | Age: 58
End: 2023-06-20
Payer: MEDICARE

## 2023-06-20 VITALS
DIASTOLIC BLOOD PRESSURE: 82 MMHG | BODY MASS INDEX: 21.38 KG/M2 | OXYGEN SATURATION: 98 % | WEIGHT: 133 LBS | HEART RATE: 75 BPM | TEMPERATURE: 97 F | SYSTOLIC BLOOD PRESSURE: 136 MMHG | HEIGHT: 66 IN

## 2023-06-20 DIAGNOSIS — K44.9 HIATAL HERNIA: Primary | ICD-10-CM

## 2023-06-20 PROCEDURE — 99214 OFFICE O/P EST MOD 30 MIN: CPT | Performed by: SURGERY

## 2023-06-20 NOTE — PROGRESS NOTES
6/20/23: Continues to smoke 1/2 ppd  Is considering smoking cessation classes  Is scheduled for an EGD next Monday, June 26 at [facility]  Will take the medication prior to the procedure  Will see tomorrow

## 2023-06-21 NOTE — PROGRESS NOTES
Assessment/Plan:  Discussed findings of moderate hiatal hernia  On review of records he did have manometry and pH testing in 2019 where he was found to have normal esophageal motility and acid reflux episodes were within normal limits  Cough was not associated with acid reflux, there was a hiatal hernia evident on manometry  Will order barium swallow to assess anatomy  His symptoms are consistent with symptomatic hiatal hernia which would warrant repair however his significant smoking history puts him at high risk for surgery as well as high risk for recurrence  After swallow study we will have him follow-up to discuss findings and potential intervention  Offered him referrals for smoking cessation again which he refused  Offered referral for second opinion which he wants to hold off on for now  No problem-specific Assessment & Plan notes found for this encounter  Diagnoses and all orders for this visit:    Hiatal hernia  -     FL barium swallow; Future          Subjective:      Patient ID: Samantha Kim is a 59 y o  male  He returns for follow-up after EGD and colonoscopy  He reports that since the colonoscopy he has had multiple loose stools a day, sometimes more solid but usually having at least 3 a day  He is not taking any laxatives or stool softeners  He has not tried anything as far as medications or dietary changes  He still has the persistent discomfort with heartburn and regurgitation sensation  Discussed findings on the EGD of moderate sized hiatal hernia  He still smokes a pack per day, says that he is unable to quit  The following portions of the patient's history were reviewed and updated as appropriate: allergies, current medications, past family history, past medical history, past social history, past surgical history and problem list     Review of Systems   Gastrointestinal: Positive for diarrhea  All other systems reviewed and are negative  "    Objective:      /82 (BP Location: Left arm, Patient Position: Sitting, Cuff Size: Standard)   Pulse 75   Temp (!) 97 °F (36 1 °C) (Tympanic)   Ht 5' 6\" (1 676 m)   Wt 60 3 kg (133 lb)   SpO2 98%   BMI 21 47 kg/m²          Physical Exam  Vitals reviewed  Constitutional:       General: He is not in acute distress  Appearance: Normal appearance  HENT:      Head: Normocephalic and atraumatic  Nose: Nose normal       Mouth/Throat:      Mouth: Mucous membranes are moist       Pharynx: Oropharynx is clear  Eyes:      Extraocular Movements: Extraocular movements intact  Conjunctiva/sclera: Conjunctivae normal       Pupils: Pupils are equal, round, and reactive to light  Cardiovascular:      Rate and Rhythm: Normal rate and regular rhythm  Heart sounds: Normal heart sounds  Pulmonary:      Effort: Pulmonary effort is normal  No respiratory distress  Abdominal:      General: Abdomen is flat  There is no distension  Palpations: Abdomen is soft  Tenderness: There is no abdominal tenderness  Musculoskeletal:         General: No swelling or tenderness  Normal range of motion  Cervical back: Normal range of motion and neck supple  Skin:     General: Skin is warm and dry  Neurological:      General: No focal deficit present  Mental Status: He is alert and oriented to person, place, and time           "

## 2023-06-22 ENCOUNTER — OFFICE VISIT (OUTPATIENT)
Dept: PHYSICAL THERAPY | Facility: CLINIC | Age: 58
End: 2023-06-22
Payer: MEDICARE

## 2023-06-22 DIAGNOSIS — M25.512 LEFT SHOULDER PAIN, UNSPECIFIED CHRONICITY: Primary | ICD-10-CM

## 2023-06-22 DIAGNOSIS — M25.552 PAIN OF LEFT HIP: ICD-10-CM

## 2023-06-22 PROCEDURE — 97140 MANUAL THERAPY 1/> REGIONS: CPT

## 2023-06-22 PROCEDURE — 97112 NEUROMUSCULAR REEDUCATION: CPT

## 2023-06-22 PROCEDURE — 97110 THERAPEUTIC EXERCISES: CPT

## 2023-06-22 NOTE — PROGRESS NOTES
"Daily Note     Today's date: 2023  Patient name: Yony Eugene  : 1965  MRN: [id number]  Referring provider: Nathalie Felipe*  Dx:   Encounter Diagnosis     ICD-10-CM    1  Left shoulder pain, unspecified chronicity  M25 512       2  Pain of left hip  M25 552           Start Time: 1021  Stop Time: 1109  Total time in clinic (min): 48 minutes    Subjective: Pt presents to therapy with continued L shoulder and neck pain  Objective: See treatment diary below      Assessment: Tolerated treatment well  Patient displays continued cervical ROM deficits, especially upper cervical ROM with protraction and retraction  Chin tucks were performed in supine this session with added OP for assistance  Pt experienced stretching and pain in SO muscles with chin tucks along with decreased ROM  Significant UT muscular tightness continues to be noted L>R  Continue to progress pt as tolerated next visit  Plan: Continue per plan of care        Precautions: GERD, hiatal hernia, COPD, anxiety, OA of neck, major depression, pain of L hip, pain of L shoulder      Manuals 6/19 6/22   5/25 5/30 6/1 6/9 6/12 6/15   AC mob             Update exercises             STM C/s erector spinae, UT, levator scap NS SOR/UT/levator scap           C/s ROM  NS NS        NS   Lateral glides c/s  NS L to R gr III        NS/MM grade 3 bilat   Neuro Re-Ed 6/19 6/22   5/25 5/30 6/1 6/9 6/12 6/15   SL clams (HEP ptb) btb 15x ea side    2x15 ea gtb    btb 15x ea side    DNF             Chin tucks supine 15x 5\" hold supine 2x10 5\" hold           Cervical isometrics 10x SB ea direction 5\" hold            SL abduction (HEP)     2x15 ea 1#  2x10 2# 2x10 2# 2x15 ea 2#    scap retractions (HEP)             No monies (HEP ptb)  2x15 btb   2x15 ptb 2x15 gtb   2x15 gtb    bridges btb abduction 2x10    2x15 gtb abduction        Rows/ext      2x15 ea 10# rows/ext 2x15 rows/extensions 11# 2x15 rows/extensions 11#     Resisted ER/IR gtb " "2x15 ea; btb nv    rtb 2x15 ea side gtb 2x15 ER/IR gtb 2x15 gtb 2x15     Prone I, T      2x10 ea  2x15  1# x 15 ea     Ther Ex 6/19 6/22   5/25 5/30 6/1 6/9 6/12 6/15   Piriformis stretch (HEP)             UT shrugs 2x10 8# 2x10 9#           UBE 3'/3' 3'/3'   3'/3' 3'/3' 3'/3' 3'/3' 3'/3' 3'/3'   Calf stretch         2x1' lvl 2    UT stretch  3x30\" ea            SA press      2x15 qped 4# 2x10 4# 2x10     Lateral walks         gtb 5x at [employer] shoulder abd & flexion        1# 2 x 10     HS curls             Supine hip flexion             HR DL         10x 10\"    LP             Pt edu     NS     NS   Ther Activity 6/19 6/22   5/25 5/30 6/1 6/9 6/12 6/15                             Gait Training 6/19 6/22   5/25 5/30 6/1 6/9 6/12 6/15                             Modalities 6/19 6/22   5/25 5/30 6/1 6/9 6/12 6/15   MHP 10' c/s                              "

## 2023-06-26 ENCOUNTER — HOSPITAL ENCOUNTER (OUTPATIENT)
Dept: RADIOLOGY | Facility: HOSPITAL | Age: 58
Discharge: HOME | End: 2023-06-26
Attending: SURGERY | Admitting: SURGERY
Payer: MEDICARE

## 2023-06-26 ENCOUNTER — APPOINTMENT (OUTPATIENT)
Dept: PHYSICAL THERAPY | Facility: CLINIC | Age: 58
End: 2023-06-26
Payer: MEDICARE

## 2023-06-26 DIAGNOSIS — K44.9 HIATAL HERNIA: ICD-10-CM

## 2023-06-26 PROCEDURE — 74220 X-RAY XM ESOPHAGUS 1CNTRST: CPT

## 2023-06-27 ENCOUNTER — PATIENT OUTREACH (OUTPATIENT)
Dept: OTHER | Facility: OTHER | Age: 58
End: 2023-06-27

## 2023-06-27 DIAGNOSIS — J44.9 CHRONIC OBSTRUCTIVE PULMONARY DISEASE, UNSPECIFIED COPD TYPE (HCC): ICD-10-CM

## 2023-06-27 RX ORDER — FLUTICASONE PROPIONATE AND SALMETEROL 50; 500 UG/1; UG/1
POWDER RESPIRATORY (INHALATION)
Qty: 60 BLISTER | Refills: 3 | Status: SHIPPED | OUTPATIENT
Start: 2023-06-27

## 2023-06-27 NOTE — PROGRESS NOTES
6/27/23: Client reported update to barium swallow  Was challenging but successful  Results will be sent to Dr Froy Back  He stated he was told to stop smoking  Stress causes him to smoke, as per patient  Now smokes 1/2 per day  Sometimes a little more  Realizes he should have surgery but his surgeon stated should not smoke for the best outcome of surgery  He is considering stopping  Offered smoking cessation classes  Stomach is better today without nausea or vomiting  Also said he has not had diarrhea over the last 24 hours either  Would like to have anti-diarrhea med on hand but doesn't remember the name  Not in his medication list    Continues to go to [facility] for therapy and psychiatry  Keeps self busy with [employer]

## 2023-06-29 ENCOUNTER — OFFICE VISIT (OUTPATIENT)
Dept: PHYSICAL THERAPY | Facility: CLINIC | Age: 58
End: 2023-06-29
Payer: MEDICARE

## 2023-06-29 DIAGNOSIS — M25.512 LEFT SHOULDER PAIN, UNSPECIFIED CHRONICITY: Primary | ICD-10-CM

## 2023-06-29 DIAGNOSIS — M25.552 PAIN OF LEFT HIP: ICD-10-CM

## 2023-06-29 PROCEDURE — 97110 THERAPEUTIC EXERCISES: CPT

## 2023-06-29 PROCEDURE — 97140 MANUAL THERAPY 1/> REGIONS: CPT

## 2023-06-29 PROCEDURE — 97112 NEUROMUSCULAR REEDUCATION: CPT

## 2023-06-29 NOTE — PROGRESS NOTES
"Daily Note     Today's date: 2023  Patient name: Lilian Carbajal  : 1965  MRN: [id number]  Referring provider: Jillian Angel*  Dx:   Encounter Diagnosis     ICD-10-CM    1  Left shoulder pain, unspecified chronicity  M25 512       2  Pain of left hip  M25 552           Start Time: 1027  Stop Time: 1112  Total time in clinic (min): 45 minutes    Subjective: Pt reports increase L sided cervical/shoulder pain due to sleeping on it wrong last night  Objective: See treatment diary below      Assessment: Tolerated treatment well  Patient displayed increased hypertonicity of L levator scap this session with ttp  Pt still displays significant hypomobility with lateral glides L to R, though minor improvement in mobility was noted with manual therapy this session  Cervical isometrics and chin tucks were performed to improve cervical stability strength with pt fatiguing long-term through DNF due to weakness  Rest break was required  Pt was visibly fatigue with significant weakness present during prone I, T, and Y  TC was required for scapular engagement  Continue to progress pt as tolerated next visit  Plan: Continue per plan of care        Precautions: GERD, hiatal hernia, COPD, anxiety, OA of neck, major depression, pain of L hip, pain of L shoulder      Manuals 6/19 6/22 6/29  5/25 5/30 6/1 6/9 6/12 6/15   AC mob             Update exercises             STM C/s erector spinae, UT, levator scap NS SOR/UT/levator scap NS SOR/UT/levator scap          C/s ROM  NS NS NS       NS   Lateral glides c/s  NS L to R gr III NS L to R gr III       NS/MM grade 3 bilat   Neuro Re-Ed 6/19 6/22 6/29  5/25 5/30 6/1 6/9 6/12 6/15   SL clams (HEP ptb) btb 15x ea side    2x15 ea gtb    btb 15x ea side    DNF   10x 5\" hold          Chin tucks supine 15x 5\" hold supine 2x10 5\" hold supine 10x 5\" hold          Cervical isometrics 10x SB ea direction 5\" hold  10x SB/rot L hold          SL abduction (HEP)     2x15 " "ea 1#  2x10 2# 2x10 2# 2x15 ea 2#    scap retractions (HEP)             No monies (HEP ptb)  2x15 btb 2x15 btb  2x15 ptb 2x15 gtb   2x15 gtb    bridges btb abduction 2x10    2x15 gtb abduction        Rows/ext      2x15 ea 10# rows/ext 2x15 rows/extensions 11# 2x15 rows/extensions 11#     Resisted ER/IR gtb 2x15 ea; btb nv    rtb 2x15 ea side gtb 2x15 ER/IR gtb 2x15 gtb 2x15     Prone I, T   15x ea I, T, Y   2x10 ea  2x15  1# x 15 ea     Ther Ex 6/19 6/22 6/29  5/25 5/30 6/1 6/9 6/12 6/15   Piriformis stretch (HEP)             UT shrugs 2x10 8# 2x10 9#           UBE 3'/3' 3'/3' 3'/3'  3'/3' 3'/3' 3'/3' 3'/3' 3'/3' 3'/3'   Calf stretch         2x1' lvl 2    UT stretch  3x30\" ea  3x30\" ea side          SA press      2x15 qped 4# 2x10 4# 2x10     Lateral walks         gtb 5x at mirror    Stand shoulder abd & flexion        1# 2 x 10     HS curls             Supine hip flexion             HR DL         10x 10\"    LP             Pt edu     NS     NS   Ther Activity 6/19 6/22 6/29  5/25 5/30 6/1 6/9 6/12 6/15                             Gait Training 6/19 6/22 6/29  5/25 5/30 6/1 6/9 6/12 6/15                             Modalities 6/19 6/22 6/29  5/25 5/30 6/1 6/9 6/12 6/15   MHP 10' c/s  10' c/s                            "

## 2023-07-03 ENCOUNTER — TELEPHONE (OUTPATIENT)
Dept: FAMILY MEDICINE CLINIC | Facility: CLINIC | Age: 58
End: 2023-07-03

## 2023-07-03 NOTE — TELEPHONE ENCOUNTER
07/03/23    Pt called office requesting an appt to f/u from being at the ED 07/02/23 for today. Pt stated that he had his Finger wrapped / cast and during the night the “the cast / wrapped fell off”    Pt called the clinic first before making the decision of going back to the ED, but unfortunately there was no available slot to f/u from the ED. Advised Pt to go again and straight to the ED to haves his Finger Re-Examined and wrapped / Cast.    PT AGREED and stated that he will go to the ED. Also Advised Pt to contact our office after his visit at the ED to f/u with his PCP or any available PCP. PT UNDERSTOOD AND AGREED.

## 2023-07-06 ENCOUNTER — OFFICE VISIT (OUTPATIENT)
Dept: PHYSICAL THERAPY | Facility: CLINIC | Age: 58
End: 2023-07-06
Payer: MEDICARE

## 2023-07-06 DIAGNOSIS — M25.512 LEFT SHOULDER PAIN, UNSPECIFIED CHRONICITY: Primary | ICD-10-CM

## 2023-07-06 DIAGNOSIS — M25.552 PAIN OF LEFT HIP: ICD-10-CM

## 2023-07-06 PROCEDURE — 97140 MANUAL THERAPY 1/> REGIONS: CPT

## 2023-07-06 PROCEDURE — 97112 NEUROMUSCULAR REEDUCATION: CPT

## 2023-07-06 NOTE — PROGRESS NOTES
Daily Note     Today's date: 2023  Patient name: Susanne Gannon  : 1965  MRN: [id number]  Referring provider: Pedro Feldman*  Dx:   Encounter Diagnosis     ICD-10-CM    1. Left shoulder pain, unspecified chronicity  M25.512       2. Pain of left hip  M25.552           Start Time: 1020  Stop Time: 1059  Total time in clinic (min): 39 minutes    Subjective: Pt dislocated R ring finger when he tripped and fell at [employer] on [employer]. He was seen by  Orthopedics and is following up with them next week. Objective: See treatment diary below      Assessment: Tolerated treatment well. Due to recent finger dislocation, gripping activities were avoided. Cervical manuals were emphasized due to continued tightness and pain. Improvement in mobility was noted with lateral glides R to L, though L to R remains hypomobile. Tenderness to L levator scap was noted with pt not tolerating STM of muscle. SOR of R cervical spine was performed due to increased pain and hypertonicity this session. Cervical ROM is fair with R SBing more limited and discomfort at end range of all planes. Pt was provided with updated HEP for cervical endurance/strengthening exercises. Continue to progress pt as tolerated next visit. Plan: Continue per plan of care.       Precautions: GERD, hiatal hernia, COPD, anxiety, OA of neck, major depression, pain of L hip, pain of L shoulder      Manuals 6/19 6/22 6/29 7/6   6/1 6/9 6/12 6/15   AC mob             Update exercises             STM C/s erector spinae, UT, levator scap NS SOR/UT/levator scap NS SOR/UT/levator scap NS SOR/UT/levator scap         C/s ROM  NS NS NS NS      NS   Lateral glides c/s  NS L to R gr III NS L to R gr III NS bilat gr III      NS/MM grade 3 bilat   Neuro Re-Ed 6/19 6/22 6/29 7/6   6/1 6/9 6/12 6/15   SL clams (HEP ptb) btb 15x ea side        btb 15x ea side    DNF   10x 5" hold 10x 5" hold         Chin tucks supine 15x 5" hold supine 2x10 5" hold supine 10x 5" hold supine 15x         Cervical isometrics 10x SB ea direction 5" hold  10x SB/rot L hold          SL abduction (HEP)       2x10 2# 2x10 2# 2x15 ea 2#    scap retractions (HEP)             No monies (HEP ptb)  2x15 btb 2x15 btb held     2x15 gtb    bridges btb abduction 2x10            Rows/ext       2x15 rows/extensions 11# 2x15 rows/extensions 11#     Resisted ER/IR gtb 2x15 ea; btb nv      gtb 2x15 gtb 2x15     Prone I, T   15x ea I, T, Y 15x ea I, T   2x15  1# x 15 ea     Ther Ex 6/19 6/22 6/29 7/6   6/1 6/9 6/12 6/15   Piriformis stretch (HEP)             UT shrugs 2x10 8# 2x10 9#           UBE 3'/3' 3'/3' 3'/3' held   3'/3' 3'/3' 3'/3' 3'/3'   Calf stretch         2x1' lvl 2    UT stretch  3x30" ea  3x30" ea side          SA press       4# 2x10 4# 2x10     Lateral walks         gtb 5x at mirror    Stand shoulder abd & flexion        1# 2 x 10     HS curls             Supine hip flexion             HR DL         10x 10"    LP             Pt edu          NS   Ther Activity 6/19 6/22 6/29 7/6   6/1 6/9 6/12 6/15                             Gait Training 6/19 6/22 6/29 7/6 6/1 6/9 6/12 6/15                             Modalities 6/19 6/22 6/29 7/6 6/1 6/9 6/12 6/15   MHP 10' c/s  10' c/s

## 2023-07-11 ENCOUNTER — PATIENT OUTREACH (OUTPATIENT)
Dept: OTHER | Facility: OTHER | Age: 58
End: 2023-07-11

## 2023-07-11 DIAGNOSIS — K44.9 HIATAL HERNIA: Primary | ICD-10-CM

## 2023-07-11 NOTE — PROGRESS NOTES
7/11/23: Client reported he needs to make an appointment for surgical intervention of his hiatal hernia. He continues to have GI symptoms but he hasn't had the intervention yet, I explained. He called but without successfully getting through. Called a second time and was able to make the appointment: August 18, at [facility]. The office may call if someone cancels.

## 2023-07-14 ENCOUNTER — OFFICE VISIT (OUTPATIENT)
Dept: PHYSICAL THERAPY | Facility: CLINIC | Age: 58
End: 2023-07-14
Payer: MEDICARE

## 2023-07-14 DIAGNOSIS — M25.552 PAIN OF LEFT HIP: ICD-10-CM

## 2023-07-14 DIAGNOSIS — M25.512 LEFT SHOULDER PAIN, UNSPECIFIED CHRONICITY: Primary | ICD-10-CM

## 2023-07-14 PROCEDURE — 97140 MANUAL THERAPY 1/> REGIONS: CPT

## 2023-07-14 PROCEDURE — 97112 NEUROMUSCULAR REEDUCATION: CPT

## 2023-07-16 DIAGNOSIS — J40 BRONCHITIS: ICD-10-CM

## 2023-07-17 ENCOUNTER — OFFICE VISIT (OUTPATIENT)
Dept: PHYSICAL THERAPY | Facility: CLINIC | Age: 58
End: 2023-07-17
Payer: MEDICARE

## 2023-07-17 DIAGNOSIS — M25.552 PAIN OF LEFT HIP: ICD-10-CM

## 2023-07-17 DIAGNOSIS — M25.512 LEFT SHOULDER PAIN, UNSPECIFIED CHRONICITY: Primary | ICD-10-CM

## 2023-07-17 PROCEDURE — 97140 MANUAL THERAPY 1/> REGIONS: CPT

## 2023-07-17 PROCEDURE — 97112 NEUROMUSCULAR REEDUCATION: CPT

## 2023-07-17 RX ORDER — FLUTICASONE PROPIONATE 50 MCG
1 SPRAY, SUSPENSION (ML) NASAL DAILY
Qty: 16 G | Refills: 3 | Status: SHIPPED | OUTPATIENT
Start: 2023-07-17

## 2023-07-17 NOTE — PROGRESS NOTES
Daily Note     Today's date: 2023  Patient name: Zulema Crain  : 1965  MRN: [id number]  Referring provider: Yessy York*  Dx:   Encounter Diagnosis     ICD-10-CM    1. Left shoulder pain, unspecified chronicity  M25.512       2. Pain of left hip  M25.552           Start Time: 08  Stop Time: 08  Total time in clinic (min): 51 minutes    Subjective: Pt reports he followed up for his finger and he no longer needs to deirdre tape it following his recent dislocation. He continues to have some stiffness and pain in the finger, along with pain and stiffness continuing in the neck and L shoulder. Objective: See treatment diary below      Assessment: Tolerated treatment well. Patient continues to require moderate cuing for scapular retraction engagement during prone I, T, and Y. Moderate limitations continue to be noted actively into cervical retraction during chin tucks with VC to hold the contraction. Improvement in control and form was noted with increased weight during resisted ER/IR this session. TC was still required minimally to avoid trunk rotation compensation. Significant tightness and hypertonicity was noted in the L UT during manual therapy this session. ROM improvement was noted visibly during PROM and STM/ Pt tolerates STM better than previously, though significant tightness and tenderness continue to be present throughout the cervical spine and thoracic spine. Continue to progress pt as tolerated next visit. Plan: Continue per plan of care.       Precautions: GERD, hiatal hernia, COPD, anxiety, OA of neck, major depression, pain of L hip, pain of L shoulder      Manuals 6/19 6/22 6/29 7/6 7/14 7/17    6/15   AC mob             Update exercises             STM C/s erector spinae, UT, levator scap NS SOR/UT/levator scap NS SOR/UT/levator scap NS SOR/UT/levator scap NS SOR/UT/levator scap NS SOR/UT/levator scap       C/s ROM  NS NS NS NS NS NS    NS   Lateral glides c/s  NS L to R gr III NS L to R gr III NS bilat gr III NS bilat gr III NS bilat gr III    NS/MM grade 3 bilat   Neuro Re-Ed 6/19 6/22 6/29 7/6 7/14 7/17    6/15   SL clams (HEP ptb) btb 15x ea side            DNF   10x 5" hold 10x 5" hold 10x 5" hold 10x 5" hold       Chin tucks supine 15x 5" hold supine 2x10 5" hold supine 10x 5" hold supine 15x supine 15x Supine 15x       Cervical isometrics 10x SB ea direction 5" hold  10x SB/rot L hold          SL abduction (HEP)             scap retractions (HEP)             No monies (HEP ptb)  2x15 btb 2x15 btb held Gtb loop 2x15 gtb loop 2x15       bridges btb abduction 2x10            Rows/ext             Resisted ER/IR gtb 2x15 ea; btb nv     btb 15x ea direction       Prone I, T   15x ea I, T, Y 15x ea I, T 15x I, T, Y 15x I, T, Y       Ther Ex 6/19 6/22 6/29 7/6 7/14 7/17    6/15   Piriformis stretch (HEP)             UT shrugs 2x10 8# 2x10 9#           UBE 3'/3' 3'/3' 3'/3' held held     3'/3'   Calf stretch             UT stretch  3x30" ea  3x30" ea side          SA press             Lateral walks             Stand shoulder abd & flexion             HS curls             Supine hip flexion             HR DL             LP             Pt edu          NS   Ther Activity 6/19 6/22 6/29 7/6 7/14 7/17    6/15                             Gait Training 6/19 6/22 6/29 7/6 7/14 7/17    6/15                             Modalities 6/19 6/22 6/29 7/6 7/14 7/17    6/15   MHP 10' c/s  10' c/s  10' c/s 10' c/s

## 2023-07-25 DIAGNOSIS — K59.00 CONSTIPATION, UNSPECIFIED CONSTIPATION TYPE: ICD-10-CM

## 2023-07-26 ENCOUNTER — PATIENT OUTREACH (OUTPATIENT)
Dept: OTHER | Facility: OTHER | Age: 58
End: 2023-07-26

## 2023-07-26 NOTE — PROGRESS NOTES
Client reported having general surgery consult with Dr. Jessica Joseph on 8/18. Katherine Marie he will reschedule his oral surgery appointment until after has had hiatal hernia appointment . I told him to call the office as soon as possible to reschedule. He said he will call tomorrow.

## 2023-07-27 ENCOUNTER — OFFICE VISIT (OUTPATIENT)
Dept: PHYSICAL THERAPY | Facility: CLINIC | Age: 58
End: 2023-07-27
Payer: MEDICARE

## 2023-07-27 DIAGNOSIS — M25.512 LEFT SHOULDER PAIN, UNSPECIFIED CHRONICITY: Primary | ICD-10-CM

## 2023-07-27 DIAGNOSIS — M25.552 PAIN OF LEFT HIP: ICD-10-CM

## 2023-07-27 PROCEDURE — 97140 MANUAL THERAPY 1/> REGIONS: CPT

## 2023-07-27 PROCEDURE — 97112 NEUROMUSCULAR REEDUCATION: CPT

## 2023-07-27 RX ORDER — DOCUSATE SODIUM 100 MG/1
100 CAPSULE, LIQUID FILLED ORAL 2 TIMES DAILY
Qty: 60 CAPSULE | Refills: 2 | Status: SHIPPED | OUTPATIENT
Start: 2023-07-27

## 2023-07-27 NOTE — PROGRESS NOTES
Daily Note     Today's date: 2023  Patient name: Pratibha Beth  : 1965  MRN: [id number]  Referring provider: Ivette Watlon  Dx:   Encounter Diagnosis     ICD-10-CM    1. Left shoulder pain, unspecified chronicity  M25.512       2. Pain of left hip  M25.552           Start Time: 09  Stop Time: 1015  Total time in clinic (min): 44 minutes    Subjective: Pt reports continued tightness in his L UT and levator scap. Objective: See treatment diary below      Assessment: Tolerated treatment well. Patient tried prone I, T, Y this session with only ability to perform prone I due to report of muscle pulling on his R side. Rows were performed with continued TC for scapular engagement. Despite cuing, pt continued to have difficulty with engagement. STM was performed with L UT and levator scap with continued hypertonicity noted. UT stretch was performed with improvement in mobility and hypertonicity post-stretch. Continue to progress pt as tolerated next visit. RE nv.       Plan: Continue per plan of care.       Precautions: GERD, hiatal hernia, COPD, anxiety, OA of neck, major depression, pain of L hip, pain of L shoulder      Manuals 6/19 6/22 6/29 7/6 7/14 7/17 7/27   6/15   AC mob             Update exercises             STM C/s erector spinae, UT, levator scap NS SOR/UT/levator scap NS SOR/UT/levator scap NS SOR/UT/levator scap NS SOR/UT/levator scap NS SOR/UT/levator scap NS SOR/UT/levator scap      C/s ROM  NS NS NS NS NS NS NS   NS   Lateral glides c/s  NS L to R gr III NS L to R gr III NS bilat gr III NS bilat gr III NS bilat gr III    NS/MM grade 3 bilat   Neuro Re-Ed 6/19 6/22 6/29 7/6 7/14 7/17 7/27   6/15   SL clams (HEP ptb) btb 15x ea side            DNF   10x 5" hold 10x 5" hold 10x 5" hold 10x 5" hold 10x 5" hold      Chin tucks supine 15x 5" hold supine 2x10 5" hold supine 10x 5" hold supine 15x supine 15x Supine 15x supine 15x      Cervical isometrics 10x SB ea direction 5" hold  10x SB/rot L hold          SL abduction (HEP)             scap retractions (HEP)             No monies (HEP ptb)  2x15 btb 2x15 btb held Gtb loop 2x15 gtb loop 2x15 gtb 2x15      bridges btb abduction 2x10            Rows/ext       13# 2x15 ea       Resisted ER/IR gtb 2x15 ea; btb nv     btb 15x ea direction       Prone I, T   15x ea I, T, Y 15x ea I, T 15x I, T, Y 15x I, T, Y 15x I      Ther Ex 6/19 6/22 6/29 7/6 7/14 7/17 7/27   6/15   Piriformis stretch (HEP)             UT shrugs 2x10 8# 2x10 9#           UBE 3'/3' 3'/3' 3'/3' held held     3'/3'   Calf stretch             UT stretch  3x30" ea  3x30" ea side          SA press             Lateral walks             Stand shoulder abd & flexion             HS curls             Supine hip flexion             HR DL             LP             Pt edu          NS   Ther Activity 6/19 6/22 6/29 7/6 7/14 7/17 7/27   6/15                             Gait Training 6/19 6/22 6/29 7/6 7/14 7/17 7/27   6/15                             Modalities 6/19 6/22 6/29 7/6 7/14 7/17 7/27   6/15   MHP 10' c/s  10' c/s  10' c/s 10' c/s

## 2023-07-28 DIAGNOSIS — J40 BRONCHITIS: ICD-10-CM

## 2023-07-28 DIAGNOSIS — K21.9 GASTROESOPHAGEAL REFLUX DISEASE WITHOUT ESOPHAGITIS: ICD-10-CM

## 2023-07-28 DIAGNOSIS — Z12.11 ENCOUNTER FOR SCREENING COLONOSCOPY: ICD-10-CM

## 2023-07-28 RX ORDER — BENZONATATE 100 MG/1
100 CAPSULE ORAL 3 TIMES DAILY PRN
Qty: 20 CAPSULE | Refills: 0 | Status: SHIPPED | OUTPATIENT
Start: 2023-07-28

## 2023-08-01 DIAGNOSIS — Z09 ENCOUNTER FOR FOLLOW-UP: ICD-10-CM

## 2023-08-01 RX ORDER — FAMOTIDINE 20 MG/1
20 TABLET, FILM COATED ORAL 2 TIMES DAILY
Qty: 60 TABLET | Refills: 0 | Status: SHIPPED | OUTPATIENT
Start: 2023-08-01

## 2023-08-01 RX ORDER — TAMSULOSIN HYDROCHLORIDE 0.4 MG/1
0.4 CAPSULE ORAL
Qty: 90 CAPSULE | Refills: 3 | Status: SHIPPED | OUTPATIENT
Start: 2023-08-01

## 2023-08-02 ENCOUNTER — PATIENT OUTREACH (OUTPATIENT)
Dept: OTHER | Facility: OTHER | Age: 58
End: 2023-08-02

## 2023-08-02 RX ORDER — BISACODYL 5 MG
TABLET, DELAYED RELEASE (ENTERIC COATED) ORAL
Qty: 4 TABLET | Refills: 0 | OUTPATIENT
Start: 2023-08-02

## 2023-08-02 NOTE — PROGRESS NOTES
Client stated he has had rash with itching on all 4 extremities x 1 week. Noted to have red rash on calves, and forearms.  Called PCP to schedule an appointment: 8/3/23 at 1 PM.

## 2023-08-03 ENCOUNTER — OFFICE VISIT (OUTPATIENT)
Dept: FAMILY MEDICINE CLINIC | Facility: CLINIC | Age: 58
End: 2023-08-03

## 2023-08-03 ENCOUNTER — EVALUATION (OUTPATIENT)
Dept: PHYSICAL THERAPY | Facility: CLINIC | Age: 58
End: 2023-08-03
Payer: MEDICARE

## 2023-08-03 VITALS
WEIGHT: 135 LBS | DIASTOLIC BLOOD PRESSURE: 60 MMHG | TEMPERATURE: 97.1 F | HEART RATE: 94 BPM | SYSTOLIC BLOOD PRESSURE: 122 MMHG | BODY MASS INDEX: 21.69 KG/M2 | OXYGEN SATURATION: 98 % | HEIGHT: 66 IN | RESPIRATION RATE: 16 BRPM

## 2023-08-03 DIAGNOSIS — M25.552 PAIN OF LEFT HIP: ICD-10-CM

## 2023-08-03 DIAGNOSIS — L23.7 POISON IVY: Primary | ICD-10-CM

## 2023-08-03 DIAGNOSIS — M25.512 LEFT SHOULDER PAIN, UNSPECIFIED CHRONICITY: Primary | ICD-10-CM

## 2023-08-03 DIAGNOSIS — E56.9 VITAMIN DEFICIENCY: ICD-10-CM

## 2023-08-03 PROCEDURE — 97164 PT RE-EVAL EST PLAN CARE: CPT

## 2023-08-03 PROCEDURE — 99213 OFFICE O/P EST LOW 20 MIN: CPT | Performed by: FAMILY MEDICINE

## 2023-08-03 PROCEDURE — 97112 NEUROMUSCULAR REEDUCATION: CPT

## 2023-08-03 RX ORDER — MULTIVITAMIN
1 TABLET ORAL DAILY
Qty: 90 TABLET | Refills: 3 | Status: SHIPPED | OUTPATIENT
Start: 2023-08-03

## 2023-08-03 RX ORDER — PREDNISONE 20 MG/1
20 TABLET ORAL DAILY
Qty: 7 TABLET | Refills: 0 | Status: SHIPPED | OUTPATIENT
Start: 2023-08-03 | End: 2023-08-10

## 2023-08-03 RX ORDER — CLOBETASOL PROPIONATE 0.5 MG/G
CREAM TOPICAL 2 TIMES DAILY
Qty: 60 G | Refills: 2 | Status: SHIPPED | OUTPATIENT
Start: 2023-08-03

## 2023-08-03 NOTE — PROGRESS NOTES
Assessment/Plan:    1. Poison ivy  Assessment & Plan:  Likely based on lesions spread. Will do a trial of oral and topical steroid. Will have patient follow up in two weeks to review the rash. Orders:  -     clobetasol (TEMOVATE) 0.05 % cream; Apply topically 2 (two) times a day  -     predniSONE 20 mg tablet; Take 1 tablet (20 mg total) by mouth daily for 7 days    2. Vitamin deficiency  -     Multiple Vitamin (multivitamin) tablet; Take 1 tablet by mouth daily       Subjective:      Patient ID: Mona Woodruff is a 59 y.o. male. patient is coming in for lower extremity rash rash that's been present for two weeks since sitting in his friends back yards. Patient has  Stated that this had spread to his upper extremity patient denies any fevers chills or any abdominal symptoms       The following portions of the patient's history were reviewed and updated as appropriate: allergies, current medications, past family history, past medical history, past social history, past surgical history, and problem list.    Review of Systems   Constitutional: Negative for chills and fever. HENT: Negative for ear pain and sore throat. Eyes: Negative for pain and visual disturbance. Respiratory: Negative for cough and shortness of breath. Cardiovascular: Negative for chest pain and palpitations. Gastrointestinal: Negative for abdominal pain and vomiting. Genitourinary: Negative for dysuria and hematuria. Musculoskeletal: Negative for arthralgias and back pain. Skin: Positive for rash. Negative for color change. Itchiness    Neurological: Negative for seizures and syncope. All other systems reviewed and are negative.         Objective:      /60 (BP Location: Left arm, Patient Position: Sitting, Cuff Size: Standard)   Pulse 94   Temp (!) 97.1 °F (36.2 °C) (Temporal)   Resp 16   Ht 5' 6" (1.676 m)   Wt 61.2 kg (135 lb)   SpO2 98%   BMI 21.79 kg/m²          Physical Exam  Constitutional:       General: He is not in acute distress. Appearance: Normal appearance. He is not toxic-appearing or diaphoretic. HENT:      Head: Normocephalic. Mouth/Throat:      Mouth: Mucous membranes are moist.   Eyes:      Extraocular Movements: Extraocular movements intact. Pupils: Pupils are equal, round, and reactive to light. Cardiovascular:      Rate and Rhythm: Normal rate and regular rhythm. Pulmonary:      Effort: Pulmonary effort is normal. No tachypnea. Breath sounds: Normal breath sounds. No wheezing or rales. Chest:      Chest wall: No edema. There is no dullness to percussion. Abdominal:      General: Abdomen is flat. There is no distension. Palpations: Abdomen is soft. Tenderness: There is no abdominal tenderness. Musculoskeletal:      Right lower leg: No edema. Left lower leg: No edema. Skin:     Capillary Refill: Capillary refill takes less than 2 seconds. Findings: Lesion and rash present. Neurological:      General: No focal deficit present. Mental Status: He is alert and oriented to person, place, and time.    Psychiatric:         Mood and Affect: Mood normal.         Behavior: Behavior normal.                    Patti Allen DO   Family Medicine PGY-2  8/3/2023

## 2023-08-03 NOTE — PROGRESS NOTES
PT Re-evaluation     Today's date: 8/3/2023  Patient name: Wilfrid Sherwood  : 1965  MRN: [id number]  Referring provider: Alvena Jeans*  Dx:   Encounter Diagnosis     ICD-10-CM    1. Left shoulder pain, unspecified chronicity  M25.512       2. Pain of left hip  M25.552           Start Time: 1031  Stop Time: 1110  Total time in clinic (min): 39 minutes    Subjective: Pt presents to therapy for re-evaluation of L hip pain and L shoulder pain. Pain  Current pain ratin (4/10 hip)  At best pain rating: 3 (0/10)  At worst pain ratin (4/10 hip)  Quality: discomfort (aching/sharp hip)  Relieving factors: medications (tylenol)  Aggravating factors: lifting and sitting (sitting for long periods of time for the hip, laying on L shoulder and holding items)  Progression: improved      Objective: See treatment diary below    Shoulder Comments   Cervical ROM  Flex: WNL  Ext: WNL   R SB: minimally limited  L RB: minimally limited with L sided neck pain  R rot: Minimally limited   L rot: minimally limited L sided pain     UE ROM  Shoulder flex: WNL bilat   Shoulder Abd: WNL bilat with minor L shoulder pain upon lowering  Functional ER: T2 L, T3 R  Functional IR: T7 bilat with stretch on L side      UE strength  UT Shru/5 bilat  Shoulder flex: 4+/5 L, 4+/5 R  Shoulder abd: 4+/5 R, 4+/5 L with minor L shoulder pain  ER: 5/5 R, 5/5 L with posterior shoulder pain  IR: 5/5 L, 5/5 R     Palpation: ttp L UT, levator scap           Hip Comments   Hip ROM  -Flexion: WNL  -ER: minimally limited bilat  -IR: minimally limited LLE with lateral hip pain, WNL R     Strength  Hip flexion: 4/5 R, 4/5 L   Knee ext: 5/5 bilat  Knee flex: 5/5  Hip abduct: 5/5 bilat  Hip ER: 5/5 L, 4+/5 R      Assessment: Tolerated treatment well. Patient presents to therapy for re-evaluation of L shoulder and L hip pain. Pt displays significant improvement in LUE and LLE strength overall.  Additionally, pt displays improvement in pain levels with decreased pain and less pain with movement. Pt has also met all goals, short term and long term. Due to improvements as above, pt is appropriate for DC. Pt was provided with updated HEP and ran through exercises. All questions were answered. Goals  Short term goals (3-4 weeks)  1. Pt will display independence with understanding and performance of HEP to allow for carryover of plan of care at home. (met)   2. Pt will improve FOTO score from initial evaluation to show improvement in pain and function. (met-both)  3. Pt will improve L shoulder ER strength to 4/5 to improve shoulder stability with lifting and reaching. (met)  4. Pt will improve hip ER strength bilaterally to 4/5 to improve standing/ambulation. (met)  5. Pt will improve hip abduction strength bilaterally to 4/5 to improve standing/ambulation. (met)    Long term goals (6-8 weeks)  1. Pt will score equal or better than projected score on FOTO to show improvement in pain and function. (met)  2. Pt will improve L shoulder ER strength to 4+/5 to improve shoulder stability with lifting and reaching. (met)  3. Pt will improve hip ER strength bilaterally to 4+/5 to improve standing/ambulation.  (met)  4. Pt will improve hip abduction strength bilaterally to 4+/5 to improve standing/ambulation.  (met)      Plan: DC pt.       Precautions: GERD, hiatal hernia, COPD, anxiety, OA of neck, major depression, pain of L hip, pain of L shoulder      Manuals 6/19 6/22 6/29 7/6 7/14 7/17 7/27 8/3  6/15   AC mob             Update exercises             STM C/s erector spinae, UT, levator scap NS SOR/UT/levator scap NS SOR/UT/levator scap NS SOR/UT/levator scap NS SOR/UT/levator scap NS SOR/UT/levator scap NS SOR/UT/levator scap      C/s ROM  NS NS NS NS NS NS NS   NS   Lateral glides c/s  NS L to R gr III NS L to R gr III NS bilat gr III NS bilat gr III NS bilat gr III    NS/MM grade 3 bilat   Neuro Re-Ed 6/19 6/22 6/29 7/6 7/14 7/17 7/27 8/3  6/15   SL clams (HEP ptb) btb 15x ea side            DNF   10x 5" hold 10x 5" hold 10x 5" hold 10x 5" hold 10x 5" hold      Chin tucks supine 15x 5" hold supine 2x10 5" hold supine 10x 5" hold supine 15x supine 15x Supine 15x supine 15x 15x seated     Cervical isometrics 10x SB ea direction 5" hold  10x SB/rot L hold          SL abduction (HEP)        15x     scap retractions (HEP)             No monies (HEP ptb)  2x15 btb 2x15 btb held Gtb loop 2x15 gtb loop 2x15 gtb 2x15 gtb 15x     bridges btb abduction 2x10       15x     Rows/ext       13# 2x15 ea       Resisted ER/IR gtb 2x15 ea; btb nv     btb 15x ea direction       Prone I, T   15x ea I, T, Y 15x ea I, T 15x I, T, Y 15x I, T, Y 15x I      Ther Ex 6/19 6/22 6/29 7/6 7/14 7/17 7/27 8/3  6/15   Piriformis stretch (HEP)             UT shrugs 2x10 8# 2x10 9#           UBE 3'/3' 3'/3' 3'/3' held held     3'/3'   Calf stretch             UT stretch  3x30" ea  3x30" ea side          SA press             Lateral walks             Stand shoulder abd & flexion             HS curls             Supine hip flexion             HR DL             LP             Pt edu          NS   Ther Activity 6/19 6/22 6/29 7/6 7/14 7/17 7/27 8/3  6/15                             Gait Training 6/19 6/22 6/29 7/6 7/14 7/17 7/27 8/3  6/15                             Modalities 6/19 6/22 6/29 7/6 7/14 7/17 7/27 8/3  6/15   MHP 10' c/s  10' c/s  10' c/s 10' c/s

## 2023-08-03 NOTE — ASSESSMENT & PLAN NOTE
Likely based on lesions spread. Will do a trial of oral and topical steroid. Will have patient follow up in two weeks to review the rash.

## 2023-08-15 DIAGNOSIS — K21.9 GASTROESOPHAGEAL REFLUX DISEASE WITHOUT ESOPHAGITIS: ICD-10-CM

## 2023-08-15 DIAGNOSIS — K52.9 GASTROENTERITIS: ICD-10-CM

## 2023-08-17 RX ORDER — ONDANSETRON 4 MG/1
4 TABLET, FILM COATED ORAL EVERY 8 HOURS PRN
Qty: 20 TABLET | Refills: 3 | Status: SHIPPED | OUTPATIENT
Start: 2023-08-17

## 2023-08-17 RX ORDER — FAMOTIDINE 20 MG/1
20 TABLET, FILM COATED ORAL 2 TIMES DAILY
Qty: 60 TABLET | Refills: 0 | Status: SHIPPED | OUTPATIENT
Start: 2023-08-17

## 2023-08-18 ENCOUNTER — CONSULT (OUTPATIENT)
Dept: SURGERY | Facility: CLINIC | Age: 58
End: 2023-08-18
Payer: MEDICARE

## 2023-08-18 VITALS
TEMPERATURE: 98.3 F | RESPIRATION RATE: 16 BRPM | BODY MASS INDEX: 21.6 KG/M2 | OXYGEN SATURATION: 97 % | DIASTOLIC BLOOD PRESSURE: 62 MMHG | HEIGHT: 66 IN | WEIGHT: 134.4 LBS | HEART RATE: 74 BPM | SYSTOLIC BLOOD PRESSURE: 122 MMHG

## 2023-08-18 DIAGNOSIS — K44.9 HIATAL HERNIA: Primary | ICD-10-CM

## 2023-08-18 PROCEDURE — 99204 OFFICE O/P NEW MOD 45 MIN: CPT | Performed by: SURGERY

## 2023-08-18 NOTE — PROGRESS NOTES
Office Visit - General Surgery  Kristine Fang III MRN: [id number]  Encounter: [id number]    Assessment and Plan  Problem List Items Addressed This Visit        Digestive    Hiatal hernia - Primary     68-year-old male with a long history of reflux disease in the setting of a hiatal hernia. Plan:  Discussed at length the pathophysiology of hiatal hernias, and their association with reflux disease. Given his long-term refractory reflux disease, in the setting of a moderate size hiatal hernia, I do not think surgical management is appropriate. Said I would like him to cut back or quit smoking if at all possible additionally, I like to obtain esophageal manometry to evaluate for any motility disorders. Following this, he will return to clinic to discuss neck steps. I will likely have him see pulmonology for preoperative risk stratification given his COPD. Relevant Orders    Esophageal manometry       Chief Complaint:  Wilfrid Sherwood is a 59 y.o. male who presents for Hernia (Consult hiatal hernia.)    Subjective  68-year-old male presents with a hiatal hernia. He states that he has a greater than 20-year history of reflux disease, moderately controlled on Protonix. He states that he has episodes of heartburn on a nearly daily basis, if he misses a dose of medication that is significantly worse. Additionally he complains of regurgitation, occasionally at night, and with coughing. Of note he does have a history of COPD, not on home oxygen. He recently underwent upper GI which reviewed in PACS which shows small to moderate hiatal hernia. He also underwent upper endoscopy with Dr. Nenita Moe, on 6/7/2023 that showed a moderate size hiatal hernia, approximately 6 cm with negative biopsies as well as some gastritis. Today in the office he completed the GERD HR QL and scored 3 with overall neutral satisfaction with his current condition.   Past Medical History:   Diagnosis Date   • Anxiety • Asthma    • COPD (chronic obstructive pulmonary disease) ([facility])    • Depression    • GERD (gastroesophageal reflux disease)    • Hyperlipidemia    • Hypertension    • Suicide attempt [facility])        Past Surgical History:   Procedure Laterality Date   • ESOPHAGOGASTRODUODENOSCOPY N/A 3/1/2019    Procedure: ESOPHAGOGASTRODUODENOSCOPY (EGD); Surgeon: Gabi Berkowitz MD;  Location: [facility] GI [facility]; Service: Gastroenterology   • FRACTURE SURGERY      ORIF   • HERNIA REPAIR     • SKIN GRAFT         Family History   Problem Relation Age of Onset   • Prostate cancer Paternal Uncle        Social History     Tobacco Use   • Smoking status: Heavy Smoker     Packs/day: 1.00     Years: 20.00     Total pack years: 20.00     Types: Cigarettes     Passive exposure: Current   • Smokeless tobacco: Never   Vaping Use   • Vaping Use: Never used   Substance Use Topics   • Alcohol use: Yes     Comment: every other day 1-2 cans   • Drug use: Never        Medications  Current Outpatient Medications on File Prior to Visit   Medication Sig Dispense Refill   • Advair Diskus 500-50 MCG/ACT inhaler INHALE 1 PUFF 2 (TWO) TIMES A DAY RINSE MOUTH AFTER USE.  60 blister 3   • albuterol (PROVENTIL HFA,VENTOLIN HFA) 90 mcg/act inhaler INHALE 1 PUFF EVERY 6 (SIX) HOURS AS NEEDED FOR WHEEZING 17 g 10   • atorvastatin (LIPITOR) 10 mg tablet TAKE 1 TABLET (10 MG TOTAL) BY MOUTH DAILY 90 tablet 3   • benzonatate (TESSALON PERLES) 100 mg capsule TAKE 1 CAPSULE (100 MG TOTAL) BY MOUTH 3 (THREE) TIMES A DAY AS NEEDED FOR COUGH 20 capsule 0   • buPROPion (WELLBUTRIN SR) 150 mg 12 hr tablet TAKE 1 TABLET (150 MG TOTAL) BY MOUTH 2 (TWO) TIMES A DAY 60 tablet 2   • busPIRone (BUSPAR) 10 mg tablet TAKE 1 TABLET(S) BY ORAL ROUTE 2 TIME(S) PER DAY     • cetirizine (ZyrTEC) 10 mg tablet TAKE 1 TABLET (10 MG TOTAL) BY MOUTH DAILY 90 tablet 5   • clobetasol (TEMOVATE) 0.05 % cream Apply topically 2 (two) times a day 60 g 2   • cloNIDine (CATAPRES) 0.1 mg tablet TAKE 1 TABLET BY ORAL ROUTE 2 TIMES PER DAY     • docusate sodium (COLACE) 100 mg capsule TAKE 1 CAPSULE (100 MG TOTAL) BY MOUTH 2 (TWO) TIMES A DAY 60 capsule 2   • escitalopram (LEXAPRO) 20 mg tablet Take 1 tablet (20 mg total) by mouth daily 30 tablet 1   • famotidine (PEPCID) 20 mg tablet TAKE 1 TABLET (20 MG TOTAL) BY MOUTH 2 (TWO) TIMES A DAY 60 tablet 0   • fluticasone (FLONASE) 50 mcg/act nasal spray 1 SPRAY INTO EACH NOSTRIL DAILY 16 g 3   • hydrOXYzine pamoate (VISTARIL) 50 mg capsule Take 50 mg by mouth daily at bedtime     • ibuprofen (MOTRIN) 600 mg tablet Take 1 tablet (600 mg total) by mouth every 6 (six) hours as needed for mild pain or moderate pain 60 tablet 0   • loratadine (CLARITIN) 10 mg tablet Take 1 tablet (10 mg total) by mouth daily 30 tablet 1   • Multiple Vitamin (multivitamin) tablet Take 1 tablet by mouth daily 90 tablet 3   • naltrexone (REVIA) 50 mg tablet Take 50 mg by mouth daily     • ondansetron (ZOFRAN) 4 mg tablet TAKE 1 TABLET (4 MG TOTAL) BY MOUTH EVERY 8 (EIGHT) HOURS AS NEEDED FOR NAUSEA OR VOMITING 20 tablet 3   • pantoprazole (PROTONIX) 40 mg tablet TAKE 1 TABLET (40 MG TOTAL) BY MOUTH DAILY 60 tablet 3   • QUEtiapine (SEROquel) 50 mg tablet Take 3 tablets (150 mg total) by mouth daily at bedtime 90 tablet 1   • tamsulosin (FLOMAX) 0.4 mg TAKE 1 CAPSULE (0.4 MG TOTAL) BY MOUTH DAILY WITH DINNER 90 capsule 3   • tiotropium (Spiriva HandiHaler) 18 mcg inhalation capsule Place 1 capsule (18 mcg total) into inhaler and inhale daily 30 capsule 0   • traZODone (DESYREL) 50 mg tablet Take 0.5 tablets (25 mg total) by mouth daily at bedtime 15 tablet 1   • ALPRAZolam (XANAX) 0.5 mg tablet Take 1 tablet (0.5 mg total) by mouth 2 (two) times a day (Patient not taking: Reported on 1/24/2023) 60 tablet 0   • cholecalciferol (VITAMIN D3) 1,000 units tablet TAKE 1 TABLET (1,000 UNITS TOTAL) BY MOUTH DAILY 56 tablet 0   • cloNIDine (CATAPRES) 0.2 mg tablet Take 0.2 mg by mouth 2 (two) times a day (Patient not taking: Reported on 3/16/2023)     • Diclofenac Sodium (VOLTAREN) 1 % APPLY 1 INCH TO EACH KNEE TWICE DAILY (Patient not taking: Reported on 1/11/2023)     • finasteride (PROSCAR) 5 mg tablet Take 1 tablet (5 mg total) by mouth daily for 90 doses 90 tablet 3   • Fluticasone-Salmeterol (Advair Diskus) 500-50 mcg/dose inhaler Inhale 1 puff 2 (two) times a day Rinse mouth after use. (Patient not taking: Reported on 5/23/2023) 60 blister 2   • fluticasone-salmeterol (Advair HFA) 115-21 MCG/ACT inhaler Inhale 2 puffs 2 (two) times a day (Patient not taking: Reported on 5/23/2023) 36 g 3   • fluticasone-salmeterol (ADVAIR, WIXELA) 500-50 mcg/dose inhaler Inhale 1 puff 2 (two) times a day Rinse mouth after use. (Patient not taking: Reported on 5/23/2023) 1 each 3   • ondansetron (ZOFRAN-ODT) 4 mg disintegrating tablet Take 1 tablet (4 mg total) by mouth every 6 (six) hours as needed for nausea or vomiting (Patient not taking: Reported on 6/20/2023) 20 tablet 0   • polyethylene glycol (GLYCOLAX) 17 GM/SCOOP powder TAKE 17 G BY MOUTH DAILY (Patient not taking: Reported on 6/20/2023) 510 g 0   • Vraylar 1.5 MG capsule Take 1.5 mg by mouth daily at bedtime (Patient not taking: Reported on 1/11/2023)       No current facility-administered medications on file prior to visit. Allergies  Allergies   Allergen Reactions   • Seasonal Ic [Cholestatin] Sneezing       Review of Systems   Constitutional: Negative for appetite change, chills, diaphoresis and fever. HENT: Negative for nosebleeds and trouble swallowing. Eyes: Negative. Respiratory: Negative for cough, shortness of breath and wheezing. Cardiovascular: Negative for chest pain, palpitations and leg swelling. Gastrointestinal: Positive for vomiting. Negative for abdominal distention, abdominal pain and nausea. Heartburn. Genitourinary: Negative for difficulty urinating, flank pain and frequency.    Musculoskeletal: Negative for arthralgias, joint swelling and myalgias. Skin: Negative for pallor and rash. Neurological: Negative for dizziness, facial asymmetry and speech difficulty. Hematological: Does not bruise/bleed easily. Psychiatric/Behavioral: Negative for agitation and confusion. All other systems reviewed and are negative. Objective  Vitals:    08/18/23 1252   BP: 122/62   Pulse: 74   Resp: 16   Temp: 98.3 °F (36.8 °C)   SpO2: 97%       Physical Exam  Vitals and nursing note reviewed. Constitutional:       General: He is not in acute distress. Appearance: Normal appearance. He is not toxic-appearing. HENT:      Head: Normocephalic and atraumatic. Mouth/Throat:      Mouth: Mucous membranes are moist.   Eyes:      Extraocular Movements: Extraocular movements intact. Pupils: Pupils are equal, round, and reactive to light. Cardiovascular:      Rate and Rhythm: Normal rate and regular rhythm. Pulses: Normal pulses. Pulmonary:      Effort: Pulmonary effort is normal. No respiratory distress. Breath sounds: Normal breath sounds. No wheezing. Abdominal:      General: There is no distension. Palpations: Abdomen is soft. There is no mass. Tenderness: There is no abdominal tenderness. There is no guarding or rebound. Hernia: No hernia is present. Comments: No tenderness throughout. Musculoskeletal:         General: No swelling or deformity. Normal range of motion. Cervical back: Normal range of motion and neck supple. Right lower leg: No edema. Left lower leg: No edema. Skin:     General: Skin is warm and dry. Coloration: Skin is not jaundiced. Neurological:      General: No focal deficit present. Mental Status: He is alert and oriented to person, place, and time.    Psychiatric:         Mood and Affect: Mood normal.         Behavior: Behavior normal.

## 2023-08-18 NOTE — ASSESSMENT & PLAN NOTE
49-year-old male with a long history of reflux disease in the setting of a hiatal hernia. Plan:  Discussed at length the pathophysiology of hiatal hernias, and their association with reflux disease. Given his long-term refractory reflux disease, in the setting of a moderate size hiatal hernia, I do not think surgical management is appropriate. Said I would like him to cut back or quit smoking if at all possible additionally, I like to obtain esophageal manometry to evaluate for any motility disorders. Following this, he will return to clinic to discuss neck steps. I will likely have him see pulmonology for preoperative risk stratification given his COPD.

## 2023-08-22 ENCOUNTER — PATIENT OUTREACH (OUTPATIENT)
Dept: OTHER | Facility: OTHER | Age: 58
End: 2023-08-22

## 2023-08-22 NOTE — PROGRESS NOTES
8/22/23: Client went to see general surgeon. Will have an endoscopy and then return to surgeon for scheduling of surgery. He is ready for the surgery when they are.

## 2023-08-23 ENCOUNTER — PATIENT OUTREACH (OUTPATIENT)
Dept: OTHER | Facility: OTHER | Age: 58
End: 2023-08-23

## 2023-08-23 NOTE — PROGRESS NOTES
Client stated that he was told by the GI office to ask his PCP if he is to stop the Pepcid and Zofran prior to the manometry, 8/31. In-basket sent to PCP for order.

## 2023-08-24 ENCOUNTER — TELEPHONE (OUTPATIENT)
Dept: GASTROENTEROLOGY | Facility: HOSPITAL | Age: 58
End: 2023-08-24

## 2023-08-24 DIAGNOSIS — J44.9 CHRONIC OBSTRUCTIVE PULMONARY DISEASE, UNSPECIFIED COPD TYPE (HCC): ICD-10-CM

## 2023-08-24 RX ORDER — FLUTICASONE PROPIONATE AND SALMETEROL 50; 500 UG/1; UG/1
POWDER RESPIRATORY (INHALATION)
Qty: 60 BLISTER | Refills: 3 | Status: SHIPPED | OUTPATIENT
Start: 2023-08-24

## 2023-08-29 ENCOUNTER — TELEPHONE (OUTPATIENT)
Dept: GASTROENTEROLOGY | Facility: HOSPITAL | Age: 58
End: 2023-08-29

## 2023-08-31 ENCOUNTER — HOSPITAL ENCOUNTER (OUTPATIENT)
Dept: GASTROENTEROLOGY | Facility: HOSPITAL | Age: 58
End: 2023-08-31
Attending: SURGERY
Payer: MEDICARE

## 2023-08-31 VITALS
RESPIRATION RATE: 16 BRPM | DIASTOLIC BLOOD PRESSURE: 71 MMHG | HEART RATE: 67 BPM | SYSTOLIC BLOOD PRESSURE: 147 MMHG | OXYGEN SATURATION: 98 % | TEMPERATURE: 98.2 F

## 2023-08-31 DIAGNOSIS — K44.9 HIATAL HERNIA: ICD-10-CM

## 2023-08-31 PROCEDURE — 91010 ESOPHAGUS MOTILITY STUDY: CPT

## 2023-08-31 NOTE — PERIOPERATIVE NURSING NOTE
Patient brought in the room and explained the esophageal manometry procedure. After the confirmation of allergies, lidocaine 2% inserted via right /left nostril and  a transnasal insertion of the High Resolution esophageal manometry catheter was inserted via left nostril. Patient given water to drink during the insertion and once the catheter inserted pressure bands of both Upper esophageal sphincter  (UES) and Lower esophageal sphincter ( LES) were observed on the color contour. Patient instructed to take a deep breath to verify placement of the catheter, diaphragmatic pinch noted on inspiration. Catheter was secured to right/left cheek. Patient was assisted to supine position . Patient was instructed to relax  while acclimating the catheter for about 5 minutes. A 30 second baseline resting pressure was obtained to identify the UES and LES followed by a series of 10 liquid swallows using 5 cc room temperature normal saline to assess esophageal motility and bolus transit. Patient administered 10 viscous swallows using 5 cc viscous solution, 1 multiple rapid drink swallow using 2 cc room temperature normal saline given a total of 5 drinks. Patient instructed to sit up at the edge of the stretcher and given 5 upright liquid swallows using 5 cc room temperature normal saline and 1 rapid drink challenge using 175 cc room temperature water. At the end of the procedure the high resolution esophageal manometry catheter was removed from the nostril intact. Patient given discharge instructions and patient left in stable condition.

## 2023-09-05 ENCOUNTER — PATIENT OUTREACH (OUTPATIENT)
Dept: OTHER | Facility: OTHER | Age: 58
End: 2023-09-05

## 2023-09-05 NOTE — PROGRESS NOTES
Client described multiple symptoms since the end of August: sweating, irritated throat, coughing, passing gas, diarrhea, vomiting, gagging with coughing. Has an appointment with PCP on 9/14. Unsure if he should wait to see him. Called to reschedule appointment. Rescheduled for 9/7 at 3 PM. . Client informed.

## 2023-09-06 DIAGNOSIS — L23.7 POISON IVY: ICD-10-CM

## 2023-09-07 ENCOUNTER — OFFICE VISIT (OUTPATIENT)
Dept: FAMILY MEDICINE CLINIC | Facility: CLINIC | Age: 58
End: 2023-09-07

## 2023-09-07 VITALS
TEMPERATURE: 97.4 F | HEIGHT: 66 IN | OXYGEN SATURATION: 98 % | BODY MASS INDEX: 21.69 KG/M2 | RESPIRATION RATE: 16 BRPM | HEART RATE: 89 BPM | DIASTOLIC BLOOD PRESSURE: 80 MMHG | SYSTOLIC BLOOD PRESSURE: 158 MMHG | WEIGHT: 135 LBS

## 2023-09-07 DIAGNOSIS — J11.1 INFLUENZA: Primary | ICD-10-CM

## 2023-09-07 PROCEDURE — 91020 GASTRIC MOTILITY STUDIES: CPT | Performed by: INTERNAL MEDICINE

## 2023-09-07 PROCEDURE — 99213 OFFICE O/P EST LOW 20 MIN: CPT | Performed by: FAMILY MEDICINE

## 2023-09-07 RX ORDER — CLOBETASOL PROPIONATE 0.5 MG/G
CREAM TOPICAL 2 TIMES DAILY
Qty: 60 G | Refills: 2 | Status: SHIPPED | OUTPATIENT
Start: 2023-09-07

## 2023-09-07 RX ORDER — DEXTROMETHORPHAN HBR. AND GUAIFENESIN 10; 100 MG/5ML; MG/5ML
5 SOLUTION ORAL EVERY 12 HOURS
Qty: 236 ML | Refills: 0 | Status: SHIPPED | OUTPATIENT
Start: 2023-09-07

## 2023-09-07 RX ORDER — SENNOSIDES 8.6 MG
650 CAPSULE ORAL EVERY 8 HOURS PRN
Qty: 30 TABLET | Refills: 0 | Status: SHIPPED | OUTPATIENT
Start: 2023-09-07

## 2023-09-07 RX ORDER — LOPERAMIDE HYDROCHLORIDE 2 MG/1
2 TABLET ORAL 4 TIMES DAILY PRN
Qty: 30 TABLET | Refills: 0 | Status: SHIPPED | OUTPATIENT
Start: 2023-09-07

## 2023-09-07 NOTE — PROGRESS NOTES
Assessment/Plan:    1. Influenza  Assessment & Plan:  VS with in normal limit symptoms positive for chills nausea vomiting and diarrhea. At this time appropriate for conservative treatment with loperamide as well as Robitussin. Informed patient to stop taking his MiraLAX. We will have patient follow-up in a week to assess the resolution of symptoms. Orders:  -     loperamide (IMODIUM A-D) 2 MG tablet; Take 1 tablet (2 mg total) by mouth 4 (four) times a day as needed for diarrhea  -     dextromethorphan-guaiFENesin (ROBITUSSIN-DM)  mg/5 mL oral liquid; Take 5 mL by mouth every 12 (twelve) hours  -     acetaminophen (TYLENOL) 650 mg CR tablet; Take 1 tablet (650 mg total) by mouth every 8 (eight) hours as needed for mild pain       Subjective:      Patient ID: Óscar Naqvi is a 59 y.o. male. Past medical history notable for hiatal hernia is here for symptoms concerning for influenza. Patient reports that for the last week and a half he has had chills, diarrhea, nausea vomiting secondary to a cough. Patient denies any sick contacts though he states that he was in not [place] earlier 2 weeks ago. Patient reports no fevers at home however subjective fevers. The following portions of the patient's history were reviewed and updated as appropriate: allergies, current medications, past family history, past medical history, past social history, past surgical history, and problem list.    Review of Systems   Constitutional: Positive for chills, diaphoresis and fatigue. Negative for fever. HENT: Positive for rhinorrhea and sinus pain. Negative for ear pain and sore throat. Eyes: Negative for pain and visual disturbance. Respiratory: Negative for cough and shortness of breath. Cardiovascular: Negative for chest pain and palpitations. Gastrointestinal: Positive for vomiting. Negative for abdominal pain. Genitourinary: Negative for dysuria and hematuria.    Musculoskeletal: Negative for arthralgias and back pain. Skin: Negative for color change and rash. Neurological: Negative for seizures and syncope. All other systems reviewed and are negative. Objective:      /80 (BP Location: Left arm, Patient Position: Sitting, Cuff Size: Standard)   Pulse 89   Temp (!) 97.4 °F (36.3 °C) (Temporal)   Resp 16   Ht 5' 6" (1.676 m)   Wt 61.2 kg (135 lb)   SpO2 98%   BMI 21.79 kg/m²          Physical Exam  Constitutional:       General: He is not in acute distress. Appearance: Normal appearance. He is diaphoretic. He is not toxic-appearing. HENT:      Head: Normocephalic. Mouth/Throat:      Mouth: Mucous membranes are moist.   Eyes:      Extraocular Movements: Extraocular movements intact. Pupils: Pupils are equal, round, and reactive to light. Cardiovascular:      Rate and Rhythm: Normal rate and regular rhythm. Pulmonary:      Effort: Pulmonary effort is normal. No tachypnea. Breath sounds: Normal breath sounds. No wheezing or rales. Chest:      Chest wall: No edema. There is no dullness to percussion. Abdominal:      General: Abdomen is flat. There is no distension. Palpations: Abdomen is soft. Tenderness: There is no abdominal tenderness. Musculoskeletal:      Right lower leg: No edema. Left lower leg: No edema. Skin:     Capillary Refill: Capillary refill takes less than 2 seconds. Neurological:      General: No focal deficit present. Mental Status: He is alert and oriented to person, place, and time.    Psychiatric:         Mood and Affect: Mood normal.         Behavior: Behavior normal.           Shiva Bah DO   Family Medicine PGY-2  9/7/2023

## 2023-09-07 NOTE — ASSESSMENT & PLAN NOTE
VS with in normal limit symptoms positive for chills nausea vomiting and diarrhea. At this time appropriate for conservative treatment with loperamide as well as Robitussin. Informed patient to stop taking his MiraLAX. We will have patient follow-up in a week to assess the resolution of symptoms.

## 2023-09-11 ENCOUNTER — APPOINTMENT (OUTPATIENT)
Dept: LAB | Facility: HOSPITAL | Age: 58
End: 2023-09-11
Payer: MEDICARE

## 2023-09-11 DIAGNOSIS — E88.89 MADELUNG'S NECK (HCC): ICD-10-CM

## 2023-09-11 DIAGNOSIS — Z79.899 ENCOUNTER FOR LONG-TERM (CURRENT) USE OF OTHER MEDICATIONS: ICD-10-CM

## 2023-09-11 LAB
25(OH)D3 SERPL-MCNC: 44.5 NG/ML (ref 30–100)
ALBUMIN SERPL BCP-MCNC: 3.9 G/DL (ref 3.5–5)
ALP SERPL-CCNC: 54 U/L (ref 34–104)
ALT SERPL W P-5'-P-CCNC: 21 U/L (ref 7–52)
ANION GAP SERPL CALCULATED.3IONS-SCNC: 7 MMOL/L
AST SERPL W P-5'-P-CCNC: 26 U/L (ref 13–39)
BASOPHILS # BLD AUTO: 0.03 THOUSANDS/ÂΜL (ref 0–0.1)
BASOPHILS NFR BLD AUTO: 0 % (ref 0–1)
BILIRUB SERPL-MCNC: 0.95 MG/DL (ref 0.2–1)
BUN SERPL-MCNC: 17 MG/DL (ref 5–25)
CALCIUM SERPL-MCNC: 8.8 MG/DL (ref 8.4–10.2)
CHLORIDE SERPL-SCNC: 104 MMOL/L (ref 96–108)
CHOLEST SERPL-MCNC: 122 MG/DL
CO2 SERPL-SCNC: 28 MMOL/L (ref 21–32)
CREAT SERPL-MCNC: 0.85 MG/DL (ref 0.6–1.3)
EOSINOPHIL # BLD AUTO: 0.09 THOUSAND/ÂΜL (ref 0–0.61)
EOSINOPHIL NFR BLD AUTO: 1 % (ref 0–6)
ERYTHROCYTE [DISTWIDTH] IN BLOOD BY AUTOMATED COUNT: 12.5 % (ref 11.6–15.1)
EST. AVERAGE GLUCOSE BLD GHB EST-MCNC: 108 MG/DL
GFR SERPL CREATININE-BSD FRML MDRD: 94 ML/MIN/1.73SQ M
GLUCOSE P FAST SERPL-MCNC: 83 MG/DL (ref 65–99)
HBA1C MFR BLD: 5.4 %
HCT VFR BLD AUTO: 41.4 % (ref 36.5–49.3)
HDLC SERPL-MCNC: 68 MG/DL
HGB BLD-MCNC: 14 G/DL (ref 12–17)
IMM GRANULOCYTES # BLD AUTO: 0.02 THOUSAND/UL (ref 0–0.2)
IMM GRANULOCYTES NFR BLD AUTO: 0 % (ref 0–2)
LDLC SERPL CALC-MCNC: 35 MG/DL (ref 0–100)
LYMPHOCYTES # BLD AUTO: 1.49 THOUSANDS/ÂΜL (ref 0.6–4.47)
LYMPHOCYTES NFR BLD AUTO: 22 % (ref 14–44)
MCH RBC QN AUTO: 32 PG (ref 26.8–34.3)
MCHC RBC AUTO-ENTMCNC: 33.8 G/DL (ref 31.4–37.4)
MCV RBC AUTO: 95 FL (ref 82–98)
MONOCYTES # BLD AUTO: 0.69 THOUSAND/ÂΜL (ref 0.17–1.22)
MONOCYTES NFR BLD AUTO: 10 % (ref 4–12)
NEUTROPHILS # BLD AUTO: 4.46 THOUSANDS/ÂΜL (ref 1.85–7.62)
NEUTS SEG NFR BLD AUTO: 67 % (ref 43–75)
NONHDLC SERPL-MCNC: 54 MG/DL
NRBC BLD AUTO-RTO: 0 /100 WBCS
PLATELET # BLD AUTO: 267 THOUSANDS/UL (ref 149–390)
PMV BLD AUTO: 9.5 FL (ref 8.9–12.7)
POTASSIUM SERPL-SCNC: 4.4 MMOL/L (ref 3.5–5.3)
PROT SERPL-MCNC: 6.5 G/DL (ref 6.4–8.4)
RBC # BLD AUTO: 4.38 MILLION/UL (ref 3.88–5.62)
SODIUM SERPL-SCNC: 139 MMOL/L (ref 135–147)
TRIGL SERPL-MCNC: 96 MG/DL
TSH SERPL DL<=0.05 MIU/L-ACNC: 0.55 UIU/ML (ref 0.45–4.5)
WBC # BLD AUTO: 6.78 THOUSAND/UL (ref 4.31–10.16)

## 2023-09-11 PROCEDURE — 85025 COMPLETE CBC W/AUTO DIFF WBC: CPT

## 2023-09-11 PROCEDURE — 84443 ASSAY THYROID STIM HORMONE: CPT

## 2023-09-11 PROCEDURE — 36415 COLL VENOUS BLD VENIPUNCTURE: CPT

## 2023-09-11 PROCEDURE — 80053 COMPREHEN METABOLIC PANEL: CPT

## 2023-09-11 PROCEDURE — 80061 LIPID PANEL: CPT

## 2023-09-11 PROCEDURE — 82306 VITAMIN D 25 HYDROXY: CPT

## 2023-09-11 PROCEDURE — 83036 HEMOGLOBIN GLYCOSYLATED A1C: CPT

## 2023-09-12 ENCOUNTER — PATIENT OUTREACH (OUTPATIENT)
Dept: OTHER | Facility: OTHER | Age: 58
End: 2023-09-12

## 2023-09-12 DIAGNOSIS — J40 BRONCHITIS: ICD-10-CM

## 2023-09-12 NOTE — PROGRESS NOTES
Client requested phone number to Dr. Jones Deal office for follow up. Gave number to call office. Also saw final visit to ortho related to finger which is fine. Goes to [facility] for PCP follow up on Friday at 2:40. Sees urology next Friday.

## 2023-09-14 RX ORDER — FLUTICASONE PROPIONATE 50 MCG
1 SPRAY, SUSPENSION (ML) NASAL DAILY
Qty: 16 G | Refills: 3 | Status: SHIPPED | OUTPATIENT
Start: 2023-09-14

## 2023-09-15 ENCOUNTER — OFFICE VISIT (OUTPATIENT)
Dept: FAMILY MEDICINE CLINIC | Facility: CLINIC | Age: 58
End: 2023-09-15

## 2023-09-15 VITALS
SYSTOLIC BLOOD PRESSURE: 150 MMHG | DIASTOLIC BLOOD PRESSURE: 90 MMHG | OXYGEN SATURATION: 99 % | RESPIRATION RATE: 16 BRPM | HEIGHT: 66 IN | WEIGHT: 140 LBS | TEMPERATURE: 96.8 F | HEART RATE: 76 BPM | BODY MASS INDEX: 22.5 KG/M2

## 2023-09-15 DIAGNOSIS — J11.1 INFLUENZA: Primary | ICD-10-CM

## 2023-09-15 DIAGNOSIS — A09 DIARRHEA OF INFECTIOUS ORIGIN: ICD-10-CM

## 2023-09-15 RX ORDER — POLYETHYLENE GLYCOL 3350 17 G/17G
238 POWDER, FOR SOLUTION ORAL ONCE
Status: CANCELLED | OUTPATIENT
Start: 2023-09-15 | End: 2023-09-15

## 2023-09-15 RX ORDER — POLYETHYLENE GLYCOL 3350 17 G/17G
17 POWDER, FOR SOLUTION ORAL DAILY
Qty: 510 G | Refills: 0 | Status: SHIPPED | OUTPATIENT
Start: 2023-09-15 | End: 2023-09-16

## 2023-09-15 NOTE — PATIENT INSTRUCTIONS
polyethylene glycol (GLYCOLAX) 17 GM/SCOOP powder Stop taking   Inrease loperimide to three times a day

## 2023-09-16 PROBLEM — K59.00 CONSTIPATION: Status: ACTIVE | Noted: 2019-07-17

## 2023-09-16 NOTE — PROGRESS NOTES
Assessment/Plan:    1. Influenza  Assessment & Plan:  GI symptoms associated with upper respiratory symptoms. Symptomatic management with Imodium, acetaminophen, Robitussin. Patient reports improving though not at 100%. Patient would like to continue to follow-up next week to see resolution of symptoms. Of note, patient psychologist remarked that this could be also stress-induced. Informed patient on better ways to take care of himself such as getting good rest and having good frequent meals and oral hydration. Patient amendable      2. Diarrhea of infectious origin  Comments:  On MiraLAX. Discontinued. Instructed patient to take Imodium. Subjective:      Patient ID: Sachin Minor is a 59 y.o. male. Past medical history of what seems to be recent influenza virus is coming in for follow-up appointment. Patient reports that he has been doing better. Patient continues to have diarrhea but he is currently still on his MiraLAX. Patient also states that he has a cough however the Robitussin has been helping. Patient denies any current fevers, chills or systemic signs of infections. The following portions of the patient's history were reviewed and updated as appropriate: allergies, current medications, past family history, past medical history, past social history, past surgical history, and problem list.    Review of Systems   Constitutional: Negative for chills and fever. HENT: Negative for ear pain and sore throat. Eyes: Negative for pain and visual disturbance. Respiratory: Negative for cough and shortness of breath. Cardiovascular: Negative for chest pain and palpitations. Gastrointestinal: Positive for diarrhea. Negative for abdominal pain and vomiting. Genitourinary: Negative for dysuria and hematuria. Musculoskeletal: Negative for arthralgias and back pain. Skin: Negative for color change and rash. Neurological: Negative for seizures and syncope.    All other systems reviewed and are negative. Objective:      /90 (BP Location: Left arm, Patient Position: Sitting, Cuff Size: Standard)   Pulse 76   Temp (!) 96.8 °F (36 °C) (Temporal)   Resp 16   Ht 5' 6" (1.676 m)   Wt 63.5 kg (140 lb)   SpO2 99%   BMI 22.60 kg/m²          Physical Exam  Constitutional:       General: He is not in acute distress. Appearance: Normal appearance. He is not toxic-appearing or diaphoretic. HENT:      Head: Normocephalic. Mouth/Throat:      Mouth: Mucous membranes are moist.   Eyes:      Extraocular Movements: Extraocular movements intact. Pupils: Pupils are equal, round, and reactive to light. Cardiovascular:      Rate and Rhythm: Normal rate and regular rhythm. Pulmonary:      Effort: Pulmonary effort is normal. No tachypnea. Breath sounds: Normal breath sounds. No wheezing or rales. Chest:      Chest wall: No edema. There is no dullness to percussion. Abdominal:      General: Abdomen is flat. There is no distension. Palpations: Abdomen is soft. Tenderness: There is no abdominal tenderness. Musculoskeletal:      Right lower leg: No edema. Left lower leg: No edema. Skin:     Capillary Refill: Capillary refill takes less than 2 seconds. Neurological:      General: No focal deficit present. Mental Status: He is alert and oriented to person, place, and time.    Psychiatric:         Mood and Affect: Mood normal.         Behavior: Behavior normal.           Yas Owens, DO   Family Medicine PGY-2  9/16/2023

## 2023-09-16 NOTE — ASSESSMENT & PLAN NOTE
GI symptoms associated with upper respiratory symptoms. Symptomatic management with Imodium, acetaminophen, Robitussin. Patient reports improving though not at 100%. Patient would like to continue to follow-up next week to see resolution of symptoms. Of note, patient psychologist remarked that this could be also stress-induced. Informed patient on better ways to take care of himself such as getting good rest and having good frequent meals and oral hydration.   Patient amendable

## 2023-09-20 ENCOUNTER — PATIENT OUTREACH (OUTPATIENT)
Dept: OTHER | Facility: OTHER | Age: 58
End: 2023-09-20

## 2023-09-20 NOTE — PROGRESS NOTES
Client  stated PCP  gave him medication that he is afraid will give him diarrhea. I explained that Dr. Alexis Miller probably feels that Roberta Padgett has a blockage which needs to be expelled therefore he ordered the medication to remove it. Roberta Padgett stated he refused the med but now understands the reason for taking the med. He will have Dr Alexis Miller reorder it when he sees him Monday. I told Roberta Padgett I will route this note to his PCP today so he can reorder it. Roberta Padgett also said he experiences morning sweats, coughing with expectoration every morning. Smokes about 10 cigarettes/day. Has smoked since 1984. Uses inhalers 3 times/day.

## 2023-09-22 ENCOUNTER — OFFICE VISIT (OUTPATIENT)
Dept: UROLOGY | Facility: CLINIC | Age: 58
End: 2023-09-22
Payer: MEDICARE

## 2023-09-22 VITALS
SYSTOLIC BLOOD PRESSURE: 144 MMHG | DIASTOLIC BLOOD PRESSURE: 78 MMHG | HEIGHT: 66 IN | WEIGHT: 140 LBS | BODY MASS INDEX: 22.5 KG/M2 | OXYGEN SATURATION: 99 % | HEART RATE: 61 BPM

## 2023-09-22 DIAGNOSIS — Z09 ENCOUNTER FOR FOLLOW-UP: ICD-10-CM

## 2023-09-22 DIAGNOSIS — R30.0 DYSURIA: Primary | ICD-10-CM

## 2023-09-22 DIAGNOSIS — Z12.5 PROSTATE CANCER SCREENING: ICD-10-CM

## 2023-09-22 LAB
SL AMB  POCT GLUCOSE, UA: NORMAL
SL AMB LEUKOCYTE ESTERASE,UA: NORMAL
SL AMB POCT BILIRUBIN,UA: NORMAL
SL AMB POCT BLOOD,UA: NORMAL
SL AMB POCT CLARITY,UA: CLEAR
SL AMB POCT COLOR,UA: YELLOW
SL AMB POCT KETONES,UA: NORMAL
SL AMB POCT NITRITE,UA: NORMAL
SL AMB POCT PH,UA: 7.5
SL AMB POCT SPECIFIC GRAVITY,UA: 1
SL AMB POCT URINE PROTEIN: NORMAL
SL AMB POCT UROBILINOGEN: 1

## 2023-09-22 PROCEDURE — 99213 OFFICE O/P EST LOW 20 MIN: CPT

## 2023-09-22 PROCEDURE — 81002 URINALYSIS NONAUTO W/O SCOPE: CPT

## 2023-09-22 RX ORDER — FINASTERIDE 5 MG/1
5 TABLET, FILM COATED ORAL DAILY
Qty: 90 TABLET | Refills: 3 | Status: SHIPPED | OUTPATIENT
Start: 2023-09-22

## 2023-09-22 RX ORDER — LOPERAMIDE HYDROCHLORIDE 2 MG/1
CAPSULE ORAL
COMMUNITY
Start: 2023-09-07

## 2023-09-22 RX ORDER — ACETAMINOPHEN 500 MG
TABLET ORAL
COMMUNITY
Start: 2023-07-05

## 2023-09-22 RX ORDER — TAMSULOSIN HYDROCHLORIDE 0.4 MG/1
0.4 CAPSULE ORAL
Qty: 90 CAPSULE | Refills: 3 | Status: SHIPPED | OUTPATIENT
Start: 2023-09-22

## 2023-09-22 RX ORDER — MELOXICAM 15 MG/1
15 TABLET ORAL DAILY
COMMUNITY
Start: 2023-07-05 | End: 2024-07-04

## 2023-09-22 RX ORDER — MELOXICAM 15 MG/1
15 TABLET ORAL DAILY
COMMUNITY
Start: 2023-09-14

## 2023-09-22 RX ORDER — DEXTROMETHORPHAN HYDROBROMIDE, GUAIFENESIN 10; 100 MG/5ML; MG/5ML
LIQUID ORAL
COMMUNITY
Start: 2023-09-07

## 2023-09-22 NOTE — PROGRESS NOTES
Office Visit- Urology  Denis Crocker III 1965 MRN: [id number]      Assessment/Discussion/Plan    59 y.o. male managed by     1. BPH with lower urinary tract symptoms  -Patient had cystoscopy in March 2023 which demonstrated some stenosis at the apex of the prostate and the sphincter  -Patient was placed on finasteride in addition to the Flomax that he was already taken and was instructed to follow-up in 6 months    2. Prostate cancer screening  -Positive family history with a uncle who unfortunately succumbed to prostate cancer. Diagnosed in his 46s  -PSA of 1.3 in March 2023  -JOE refused today by patient due to recent gastrointestinal distress  -Plan for repeat PSA in March 2024 with an office visit at that time for annual JOE        Chief Complaint:   Luis Salas is a 59 y.o. male presenting to the office for a follow up visit regarding  Lower urinary tract symptoms        Subjective    -Patient originally presented to the office in October 2022 for evaluation of urinary frequency and painful urination  -Patient had cystoscopy in March 2023 which demonstrated some stenosis at the apex of the prostate and the sphincter  -Patient was placed on finasteride in addition to the Flomax that he was already taken and was instructed to follow-up in 6 months  -Patient presents to the office today stating that he is doing well. Some mild persistent dysuria but he does not find this to be bothersome  -He has been utilizing finasteride and Flomax to good effect and is currently happy with his current pharmacologic regimen. He does not wish for any further evaluation or changes to medical regimen at this time  -Denies any dysuria, gross hematuria, or flank pain      ROS:   Review of Systems   Constitutional: Negative. Negative for chills, fatigue and fever. HENT: Negative. Respiratory: Negative for shortness of breath. Cardiovascular: Negative for chest pain. Gastrointestinal: Negative.   Negative for abdominal pain. Endocrine: Negative. Musculoskeletal: Negative. Skin: Negative. Neurological: Negative. Negative for dizziness and light-headedness. Hematological: Negative. Psychiatric/Behavioral: Negative. Past Medical History  Past Medical History:   Diagnosis Date   • Anxiety    • Asthma    • COPD (chronic obstructive pulmonary disease) ([address])    • Depression    • GERD (gastroesophageal reflux disease)    • Hyperlipidemia    • Hypertension    • Suicide attempt [facility])        Past Surgical History  Past Surgical History:   Procedure Laterality Date   • ESOPHAGOGASTRODUODENOSCOPY N/A 3/1/2019    Procedure: ESOPHAGOGASTRODUODENOSCOPY (EGD); Surgeon: Sabina Khoury MD;  Location: [facility] GI [facility]; Service: Gastroenterology   • FRACTURE SURGERY      ORIF   • HERNIA REPAIR     • SKIN GRAFT         Past Family History  Family History   Problem Relation Age of Onset   • Prostate cancer Paternal Uncle        Past Social history  Social History     Socioeconomic History   • Marital status:       Spouse name: Not on file   • Number of children: Not on file   • Years of education: Not on file   • Highest education level: Not on file   Occupational History   • Occupation: unemployed   Tobacco Use   • Smoking status: Heavy Smoker     Packs/day: 1.00     Years: 20.00     Total pack years: 20.00     Types: Cigarettes     Passive exposure: Current   • Smokeless tobacco: Never   Vaping Use   • Vaping Use: Never used   Substance and Sexual Activity   • Alcohol use: Yes     Comment: every other day 1-2 cans   • Drug use: Never   • Sexual activity: Not Currently     Partners: Female   Other Topics Concern   • Not on file   Social History Narrative    Lives independently     Social Determinants of Health     Financial Resource Strain: Low Risk  (8/4/2022)    Overall Financial Resource Strain (CARDIA)    • Difficulty of Paying Living Expenses: Not very hard   Food Insecurity: No Food Insecurity (8/4/2022) Hunger Vital Sign    • Worried About Running Out of Food in the Last Year: Never true    • Ran Out of Food in the Last Year: Never true   Transportation Needs: No Transportation Needs (8/4/2022)    PRAPARE - Transportation    • Lack of Transportation (Medical): No    • Lack of Transportation (Non-Medical): No   Physical Activity: Not on file   Stress: No Stress Concern Present (8/4/2022)    [facility]    • Feeling of Stress : Not at all   Social Connections: Not on file   Intimate Partner Violence: Not on file   Housing Stability: Unknown (8/4/2022)    Housing Stability Vital Sign    • Unable to Pay for Housing in the Last Year: No    • Number of Places Lived in the Last Year: Not on file    • Unstable Housing in the Last Year: No       Current Medications  Current Outpatient Medications   Medication Sig Dispense Refill   • acetaminophen (TYLENOL) 500 mg tablet TAKE 1 TABLET (500 MG TOTAL) BY MOUTH EVERY 6 (SIX) HOURS AS NEEDED FOR MILD PAIN (PAIN SCORE 1-3). • acetaminophen (TYLENOL) 650 mg CR tablet Take 1 tablet (650 mg total) by mouth every 8 (eight) hours as needed for mild pain 30 tablet 0   • Advair Diskus 500-50 MCG/ACT inhaler INHALE 1 PUFF 2 (TWO) TIMES A DAY RINSE MOUTH AFTER USE.  60 blister 3   • albuterol (PROVENTIL HFA,VENTOLIN HFA) 90 mcg/act inhaler INHALE 1 PUFF EVERY 6 (SIX) HOURS AS NEEDED FOR WHEEZING 17 g 10   • atorvastatin (LIPITOR) 10 mg tablet TAKE 1 TABLET (10 MG TOTAL) BY MOUTH DAILY 90 tablet 3   • benzonatate (TESSALON PERLES) 100 mg capsule TAKE 1 CAPSULE (100 MG TOTAL) BY MOUTH 3 (THREE) TIMES A DAY AS NEEDED FOR COUGH 20 capsule 0   • buPROPion (WELLBUTRIN SR) 150 mg 12 hr tablet TAKE 1 TABLET (150 MG TOTAL) BY MOUTH 2 (TWO) TIMES A DAY 60 tablet 2   • busPIRone (BUSPAR) 10 mg tablet TAKE 1 TABLET(S) BY ORAL ROUTE 2 TIME(S) PER DAY     • cetirizine (ZyrTEC) 10 mg tablet TAKE 1 TABLET (10 MG TOTAL) BY MOUTH DAILY 90 tablet 5   • Chest Congestion Relief DM  MG/5ML oral syrup TAKE 5 ML BY MOUTH EVERY 12 (TWELVE) HOURS     • clobetasol (TEMOVATE) 0.05 % cream APPLY TOPICALLY 2 (TWO) TIMES A DAY 60 g 2   • cloNIDine (CATAPRES) 0.1 mg tablet TAKE 1 TABLET BY ORAL ROUTE 2 TIMES PER DAY     • dextromethorphan-guaiFENesin (ROBITUSSIN-DM)  mg/5 mL oral liquid Take 5 mL by mouth every 12 (twelve) hours 236 mL 0   • docusate sodium (COLACE) 100 mg capsule TAKE 1 CAPSULE (100 MG TOTAL) BY MOUTH 2 (TWO) TIMES A DAY 60 capsule 2   • escitalopram (LEXAPRO) 20 mg tablet Take 1 tablet (20 mg total) by mouth daily 30 tablet 1   • famotidine (PEPCID) 20 mg tablet TAKE 1 TABLET (20 MG TOTAL) BY MOUTH 2 (TWO) TIMES A DAY 60 tablet 0   • hydrOXYzine pamoate (VISTARIL) 50 mg capsule Take 50 mg by mouth daily at bedtime     • ibuprofen (MOTRIN) 600 mg tablet Take 1 tablet (600 mg total) by mouth every 6 (six) hours as needed for mild pain or moderate pain 60 tablet 0   • loperamide (IMODIUM A-D) 2 MG tablet Take 1 tablet (2 mg total) by mouth 4 (four) times a day as needed for diarrhea 30 tablet 0   • loperamide (IMODIUM) 2 mg capsule TAKE 1CAPSULE (2 MG TOTAL) BY MOUTH 4 (FOUR) TIMES A DAY AS NEEDED FOR DIARRHEA     • loratadine (CLARITIN) 10 mg tablet Take 1 tablet (10 mg total) by mouth daily 30 tablet 1   • meloxicam (MOBIC) 15 mg tablet Take 15 mg by mouth daily     • meloxicam (MOBIC) 15 mg tablet Take 15 mg by mouth daily     • Multiple Vitamin (multivitamin) tablet Take 1 tablet by mouth daily 90 tablet 3   • naltrexone (REVIA) 50 mg tablet Take 50 mg by mouth daily     • ondansetron (ZOFRAN) 4 mg tablet TAKE 1 TABLET (4 MG TOTAL) BY MOUTH EVERY 8 (EIGHT) HOURS AS NEEDED FOR NAUSEA OR VOMITING 20 tablet 3   • pantoprazole (PROTONIX) 40 mg tablet TAKE 1 TABLET (40 MG TOTAL) BY MOUTH DAILY 60 tablet 3   • QUEtiapine (SEROquel) 50 mg tablet Take 3 tablets (150 mg total) by mouth daily at bedtime 90 tablet 1   • tamsulosin (FLOMAX) 0.4 mg TAKE 1 CAPSULE (0.4 MG TOTAL) BY MOUTH DAILY WITH DINNER 90 capsule 3   • tiotropium (Spiriva HandiHaler) 18 mcg inhalation capsule Place 1 capsule (18 mcg total) into inhaler and inhale daily 30 capsule 0   • traZODone (DESYREL) 50 mg tablet Take 0.5 tablets (25 mg total) by mouth daily at bedtime 15 tablet 1   • ALPRAZolam (XANAX) 0.5 mg tablet Take 1 tablet (0.5 mg total) by mouth 2 (two) times a day (Patient not taking: Reported on 1/24/2023) 60 tablet 0   • cholecalciferol (VITAMIN D3) 1,000 units tablet TAKE 1 TABLET (1,000 UNITS TOTAL) BY MOUTH DAILY 56 tablet 0   • cloNIDine (CATAPRES) 0.2 mg tablet Take 0.2 mg by mouth 2 (two) times a day (Patient not taking: Reported on 3/16/2023)     • Diclofenac Sodium (VOLTAREN) 1 % APPLY 1 INCH TO EACH KNEE TWICE DAILY (Patient not taking: Reported on 1/11/2023)     • finasteride (PROSCAR) 5 mg tablet Take 1 tablet (5 mg total) by mouth daily for 90 doses 90 tablet 3   • fluticasone (FLONASE) 50 mcg/act nasal spray 1 SPRAY INTO EACH NOSTRIL DAILY (Patient not taking: Reported on 9/22/2023) 16 g 3   • Fluticasone-Salmeterol (Advair Diskus) 500-50 mcg/dose inhaler Inhale 1 puff 2 (two) times a day Rinse mouth after use. (Patient not taking: Reported on 5/23/2023) 60 blister 2   • fluticasone-salmeterol (Advair HFA) 115-21 MCG/ACT inhaler Inhale 2 puffs 2 (two) times a day (Patient not taking: Reported on 5/23/2023) 36 g 3   • fluticasone-salmeterol (ADVAIR, WIXELA) 500-50 mcg/dose inhaler Inhale 1 puff 2 (two) times a day Rinse mouth after use. (Patient not taking: Reported on 5/23/2023) 1 each 3   • ondansetron (ZOFRAN-ODT) 4 mg disintegrating tablet Take 1 tablet (4 mg total) by mouth every 6 (six) hours as needed for nausea or vomiting (Patient not taking: Reported on 6/20/2023) 20 tablet 0   • Vraylar 1.5 MG capsule Take 1.5 mg by mouth daily at bedtime (Patient not taking: Reported on 1/11/2023)       No current facility-administered medications for this visit. Allergies  Allergies   Allergen Reactions   • Seasonal Ic [Cholestatin] Sneezing       OBJECTIVE    Vitals   Vitals:    09/22/23 1035   BP: 144/78   BP Location: Left arm   Patient Position: Sitting   Cuff Size: Adult   Pulse: 61   SpO2: 99%   Weight: 63.5 kg (140 lb)   Height: 5' 6" (1.676 m)       PVR:    Physical Exam  Constitutional:       General: He is not in acute distress. Appearance: Normal appearance. He is normal weight. He is not ill-appearing or toxic-appearing. HENT:      Head: Normocephalic and atraumatic. Eyes:      Conjunctiva/sclera: Conjunctivae normal.   Cardiovascular:      Rate and Rhythm: Normal rate. Pulmonary:      Effort: Pulmonary effort is normal. No respiratory distress. Skin:     General: Skin is warm and dry. Neurological:      General: No focal deficit present. Mental Status: He is alert and oriented to person, place, and time. Cranial Nerves: No cranial nerve deficit. Psychiatric:         Mood and Affect: Mood normal.         Behavior: Behavior normal.         Thought Content: Thought content normal.          Labs:     Lab Results   Component Value Date    PSA 1.3 03/09/2023    PSA 1.3 05/09/2022    PSA 1.2 02/11/2020    PSA 0.9 03/14/2019     Lab Results   Component Value Date    CREATININE 0.85 09/11/2023      Lab Results   Component Value Date    HGBA1C 5.4 09/11/2023     Lab Results   Component Value Date    GLUCOSE 93 08/17/2014    CALCIUM 8.8 09/11/2023     (L) 08/17/2014    K 4.4 09/11/2023    CO2 28 09/11/2023     09/11/2023    BUN 17 09/11/2023    CREATININE 0.85 09/11/2023       I have personally reviewed all pertinent lab results and reviewed with patient    Imaging       Deedee Guillermo PA-C  Date: 9/22/2023 Time: 10:55 AM  [facility]    This note was written using fluency dictation software. Please excuse any resulting minor grammatical errors.   BPH with lower [place] tract symptoms

## 2023-09-25 ENCOUNTER — OFFICE VISIT (OUTPATIENT)
Dept: FAMILY MEDICINE CLINIC | Facility: CLINIC | Age: 58
End: 2023-09-25

## 2023-09-25 VITALS
BODY MASS INDEX: 22.66 KG/M2 | WEIGHT: 141 LBS | DIASTOLIC BLOOD PRESSURE: 70 MMHG | HEART RATE: 107 BPM | HEIGHT: 66 IN | OXYGEN SATURATION: 97 % | TEMPERATURE: 98.7 F | SYSTOLIC BLOOD PRESSURE: 130 MMHG | RESPIRATION RATE: 16 BRPM

## 2023-09-25 DIAGNOSIS — F17.200 TOBACCO USE DISORDER: ICD-10-CM

## 2023-09-25 DIAGNOSIS — R68.89 FLU-LIKE SYMPTOMS: Primary | ICD-10-CM

## 2023-09-25 PROCEDURE — 99213 OFFICE O/P EST LOW 20 MIN: CPT | Performed by: FAMILY MEDICINE

## 2023-09-25 PROCEDURE — 87636 SARSCOV2 & INF A&B AMP PRB: CPT | Performed by: STUDENT IN AN ORGANIZED HEALTH CARE EDUCATION/TRAINING PROGRAM

## 2023-09-25 RX ORDER — CETIRIZINE HYDROCHLORIDE, PSEUDOEPHEDRINE HYDROCHLORIDE 5; 120 MG/1; MG/1
1 TABLET, FILM COATED, EXTENDED RELEASE ORAL 2 TIMES DAILY
Qty: 14 TABLET | Refills: 0 | Status: SHIPPED | OUTPATIENT
Start: 2023-09-25 | End: 2023-10-02

## 2023-09-26 ENCOUNTER — PATIENT OUTREACH (OUTPATIENT)
Dept: OTHER | Facility: OTHER | Age: 58
End: 2023-09-26

## 2023-09-26 NOTE — PROGRESS NOTES
Assessment/Plan:    1. Flu-like symptoms  Comments:  I have not sent COVID or flu. We will follow-up on those labs. We will also do trial of Zyrtec-D. We will have patient follow-up. Orders:  -     Covid/Flu- Office Collect  -     cetirizine-pseudoephedrine (ZyrTEC-D) 5-120 MG per tablet; Take 1 tablet by mouth 2 (two) times a day for 7 days    2. Tobacco use disorder  Comments:  Would also benefit from low-dose CT screening for lung cancer. Orders:  -     CT lung screening program; Future; Expected date: 09/25/2023         Subjective:      Patient ID: Cale Alcala is a 59 y.o. male. Is coming in for follow-up on his flulike illness. Patient continues to report congestion, nausea, vomiting and diarrhea. Patient reports that symptoms have not gone away. He also endorses night sweats. Patient has had a trial of Robitussin as above as Zofran for his nausea and vomiting, however it has not helped with relief of symptoms. Patient denies any sick contact, recent travels, for any objective fevers. The following portions of the patient's history were reviewed and updated as appropriate: allergies, current medications, past family history, past medical history, past social history, past surgical history, and problem list.    Review of Systems   Constitutional: Positive for chills. Negative for fever. HENT: Negative for ear pain and sore throat. Eyes: Negative for pain and visual disturbance. Respiratory: Positive for cough. Negative for shortness of breath. Cardiovascular: Negative for chest pain and palpitations. Gastrointestinal: Positive for diarrhea. Negative for abdominal pain and vomiting. Genitourinary: Negative for dysuria and hematuria. Musculoskeletal: Negative for arthralgias and back pain. Skin: Negative for color change and rash. Neurological: Negative for seizures and syncope. All other systems reviewed and are negative.         Objective:      /70 (BP Location: Right arm, Patient Position: Sitting, Cuff Size: Standard)   Pulse (!) 107   Temp 98.7 °F (37.1 °C) (Temporal)   Resp 16   Ht 5' 6" (1.676 m)   Wt 64 kg (141 lb)   SpO2 97%   BMI 22.76 kg/m²          Physical Exam  Constitutional:       General: He is not in acute distress. Appearance: Normal appearance. He is not toxic-appearing or diaphoretic. HENT:      Head: Normocephalic. Mouth/Throat:      Mouth: Mucous membranes are moist.   Eyes:      Extraocular Movements: Extraocular movements intact. Pupils: Pupils are equal, round, and reactive to light. Cardiovascular:      Rate and Rhythm: Normal rate and regular rhythm. Pulmonary:      Effort: Pulmonary effort is normal. No tachypnea. Breath sounds: Normal breath sounds. No wheezing or rales. Chest:      Chest wall: No edema. There is no dullness to percussion. Abdominal:      General: Abdomen is flat. There is no distension. Palpations: Abdomen is soft. Tenderness: There is no abdominal tenderness. Musculoskeletal:      Right lower leg: No edema. Left lower leg: No edema. Skin:     Capillary Refill: Capillary refill takes less than 2 seconds. Neurological:      General: No focal deficit present. Mental Status: He is alert and oriented to person, place, and time.    Psychiatric:         Mood and Affect: Mood normal.         Behavior: Behavior normal.           Darlin Urena DO   Family Medicine PGY-2  9/25/2023

## 2023-10-02 ENCOUNTER — OFFICE VISIT (OUTPATIENT)
Dept: FAMILY MEDICINE CLINIC | Facility: CLINIC | Age: 58
End: 2023-10-02

## 2023-10-02 VITALS
TEMPERATURE: 98.1 F | SYSTOLIC BLOOD PRESSURE: 116 MMHG | RESPIRATION RATE: 18 BRPM | DIASTOLIC BLOOD PRESSURE: 70 MMHG | HEART RATE: 72 BPM | HEIGHT: 66 IN | OXYGEN SATURATION: 98 % | BODY MASS INDEX: 22.18 KG/M2 | WEIGHT: 138 LBS

## 2023-10-02 DIAGNOSIS — J37.0 CONGESTION OF LARYNX: Primary | ICD-10-CM

## 2023-10-02 PROCEDURE — 99213 OFFICE O/P EST LOW 20 MIN: CPT | Performed by: INTERNAL MEDICINE

## 2023-10-02 RX ORDER — MONTELUKAST SODIUM 10 MG/1
10 TABLET ORAL
Qty: 60 TABLET | Refills: 0 | Status: SHIPPED | OUTPATIENT
Start: 2023-10-02

## 2023-10-02 RX ORDER — GUAIFENESIN/DEXTROMETHORPHAN 100-10MG/5
5 SYRUP ORAL 3 TIMES DAILY PRN
Qty: 100 ML | Refills: 0 | Status: SHIPPED | OUTPATIENT
Start: 2023-10-02

## 2023-10-02 NOTE — PROGRESS NOTES
Assessment/Plan:    1. Congestion of larynx  Assessment & Plan:  Likely secondary to allergy. COVID and influenza negative. Would benefit from alternating his current antihistamine to montelukast.  We will also do Robitussin-DM for his cough. We will follow-up in 4 weeks to see resolution of symptoms. Orders:  -     montelukast (SINGULAIR) 10 mg tablet; Take 1 tablet (10 mg total) by mouth daily at bedtime  -     dextromethorphan-guaiFENesin (ROBITUSSIN DM)  mg/5 mL syrup; Take 5 mL by mouth 3 (three) times a day as needed for cough       Subjective:      Patient ID: Michael Sewell is a 59 y.o. male. Patient coming in to follow-up on flulike symptoms. Though he states that his diarrhea and chills have resolved he continues to have congestion. He does note that he has a history of allergies. Previously he was on Zyrtec D. He continues to have congestion with cough. Patient denies any fevers at home. He denies any sick contact. He denies any recent travel. The following portions of the patient's history were reviewed and updated as appropriate: allergies, current medications, past family history, past medical history, past social history, past surgical history, and problem list.    Review of Systems   Constitutional: Negative for chills and fever. HENT: Positive for congestion. Negative for ear pain and sore throat. Eyes: Negative for pain and visual disturbance. Respiratory: Negative for cough and shortness of breath. Cardiovascular: Negative for chest pain and palpitations. Gastrointestinal: Negative for abdominal pain and vomiting. Genitourinary: Negative for dysuria and hematuria. Musculoskeletal: Negative for arthralgias and back pain. Skin: Negative for color change and rash. Neurological: Negative for seizures and syncope. All other systems reviewed and are negative.         Objective:      /70 (BP Location: Left arm, Patient Position: Sitting, Cuff Size: Standard)   Pulse 72   Temp 98.1 °F (36.7 °C) (Temporal)   Resp 18   Ht 5' 6" (1.676 m)   Wt 62.6 kg (138 lb)   SpO2 98%   BMI 22.27 kg/m²          Physical Exam  Constitutional:       General: He is not in acute distress. Appearance: Normal appearance. He is not toxic-appearing or diaphoretic. HENT:      Head: Normocephalic. Nose: Congestion and rhinorrhea present. Mouth/Throat:      Mouth: Mucous membranes are moist.   Eyes:      Extraocular Movements: Extraocular movements intact. Pupils: Pupils are equal, round, and reactive to light. Cardiovascular:      Rate and Rhythm: Normal rate and regular rhythm. Pulmonary:      Effort: Pulmonary effort is normal. No tachypnea. Breath sounds: Normal breath sounds. No wheezing or rales. Chest:      Chest wall: No edema. There is no dullness to percussion. Abdominal:      General: Abdomen is flat. There is no distension. Palpations: Abdomen is soft. Tenderness: There is no abdominal tenderness. Musculoskeletal:      Right lower leg: No edema. Left lower leg: No edema. Skin:     Capillary Refill: Capillary refill takes less than 2 seconds. Neurological:      General: No focal deficit present. Mental Status: He is alert and oriented to person, place, and time.    Psychiatric:         Mood and Affect: Mood normal.         Behavior: Behavior normal.           Bunny Ramos DO   Family Medicine PGY-2  10/2/2023

## 2023-10-02 NOTE — ASSESSMENT & PLAN NOTE
Likely secondary to allergy. COVID and influenza negative. Would benefit from alternating his current antihistamine to montelukast.  We will also do Robitussin-DM for his cough. We will follow-up in 4 weeks to see resolution of symptoms.

## 2023-10-03 ENCOUNTER — PATIENT OUTREACH (OUTPATIENT)
Dept: OTHER | Facility: OTHER | Age: 58
End: 2023-10-03

## 2023-10-03 DIAGNOSIS — K21.9 GASTROESOPHAGEAL REFLUX DISEASE WITHOUT ESOPHAGITIS: ICD-10-CM

## 2023-10-03 DIAGNOSIS — Z09 ENCOUNTER FOR FOLLOW-UP: ICD-10-CM

## 2023-10-03 RX ORDER — TAMSULOSIN HYDROCHLORIDE 0.4 MG/1
0.4 CAPSULE ORAL
Qty: 90 CAPSULE | Refills: 1 | Status: CANCELLED | OUTPATIENT
Start: 2023-10-03

## 2023-10-03 NOTE — PROGRESS NOTES
10/3/23: Client came to ask about appointments. He had gone in error to a CT scan of his lung today but was informed that it is scheduled for 10/24. He does have an appointment with Dr. Fernando Beatty on Friday. At that appointment they will schedule his hiatal hernia repair. He wants to get it completed. Has PCP appointment on 10/30 also.

## 2023-10-03 NOTE — TELEPHONE ENCOUNTER
Called and spoke with Lyubov Salas. States he wasn't sure if script was called in so he put in another request. Patient states he just picked up Flomax at the pharmacy. No further action needed.

## 2023-10-03 NOTE — TELEPHONE ENCOUNTER
Reason for call:   [x] Refill   [] Prior Auth  [] Other:     Office:   [] PCP/Provider -   [x] Speciality/Provider - [facility] / Vania Handing    Medication: tamsulosin    Dose/Frequency: 0.4mg qd    Quantity: 90    Pharmacy:  [facility] [address] [place] [address]    Does the patient have enough for 3 days?    [] Yes   [x] No - Send as HP to POD

## 2023-10-06 ENCOUNTER — OFFICE VISIT (OUTPATIENT)
Dept: SURGERY | Facility: CLINIC | Age: 58
End: 2023-10-06
Payer: MEDICARE

## 2023-10-06 VITALS — WEIGHT: 137.8 LBS | HEIGHT: 66 IN | BODY MASS INDEX: 22.14 KG/M2 | TEMPERATURE: 97.9 F

## 2023-10-06 DIAGNOSIS — K44.9 HIATAL HERNIA: Primary | ICD-10-CM

## 2023-10-06 DIAGNOSIS — J43.8 OTHER EMPHYSEMA (HCC): ICD-10-CM

## 2023-10-06 DIAGNOSIS — K21.9 GASTROESOPHAGEAL REFLUX DISEASE WITHOUT ESOPHAGITIS: ICD-10-CM

## 2023-10-06 PROCEDURE — 99215 OFFICE O/P EST HI 40 MIN: CPT | Performed by: SURGERY

## 2023-10-06 NOTE — PROGRESS NOTES
Office Visit - General Surgery  Razia Terrazas III MRN: [id number]  Encounter: [id number]    Assessment and Plan  Problem List Items Addressed This Visit        Digestive    Gastroesophageal reflux disease without esophagitis    Hiatal hernia - Primary     78-year-old male with reflux disease in the setting of a hiatal hernia. Plan:  Plan for laparoscopic or robotic paraesophageal hernia repair with Toupet fundoplication, and EGD. I discussed with the patient the possibility of open repair however unlikely, as well as the use of mesh to reinforce the hiatus. We will plan to do this in the near future. The risks and benefits of surgery were discussed and the patient was amenable to proceed, and consent was signed. I discussed specific risks with her including the high risk of hernia recurrence as elucidated in the literature, as well as some initial dysphagia and trouble swallowing in the immediate postoperative. The patient was amenable to this and acknowledged understanding of requiring a postoperative modified diet. Given his COPD and smoking history I would like for him to be seen by pulmonology for restratification prior to going to the operating room. I have placed a referral to Dr. Lisa Ogden. Relevant Orders    Ambulatory Referral to Pulmonology       Respiratory    COPD (chronic obstructive pulmonary disease) ([facility])    Relevant Orders    Ambulatory Referral to Pulmonology       Chief Complaint:  Tank Bullard is a 59 y.o. male who presents for Results (Results of manometry.)    Subjective  78-year-old male well-known to me with chronic reflux disease as well as a hiatal hernia. Since our last visit he continues to have episodes of regurgitation that are significantly lifestyle limiting. He states that he is vomiting undigested food approximately 2-3 times per week. He is unable to sleep at night due to reflux. At last visit he underwent manometry which I reviewed in PACS. This is consistent with normal motility with a moderate size hiatal hernia. Past Medical History:   Diagnosis Date   • Anxiety    • Asthma    • COPD (chronic obstructive pulmonary disease) ([address])    • Depression    • GERD (gastroesophageal reflux disease)    • Hyperlipidemia    • Hypertension    • Suicide attempt [facility])        Past Surgical History:   Procedure Laterality Date   • ESOPHAGOGASTRODUODENOSCOPY N/A 3/1/2019    Procedure: ESOPHAGOGASTRODUODENOSCOPY (EGD); Surgeon: Kinsey Lyman MD;  Location: [facility] GI LAB; Service: Gastroenterology   • FRACTURE SURGERY      ORIF   • HERNIA REPAIR     • SKIN GRAFT         Family History   Problem Relation Age of Onset   • Prostate cancer Paternal Uncle        Social History     Tobacco Use   • Smoking status: Heavy Smoker     Packs/day: 1.00     Years: 20.00     Total pack years: 20.00     Types: Cigarettes     Passive exposure: Current   • Smokeless tobacco: Never   Vaping Use   • Vaping Use: Never used   Substance Use Topics   • Alcohol use: Yes     Comment: every other day 1-2 cans   • Drug use: Never        Medications  Current Outpatient Medications on File Prior to Visit   Medication Sig Dispense Refill   • acetaminophen (TYLENOL) 500 mg tablet TAKE 1 TABLET (500 MG TOTAL) BY MOUTH EVERY 6 (SIX) HOURS AS NEEDED FOR MILD PAIN (PAIN SCORE 1-3). • acetaminophen (TYLENOL) 650 mg CR tablet Take 1 tablet (650 mg total) by mouth every 8 (eight) hours as needed for mild pain 30 tablet 0   • Advair Diskus 500-50 MCG/ACT inhaler INHALE 1 PUFF 2 (TWO) TIMES A DAY RINSE MOUTH AFTER USE.  60 blister 3   • albuterol (PROVENTIL HFA,VENTOLIN HFA) 90 mcg/act inhaler INHALE 1 PUFF EVERY 6 (SIX) HOURS AS NEEDED FOR WHEEZING 17 g 10   • atorvastatin (LIPITOR) 10 mg tablet TAKE 1 TABLET (10 MG TOTAL) BY MOUTH DAILY 90 tablet 3   • benzonatate (TESSALON PERLES) 100 mg capsule TAKE 1 CAPSULE (100 MG TOTAL) BY MOUTH 3 (THREE) TIMES A DAY AS NEEDED FOR COUGH 20 capsule 0   • buPROPion (WELLBUTRIN SR) 150 mg 12 hr tablet TAKE 1 TABLET (150 MG TOTAL) BY MOUTH 2 (TWO) TIMES A DAY 60 tablet 2   • busPIRone (BUSPAR) 10 mg tablet TAKE 1 TABLET(S) BY ORAL ROUTE 2 TIME(S) PER DAY     • clobetasol (TEMOVATE) 0.05 % cream APPLY TOPICALLY 2 (TWO) TIMES A DAY 60 g 2   • cloNIDine (CATAPRES) 0.1 mg tablet TAKE 1 TABLET BY ORAL ROUTE 2 TIMES PER DAY     • dextromethorphan-guaiFENesin (ROBITUSSIN DM)  mg/5 mL syrup Take 5 mL by mouth 3 (three) times a day as needed for cough 100 mL 0   • docusate sodium (COLACE) 100 mg capsule TAKE 1 CAPSULE (100 MG TOTAL) BY MOUTH 2 (TWO) TIMES A DAY 60 capsule 2   • escitalopram (LEXAPRO) 20 mg tablet Take 1 tablet (20 mg total) by mouth daily 30 tablet 1   • famotidine (PEPCID) 20 mg tablet TAKE 1 TABLET (20 MG TOTAL) BY MOUTH 2 (TWO) TIMES A DAY 60 tablet 0   • finasteride (PROSCAR) 5 mg tablet Take 1 tablet (5 mg total) by mouth daily 90 tablet 3   • hydrOXYzine pamoate (VISTARIL) 50 mg capsule Take 50 mg by mouth daily at bedtime     • ibuprofen (MOTRIN) 600 mg tablet Take 1 tablet (600 mg total) by mouth every 6 (six) hours as needed for mild pain or moderate pain 60 tablet 0   • loperamide (IMODIUM A-D) 2 MG tablet Take 1 tablet (2 mg total) by mouth 4 (four) times a day as needed for diarrhea 30 tablet 0   • loperamide (IMODIUM) 2 mg capsule TAKE 1CAPSULE (2 MG TOTAL) BY MOUTH 4 (FOUR) TIMES A DAY AS NEEDED FOR DIARRHEA     • loratadine (CLARITIN) 10 mg tablet Take 1 tablet (10 mg total) by mouth daily 30 tablet 1   • meloxicam (MOBIC) 15 mg tablet Take 15 mg by mouth daily     • meloxicam (MOBIC) 15 mg tablet Take 15 mg by mouth daily     • montelukast (SINGULAIR) 10 mg tablet Take 1 tablet (10 mg total) by mouth daily at bedtime 60 tablet 0   • Multiple Vitamin (multivitamin) tablet Take 1 tablet by mouth daily 90 tablet 3   • naltrexone (REVIA) 50 mg tablet Take 50 mg by mouth daily     • ondansetron (ZOFRAN) 4 mg tablet TAKE 1 TABLET (4 MG TOTAL) BY MOUTH EVERY 8 (EIGHT) HOURS AS NEEDED FOR NAUSEA OR VOMITING 20 tablet 3   • pantoprazole (PROTONIX) 40 mg tablet TAKE 1 TABLET (40 MG TOTAL) BY MOUTH DAILY 60 tablet 3   • QUEtiapine (SEROquel) 50 mg tablet Take 3 tablets (150 mg total) by mouth daily at bedtime 90 tablet 1   • tamsulosin (FLOMAX) 0.4 mg Take 1 capsule (0.4 mg total) by mouth daily with dinner 90 capsule 3   • tiotropium (Spiriva HandiHaler) 18 mcg inhalation capsule Place 1 capsule (18 mcg total) into inhaler and inhale daily 30 capsule 0   • traZODone (DESYREL) 50 mg tablet Take 0.5 tablets (25 mg total) by mouth daily at bedtime 15 tablet 1   • ALPRAZolam (XANAX) 0.5 mg tablet Take 1 tablet (0.5 mg total) by mouth 2 (two) times a day (Patient not taking: Reported on 1/24/2023) 60 tablet 0   • cholecalciferol (VITAMIN D3) 1,000 units tablet TAKE 1 TABLET (1,000 UNITS TOTAL) BY MOUTH DAILY 56 tablet 0   • cloNIDine (CATAPRES) 0.2 mg tablet Take 0.2 mg by mouth 2 (two) times a day (Patient not taking: Reported on 10/6/2023)     • Diclofenac Sodium (VOLTAREN) 1 % APPLY 1 INCH TO EACH KNEE TWICE DAILY (Patient not taking: Reported on 1/11/2023)     • fluticasone (FLONASE) 50 mcg/act nasal spray 1 SPRAY INTO EACH NOSTRIL DAILY (Patient not taking: Reported on 9/22/2023) 16 g 3   • Fluticasone-Salmeterol (Advair Diskus) 500-50 mcg/dose inhaler Inhale 1 puff 2 (two) times a day Rinse mouth after use. (Patient not taking: Reported on 5/23/2023) 60 blister 2   • fluticasone-salmeterol (Advair HFA) 115-21 MCG/ACT inhaler Inhale 2 puffs 2 (two) times a day (Patient not taking: Reported on 5/23/2023) 36 g 3   • fluticasone-salmeterol (ADVAIR, WIXELA) 500-50 mcg/dose inhaler Inhale 1 puff 2 (two) times a day Rinse mouth after use.  (Patient not taking: Reported on 5/23/2023) 1 each 3   • ondansetron (ZOFRAN-ODT) 4 mg disintegrating tablet Take 1 tablet (4 mg total) by mouth every 6 (six) hours as needed for nausea or vomiting (Patient not taking: Reported on 6/20/2023) 20 tablet 0   • Vraylar 1.5 MG capsule Take 1.5 mg by mouth daily at bedtime (Patient not taking: Reported on 1/11/2023)       No current facility-administered medications on file prior to visit. Allergies  Allergies   Allergen Reactions   • Seasonal Ic [Cholestatin] Sneezing       Review of Systems   Constitutional: Negative for appetite change, chills, diaphoresis and fever. HENT: Negative for nosebleeds and trouble swallowing. Eyes: Negative. Respiratory: Negative for cough, shortness of breath and wheezing. Cardiovascular: Negative for chest pain, palpitations and leg swelling. Gastrointestinal: Negative for abdominal distention, abdominal pain, nausea and vomiting. Genitourinary: Negative for difficulty urinating, flank pain and frequency. Musculoskeletal: Negative for arthralgias, joint swelling and myalgias. Skin: Negative for pallor and rash. Neurological: Negative for dizziness, facial asymmetry and speech difficulty. Hematological: Does not bruise/bleed easily. Psychiatric/Behavioral: Negative for agitation and confusion. All other systems reviewed and are negative. Objective  Vitals:    10/06/23 0846   Temp: 97.9 °F (36.6 °C)       Physical Exam  Vitals and nursing note reviewed. Constitutional:       General: He is not in acute distress. Appearance: Normal appearance. He is not toxic-appearing. HENT:      Head: Normocephalic and atraumatic. Mouth/Throat:      Mouth: Mucous membranes are moist.   Eyes:      Extraocular Movements: Extraocular movements intact. Pupils: Pupils are equal, round, and reactive to light. Cardiovascular:      Rate and Rhythm: Normal rate and regular rhythm. Pulses: Normal pulses. Pulmonary:      Effort: Pulmonary effort is normal. No respiratory distress. Breath sounds: Normal breath sounds. No wheezing. Abdominal:      General: There is no distension. Palpations: Abdomen is soft.  There is no mass.      Tenderness: There is no abdominal tenderness. There is no guarding or rebound. Hernia: No hernia is present. Musculoskeletal:         General: No swelling or deformity. Normal range of motion. Cervical back: Normal range of motion and neck supple. Right lower leg: No edema. Left lower leg: No edema. Skin:     General: Skin is warm and dry. Coloration: Skin is not jaundiced. Neurological:      General: No focal deficit present. Mental Status: He is alert and oriented to person, place, and time.    Psychiatric:         Mood and Affect: Mood normal.         Behavior: Behavior normal.

## 2023-10-06 NOTE — ASSESSMENT & PLAN NOTE
68-year-old male with reflux disease in the setting of a hiatal hernia. Plan:  Plan for laparoscopic or robotic paraesophageal hernia repair with Toupet fundoplication, and EGD. I discussed with the patient the possibility of open repair however unlikely, as well as the use of mesh to reinforce the hiatus. We will plan to do this in the near future. The risks and benefits of surgery were discussed and the patient was amenable to proceed, and consent was signed. I discussed specific risks with her including the high risk of hernia recurrence as elucidated in the literature, as well as some initial dysphagia and trouble swallowing in the immediate postoperative. The patient was amenable to this and acknowledged understanding of requiring a postoperative modified diet. Given his COPD and smoking history I would like for him to be seen by pulmonology for restratification prior to going to the operating room. I have placed a referral to Dr. Dipti Govea.

## 2023-10-09 RX ORDER — PANTOPRAZOLE SODIUM 40 MG/1
40 TABLET, DELAYED RELEASE ORAL DAILY
Qty: 60 TABLET | Refills: 3 | Status: SHIPPED | OUTPATIENT
Start: 2023-10-09

## 2023-10-11 ENCOUNTER — TELEPHONE (OUTPATIENT)
Dept: FAMILY MEDICINE CLINIC | Facility: CLINIC | Age: 58
End: 2023-10-11

## 2023-10-12 DIAGNOSIS — K59.00 CONSTIPATION, UNSPECIFIED CONSTIPATION TYPE: ICD-10-CM

## 2023-10-15 NOTE — PROGRESS NOTES
Community Health Resource Nurse [facility]) encountered client at [facility] during outreach hours. Upcoming appointments reviewed with client.

## 2023-10-16 RX ORDER — DOCUSATE SODIUM 100 MG/1
100 CAPSULE, LIQUID FILLED ORAL 2 TIMES DAILY
Qty: 60 CAPSULE | Refills: 2 | Status: SHIPPED | OUTPATIENT
Start: 2023-10-16

## 2023-10-17 ENCOUNTER — TELEPHONE (OUTPATIENT)
Dept: PULMONOLOGY | Facility: CLINIC | Age: 58
End: 2023-10-17

## 2023-10-17 ENCOUNTER — PATIENT OUTREACH (OUTPATIENT)
Dept: OTHER | Facility: OTHER | Age: 58
End: 2023-10-17

## 2023-10-17 NOTE — PROGRESS NOTES
10/17/23: Client requested assistance to schedule an appointment with pulmonology prior to having his hiatal hernia repair. Called and left a message with [facility] pulmonology to return call to Derrick Bourne. Quinn Hauser that they will call him and if he doesn't receive a call, to call them back. It may take a couple of days. He stated he understood.

## 2023-10-17 NOTE — TELEPHONE ENCOUNTER
This patient would be a new patient needing pre-op clearance. Surgery: Hernia repair   Surgery date: unknown until he receives pulm clearance   On Oxygen: no  Pulmonary Issues: none  CXR/CT: CT 10/24  Kind of sedation: unknown   Length of surgery: unknown  Surgeon: Dr. Mikala Jimenez contact:   Fax(To send office note):      Scheduled on 10/18 with Kristine Burns      Pt is a current smoker and the surgeon is requesting he receives pulm clearance prior to surgery.

## 2023-10-19 ENCOUNTER — CONSULT (OUTPATIENT)
Dept: PULMONOLOGY | Facility: CLINIC | Age: 58
End: 2023-10-19
Payer: MEDICARE

## 2023-10-19 VITALS
TEMPERATURE: 99 F | BODY MASS INDEX: 22.02 KG/M2 | SYSTOLIC BLOOD PRESSURE: 126 MMHG | HEIGHT: 66 IN | WEIGHT: 137 LBS | HEART RATE: 93 BPM | OXYGEN SATURATION: 97 % | DIASTOLIC BLOOD PRESSURE: 80 MMHG

## 2023-10-19 DIAGNOSIS — K44.9 HIATAL HERNIA: ICD-10-CM

## 2023-10-19 DIAGNOSIS — J44.9 CHRONIC OBSTRUCTIVE PULMONARY DISEASE, UNSPECIFIED COPD TYPE (HCC): ICD-10-CM

## 2023-10-19 DIAGNOSIS — J43.8 OTHER EMPHYSEMA (HCC): ICD-10-CM

## 2023-10-19 DIAGNOSIS — Z01.811 PREOP PULMONARY/RESPIRATORY EXAM: Primary | ICD-10-CM

## 2023-10-19 PROCEDURE — 99204 OFFICE O/P NEW MOD 45 MIN: CPT | Performed by: INTERNAL MEDICINE

## 2023-10-19 RX ORDER — ALBUTEROL SULFATE 90 UG/1
1 AEROSOL, METERED RESPIRATORY (INHALATION) EVERY 6 HOURS PRN
Qty: 17 G | Refills: 10 | Status: SHIPPED | OUTPATIENT
Start: 2023-10-19

## 2023-10-19 NOTE — PROGRESS NOTES
Pulmonary Consultation   Devan Way III 59 y.o. male MRN: [id number]  10/19/2023      Assessment:  COPD  Unknown severity, no PFT on file  Seem to be chronic bronchitis phenotype  No history of frequent exacerbation or oral steroid use  On triple inhalers    Plan:  Continue Advair 500/Spiriva HandiHaler  Educated about the proper inhaler use technique  We will evaluate for de-escalation of therapy after the hiatal hernia surgery  Check complete pulmonary function test  Up-to-date on immunization    Active tobacco abuse  Counseled, about 60-pack-year history  Continues to smoke 1.5 PPD  No suspicious lesion on last LDCT in 10/2022  Due for LDCT these days, scheduled on 10/24    Preoperative pulmonary evaluation  In the absence of an acute exacerbation of his respiratory disease, I would fully recommend he proceed to surgery. He does not appear to be in a decompensation presently. No need for further testing or evaluation prior to surgery from a pulmonary standpoint. As general for COPD patients, we recommend lung expansion maneuvers postoperatively with early mobilization, chest PT and use of incentive spirometry frequently. Albuterol should be given 2-4 puffs within the 30 minutes prior to intubation if general anesthesia is used. Post-operative care should include early incentive spirometry and getting out of bed with physical therapy, and resuming his normal pulmonary medications. If the patient cannot perform incentive spirometry CPAP at 10cm H2O should be used for 4-6 hours a day. Albuterol by nebulizer every 4 hours while awake, with every 2 hours as needed, is a reasonable rescue therapy to have available and ordered. VTE prophylaxis should be started as soon as possible, when ok per the surgical team.      Return in about 6 months (around 4/19/2024).         History of Present Illness     New Consult for: COPD/preop pulmonary eval      Background  59 y.o. male with a h/o hiatal hernia, COPD, allergic rhinitis, tobacco abuse, osteoarthritis, major depression, BPH    Reports first diagnosis of COPD in 2022. Known symptoms of cough, chest congestion with mucus production of white/greenish sputum. Improved with inhalers currently on Advair/Spiriva. No history of severe attack, hospitalization, or frequent oral steroid use. Significant/heavy tobacco abuse history, 1.5 PPD for the past 40 years. Continues to smoke 1.5 PPD. Used to work at [employer]. No frequent use of PRN albuterol    At baseline, able to walk on a surface level long distances, climb 1-2 flight of stairs without stop. 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 Studies:  Imaging and other studies: I have personally reviewed pertinent films in PACS    LDCT 10/24/2022-no suspicious nodules. LLL GGO seen in 2014-resolved. Pulmonary function testing:       EKG, Pathology, and Other Studies: I have personally reviewed pertinent reports. Historical Information   Past Medical History:   Diagnosis Date    Anxiety     Asthma     COPD (chronic obstructive pulmonary disease) ([facility])     Depression     GERD (gastroesophageal reflux disease)     Hyperlipidemia     Hypertension     Suicide attempt [facility])      Past Surgical History:   Procedure Laterality Date    ESOPHAGOGASTRODUODENOSCOPY N/A 3/1/2019    Procedure: ESOPHAGOGASTRODUODENOSCOPY (EGD); Surgeon: David Loya MD;  Location: [facility] GI LAB;   Service: Gastroenterology    FRACTURE SURGERY      ORIF    HERNIA REPAIR      SKIN GRAFT       Family History   Problem Relation Age of Onset    Prostate cancer Paternal Uncle          Medications/Allergies: Reviewed    Vitals: Blood pressure 126/80, pulse 93, temperature 99 °F (37.2 °C), temperature source Tympanic, height 5' 6" (1.676 m), weight 62.1 kg (137 lb), SpO2 97 %. Body mass index is 22.11 kg/m². Oxygen Therapy  SpO2: 97 %  Oxygen Therapy: None (Room air)      Physical Exam  Body mass index is 22.11 kg/m². Gen: not in acute distress,   Neck/Eyes: supple, no adenopathy, PERRL  Ear: normal appearance, no significant hearing impairment  Nose:  normal nasal mucosa, no drainage  Mouth:  unremarkable/normal appearance of lips, multiple lost teeth and gums  Oropharynx: mucosa is moist, no focal lesions or erythema  Salivary glands: soft nontender  Chest: normal respiratory efforts, moderate air movement, diminished but clear breath sounds bilaterally  CV: RRR, no murmurs appreciated, no edema  Abdomen: soft, non tender  Extremities:  No observed deformity   Skin: unremarkable  Neuro: AAO X3, no focal motor deficit         Labs:  Lab Results   Component Value Date    WBC 6.78 09/11/2023    HGB 14.0 09/11/2023    HCT 41.4 09/11/2023    MCV 95 09/11/2023     09/11/2023     Lab Results   Component Value Date    GLUCOSE 93 08/17/2014    CALCIUM 8.8 09/11/2023     (L) 08/17/2014    K 4.4 09/11/2023    CO2 28 09/11/2023     09/11/2023    BUN 17 09/11/2023    CREATININE 0.85 09/11/2023     No results found for: "IGE"  Lab Results   Component Value Date    ALT 21 09/11/2023    AST 26 09/11/2023    ALKPHOS 54 09/11/2023    BILITOT 0.6 08/16/2014           Portions of the record may have been created with voice recognition software. Occasional wrong word or "sound a like" substitutions may have occurred due to the inherent limitations of voice recognition software. Read the chart carefully and recognize, using context, where substitutions have occurred    Gabbi Vega M.D.   [facility]

## 2023-10-23 PROBLEM — Z01.811 PREOP PULMONARY/RESPIRATORY EXAM: Status: ACTIVE | Noted: 2023-10-23

## 2023-10-24 ENCOUNTER — PATIENT OUTREACH (OUTPATIENT)
Dept: OTHER | Facility: OTHER | Age: 58
End: 2023-10-24

## 2023-10-24 ENCOUNTER — HOSPITAL ENCOUNTER (OUTPATIENT)
Dept: CT IMAGING | Facility: HOSPITAL | Age: 58
Discharge: HOME | End: 2023-10-24
Payer: MEDICARE

## 2023-10-24 DIAGNOSIS — F17.200 TOBACCO USE DISORDER: ICD-10-CM

## 2023-10-24 PROCEDURE — 71271 CT THORAX LUNG CANCER SCR C-: CPT

## 2023-10-24 NOTE — PROGRESS NOTES
10/24/23: Client reported completed the pulmonologist and CT scan of the lung. Is awaiting the scheduling of the hernia repair. GI symptoms persist so he is ready for the surgery.

## 2023-10-30 ENCOUNTER — OFFICE VISIT (OUTPATIENT)
Dept: FAMILY MEDICINE CLINIC | Facility: CLINIC | Age: 58
End: 2023-10-30

## 2023-10-30 VITALS
HEIGHT: 66 IN | OXYGEN SATURATION: 98 % | RESPIRATION RATE: 17 BRPM | WEIGHT: 142 LBS | BODY MASS INDEX: 22.82 KG/M2 | HEART RATE: 96 BPM | SYSTOLIC BLOOD PRESSURE: 148 MMHG | TEMPERATURE: 98 F | DIASTOLIC BLOOD PRESSURE: 94 MMHG

## 2023-10-30 DIAGNOSIS — I20.89 ANGINA OF EFFORT: Primary | ICD-10-CM

## 2023-10-30 DIAGNOSIS — J37.0 CONGESTION OF LARYNX: ICD-10-CM

## 2023-10-30 RX ORDER — GUAIFENESIN 200 MG/10ML
200 LIQUID ORAL 3 TIMES DAILY PRN
Qty: 120 ML | Refills: 0 | Status: SHIPPED | OUTPATIENT
Start: 2023-10-30

## 2023-10-31 DIAGNOSIS — J11.1 INFLUENZA: ICD-10-CM

## 2023-10-31 DIAGNOSIS — J44.9 CHRONIC OBSTRUCTIVE PULMONARY DISEASE, UNSPECIFIED COPD TYPE (HCC): ICD-10-CM

## 2023-10-31 DIAGNOSIS — J40 BRONCHITIS: ICD-10-CM

## 2023-10-31 DIAGNOSIS — K21.9 GASTROESOPHAGEAL REFLUX DISEASE WITHOUT ESOPHAGITIS: ICD-10-CM

## 2023-10-31 PROBLEM — I20.89 ANGINA OF EFFORT: Status: ACTIVE | Noted: 2023-10-31

## 2023-10-31 NOTE — PROGRESS NOTES
Assessment/Plan:    1. Angina of effort  Assessment & Plan:  Left-sided radiates to the arm. EKG in the office within normal limits. No ST segment elevation found. At this time patient would benefit from cardiac stress test, preferably medical stress test as patient states that he is unable to do the walking stress test.  Referral for stress test has been made. Orders:  -     POCT ECG  -     Ambulatory Referral to Cardiology; Future  -     Echo stress test, dobutamine; Future; Expected date: 10/31/2023    2. Congestion of larynx  Comments:  Controlled with over-the-counter medication. Continue with current regimen  Orders:  -     guaiFENesin (ROBITUSSIN) 100 MG/5ML oral liquid; Take 10 mL (200 mg total) by mouth 3 (three) times a day as needed for cough         Subjective:      Patient ID: Sanam Ernandez is a 59 y.o. male. Past medical history notable for hiatal hernia scheduled surgery for December, history also notable for recent congestion abated with over-the-counter medicine is coming in for follow-up visit. Patient recently had complaint of left-sided chest pain that radiated to his arm that is more consistent when he is walking. Patient denies any nausea, vomiting. Patient risk factors include tobacco abuse, hyperlipidemia. Patient denies any chest pain while at rest.  Patient denies any shortness of breath. The following portions of the patient's history were reviewed and updated as appropriate: allergies, current medications, past family history, past medical history, past social history, past surgical history, and problem list.    Review of Systems   Constitutional:  Negative for chills and fever. HENT:  Negative for ear pain and sore throat. Eyes:  Negative for pain and visual disturbance. Respiratory:  Negative for cough and shortness of breath. Cardiovascular:  Positive for chest pain. Negative for palpitations.    Gastrointestinal:  Negative for abdominal pain and vomiting. Genitourinary:  Negative for dysuria and hematuria. Musculoskeletal:  Negative for arthralgias and back pain. Skin:  Negative for color change and rash. Neurological:  Negative for seizures and syncope. All other systems reviewed and are negative. Objective:      /94 (BP Location: Right arm, Patient Position: Sitting, Cuff Size: Standard)   Pulse 96   Temp 98 °F (36.7 °C) (Temporal)   Resp 17   Ht 5' 6" (1.676 m)   Wt 64.4 kg (142 lb)   SpO2 98%   BMI 22.92 kg/m²          Physical Exam  Constitutional:       General: He is not in acute distress. Appearance: Normal appearance. He is not toxic-appearing or diaphoretic. HENT:      Head: Normocephalic. Mouth/Throat:      Mouth: Mucous membranes are moist.   Eyes:      Extraocular Movements: Extraocular movements intact. Pupils: Pupils are equal, round, and reactive to light. Cardiovascular:      Rate and Rhythm: Normal rate and regular rhythm. Pulmonary:      Effort: Pulmonary effort is normal. No tachypnea or respiratory distress. Breath sounds: Normal breath sounds. No stridor. No wheezing, rhonchi or rales. Chest:      Chest wall: No tenderness or edema. There is no dullness to percussion. Abdominal:      General: Abdomen is flat. There is no distension. Palpations: Abdomen is soft. Tenderness: There is no abdominal tenderness. Musculoskeletal:      Right lower leg: No edema. Left lower leg: No edema. Skin:     Capillary Refill: Capillary refill takes less than 2 seconds. Neurological:      General: No focal deficit present. Mental Status: He is alert and oriented to person, place, and time.    Psychiatric:         Mood and Affect: Mood normal.         Behavior: Behavior normal.           Jennifer Cummins DO   Family Medicine PGY-3  10/31/2023

## 2023-10-31 NOTE — ASSESSMENT & PLAN NOTE
Left-sided radiates to the arm. EKG in the office within normal limits. No ST segment elevation found. At this time patient would benefit from cardiac stress test, preferably medical stress test as patient states that he is unable to do the walking stress test.  Referral for stress test has been made.

## 2023-11-03 RX ORDER — FAMOTIDINE 20 MG/1
20 TABLET, FILM COATED ORAL 2 TIMES DAILY
Qty: 60 TABLET | Refills: 0 | Status: SHIPPED | OUTPATIENT
Start: 2023-11-03

## 2023-11-03 RX ORDER — FLUTICASONE PROPIONATE 50 MCG
1 SPRAY, SUSPENSION (ML) NASAL DAILY
Qty: 16 G | Refills: 3 | Status: SHIPPED | OUTPATIENT
Start: 2023-11-03

## 2023-11-03 RX ORDER — SENNOSIDES 8.6 MG
650 CAPSULE ORAL EVERY 8 HOURS PRN
Qty: 30 TABLET | Refills: 0 | Status: SHIPPED | OUTPATIENT
Start: 2023-11-03

## 2023-11-03 RX ORDER — FLUTICASONE PROPIONATE AND SALMETEROL 50; 500 UG/1; UG/1
1 POWDER RESPIRATORY (INHALATION) 2 TIMES DAILY
Qty: 60 BLISTER | Refills: 3 | Status: SHIPPED | OUTPATIENT
Start: 2023-11-03

## 2023-11-06 PROBLEM — J11.1 INFLUENZA: Status: RESOLVED | Noted: 2023-09-07 | Resolved: 2023-11-06

## 2023-11-07 ENCOUNTER — PATIENT OUTREACH (OUTPATIENT)
Dept: OTHER | Facility: OTHER | Age: 58
End: 2023-11-07

## 2023-11-07 NOTE — PROGRESS NOTES
11/7/23: Surgery is scheduled for 12/12/23. To be assigned the day before. Needs EKG appointment: Referral placed and cardiology will call client  Pulmonary functions test:  need for this test now according to the pulmonologist's note. Also would like to cancel 12/14/23 appointment with Dr. Genie Garcia. Since has an appointment on 12/20. Called [facility] to cancel.

## 2023-11-14 ENCOUNTER — PATIENT OUTREACH (OUTPATIENT)
Dept: OTHER | Facility: OTHER | Age: 58
End: 2023-11-14

## 2023-11-14 NOTE — PROGRESS NOTES
Client came to request I check his chart for the scheduling of stress test. No stress test is currently scheduled. Recommended that he schedule it prior to his surgery. He said he will call soon.

## 2023-11-18 DIAGNOSIS — K59.00 CONSTIPATION, UNSPECIFIED CONSTIPATION TYPE: ICD-10-CM

## 2023-11-22 RX ORDER — DOCUSATE SODIUM 100 MG/1
100 CAPSULE, LIQUID FILLED ORAL 2 TIMES DAILY
Qty: 60 CAPSULE | Refills: 2 | Status: SHIPPED | OUTPATIENT
Start: 2023-11-22

## 2023-11-29 ENCOUNTER — PATIENT OUTREACH (OUTPATIENT)
Dept: OTHER | Facility: OTHER | Age: 58
End: 2023-11-29

## 2023-11-29 NOTE — PROGRESS NOTES
Client had received paperwork for stress test and doesn't understand the procedure. This test is ordered as dobutamine echo stress test. Explained and showed video of procedure. Instructed in preparation. Stated he understood. Will call [facility] if doesn't hear from them.

## 2023-12-11 ENCOUNTER — HOSPITAL ENCOUNTER (OUTPATIENT)
Dept: NON INVASIVE DIAGNOSTICS | Facility: HOSPITAL | Age: 58
Discharge: HOME | End: 2023-12-11
Payer: MEDICARE

## 2023-12-11 DIAGNOSIS — I20.89 ANGINA OF EFFORT: ICD-10-CM

## 2023-12-11 LAB
AORTIC VALVE MEAN VELOCITY: 9.9 M/S
AV AREA BY CONTINUOUS VTI: 2.4 CM2
AV AREA PEAK VELOCITY: 2 CM2
AV LVOT MEAN GRADIENT: 1 MMHG
AV LVOT PEAK GRADIENT: 2 MMHG
AV MEAN GRADIENT: 5 MMHG
AV PEAK GRADIENT: 10 MMHG
AV VALVE AREA: 2.42 CM2
AV VELOCITY RATIO: 0.47
CHEST PAIN STATEMENT: NORMAL
DOP CALC AO PEAK VEL: 1.55 M/S
DOP CALC AO VTI: 26.28 CM
DOP CALC LVOT AREA: 4.15 CM2
DOP CALC LVOT CARDIAC INDEX: 2.7 L/MIN/M2
DOP CALC LVOT CARDIAC OUTPUT: 4.66 L/MIN
DOP CALC LVOT DIAMETER: 2.3 CM
DOP CALC LVOT PEAK VEL VTI: 15.29 CM
DOP CALC LVOT PEAK VEL: 0.73 M/S
DOP CALC LVOT STROKE INDEX: 35.8 ML/M2
DOP CALC LVOT STROKE VOLUME: 63.49 CM3
E WAVE DECELERATION TIME: 245 MS
E/A RATIO: 0.76
MAX DIASTOLIC BP: 94 MMHG
MAX HR PERCENT: 86 %
MAX HR: 139 BPM
MAX PREDICTED HEART RATE: 160 BPM
MV E'TISSUE VEL-LAT: 9 CM/S
MV E'TISSUE VEL-SEP: 6 CM/S
MV PEAK A VEL: 0.67 M/S
MV PEAK E VEL: 51 CM/S
PROTOCOL NAME: NORMAL
RATE PRESSURE PRODUCT: 2224
REASON FOR TERMINATION: NORMAL
SL CV LV EF: 55
SL CV STRESS RECOVERY BP: NORMAL MMHG
SL CV STRESS RECOVERY HR: 82 BPM
SL CV STRESS RECOVERY O2 SAT: 98 %
SL CV STRESS STAGE REACHED: 4
STRESS ANGINA INDEX: 0
STRESS BASELINE BP: NORMAL MMHG
STRESS BASELINE HR: 81 BPM
STRESS O2 SAT REST: 97 %
STRESS PEAK HR: 139 BPM
STRESS POST ESTIMATED WORKLOAD: 1 METS
STRESS POST EXERCISE DUR MIN: 14 MIN
STRESS POST EXERCISE DUR SEC: 3 SEC
STRESS POST O2 SAT PEAK: 96 %
STRESS POST PEAK BP: 16 MMHG
STRESS POST PEAK HR: 139 BPM
STRESS POST PEAK SYSTOLIC BP: 181 MMHG
TARGET HR FORMULA: NORMAL
TEST INDICATION: NORMAL
TRICUSPID ANNULAR PLANE SYSTOLIC EXCURSION: 1.9 CM

## 2023-12-11 PROCEDURE — 93350 STRESS TTE ONLY: CPT

## 2023-12-11 PROCEDURE — 93350 STRESS TTE ONLY: CPT | Performed by: INTERNAL MEDICINE

## 2023-12-11 RX ORDER — DOBUTAMINE HYDROCHLORIDE 200 MG/100ML
5-50 INJECTION INTRAVENOUS CONTINUOUS
Status: ACTIVE | OUTPATIENT
Start: 2023-12-11 | End: 2023-12-11

## 2023-12-11 RX ADMIN — DOBUTAMINE IN DEXTROSE 5 MCG/KG/MIN: 200 INJECTION, SOLUTION INTRAVENOUS at 15:27

## 2023-12-12 ENCOUNTER — PATIENT OUTREACH (OUTPATIENT)
Dept: OTHER | Facility: OTHER | Age: 58
End: 2023-12-12

## 2023-12-12 NOTE — PROGRESS NOTES
Client reported having had the stress test yesterday. No problems with it. He was grateful that it went so well. He asked if the results will be sent to PCP which they will. And now he is ready to get surgery completed.

## 2023-12-20 ENCOUNTER — OFFICE VISIT (OUTPATIENT)
Dept: FAMILY MEDICINE CLINIC | Facility: CLINIC | Age: 58
End: 2023-12-20

## 2023-12-20 VITALS
WEIGHT: 145 LBS | SYSTOLIC BLOOD PRESSURE: 130 MMHG | DIASTOLIC BLOOD PRESSURE: 82 MMHG | OXYGEN SATURATION: 98 % | HEART RATE: 63 BPM | BODY MASS INDEX: 23.3 KG/M2 | HEIGHT: 66 IN | TEMPERATURE: 99 F

## 2023-12-20 DIAGNOSIS — M54.2 NECK PAIN ON LEFT SIDE: Primary | ICD-10-CM

## 2023-12-20 DIAGNOSIS — M70.21 OLECRANON BURSITIS OF RIGHT ELBOW: ICD-10-CM

## 2023-12-20 RX ORDER — METHOCARBAMOL 500 MG/1
500 TABLET, FILM COATED ORAL 4 TIMES DAILY
Qty: 30 TABLET | Refills: 0 | Status: SHIPPED | OUTPATIENT
Start: 2023-12-20

## 2023-12-22 NOTE — PROGRESS NOTES
Assessment/Plan:    1. Neck pain on left side  Comments:  Neck exercises given.  Instructed patient to take Robaxin prior to exercise.  Can consider physical therapy after his surgery.  Orders:  -     methocarbamol (ROBAXIN) 500 mg tablet; Take 1 tablet (500 mg total) by mouth 4 (four) times a day    2. Olecranon bursitis of right elbow  Comments:  Continue with conservative management with diclofenac cream.  Will follow-up in a few months.  Orders:  -     Diclofenac Sodium (VOLTAREN) 1 %; Apply 2 g topically 4 (four) times a day         Subjective:      Patient ID: Chau Lou III is a 58 y.o. male.    Past medical history notable for reflux with hiatal hernia.  Patient scheduled for repair in January.  Patient also had ACS like symptoms in her last visit.  We did a cardiac stress test and it was found to be within normal limits.  Patient continues to be asymptomatic with regards to his chest pain at this time.  Complaints of left neck pain and right olecranon bursitis.  Patient denies any new injury.  He denies any neurological deficits.        The following portions of the patient's history were reviewed and updated as appropriate: allergies, current medications, past family history, past medical history, past social history, past surgical history, and problem list.    Review of Systems   Constitutional:  Negative for chills and fever.   HENT:  Negative for ear pain and sore throat.    Eyes:  Negative for pain and visual disturbance.   Respiratory:  Negative for cough and shortness of breath.    Cardiovascular:  Negative for chest pain and palpitations.   Gastrointestinal:  Negative for abdominal pain and vomiting.   Genitourinary:  Negative for dysuria and hematuria.   Musculoskeletal:  Positive for arthralgias (Right elbow pain) and neck pain. Negative for back pain.   Skin:  Negative for color change and rash.   Neurological:  Negative for seizures and syncope.   All other systems reviewed and are  "negative.        Objective:      /82 (BP Location: Left arm, Patient Position: Sitting, Cuff Size: Standard)   Pulse 63   Temp 99 °F (37.2 °C)   Ht 5' 6\" (1.676 m)   Wt 65.8 kg (145 lb)   SpO2 98%   BMI 23.40 kg/m²          Physical Exam  Constitutional:       General: He is not in acute distress.     Appearance: Normal appearance. He is not toxic-appearing or diaphoretic.   HENT:      Head: Normocephalic.      Mouth/Throat:      Mouth: Mucous membranes are moist.   Eyes:      Extraocular Movements: Extraocular movements intact.      Pupils: Pupils are equal, round, and reactive to light.   Cardiovascular:      Rate and Rhythm: Normal rate and regular rhythm.   Pulmonary:      Effort: Pulmonary effort is normal. No tachypnea.      Breath sounds: Normal breath sounds. No wheezing or rales.   Chest:      Chest wall: No edema. There is no dullness to percussion.   Abdominal:      General: Abdomen is flat. There is no distension.      Palpations: Abdomen is soft.      Tenderness: There is abdominal tenderness (Right elbow).   Musculoskeletal:      Right elbow: Normal.      Left elbow: Normal.      Right lower leg: No edema.      Left lower leg: No edema.   Skin:     Capillary Refill: Capillary refill takes less than 2 seconds.   Neurological:      General: No focal deficit present.      Mental Status: He is alert and oriented to person, place, and time.   Psychiatric:         Mood and Affect: Mood normal.         Behavior: Behavior normal.           Tahir Pena DO   Family Medicine PGY-3  12/22/2023       "

## 2023-12-26 ENCOUNTER — PATIENT OUTREACH (OUTPATIENT)
Dept: OTHER | Facility: OTHER | Age: 58
End: 2023-12-26

## 2023-12-26 DIAGNOSIS — J44.9 CHRONIC OBSTRUCTIVE PULMONARY DISEASE, UNSPECIFIED COPD TYPE (HCC): ICD-10-CM

## 2023-12-26 RX ORDER — FLUTICASONE PROPIONATE AND SALMETEROL 50; 500 UG/1; UG/1
1 POWDER RESPIRATORY (INHALATION) 2 TIMES DAILY
Qty: 14 BLISTER | Refills: 3 | Status: SHIPPED | OUTPATIENT
Start: 2023-12-26

## 2023-12-26 NOTE — PROGRESS NOTES
Client came to confirm his surgical appointment. Called General Surgery for appointment. No current surgery scheduled. Tentatively scheduled hiatal hernia repair for February 7 with post- op 2/23/24 at 10:15. Will notify PCP.

## 2024-01-02 ENCOUNTER — PATIENT OUTREACH (OUTPATIENT)
Dept: OTHER | Facility: OTHER | Age: 59
End: 2024-01-02

## 2024-01-02 NOTE — PROGRESS NOTES
Client reported increased GI symptoms, I.e. diarrhea. Gave him phone number to Dr. Liz's office to ask to move his surgery up as soonas possible.

## 2024-01-03 DIAGNOSIS — J40 BRONCHITIS: ICD-10-CM

## 2024-01-04 RX ORDER — FLUTICASONE PROPIONATE 50 MCG
1 SPRAY, SUSPENSION (ML) NASAL DAILY
Qty: 16 G | Refills: 3 | Status: SHIPPED | OUTPATIENT
Start: 2024-01-04

## 2024-01-10 ENCOUNTER — PATIENT OUTREACH (OUTPATIENT)
Dept: OTHER | Facility: OTHER | Age: 59
End: 2024-01-10

## 2024-01-10 NOTE — PROGRESS NOTES
Client reported that he had two episodes of slight bleeding upon moving his bowels. It is noted on the toilet paper only. None in the toilet bowl. Understands that he has an appointment with PCP. Will route note to Dr ENG. Also reported that he belches frequently, and also has intermittent vomiting. Is looking forward to hiatal hernia repair.

## 2024-01-23 ENCOUNTER — PATIENT OUTREACH (OUTPATIENT)
Dept: OTHER | Facility: OTHER | Age: 59
End: 2024-01-23

## 2024-01-23 NOTE — PROGRESS NOTES
Client reported having viral symptoms since the weekend. He reported having achiness, occasional cold/hot, coughing, with vomiting. Is nervous about his pending surgery 2/7. Continues to smoke. Will see PCP this week. Recommended increasing fluids and rest.   Lungs clear.

## 2024-01-25 ENCOUNTER — TELEPHONE (OUTPATIENT)
Dept: FAMILY MEDICINE CLINIC | Facility: CLINIC | Age: 59
End: 2024-01-25

## 2024-01-25 ENCOUNTER — OFFICE VISIT (OUTPATIENT)
Dept: FAMILY MEDICINE CLINIC | Facility: CLINIC | Age: 59
End: 2024-01-25

## 2024-01-25 VITALS
OXYGEN SATURATION: 98 % | SYSTOLIC BLOOD PRESSURE: 142 MMHG | HEART RATE: 82 BPM | WEIGHT: 146 LBS | RESPIRATION RATE: 18 BRPM | DIASTOLIC BLOOD PRESSURE: 72 MMHG | TEMPERATURE: 98 F | BODY MASS INDEX: 23.46 KG/M2 | HEIGHT: 66 IN

## 2024-01-25 DIAGNOSIS — R05.9 COUGH, UNSPECIFIED TYPE: Primary | ICD-10-CM

## 2024-01-25 PROCEDURE — 99213 OFFICE O/P EST LOW 20 MIN: CPT | Performed by: FAMILY MEDICINE

## 2024-01-25 RX ORDER — BENZONATATE 100 MG/1
100 CAPSULE ORAL 3 TIMES DAILY PRN
Qty: 20 CAPSULE | Refills: 0 | Status: SHIPPED | OUTPATIENT
Start: 2024-01-25

## 2024-01-25 RX ORDER — ONDANSETRON 4 MG/1
4 TABLET, FILM COATED ORAL EVERY 8 HOURS PRN
Qty: 20 TABLET | Refills: 0 | Status: SHIPPED | OUTPATIENT
Start: 2024-01-25

## 2024-01-25 NOTE — PROGRESS NOTES
Assessment/Plan:    1. Cough, unspecified type  Assessment & Plan:  About 1 works better, family logou ongoing chronic cough.  Hiatal hernia could be a component to this.  At this time will prescribe cough suppressant as well as some Zofran for vomiting episodes.  No indication at this time for strep, flu or covid swab    Orders:  -     ondansetron (ZOFRAN) 4 mg tablet; Take 1 tablet (4 mg total) by mouth every 8 (eight) hours as needed for nausea or vomiting  -     brompheniramine-pseudoephedrine (DIMETAPP) 1-15 MG/5ML ELIX; Take 5 mL by mouth every 6 (six) hours as needed for allergies  -     benzonatate (TESSALON PERLES) 100 mg capsule; Take 1 capsule (100 mg total) by mouth 3 (three) times a day as needed for cough         Subjective:      Patient ID: Chau Lou III is a 58 y.o. male.    Past medical history notable for hiatal hernia scheduled to have a repair 2/7 is coming in for a follow-up visit.  Patient reports that he continues to have chronic cough associated with some nausea and vomiting.  Patient reports that his symptoms mildly improved with Robitussin.  Patient denies any current fevers, chills or systemic signs of infection.          The following portions of the patient's history were reviewed and updated as appropriate: allergies, current medications, past family history, past medical history, past social history, past surgical history, and problem list.    Review of Systems   Constitutional:  Negative for chills and fever.   HENT:  Negative for ear pain and sore throat.    Eyes:  Negative for pain and visual disturbance.   Respiratory:  Positive for cough. Negative for shortness of breath.    Cardiovascular:  Negative for chest pain and palpitations.   Gastrointestinal:  Negative for abdominal pain and vomiting.   Genitourinary:  Negative for dysuria and hematuria.   Musculoskeletal:  Negative for arthralgias and back pain.   Skin:  Negative for color change and rash.   Neurological:   "Negative for seizures and syncope.   All other systems reviewed and are negative.        Objective:      /72 (BP Location: Left arm, Patient Position: Sitting, Cuff Size: Standard)   Pulse 82   Temp 98 °F (36.7 °C) (Temporal)   Resp 18   Ht 5' 6\" (1.676 m)   Wt 66.2 kg (146 lb)   SpO2 98%   BMI 23.57 kg/m²          Physical Exam  Constitutional:       General: He is not in acute distress.     Appearance: Normal appearance. He is not toxic-appearing or diaphoretic.   HENT:      Head: Normocephalic.      Mouth/Throat:      Mouth: Mucous membranes are moist.   Eyes:      Extraocular Movements: Extraocular movements intact.      Pupils: Pupils are equal, round, and reactive to light.   Cardiovascular:      Rate and Rhythm: Normal rate and regular rhythm.   Pulmonary:      Effort: Pulmonary effort is normal. No tachypnea.      Breath sounds: Normal breath sounds. No wheezing or rales.   Chest:      Chest wall: No edema. There is no dullness to percussion.   Abdominal:      General: Abdomen is flat. There is no distension.      Palpations: Abdomen is soft.      Tenderness: There is no abdominal tenderness.   Musculoskeletal:      Right lower leg: No edema.      Left lower leg: No edema.   Skin:     Capillary Refill: Capillary refill takes less than 2 seconds.   Neurological:      General: No focal deficit present.      Mental Status: He is alert and oriented to person, place, and time.   Psychiatric:         Mood and Affect: Mood normal.         Behavior: Behavior normal.           Tahir Pena DO   Family Medicine PGY-3  1/25/2024       "

## 2024-01-25 NOTE — TELEPHONE ENCOUNTER
The pharmacy called stating they don't have   brompheniramine-pseudoephedrine (DIMETAPP) 1-15 MG/5ML ELIX , she said you can put in a order for a different cough medicine or prescribe Bromfed-DM

## 2024-01-25 NOTE — ASSESSMENT & PLAN NOTE
About 1 works better, family logou ongoing chronic cough.  Hiatal hernia could be a component to this.  At this time will prescribe cough suppressant as well as some Zofran for vomiting episodes.  No indication at this time for strep, flu or covid swab

## 2024-01-29 ENCOUNTER — ANESTHESIA EVENT (OUTPATIENT)
Dept: PERIOP | Facility: HOSPITAL | Age: 59
End: 2024-01-29
Payer: MEDICARE

## 2024-01-30 NOTE — PRE-PROCEDURE INSTRUCTIONS
Pre-Surgery Instructions:   Medication Instructions    acetaminophen (TYLENOL) 650 mg CR tablet Uses PRN- OK to take day of surgery    Advair Diskus 500-50 MCG/ACT inhaler Take day of surgery.    albuterol (PROVENTIL HFA,VENTOLIN HFA) 90 mcg/act inhaler Uses PRN- OK to take day of surgery    atorvastatin (LIPITOR) 10 mg tablet Take day of surgery.    buPROPion (WELLBUTRIN SR) 150 mg 12 hr tablet Take day of surgery.    cholecalciferol (VITAMIN D3) 1,000 units tablet Hold day of surgery.    cloNIDine (CATAPRES) 0.1 mg tablet Take day of surgery.    escitalopram (LEXAPRO) 20 mg tablet Take day of surgery.    famotidine (PEPCID) 20 mg tablet Take day of surgery.    finasteride (PROSCAR) 5 mg tablet Take day of surgery.    fluticasone (FLONASE) 50 mcg/act nasal spray Take day of surgery.    ibuprofen (MOTRIN) 600 mg tablet Stop taking 3 days prior to surgery.    loperamide (IMODIUM A-D) 2 MG tablet Uses PRN- OK to take day of surgery    loratadine (CLARITIN) 10 mg tablet Take day of surgery.    methocarbamol (ROBAXIN) 500 mg tablet Take day of surgery.    Multiple Vitamin (multivitamin) tablet Stop taking 7 days prior to surgery.    pantoprazole (PROTONIX) 40 mg tablet Take day of surgery.    tamsulosin (FLOMAX) 0.4 mg Take night before surgery    tiotropium (Spiriva HandiHaler) 18 mcg inhalation capsule Take day of surgery.   Medication instructions for day surgery reviewed. Please use only a sip of water to take your instructed medications. Avoid all over the counter vitamins, supplements and NSAIDS for one week prior to surgery per anesthesia guidelines. Tylenol is ok to take as needed.     You will receive a call one business day prior to surgery with an arrival time and hospital directions. If your surgery is scheduled on a Monday, the hospital will be calling you on the Friday prior to your surgery. If you have not heard from anyone by 8pm, please call the hospital supervisor through the hospital  at  [phone]. (Chad [phone]).    Do not eat or drink anything after midnight the night before your surgery, including candy, mints, lifesavers, or chewing gum. Do not drink alcohol 24hrs before your surgery. Try not to smoke at least 24hrs before your surgery.       Follow the pre surgery showering instructions as listed in the “My Surgical Experience Booklet” or otherwise provided by your surgeon's office. Do not use a blade to shave the surgical area 1 week before surgery. It is okay to use a clean electric clippers up to 24 hours before surgery. Do not apply any lotions, creams, including makeup, cologne, deodorant, or perfumes after showering on the day of your surgery. Do not use dry shampoo, hair spray, hair gel, or any type of hair products.     No contact lenses, eye make-up, or artificial eyelashes. Remove nail polish, including gel polish, and any artificial, gel, or acrylic nails if possible. Remove all jewelry including rings and body piercing jewelry.     Wear causal clothing that is easy to take on and off. Consider your type of surgery.    Keep any valuables, jewelry, piercings at home. Please bring any specially ordered equipment (sling, braces) if indicated.    Arrange for a responsible person to drive you to and from the hospital on the day of your surgery. Visitor Guidelines discussed.     Call the surgeon's office with any new illnesses, exposures, or additional questions prior to surgery.    Please reference your “My Surgical Experience Booklet” for additional information to prepare for your upcoming surgery.

## 2024-01-31 DIAGNOSIS — R05.9 COUGH, UNSPECIFIED TYPE: Primary | ICD-10-CM

## 2024-01-31 RX ORDER — GUAIFENESIN 200 MG/10ML
200 LIQUID ORAL 3 TIMES DAILY PRN
Qty: 120 ML | Refills: 0 | Status: SHIPPED | OUTPATIENT
Start: 2024-01-31

## 2024-02-02 ENCOUNTER — APPOINTMENT (OUTPATIENT)
Dept: LAB | Facility: HOSPITAL | Age: 59
End: 2024-02-02
Payer: MEDICARE

## 2024-02-02 ENCOUNTER — TELEPHONE (OUTPATIENT)
Dept: FAMILY MEDICINE CLINIC | Facility: CLINIC | Age: 59
End: 2024-02-02

## 2024-02-02 DIAGNOSIS — K44.9 HIATAL HERNIA: ICD-10-CM

## 2024-02-02 LAB
ALBUMIN SERPL BCP-MCNC: 3.9 G/DL (ref 3.5–5)
ALP SERPL-CCNC: 57 U/L (ref 34–104)
ALT SERPL W P-5'-P-CCNC: 15 U/L (ref 7–52)
ANION GAP SERPL CALCULATED.3IONS-SCNC: 7 MMOL/L
AST SERPL W P-5'-P-CCNC: 18 U/L (ref 13–39)
BASOPHILS # BLD AUTO: 0.03 THOUSANDS/ÂΜL (ref 0–0.1)
BASOPHILS NFR BLD AUTO: 1 % (ref 0–1)
BILIRUB SERPL-MCNC: 0.62 MG/DL (ref 0.2–1)
BUN SERPL-MCNC: 22 MG/DL (ref 5–25)
CALCIUM SERPL-MCNC: 9 MG/DL (ref 8.4–10.2)
CHLORIDE SERPL-SCNC: 105 MMOL/L (ref 96–108)
CO2 SERPL-SCNC: 28 MMOL/L (ref 21–32)
CREAT SERPL-MCNC: 0.86 MG/DL (ref 0.6–1.3)
EOSINOPHIL # BLD AUTO: 0.19 THOUSAND/ÂΜL (ref 0–0.61)
EOSINOPHIL NFR BLD AUTO: 3 % (ref 0–6)
ERYTHROCYTE [DISTWIDTH] IN BLOOD BY AUTOMATED COUNT: 12 % (ref 11.6–15.1)
GFR SERPL CREATININE-BSD FRML MDRD: 94 ML/MIN/1.73SQ M
GLUCOSE P FAST SERPL-MCNC: 68 MG/DL (ref 65–99)
HCT VFR BLD AUTO: 41.6 % (ref 36.5–49.3)
HGB BLD-MCNC: 13.8 G/DL (ref 12–17)
IMM GRANULOCYTES # BLD AUTO: 0.03 THOUSAND/UL (ref 0–0.2)
IMM GRANULOCYTES NFR BLD AUTO: 1 % (ref 0–2)
LYMPHOCYTES # BLD AUTO: 1.95 THOUSANDS/ÂΜL (ref 0.6–4.47)
LYMPHOCYTES NFR BLD AUTO: 35 % (ref 14–44)
MCH RBC QN AUTO: 30.6 PG (ref 26.8–34.3)
MCHC RBC AUTO-ENTMCNC: 33.2 G/DL (ref 31.4–37.4)
MCV RBC AUTO: 92 FL (ref 82–98)
MONOCYTES # BLD AUTO: 0.75 THOUSAND/ÂΜL (ref 0.17–1.22)
MONOCYTES NFR BLD AUTO: 13 % (ref 4–12)
NEUTROPHILS # BLD AUTO: 2.63 THOUSANDS/ÂΜL (ref 1.85–7.62)
NEUTS SEG NFR BLD AUTO: 47 % (ref 43–75)
NRBC BLD AUTO-RTO: 0 /100 WBCS
PLATELET # BLD AUTO: 300 THOUSANDS/UL (ref 149–390)
PMV BLD AUTO: 9 FL (ref 8.9–12.7)
POTASSIUM SERPL-SCNC: 4.7 MMOL/L (ref 3.5–5.3)
PROT SERPL-MCNC: 6.5 G/DL (ref 6.4–8.4)
RBC # BLD AUTO: 4.51 MILLION/UL (ref 3.88–5.62)
SODIUM SERPL-SCNC: 140 MMOL/L (ref 135–147)
WBC # BLD AUTO: 5.58 THOUSAND/UL (ref 4.31–10.16)

## 2024-02-02 PROCEDURE — 36415 COLL VENOUS BLD VENIPUNCTURE: CPT

## 2024-02-02 PROCEDURE — 80053 COMPREHEN METABOLIC PANEL: CPT

## 2024-02-02 PROCEDURE — 85025 COMPLETE CBC W/AUTO DIFF WBC: CPT

## 2024-02-02 NOTE — TELEPHONE ENCOUNTER
[facility] form received on 2/2/2024  to be completed by PCP. Copy made and placed in PCP folder.    Forms to be delivered to PCP mailbox at assigned time.

## 2024-02-06 ENCOUNTER — PATIENT OUTREACH (OUTPATIENT)
Dept: OTHER | Facility: OTHER | Age: 59
End: 2024-02-06

## 2024-02-06 NOTE — PROGRESS NOTES
"Client requested to speak with Community Health Resource Nurse (CHRN) -[facility] (PN) during outreach hours at [facility] ([facility]).     Client stated he received call from Dr. Peguero's office informing him the surgery scheduled for 2/7/24 needs to be postponed till 2/15/24 due to physician illness.     Client wants \"to get it done\". Client pleasant in this encounter. Emotional support given.   "

## 2024-02-07 ENCOUNTER — ANESTHESIA (OUTPATIENT)
Dept: PERIOP | Facility: HOSPITAL | Age: 59
End: 2024-02-07
Payer: MEDICARE

## 2024-02-07 NOTE — LETTER
08/15/22    Dear Juan C Covarrubias am a [facility] with [address]  [address] [address]    I have made several attempts to call you by phone  It is important that you contact me back at [phone] so that I can assist with your care needs       Sincerely,     [employer], [address] [place] [address] REQUIRED- Click to run Sepsis Recognition Calculator

## 2024-02-12 DIAGNOSIS — K21.9 GASTROESOPHAGEAL REFLUX DISEASE WITHOUT ESOPHAGITIS: ICD-10-CM

## 2024-02-12 RX ORDER — PANTOPRAZOLE SODIUM 40 MG/1
40 TABLET, DELAYED RELEASE ORAL DAILY
Qty: 60 TABLET | Refills: 3 | Status: SHIPPED | OUTPATIENT
Start: 2024-02-12

## 2024-02-14 ENCOUNTER — PATIENT OUTREACH (OUTPATIENT)
Dept: OTHER | Facility: OTHER | Age: 59
End: 2024-02-14

## 2024-02-14 NOTE — PROGRESS NOTES
This Community Health Resource Nurse (CHRN)- [facility] (PN) encountered client at [facility]) during outreach hours.  Client requests assistance in finding transportation to scheduled surgery tomorrow. He shared he just received a call from the hospital asking him to be at hospital 6:15 AM . Client had planned on having his nephew transport him but due to it being so early, he stated he is not sure how to get there.     CHARLEEN called [facility] General Surgery, spoke with Leticia. Received permission to call [facility] [employer] to arrange a lyft [facility]   CHRN contacted [employer], spoke with Diana. Lyft arranged for pick-up tomorrow morning, Thursday 2/15/24 at 05:15 AM. Client's phone number confirmed.     Client pleasant in this encounter and verbalized appreciation for assistance. Emotional support given.

## 2024-02-15 ENCOUNTER — HOSPITAL ENCOUNTER (OUTPATIENT)
Facility: HOSPITAL | Age: 59
Setting detail: OUTPATIENT SURGERY
Discharge: HOME | End: 2024-02-16
Attending: SURGERY | Admitting: SURGERY
Payer: MEDICARE

## 2024-02-15 DIAGNOSIS — K21.9 PARAESOPHAGEAL HERNIA WITH GASTROESOPHAGEAL REFLUX: ICD-10-CM

## 2024-02-15 DIAGNOSIS — K44.9 HIATAL HERNIA: ICD-10-CM

## 2024-02-15 DIAGNOSIS — K44.9 PARAESOPHAGEAL HERNIA WITH GASTROESOPHAGEAL REFLUX: ICD-10-CM

## 2024-02-15 LAB
ABO GROUP BLD: NORMAL
BLD GP AB SCN SERPL QL: NEGATIVE
PLATELET # BLD AUTO: 148 THOUSANDS/UL (ref 149–390)
PMV BLD AUTO: 9.3 FL (ref 8.9–12.7)
RH BLD: POSITIVE
SPECIMEN EXPIRATION DATE: NORMAL

## 2024-02-15 PROCEDURE — NC001 PR NO CHARGE: Performed by: SURGERY

## 2024-02-15 PROCEDURE — 86850 RBC ANTIBODY SCREEN: CPT | Performed by: SURGERY

## 2024-02-15 PROCEDURE — 86900 BLOOD TYPING SEROLOGIC ABO: CPT | Performed by: SURGERY

## 2024-02-15 PROCEDURE — 80048 BASIC METABOLIC PNL TOTAL CA: CPT | Performed by: SURGERY

## 2024-02-15 PROCEDURE — 43280 LAPAROSCOPY FUNDOPLASTY: CPT | Performed by: SURGERY

## 2024-02-15 PROCEDURE — 86901 BLOOD TYPING SEROLOGIC RH(D): CPT | Performed by: SURGERY

## 2024-02-15 PROCEDURE — 88302 TISSUE EXAM BY PATHOLOGIST: CPT | Performed by: PATHOLOGY

## 2024-02-15 PROCEDURE — 93005 ELECTROCARDIOGRAM TRACING: CPT

## 2024-02-15 PROCEDURE — 85049 AUTOMATED PLATELET COUNT: CPT | Performed by: SURGERY

## 2024-02-15 RX ORDER — EPHEDRINE SULFATE 50 MG/ML
INJECTION INTRAVENOUS AS NEEDED
Status: DISCONTINUED | OUTPATIENT
Start: 2024-02-15 | End: 2024-02-15

## 2024-02-15 RX ORDER — ONDANSETRON 2 MG/ML
4 INJECTION INTRAMUSCULAR; INTRAVENOUS ONCE AS NEEDED
Status: DISCONTINUED | OUTPATIENT
Start: 2024-02-15 | End: 2024-02-15 | Stop reason: HOSPADM

## 2024-02-15 RX ORDER — KETOROLAC TROMETHAMINE 30 MG/ML
INJECTION, SOLUTION INTRAMUSCULAR; INTRAVENOUS AS NEEDED
Status: DISCONTINUED | OUTPATIENT
Start: 2024-02-15 | End: 2024-02-15

## 2024-02-15 RX ORDER — PANTOPRAZOLE SODIUM 40 MG/1
40 TABLET, DELAYED RELEASE ORAL
Status: DISCONTINUED | OUTPATIENT
Start: 2024-02-16 | End: 2024-02-16 | Stop reason: HOSPADM

## 2024-02-15 RX ORDER — SODIUM CHLORIDE, SODIUM LACTATE, POTASSIUM CHLORIDE, CALCIUM CHLORIDE 600; 310; 30; 20 MG/100ML; MG/100ML; MG/100ML; MG/100ML
INJECTION, SOLUTION INTRAVENOUS CONTINUOUS PRN
Status: DISCONTINUED | OUTPATIENT
Start: 2024-02-15 | End: 2024-02-15

## 2024-02-15 RX ORDER — SODIUM CHLORIDE, SODIUM LACTATE, POTASSIUM CHLORIDE, CALCIUM CHLORIDE 600; 310; 30; 20 MG/100ML; MG/100ML; MG/100ML; MG/100ML
75 INJECTION, SOLUTION INTRAVENOUS CONTINUOUS
Status: DISCONTINUED | OUTPATIENT
Start: 2024-02-15 | End: 2024-02-15

## 2024-02-15 RX ORDER — ONDANSETRON 2 MG/ML
INJECTION INTRAMUSCULAR; INTRAVENOUS AS NEEDED
Status: DISCONTINUED | OUTPATIENT
Start: 2024-02-15 | End: 2024-02-15

## 2024-02-15 RX ORDER — KETOROLAC TROMETHAMINE 30 MG/ML
15 INJECTION, SOLUTION INTRAMUSCULAR; INTRAVENOUS ONCE
Status: COMPLETED | OUTPATIENT
Start: 2024-02-15 | End: 2024-02-15

## 2024-02-15 RX ORDER — BENZONATATE 100 MG/1
100 CAPSULE ORAL 3 TIMES DAILY PRN
Status: DISCONTINUED | OUTPATIENT
Start: 2024-02-15 | End: 2024-02-16 | Stop reason: HOSPADM

## 2024-02-15 RX ORDER — ESCITALOPRAM OXALATE 20 MG/1
20 TABLET ORAL DAILY
Status: DISCONTINUED | OUTPATIENT
Start: 2024-02-15 | End: 2024-02-16 | Stop reason: HOSPADM

## 2024-02-15 RX ORDER — MAGNESIUM HYDROXIDE 1200 MG/15ML
LIQUID ORAL AS NEEDED
Status: DISCONTINUED | OUTPATIENT
Start: 2024-02-15 | End: 2024-02-15 | Stop reason: HOSPADM

## 2024-02-15 RX ORDER — BUPIVACAINE HYDROCHLORIDE 2.5 MG/ML
INJECTION, SOLUTION EPIDURAL; INFILTRATION; INTRACAUDAL AS NEEDED
Status: DISCONTINUED | OUTPATIENT
Start: 2024-02-15 | End: 2024-02-15 | Stop reason: HOSPADM

## 2024-02-15 RX ORDER — BUPROPION HYDROCHLORIDE 150 MG/1
150 TABLET, EXTENDED RELEASE ORAL 2 TIMES DAILY
Status: DISCONTINUED | OUTPATIENT
Start: 2024-02-15 | End: 2024-02-16 | Stop reason: HOSPADM

## 2024-02-15 RX ORDER — SUCCINYLCHOLINE/SOD CL,ISO/PF 100 MG/5ML
SYRINGE (ML) INTRAVENOUS AS NEEDED
Status: DISCONTINUED | OUTPATIENT
Start: 2024-02-15 | End: 2024-02-15

## 2024-02-15 RX ORDER — ALBUMIN, HUMAN INJ 5% 5 %
SOLUTION INTRAVENOUS CONTINUOUS PRN
Status: DISCONTINUED | OUTPATIENT
Start: 2024-02-15 | End: 2024-02-15

## 2024-02-15 RX ORDER — CLONIDINE HYDROCHLORIDE 0.1 MG/1
0.1 TABLET ORAL EVERY 12 HOURS SCHEDULED
Status: DISCONTINUED | OUTPATIENT
Start: 2024-02-15 | End: 2024-02-16 | Stop reason: HOSPADM

## 2024-02-15 RX ORDER — METHOCARBAMOL 500 MG/1
500 TABLET, FILM COATED ORAL 4 TIMES DAILY
Status: DISCONTINUED | OUTPATIENT
Start: 2024-02-15 | End: 2024-02-16 | Stop reason: HOSPADM

## 2024-02-15 RX ORDER — FENTANYL CITRATE/PF 50 MCG/ML
25 SYRINGE (ML) INJECTION
Status: DISCONTINUED | OUTPATIENT
Start: 2024-02-15 | End: 2024-02-15 | Stop reason: HOSPADM

## 2024-02-15 RX ORDER — GLYCOPYRROLATE 0.2 MG/ML
INJECTION INTRAMUSCULAR; INTRAVENOUS AS NEEDED
Status: DISCONTINUED | OUTPATIENT
Start: 2024-02-15 | End: 2024-02-15

## 2024-02-15 RX ORDER — ATORVASTATIN CALCIUM 10 MG/1
10 TABLET, FILM COATED ORAL DAILY
Status: DISCONTINUED | OUTPATIENT
Start: 2024-02-15 | End: 2024-02-16 | Stop reason: HOSPADM

## 2024-02-15 RX ORDER — TAMSULOSIN HYDROCHLORIDE 0.4 MG/1
0.4 CAPSULE ORAL
Status: DISCONTINUED | OUTPATIENT
Start: 2024-02-15 | End: 2024-02-16 | Stop reason: HOSPADM

## 2024-02-15 RX ORDER — OXYCODONE HCL 5 MG/5 ML
10 SOLUTION, ORAL ORAL EVERY 4 HOURS PRN
Status: DISCONTINUED | OUTPATIENT
Start: 2024-02-15 | End: 2024-02-16 | Stop reason: HOSPADM

## 2024-02-15 RX ORDER — FLUTICASONE PROPIONATE AND SALMETEROL 500; 50 UG/1; UG/1
1 POWDER RESPIRATORY (INHALATION) 2 TIMES DAILY
Status: DISCONTINUED | OUTPATIENT
Start: 2024-02-15 | End: 2024-02-16 | Stop reason: HOSPADM

## 2024-02-15 RX ORDER — CEFAZOLIN SODIUM 2 G/50ML
2000 SOLUTION INTRAVENOUS ONCE
Status: COMPLETED | OUTPATIENT
Start: 2024-02-15 | End: 2024-02-15

## 2024-02-15 RX ORDER — DEXAMETHASONE SODIUM PHOSPHATE 10 MG/ML
INJECTION, SOLUTION INTRAMUSCULAR; INTRAVENOUS AS NEEDED
Status: DISCONTINUED | OUTPATIENT
Start: 2024-02-15 | End: 2024-02-15

## 2024-02-15 RX ORDER — FENTANYL CITRATE 50 UG/ML
INJECTION, SOLUTION INTRAMUSCULAR; INTRAVENOUS AS NEEDED
Status: DISCONTINUED | OUTPATIENT
Start: 2024-02-15 | End: 2024-02-15

## 2024-02-15 RX ORDER — GUAIFENESIN/DEXTROMETHORPHAN 100-10MG/5
5 SYRUP ORAL 3 TIMES DAILY PRN
Status: DISCONTINUED | OUTPATIENT
Start: 2024-02-15 | End: 2024-02-16 | Stop reason: HOSPADM

## 2024-02-15 RX ORDER — ROCURONIUM BROMIDE 10 MG/ML
INJECTION, SOLUTION INTRAVENOUS AS NEEDED
Status: DISCONTINUED | OUTPATIENT
Start: 2024-02-15 | End: 2024-02-15

## 2024-02-15 RX ORDER — PROPOFOL 10 MG/ML
INJECTION, EMULSION INTRAVENOUS AS NEEDED
Status: DISCONTINUED | OUTPATIENT
Start: 2024-02-15 | End: 2024-02-15

## 2024-02-15 RX ORDER — ONDANSETRON 2 MG/ML
4 INJECTION INTRAMUSCULAR; INTRAVENOUS EVERY 4 HOURS PRN
Status: DISCONTINUED | OUTPATIENT
Start: 2024-02-15 | End: 2024-02-16 | Stop reason: HOSPADM

## 2024-02-15 RX ORDER — ENOXAPARIN SODIUM 100 MG/ML
40 INJECTION SUBCUTANEOUS 2 TIMES DAILY
Status: DISCONTINUED | OUTPATIENT
Start: 2024-02-15 | End: 2024-02-16 | Stop reason: HOSPADM

## 2024-02-15 RX ORDER — GUAIFENESIN 100 MG/5ML
200 SOLUTION ORAL 3 TIMES DAILY PRN
Status: DISCONTINUED | OUTPATIENT
Start: 2024-02-15 | End: 2024-02-16 | Stop reason: HOSPADM

## 2024-02-15 RX ORDER — FINASTERIDE 5 MG/1
5 TABLET, FILM COATED ORAL DAILY
Status: DISCONTINUED | OUTPATIENT
Start: 2024-02-15 | End: 2024-02-16 | Stop reason: HOSPADM

## 2024-02-15 RX ORDER — LABETALOL HYDROCHLORIDE 5 MG/ML
10 INJECTION, SOLUTION INTRAVENOUS EVERY 4 HOURS PRN
Status: DISCONTINUED | OUTPATIENT
Start: 2024-02-15 | End: 2024-02-16 | Stop reason: HOSPADM

## 2024-02-15 RX ORDER — LIDOCAINE HYDROCHLORIDE 20 MG/ML
INJECTION, SOLUTION EPIDURAL; INFILTRATION; INTRACAUDAL; PERINEURAL AS NEEDED
Status: DISCONTINUED | OUTPATIENT
Start: 2024-02-15 | End: 2024-02-15

## 2024-02-15 RX ORDER — KETAMINE HCL IN NACL, ISO-OSM 100MG/10ML
SYRINGE (ML) INJECTION AS NEEDED
Status: DISCONTINUED | OUTPATIENT
Start: 2024-02-15 | End: 2024-02-15

## 2024-02-15 RX ORDER — TRAZODONE HYDROCHLORIDE 50 MG/1
25 TABLET ORAL
Status: DISCONTINUED | OUTPATIENT
Start: 2024-02-15 | End: 2024-02-16 | Stop reason: HOSPADM

## 2024-02-15 RX ORDER — MELATONIN
1000 DAILY
Status: DISCONTINUED | OUTPATIENT
Start: 2024-02-15 | End: 2024-02-16 | Stop reason: HOSPADM

## 2024-02-15 RX ORDER — ACETAMINOPHEN 160 MG/5ML
650 SUSPENSION ORAL EVERY 6 HOURS PRN
Status: DISCONTINUED | OUTPATIENT
Start: 2024-02-15 | End: 2024-02-15

## 2024-02-15 RX ORDER — ALBUTEROL SULFATE 90 UG/1
1 AEROSOL, METERED RESPIRATORY (INHALATION) EVERY 6 HOURS PRN
Status: DISCONTINUED | OUTPATIENT
Start: 2024-02-15 | End: 2024-02-16 | Stop reason: HOSPADM

## 2024-02-15 RX ORDER — MIDAZOLAM HYDROCHLORIDE 2 MG/2ML
INJECTION, SOLUTION INTRAMUSCULAR; INTRAVENOUS AS NEEDED
Status: DISCONTINUED | OUTPATIENT
Start: 2024-02-15 | End: 2024-02-15

## 2024-02-15 RX ORDER — HYDROMORPHONE HCL IN WATER/PF 6 MG/30 ML
0.2 PATIENT CONTROLLED ANALGESIA SYRINGE INTRAVENOUS
Status: DISCONTINUED | OUTPATIENT
Start: 2024-02-15 | End: 2024-02-15 | Stop reason: HOSPADM

## 2024-02-15 RX ORDER — OXYCODONE HCL 5 MG/5 ML
5 SOLUTION, ORAL ORAL EVERY 6 HOURS PRN
Status: DISCONTINUED | OUTPATIENT
Start: 2024-02-15 | End: 2024-02-16 | Stop reason: HOSPADM

## 2024-02-15 RX ORDER — HYDROMORPHONE HCL/PF 1 MG/ML
0.5 SYRINGE (ML) INJECTION EVERY 4 HOURS PRN
Status: DISCONTINUED | OUTPATIENT
Start: 2024-02-15 | End: 2024-02-16

## 2024-02-15 RX ORDER — SODIUM CHLORIDE, SODIUM LACTATE, POTASSIUM CHLORIDE, CALCIUM CHLORIDE 600; 310; 30; 20 MG/100ML; MG/100ML; MG/100ML; MG/100ML
100 INJECTION, SOLUTION INTRAVENOUS CONTINUOUS
Status: DISCONTINUED | OUTPATIENT
Start: 2024-02-15 | End: 2024-02-16

## 2024-02-15 RX ORDER — HYDROMORPHONE HCL/PF 1 MG/ML
SYRINGE (ML) INJECTION AS NEEDED
Status: DISCONTINUED | OUTPATIENT
Start: 2024-02-15 | End: 2024-02-15

## 2024-02-15 RX ORDER — ACETAMINOPHEN 160 MG/5ML
650 SUSPENSION ORAL EVERY 6 HOURS SCHEDULED
Status: DISCONTINUED | OUTPATIENT
Start: 2024-02-16 | End: 2024-02-16 | Stop reason: HOSPADM

## 2024-02-15 RX ORDER — BENZONATATE 100 MG/1
100 CAPSULE ORAL 3 TIMES DAILY PRN
Status: DISCONTINUED | OUTPATIENT
Start: 2024-02-15 | End: 2024-02-15

## 2024-02-15 RX ORDER — NICOTINE 21 MG/24HR
1 PATCH, TRANSDERMAL 24 HOURS TRANSDERMAL DAILY
Status: DISCONTINUED | OUTPATIENT
Start: 2024-02-15 | End: 2024-02-16 | Stop reason: HOSPADM

## 2024-02-15 RX ADMIN — PHENYLEPHRINE HYDROCHLORIDE 40 MCG/MIN: 10 INJECTION INTRAVENOUS at 08:40

## 2024-02-15 RX ADMIN — HYDROMORPHONE HYDROCHLORIDE 0.5 MG: 1 INJECTION, SOLUTION INTRAMUSCULAR; INTRAVENOUS; SUBCUTANEOUS at 17:23

## 2024-02-15 RX ADMIN — KETOROLAC TROMETHAMINE 15 MG: 30 INJECTION, SOLUTION INTRAMUSCULAR at 10:55

## 2024-02-15 RX ADMIN — SODIUM CHLORIDE, SODIUM LACTATE, POTASSIUM CHLORIDE, AND CALCIUM CHLORIDE: .6; .31; .03; .02 INJECTION, SOLUTION INTRAVENOUS at 08:00

## 2024-02-15 RX ADMIN — ACETAMINOPHEN 650 MG: 650 SUSPENSION ORAL at 23:31

## 2024-02-15 RX ADMIN — EPHEDRINE SULFATE 10 MG: 50 INJECTION, SOLUTION INTRAVENOUS at 08:41

## 2024-02-15 RX ADMIN — NICOTINE 1 PATCH: 14 PATCH, EXTENDED RELEASE TRANSDERMAL at 12:49

## 2024-02-15 RX ADMIN — Medication 90 MG: at 08:07

## 2024-02-15 RX ADMIN — ALBUMIN (HUMAN): 12.5 INJECTION, SOLUTION INTRAVENOUS at 09:37

## 2024-02-15 RX ADMIN — KETOROLAC TROMETHAMINE 15 MG: 30 INJECTION, SOLUTION INTRAMUSCULAR; INTRAVENOUS at 21:12

## 2024-02-15 RX ADMIN — FENTANYL CITRATE 50 MCG: 50 INJECTION INTRAMUSCULAR; INTRAVENOUS at 08:07

## 2024-02-15 RX ADMIN — EPHEDRINE SULFATE 5 MG: 50 INJECTION, SOLUTION INTRAVENOUS at 09:09

## 2024-02-15 RX ADMIN — OXYCODONE HYDROCHLORIDE 10 MG: 5 SOLUTION ORAL at 20:42

## 2024-02-15 RX ADMIN — LIDOCAINE HYDROCHLORIDE 100 MG: 20 INJECTION, SOLUTION EPIDURAL; INFILTRATION; INTRACAUDAL; PERINEURAL at 08:07

## 2024-02-15 RX ADMIN — LORATADINE AND PSEUDOEPHEDRINE 1 TABLET: 10; 240 TABLET, EXTENDED RELEASE ORAL at 17:18

## 2024-02-15 RX ADMIN — SODIUM CHLORIDE, SODIUM LACTATE, POTASSIUM CHLORIDE, AND CALCIUM CHLORIDE 100 ML/HR: .6; .31; .03; .02 INJECTION, SOLUTION INTRAVENOUS at 23:32

## 2024-02-15 RX ADMIN — EPHEDRINE SULFATE 10 MG: 50 INJECTION, SOLUTION INTRAVENOUS at 09:30

## 2024-02-15 RX ADMIN — FINASTERIDE 5 MG: 5 TABLET, FILM COATED ORAL at 12:49

## 2024-02-15 RX ADMIN — PROPOFOL 30 MG: 10 INJECTION, EMULSION INTRAVENOUS at 09:21

## 2024-02-15 RX ADMIN — TAMSULOSIN HYDROCHLORIDE 0.4 MG: 0.4 CAPSULE ORAL at 17:18

## 2024-02-15 RX ADMIN — ATORVASTATIN CALCIUM 10 MG: 10 TABLET, FILM COATED ORAL at 12:49

## 2024-02-15 RX ADMIN — PROPOFOL 150 MG: 10 INJECTION, EMULSION INTRAVENOUS at 08:07

## 2024-02-15 RX ADMIN — ROCURONIUM BROMIDE 10 MG: 10 INJECTION, SOLUTION INTRAVENOUS at 10:19

## 2024-02-15 RX ADMIN — Medication 30 MG: at 08:19

## 2024-02-15 RX ADMIN — METHOCARBAMOL 500 MG: 500 TABLET ORAL at 21:11

## 2024-02-15 RX ADMIN — GLYCOPYRROLATE 0.1 MG: 0.2 INJECTION, SOLUTION INTRAMUSCULAR; INTRAVENOUS at 08:19

## 2024-02-15 RX ADMIN — METHOCARBAMOL 500 MG: 500 TABLET ORAL at 12:50

## 2024-02-15 RX ADMIN — FENTANYL CITRATE 50 MCG: 50 INJECTION INTRAMUSCULAR; INTRAVENOUS at 08:47

## 2024-02-15 RX ADMIN — Medication 1000 UNITS: at 12:49

## 2024-02-15 RX ADMIN — HYDROMORPHONE HYDROCHLORIDE 0.25 MG: 1 INJECTION, SOLUTION INTRAMUSCULAR; INTRAVENOUS; SUBCUTANEOUS at 10:16

## 2024-02-15 RX ADMIN — CEFAZOLIN SODIUM 2000 MG: 2 SOLUTION INTRAVENOUS at 08:21

## 2024-02-15 RX ADMIN — ESCITALOPRAM OXALATE 20 MG: 20 TABLET ORAL at 12:49

## 2024-02-15 RX ADMIN — HYDROMORPHONE HYDROCHLORIDE 0.25 MG: 1 INJECTION, SOLUTION INTRAMUSCULAR; INTRAVENOUS; SUBCUTANEOUS at 09:23

## 2024-02-15 RX ADMIN — CLONIDINE HYDROCHLORIDE 0.1 MG: 0.1 TABLET ORAL at 21:10

## 2024-02-15 RX ADMIN — ROCURONIUM BROMIDE 20 MG: 10 INJECTION, SOLUTION INTRAVENOUS at 09:22

## 2024-02-15 RX ADMIN — MIDAZOLAM 2 MG: 1 INJECTION INTRAMUSCULAR; INTRAVENOUS at 07:58

## 2024-02-15 RX ADMIN — ONDANSETRON 4 MG: 2 INJECTION INTRAMUSCULAR; INTRAVENOUS at 10:46

## 2024-02-15 RX ADMIN — DEXAMETHASONE SODIUM PHOSPHATE 10 MG: 10 INJECTION, SOLUTION INTRAMUSCULAR; INTRAVENOUS at 08:32

## 2024-02-15 RX ADMIN — Medication 10 MG: at 17:18

## 2024-02-15 RX ADMIN — Medication 10 MG: at 23:32

## 2024-02-15 RX ADMIN — ROCURONIUM BROMIDE 50 MG: 10 INJECTION, SOLUTION INTRAVENOUS at 08:19

## 2024-02-15 RX ADMIN — OXYCODONE HYDROCHLORIDE 10 MG: 5 SOLUTION ORAL at 14:13

## 2024-02-15 RX ADMIN — METHOCARBAMOL 500 MG: 500 TABLET ORAL at 17:18

## 2024-02-15 RX ADMIN — FENTANYL CITRATE 25 MCG: 50 INJECTION INTRAMUSCULAR; INTRAVENOUS at 11:27

## 2024-02-15 RX ADMIN — SUGAMMADEX 200 MG: 100 INJECTION, SOLUTION INTRAVENOUS at 10:56

## 2024-02-15 NOTE — DISCHARGE INSTR - AVS FIRST PAGE
Post-Operative Care Instructions             Dr. Robbie Peguero M.D.    1. General: You will feel pulling sensations around the wound and/or aches and pains around the incisions. This is normal. Even minor surgery is a change in your body and this is your body’s way of reaction to it. If you have had abdominal surgery, it may help to support the incision with a small pillow or blanket for comfort when moving or coughing.    2. Wound care:    Bandage/Dressing - Make sure to remove the bandage in about 48 hours, unless instructed otherwise. You usually don't have to redress the wound after 24-48 hours, unless for comfort. Keep the incision clean and dry. Let air get to it. If the Steri-Strips fall off, just keep the wound clean.     Glue - Leave glue alone, it will fall off on its own, no need for an additional dressings    3. Water: You may shower over the wound, unless there are drain tubes left in place. Do not bathe or use a pool or hot tub until cleared by the physician. You may shower right over the staples, glue or Steri-Strips and rinse wound with soapy water but do not scrub incision pat dry when you are done.    4. Activity: You may go up and down stairs, walk as much as you are comfortable, but walk at least 3 times each day. If you have had abdominal or hernia surgery, do not lift anything heavier than 15 pounds for at least 4 weeks.    5. Diet: You may resume a regular diet. If you had a same-day surgery or overnight stay surgery, you may wish to eat lightly for a few days: soups, crackers, and sandwiches. You may resume a regular diet when ready.    6. Medications: Resume all of your previous medications, unless told otherwise by the doctor. Tylenol and ibuoprofen is always fine, unless you are taking any narcotic pain medication containing Tylenol (such as Percocet, Darvocet, Vicodin, or anything containing acetaminophen). Do not take Tylenol if you're taking these medications. You do not need to take  the narcotic pain medications unless you are having significant pain and discomfort.    7. Driving: He will need someone to drive you home on the day of surgery. Do not drive or make any important decisions while on narcotic pain medication or 24 hours and after anesthesia or sedation for surgery. Generally, you may drive when your off all narcotic pain medications, and you can turn in your seat comfortably to check your blind spot.     8. Upset Stomach: You may take Maalox, Tums, or similar items for an upset stomach. If your narcotic pain medication causes an upset stomach, do not take it on an empty stomach. Try taking it with at least some crackers or toast.     9. Constipation: Patients often experienced constipation after surgery. You may take over-the-counter medication for this, such as Metamucil, Senokot, Dulcolax, milk of magnesia, etc. You may take a suppository unless you have had anorectal surgery such as a procedure on your hemorrhoids. If you experience significant nausea or vomiting after abdominal surgery, call the office before trying any of these medications.    10. Call the office: If you are experiencing any of the following, fevers above 101.5°, significant nausea or vomiting, if the wound develops drainage and/or is excessive redness around the wound, or if you have significant diarrhea or other worsening symptoms.    11. Pain: You may be given a prescription for pain. This will be given to the hospital, the day of surgery.    12. Sexual Activity: You may resume sexual activity when you feel ready and comfortable and your incision is sealed and healed without apparent infection risk.    [facility]  Phone: [phone]  Please continue with a full liquid diet until 2/19/2024, at which time you may commence the diet as below.      Post-op Foregut Diet    · Foods should be very soft consistency; semi solid or fork tender.    · Chew foods very well before swallowing; allow  20-30 minutes per meal.    · Eat every 3-4 hours, have 3 small meals and 2-3 small snacks    · Follow this diet for at least 3 weeks after surgery or until otherwise directed by your surgeon.    Acceptable Foods     · Eggs or egg beaters  · Greek yogurt  · Cottage cheese  · Cheese sticks or shredded cheese  · Soft fish (salmon, white fish, tuna)  · Beans, lentils, or lentil soup  · Tuna or egg salad  · Refried beans  · Protein shakes  · Loosely cooked/crumbled ground turkey, chicken, or beef  · Thinned oatmeal or cream of wheat  · Over cooked, soft/mushy vegetables  · Fruits canned in natural juices  · Pureed food  · Sweet potatoes, well cooked, no skin  · Winter squash, well cooked, no skin  · Applesauce  · Tomato, carrot lisseth or butternut squash soup ·       Foods to Avoid  Solid/Tough meats: chicken, steak, hamburgers, hot dogs, pork chops, meatloaf, etc.   · Bread, rice, pasta, macaroni and cheese   · High-fat foods   · High-sugar foods   · Raw vegetables   · Junk food   · Crunchy foods (chips, popcorn, nuts, pumpkin and sunflower seeds)

## 2024-02-15 NOTE — PROGRESS NOTES
Elevated BP taken. Pain meds given. No cardiac complaints. Notified Dr. Mani Davies. No new orders received.

## 2024-02-15 NOTE — RESTORATIVE TECHNICIAN NOTE
Restorative Technician Note      Patient Name: Chau Lou III     Restorative Tech Visit Date: 02/15/24  Note Type: Mobility  Patient Position Upon Consult: Supine  Activity Performed: Ambulated (To BR)  Assistive Device: Other (Comment) (None)  Patient Position at End of Consult: Supine; All needs within reach    Farzad Summers, Restorative Technician

## 2024-02-15 NOTE — ANESTHESIA PREPROCEDURE EVALUATION
Procedure:  REPAIR HERNIA PARAESOPHAGEAL  LAPAROSCOPIC (Abdomen)  FUNDOPLICATION TOUPET LAPAROSCOPIC (Abdomen)  ESOPHAGOGASTRODUODENOSCOPY (EGD) (Abdomen)    Relevant Problems   CARDIO   (+) Angina of effort   (+) Mixed hyperlipidemia      GI/HEPATIC   (+) Gastroesophageal reflux disease without esophagitis   (+) Hiatal hernia      /RENAL   (+) BPH (benign prostatic hyperplasia)      MUSCULOSKELETAL   (+) Hiatal hernia   (+) OA (osteoarthritis) of neck      NEURO/PSYCH   (+) Anxiety   (+) Major depression      PULMONARY   (+) COPD (chronic obstructive pulmonary disease) (HCC)        Physical Exam    Airway    Mallampati score: II  TM Distance: >3 FB  Neck ROM: full     Dental        Cardiovascular      Pulmonary      Other Findings        Anesthesia Plan  ASA Score- 3     Anesthesia Type- general with ASA Monitors.         Additional Monitors:     Airway Plan: ETT.           Plan Factors-Exercise tolerance (METS): >4 METS.    Chart reviewed.        Patient is a current smoker.  Patient smoked on day of surgery.    Obstructive sleep apnea risk education given perioperatively.        Induction- intravenous.    Postoperative Plan- Plan for postoperative opioid use. Planned trial extubation    Informed Consent- Anesthetic plan and risks discussed with patient.  I personally reviewed this patient with the CRNA. Discussed and agreed on the Anesthesia Plan with the CRNA..            Anesthesia plan and consent discussed with Chau who expressed understanding and agreement. Risks/benefits and alternatives discussed with patient including possible PONV, sore throat, damage to teeth/lips/gums and possibility of rare anesthetic and surgical emergencies.

## 2024-02-15 NOTE — H&P
H&P Exam - General Surgery   Chau Lou III 58 y.o. male MRN: [id number]  Unit/Bed#: OR Neon Encounter: [id number]    Assessment/Plan     Assessment:  58-year-old male with a moderate-sized type III paraesophageal hernia  Plan:  Plan on laparoscopic paraesophageal hernia repair with partial fundoplication and EGD.  Patient is amenable to risk and benefits, and we will proceed back to the operating room expeditiously.    History of Present Illness     HPI:  Chau Lou III is a 58 y.o. male who presents with a paraesophageal hernia.  He has a several year history of postprandial fullness, nausea, and occasional emesis.  This is worse at night, and exacerbating after large meals.  He denies any fevers, chills, chest pain, shortness of breath.  He does have a history of COPD, currently on inhalers.    Review of Systems   Constitutional:  Negative for appetite change, chills, diaphoresis and fever.   HENT:  Negative for nosebleeds and trouble swallowing.    Eyes: Negative.    Respiratory:  Negative for cough, shortness of breath and wheezing.    Cardiovascular:  Negative for chest pain, palpitations and leg swelling.   Gastrointestinal:  Positive for vomiting. Negative for abdominal distention, abdominal pain and nausea.   Genitourinary:  Negative for difficulty urinating, flank pain and frequency.   Musculoskeletal:  Negative for arthralgias, joint swelling and myalgias.   Skin:  Negative for pallor and rash.   Neurological:  Negative for dizziness, facial asymmetry and speech difficulty.   Hematological:  Does not bruise/bleed easily.   Psychiatric/Behavioral:  Negative for agitation and confusion.    All other systems reviewed and are negative.      Historical Information   Past Medical History:   Diagnosis Date    Anxiety     Asthma     BPH (benign prostatic hyperplasia)     COPD (chronic obstructive pulmonary disease) (HCC)     Depression     GERD (gastroesophageal reflux disease)     Hiatal hernia      Hyperlipidemia     Hypertension     Suicide attempt (HCC)      Past Surgical History:   Procedure Laterality Date    COLONOSCOPY      ESOPHAGOGASTRODUODENOSCOPY N/A 03/01/2019    Procedure: ESOPHAGOGASTRODUODENOSCOPY (EGD);  Surgeon: Travis Nathan MD;  Location: [facility];  Service: Gastroenterology    FRACTURE SURGERY      finger surgery    HERNIA REPAIR      x2    SKIN GRAFT Right     Foot     Social History   Social History     Substance and Sexual Activity   Alcohol Use Yes    Alcohol/week: 14.0 standard drinks of alcohol    Types: 14 Cans of beer per week     Social History     Substance and Sexual Activity   Drug Use Never     Social History     Tobacco Use   Smoking Status Heavy Smoker    Current packs/day: 1.50    Average packs/day: 1.5 packs/day for 40.1 years (60.2 ttl pk-yrs)    Types: Cigarettes    Start date: 1984    Passive exposure: Current   Smokeless Tobacco Never     E-Cigarette/Vaping    E-Cigarette Use Never User      E-Cigarette/Vaping Substances    Nicotine No     THC No     CBD No     Flavoring No     Other No     Unknown No      Family History:   Family History   Problem Relation Age of Onset    Prostate cancer Paternal Uncle        Meds/Allergies   PTA meds:   Prior to Admission Medications   Prescriptions Last Dose Informant Patient Reported? Taking?   Advair Diskus 500-50 MCG/ACT inhaler 2/14/2024 at 0330  No Yes   Sig: INHALE 1 PUFF 2 (TWO) TIMES A DAY RINSE MOUTH AFTER USE.   Diclofenac Sodium (VOLTAREN) 1 %   No No   Sig: Apply 2 g topically 4 (four) times a day   Multiple Vitamin (multivitamin) tablet 1/29/2024 Self No Yes   Sig: Take 1 tablet by mouth daily   QUEtiapine (SEROquel) 50 mg tablet 2/15/2024 at 0330 Self No Yes   Sig: Take 3 tablets (150 mg total) by mouth daily at bedtime   acetaminophen (TYLENOL) 650 mg CR tablet 2/14/2024 at 0330  No Yes   Sig: TAKE 1 TABLET (650 MG TOTAL) BY MOUTH EVERY 8 (EIGHT) HOURS AS NEEDED FOR MILD PAIN   albuterol (PROVENTIL HFA,VENTOLIN HFA)  90 mcg/act inhaler Past Week  No Yes   Sig: Inhale 1 puff every 6 (six) hours as needed for wheezing   atorvastatin (LIPITOR) 10 mg tablet 2/15/2024 at 0330 Self No Yes   Sig: TAKE 1 TABLET (10 MG TOTAL) BY MOUTH DAILY   benzonatate (TESSALON PERLES) 100 mg capsule 2024 Self No Yes   Sig: TAKE 1 CAPSULE (100 MG TOTAL) BY MOUTH 3 (THREE) TIMES A DAY AS NEEDED FOR COUGH   benzonatate (TESSALON PERLES) 100 mg capsule 2024  No Yes   Sig: Take 1 capsule (100 mg total) by mouth 3 (three) times a day as needed for cough   brompheniramine-pseudoephedrine (DIMETAPP) 1-15 MG/5ML ELIX   No No   Sig: Take 5 mL by mouth every 6 (six) hours as needed for allergies   buPROPion (WELLBUTRIN SR) 150 mg 12 hr tablet 2/15/2024 at 0330 Self No Yes   Sig: TAKE 1 TABLET (150 MG TOTAL) BY MOUTH 2 (TWO) TIMES A DAY   cholecalciferol (VITAMIN D3) 1,000 units tablet   No Yes   Sig: TAKE 1 TABLET (1,000 UNITS TOTAL) BY MOUTH DAILY   cloNIDine (CATAPRES) 0.1 mg tablet 2/15/2024 at 0330 Self Yes Yes   Sig: TAKE 1 TABLET BY ORAL ROUTE 2 TIMES PER DAY   dextromethorphan-guaiFENesin (ROBITUSSIN DM)  mg/5 mL syrup   No No   Sig: Take 5 mL by mouth 3 (three) times a day as needed for cough   escitalopram (LEXAPRO) 20 mg tablet 2/15/2024 at 0330 Self No Yes   Sig: Take 1 tablet (20 mg total) by mouth daily   famotidine (PEPCID) 20 mg tablet 2/15/2024 at 0330  No Yes   Sig: TAKE 1 TABLET (20 MG TOTAL) BY MOUTH 2 (TWO) TIMES A DAY   finasteride (PROSCAR) 5 mg tablet 2/15/2024 at 0330  No Yes   Sig: Take 1 tablet (5 mg total) by mouth daily   fluticasone (FLONASE) 50 mcg/act nasal spray 2024  No Yes   Si SPRAY INTO EACH NOSTRIL DAILY   guaiFENesin (ROBITUSSIN) 100 MG/5ML oral liquid Past Week  No Yes   Sig: Take 10 mL (200 mg total) by mouth 3 (three) times a day as needed for cough   guaiFENesin (ROBITUSSIN) 100 MG/5ML oral liquid   No No   Sig: Take 10 mL (200 mg total) by mouth 3 (three) times a day as needed for cough  "  ibuprofen (MOTRIN) 600 mg tablet Past Week Self No Yes   Sig: Take 1 tablet (600 mg total) by mouth every 6 (six) hours as needed for mild pain or moderate pain   loperamide (IMODIUM A-D) 2 MG tablet 2/14/2024 Self No Yes   Sig: Take 1 tablet (2 mg total) by mouth 4 (four) times a day as needed for diarrhea   loratadine (CLARITIN) 10 mg tablet 2/15/2024 at 0330 Self No Yes   Sig: Take 1 tablet (10 mg total) by mouth daily   methocarbamol (ROBAXIN) 500 mg tablet Past Week  No Yes   Sig: Take 1 tablet (500 mg total) by mouth 4 (four) times a day   montelukast (SINGULAIR) 10 mg tablet Not Taking  No No   Sig: Take 1 tablet (10 mg total) by mouth daily at bedtime   Patient not taking: Reported on 2/15/2024   ondansetron (ZOFRAN) 4 mg tablet Unknown  No No   Sig: Take 1 tablet (4 mg total) by mouth every 8 (eight) hours as needed for nausea or vomiting   pantoprazole (PROTONIX) 40 mg tablet 2/15/2024 at 0330  No Yes   Sig: TAKE 1 TABLET (40 MG TOTAL) BY MOUTH DAILY   tamsulosin (FLOMAX) 0.4 mg 2/15/2024 at 0330  No Yes   Sig: Take 1 capsule (0.4 mg total) by mouth daily with dinner   tiotropium (Spiriva HandiHaler) 18 mcg inhalation capsule 2/15/2024 at 0330 Self No Yes   Sig: Place 1 capsule (18 mcg total) into inhaler and inhale daily   traZODone (DESYREL) 50 mg tablet 2/14/2024 Self No Yes   Sig: Take 0.5 tablets (25 mg total) by mouth daily at bedtime      Facility-Administered Medications: None     Allergies   Allergen Reactions    Seasonal Ic [Cholestatin] Sneezing       Objective   First Vitals:   Blood Pressure: 154/84 (02/15/24 0556)  Pulse: 64 (02/15/24 0556)  Temperature: 99.2 °F (37.3 °C) (02/15/24 0556)  Temp Source: Temporal (02/15/24 0556)  Respirations: 18 (02/15/24 0556)  Height: 5' 6\" (167.6 cm) (02/15/24 0639)  Weight - Scale: 66.2 kg (146 lb) (02/15/24 0639)  SpO2: 97 % (02/15/24 0556)    Current Vitals:   Blood Pressure: 154/84 (02/15/24 0556)  Pulse: 64 (02/15/24 0556)  Temperature: 99.2 °F (37.3 " "°C) (02/15/24 0556)  Temp Source: Temporal (02/15/24 0556)  Respirations: 18 (02/15/24 0556)  Height: 5' 6\" (167.6 cm) (02/15/24 0639)  Weight - Scale: 66.2 kg (146 lb) (02/15/24 0639)  SpO2: 97 % (02/15/24 0556)    No intake or output data in the 24 hours ending 02/15/24 0745    Invasive Devices       Peripheral Intravenous Line  Duration             Peripheral IV 12/11/23 Right Antecubital 65 days                    Physical Exam  Vitals and nursing note reviewed.   Constitutional:       General: He is not in acute distress.     Appearance: He is well-developed.   HENT:      Head: Normocephalic and atraumatic.   Eyes:      Conjunctiva/sclera: Conjunctivae normal.   Cardiovascular:      Rate and Rhythm: Normal rate and regular rhythm.      Heart sounds: No murmur heard.  Pulmonary:      Effort: Pulmonary effort is normal. No respiratory distress.      Breath sounds: Normal breath sounds.   Abdominal:      Palpations: Abdomen is soft.      Tenderness: There is no abdominal tenderness.   Musculoskeletal:         General: No swelling.      Cervical back: Neck supple.   Skin:     General: Skin is warm and dry.      Capillary Refill: Capillary refill takes less than 2 seconds.   Neurological:      General: No focal deficit present.      Mental Status: He is alert and oriented to person, place, and time.   Psychiatric:         Mood and Affect: Mood normal.         Lab Results: I have personally reviewed pertinent lab results.  , CBC: No results found for: \"WBC\", \"HGB\", \"HCT\", \"MCV\", \"PLT\", \"ADJUSTEDWBC\", \"RBC\", \"MCH\", \"MCHC\", \"RDW\", \"MPV\", \"NRBC\", CMP: No results found for: \"SODIUM\", \"K\", \"CL\", \"CO2\", \"ANIONGAP\", \"BUN\", \"CREATININE\", \"GLUCOSE\", \"CALCIUM\", \"AST\", \"ALT\", \"ALKPHOS\", \"PROT\", \"BILITOT\", \"EGFR\", Coagulation: No results found for: \"PT\", \"INR\", \"APTT\", Urinalysis: No results found for: \"COLORU\", \"CLARITYU\", \"SPECGRAV\", \"PHUR\", \"LEUKOCYTESUR\", \"NITRITE\", \"PROTEINUA\", \"GLUCOSEU\", \"KETONESU\", \"BILIRUBINUR\", " "\"BLOODU\", Amylase: No results found for: \"AMYLASE\", Lipase: No results found for: \"LIPASE\"  Imaging: I have personally reviewed pertinent reports.    EKG, Pathology, and Other Studies: I have personally reviewed pertinent reports.      Code Status: Prior  Advance Directive and Living Will:      Power of :    POLST:    "

## 2024-02-15 NOTE — ANESTHESIA POSTPROCEDURE EVALUATION
Post-Op Assessment Note    CV Status:  Stable  Pain Score: 0    Pain management: adequate    Multimodal analgesia used between 6 hours prior to anesthesia start to PACU discharge    Mental Status:  Alert   Hydration Status:  Euvolemic   PONV Controlled:  None   Airway Patency:  Patent     Post Op Vitals Reviewed: Yes    No anethesia notable event occurred.    Staff: CRNA               BP   154/73   Temp   97.3F   Pulse  108   Resp   16   SpO2   98

## 2024-02-15 NOTE — OP NOTE
OPERATIVE REPORT  PATIENT NAME: Chau Lou III    :  1965  MRN: [id number]  Pt Location: [facility] OR ROOM 03    SURGERY DATE: 2/15/2024    Surgeons and Role:     * Robbie Peguero MD - Primary     * Tramaine Mota MD - Assisting    Preop Diagnosis:  Hiatal hernia [K44.9]  Paraesophageal hernia with gastroesophageal reflux [K44.9, K21.9]    Post-Op Diagnosis Codes:     * Hiatal hernia [K44.9]     * Paraesophageal hernia with gastroesophageal reflux [K44.9, K21.9]    Procedure(s):  REPAIR HERNIA PARAESOPHAGEAL  LAPAROSCOPIC  FUNDOPLICATION TOUPET LAPAROSCOPIC    Specimen(s):  ID Type Source Tests Collected by Time Destination   1 : hernia sac Tissue Soft Tissue, Other TISSUE EXAM Robbie Peguero MD 2/15/2024 1015        Estimated Blood Loss:   Minimal    Drains:  * No LDAs found *    Anesthesia Type:   General    Operative Indications:  Hiatal hernia [K44.9]  Paraesophageal hernia with gastroesophageal reflux [K44.9, K21.9]  58-year-old male with chronic reflux disease in the setting of a hiatal hernia.  After discussion of benefits, he opted to proceed to the operating room for planned laparoscopic paraesophageal hernia repair with partial fundoplication.    Operative Findings:  Moderate sized type III paraesophageal hernia, with excellent intra-abdominal esophageal length.    Complications:   None    Procedure and Technique:  The patient was correctly identified by name and medical record number in the holding area and brought to the operative suite, where he was placed in lithotomy on the operative table. SCDs were placed and he was given preoperative antibiotics within 1 hour of incision.    The patient was prepped and draped in the usual fashion. A timeout was performed.  Stab incision was made in the left upper quadrant and a Veress needle was introduced.  The abdomen was insufflated with CO2 to a pressure of 15mmHg. The patient tolerated insufflation without complication.  Using an optical entry  technique a 5 mm port was placed just to the right of the umbilicus.  An additional 12mm port was placed in the left upper quadrant and two 5mm ports were placed in the right upper quadrant and the left lower quadrant.  Finally a right flank 5 mm port was placed for the liver tractor.    Once all of the ports were placed, the liver was retracted with the liver retractor and the hernia was identified and reduced using downward gentle retraction. The patient was placed in steep reverse Trendelenburg to assist with dissection. The pars flaccida was entered using the Harmonic scalpel and dissected up to the right albertina. The hernia sac was then opened, exposing the plane between the hernia sac and the mediastinum. The sac was sharply and bluntly dissected from the pleura laterally on both sides, the aorta posteriorly and the pericardium anteriorly with the harmonic scalpel.     After the hernia sac was dissected, the esophagus was mobilized first by dissecting out the right albertina and taking down the phrenoesophageal membrane, making sure to preserve the peritoneal lining of the right albertina. Circumferential mobilization of the esophagus was performed to help establish adequate intra-abdominal esophageal length of 2-3cm. The left albertina and base of the crura were also dissected during the mobilization of the esophagus.    Attention was then turned to the mobilization of the fundus and the division of the short gastric vessels. With the traction of gastrosplenic omentum and countertraction of the stomach, the short gastric vessels were ligated with the harmonic scalpel along the upper third of the greater curve of the stomach. Care was taken as the superior pole of the spleen was approached to avoid injury. Retrogastric attachments were taken down and a window was created into the lesser sac. Exposure of the left albertina was achieved with further dissection of the short gastric and retroesophageal attachments.    Attention was then  "turned to performing a tension free hiatal closure. The crura were reapproximated with 4 interrupted sutures of #0 Ethibond. At the completion of the hiatal closure, a grasper was easily passed through the hiatus and the closure did not appear to be too tight.    Attention was then turned to creating a Toupet wrap. A grasper was passed through the retroesophageal window and the fundus was grasped at the level of the divided short gastric vessels along the greater curvature and pulled through the window to the right side. A \"shoe-shine\" maneuver was then performed to ensure adequate mobility without twists or tension. The Toupet was then secured with #2-0 Ethibond. Each stitch incorporated full thickness of the stomach and partial thickness of the esophagus, spaced 1cm apart on both the left and right side of the esophagus.    After ensuring hemostasis, all ports were removed under direct visualization and no bleeding was noted. The left upper quadrant 12 mm port port was closed using #0 Vicryl Figure-of-eight suture. All skin incisions were closed with subcuticular #4-0 Monocryl. The wounds were dressed with Glue.    At the end of the procedure, all instrument and sponge counts were correct x2.    The patient was brought to PACU in stable condition.      I was present for the entire procedure.    Patient Disposition:  PACU  and hemodynamically stable        SIGNATURE: Robbie Peguero MD  DATE: February 15, 2024  TIME: 11:10 AM                "

## 2024-02-15 NOTE — RESPIRATORY THERAPY NOTE
RT Protocol Note  Chau Lou III 58 y.o. male MRN: [id number]  Unit/Bed#: [facility] 904-01 Encounter: [id number]    Assessment    Active Problems:  There are no active Hospital Problems.      Home Pulmonary Medications:  albuteorl       Past Medical History:   Diagnosis Date    Anxiety     Asthma     BPH (benign prostatic hyperplasia)     COPD (chronic obstructive pulmonary disease) (HCC)     Depression     GERD (gastroesophageal reflux disease)     Hiatal hernia     Hyperlipidemia     Hypertension     Suicide attempt (HCC)      Social History     Socioeconomic History    Marital status:      Spouse name: None    Number of children: None    Years of education: None    Highest education level: None   Occupational History    Occupation: unemployed   Tobacco Use    Smoking status: Heavy Smoker     Current packs/day: 1.50     Average packs/day: 1.5 packs/day for 40.1 years (60.2 ttl pk-yrs)     Types: Cigarettes     Start date: 1984     Passive exposure: Current    Smokeless tobacco: Never   Vaping Use    Vaping status: Never Used   Substance and Sexual Activity    Alcohol use: Yes     Alcohol/week: 14.0 standard drinks of alcohol     Types: 14 Cans of beer per week    Drug use: Never    Sexual activity: Not Currently     Partners: Female   Other Topics Concern    None   Social History Narrative    Lives independently     Social Determinants of Health     Financial Resource Strain: Low Risk  (12/20/2023)    Overall Financial Resource Strain (CARDIA)     Difficulty of Paying Living Expenses: Not hard at all   Food Insecurity: No Food Insecurity (12/20/2023)    Hunger Vital Sign     Worried About Running Out of Food in the Last Year: Never true     Ran Out of Food in the Last Year: Never true   Transportation Needs: No Transportation Needs (12/20/2023)    PRAPARE - Transportation     Lack of Transportation (Medical): No     Lack of Transportation (Non-Medical): No   Physical Activity: Not on file   Stress: No  "Stress Concern Present (8/4/2022)    [employer] [place] [employer] - Occupational Stress Questionnaire     Feeling of Stress : Not at all   Social Connections: Not on file   Intimate Partner Violence: Not on file   Housing Stability: Unknown (8/4/2022)    Housing Stability Vital Sign     Unable to Pay for Housing in the Last Year: No     Number of Places Lived in the Last Year: Not on file     Unstable Housing in the Last Year: No       Subjective         Objective    Physical Exam:   Assessment Type: (P) Assess only  General Appearance: (P) Awake, Alert  Respiratory Pattern: (P) Normal  Chest Assessment: (P) Chest expansion symmetrical  Bilateral Breath Sounds: (P) Clear    Vitals:  Blood pressure (!) 180/87, pulse 75, temperature (!) 97 °F (36.1 °C), temperature source Oral, resp. rate 20, height 5' 6\" (1.676 m), weight 66.2 kg (146 lb), SpO2 97%.          Imaging and other studies: I have personally reviewed pertinent reports.            Plan    Respiratory Plan: (P) Discontinue Protocol, Home Bronchodilator Patient pathway        Resp Comments: (P) pt ordered on protocol, pt has hx of asthma and uses albuterol mdi @ home prn. pt on room air no distress, pt is already ordered on albuterol mdi prn. pt not admitted for respiratory issues, no other respriatory intervention is needed at this time will d/c form protocol   "

## 2024-02-16 ENCOUNTER — APPOINTMENT (OUTPATIENT)
Dept: RADIOLOGY | Facility: HOSPITAL | Age: 59
End: 2024-02-16
Payer: MEDICARE

## 2024-02-16 VITALS
OXYGEN SATURATION: 93 % | DIASTOLIC BLOOD PRESSURE: 99 MMHG | RESPIRATION RATE: 17 BRPM | HEIGHT: 66 IN | TEMPERATURE: 98.8 F | WEIGHT: 146 LBS | BODY MASS INDEX: 23.46 KG/M2 | HEART RATE: 84 BPM | SYSTOLIC BLOOD PRESSURE: 162 MMHG

## 2024-02-16 LAB
ANION GAP SERPL CALCULATED.3IONS-SCNC: 9 MMOL/L
ATRIAL RATE: 79 BPM
ATRIAL RATE: 79 BPM
ATRIAL RATE: 94 BPM
BUN SERPL-MCNC: 14 MG/DL (ref 5–25)
CALCIUM SERPL-MCNC: 8.2 MG/DL (ref 8.4–10.2)
CHLORIDE SERPL-SCNC: 99 MMOL/L (ref 96–108)
CO2 SERPL-SCNC: 23 MMOL/L (ref 21–32)
CREAT SERPL-MCNC: 0.73 MG/DL (ref 0.6–1.3)
GFR SERPL CREATININE-BSD FRML MDRD: 100 ML/MIN/1.73SQ M
GLUCOSE P FAST SERPL-MCNC: 128 MG/DL (ref 65–99)
GLUCOSE SERPL-MCNC: 128 MG/DL (ref 65–140)
P AXIS: 66 DEGREES
P AXIS: 67 DEGREES
P AXIS: 71 DEGREES
POTASSIUM SERPL-SCNC: 3.9 MMOL/L (ref 3.5–5.3)
PR INTERVAL: 178 MS
PR INTERVAL: 178 MS
PR INTERVAL: 194 MS
QRS AXIS: 19 DEGREES
QRS AXIS: 19 DEGREES
QRS AXIS: 21 DEGREES
QRSD INTERVAL: 90 MS
QRSD INTERVAL: 92 MS
QRSD INTERVAL: 92 MS
QT INTERVAL: 328 MS
QT INTERVAL: 362 MS
QT INTERVAL: 366 MS
QTC INTERVAL: 410 MS
QTC INTERVAL: 415 MS
QTC INTERVAL: 419 MS
SODIUM SERPL-SCNC: 131 MMOL/L (ref 135–147)
T WAVE AXIS: 50 DEGREES
T WAVE AXIS: 57 DEGREES
T WAVE AXIS: 75 DEGREES
VENTRICULAR RATE: 79 BPM
VENTRICULAR RATE: 79 BPM
VENTRICULAR RATE: 94 BPM

## 2024-02-16 PROCEDURE — 93010 ELECTROCARDIOGRAM REPORT: CPT | Performed by: INTERNAL MEDICINE

## 2024-02-16 PROCEDURE — 99024 POSTOP FOLLOW-UP VISIT: CPT | Performed by: SURGERY

## 2024-02-16 PROCEDURE — 93005 ELECTROCARDIOGRAM TRACING: CPT

## 2024-02-16 PROCEDURE — 97162 PT EVAL MOD COMPLEX 30 MIN: CPT

## 2024-02-16 PROCEDURE — 97166 OT EVAL MOD COMPLEX 45 MIN: CPT

## 2024-02-16 PROCEDURE — 74240 X-RAY XM UPR GI TRC 1CNTRST: CPT

## 2024-02-16 RX ORDER — AMLODIPINE BESYLATE 5 MG/1
5 TABLET ORAL DAILY
Status: DISCONTINUED | OUTPATIENT
Start: 2024-02-16 | End: 2024-02-16 | Stop reason: HOSPADM

## 2024-02-16 RX ORDER — AMLODIPINE BESYLATE 5 MG/1
5 TABLET ORAL DAILY
Qty: 30 TABLET | Refills: 0 | Status: SHIPPED | OUTPATIENT
Start: 2024-02-17 | End: 2024-03-18

## 2024-02-16 RX ORDER — OXYCODONE HCL 5 MG/5 ML
5 SOLUTION, ORAL ORAL EVERY 6 HOURS PRN
Qty: 80 ML | Refills: 0 | Status: SHIPPED | OUTPATIENT
Start: 2024-02-16 | End: 2024-02-26

## 2024-02-16 RX ORDER — ONDANSETRON 4 MG/1
4 TABLET, ORALLY DISINTEGRATING ORAL EVERY 6 HOURS PRN
Qty: 16 TABLET | Refills: 0 | Status: SHIPPED | OUTPATIENT
Start: 2024-02-16

## 2024-02-16 RX ADMIN — FINASTERIDE 5 MG: 5 TABLET, FILM COATED ORAL at 09:18

## 2024-02-16 RX ADMIN — AMLODIPINE BESYLATE 5 MG: 5 TABLET ORAL at 09:14

## 2024-02-16 RX ADMIN — ESCITALOPRAM OXALATE 20 MG: 20 TABLET ORAL at 09:14

## 2024-02-16 RX ADMIN — LORATADINE AND PSEUDOEPHEDRINE 1 TABLET: 10; 240 TABLET, EXTENDED RELEASE ORAL at 09:16

## 2024-02-16 RX ADMIN — ACETAMINOPHEN 650 MG: 650 SUSPENSION ORAL at 06:14

## 2024-02-16 RX ADMIN — METHOCARBAMOL 500 MG: 500 TABLET ORAL at 09:13

## 2024-02-16 RX ADMIN — METHOCARBAMOL 500 MG: 500 TABLET ORAL at 18:03

## 2024-02-16 RX ADMIN — OXYCODONE HYDROCHLORIDE 10 MG: 5 SOLUTION ORAL at 18:03

## 2024-02-16 RX ADMIN — CLONIDINE HYDROCHLORIDE 0.1 MG: 0.1 TABLET ORAL at 09:14

## 2024-02-16 RX ADMIN — NICOTINE 1 PATCH: 14 PATCH, EXTENDED RELEASE TRANSDERMAL at 09:14

## 2024-02-16 RX ADMIN — OXYCODONE HYDROCHLORIDE 10 MG: 5 SOLUTION ORAL at 01:05

## 2024-02-16 RX ADMIN — ATORVASTATIN CALCIUM 10 MG: 10 TABLET, FILM COATED ORAL at 09:14

## 2024-02-16 RX ADMIN — ALBUTEROL SULFATE 1 PUFF: 90 AEROSOL, METERED RESPIRATORY (INHALATION) at 09:16

## 2024-02-16 RX ADMIN — ACETAMINOPHEN 650 MG: 650 SUSPENSION ORAL at 18:03

## 2024-02-16 RX ADMIN — IOHEXOL 100 ML: 350 INJECTION, SOLUTION INTRAVENOUS at 12:34

## 2024-02-16 RX ADMIN — METHOCARBAMOL 500 MG: 500 TABLET ORAL at 12:39

## 2024-02-16 RX ADMIN — UMECLIDINIUM 1 PUFF: 62.5 AEROSOL, POWDER ORAL at 09:19

## 2024-02-16 RX ADMIN — ACETAMINOPHEN 650 MG: 650 SUSPENSION ORAL at 12:39

## 2024-02-16 RX ADMIN — PANTOPRAZOLE SODIUM 40 MG: 40 TABLET, DELAYED RELEASE ORAL at 06:13

## 2024-02-16 RX ADMIN — TAMSULOSIN HYDROCHLORIDE 0.4 MG: 0.4 CAPSULE ORAL at 18:09

## 2024-02-16 RX ADMIN — OXYCODONE HYDROCHLORIDE 10 MG: 5 SOLUTION ORAL at 06:13

## 2024-02-16 RX ADMIN — Medication 1000 UNITS: at 09:14

## 2024-02-16 NOTE — DISCHARGE SUMMARY
"  Discharge Summary - Chau Lou III 58 y.o. male MRN: [id number]    Unit/Bed#: [facility] 904-01 Encounter: [id number]    Admission Date:     Admitting Diagnosis: Hiatal hernia [K44.9]  Paraesophageal hernia with gastroesophageal reflux [K44.9, K21.9]    HPI: Per Robbie Peguero, \"Chau Lou III is a 58 y.o. male who presents with a paraesophageal hernia.  He has a several year history of postprandial fullness, nausea, and occasional emesis.  This is worse at night, and exacerbating after large meals.  He denies any fevers, chills, chest pain, shortness of breath.  He does have a history of COPD, currently on inhalers.\"    Procedures Performed: No orders of the defined types were placed in this encounter.      Summary of Hospital Course: Patient is a 58-year-old male comes in for evaluation for an elective paraesophageal hernia repair.  Patient was taken to the OR table in postoperative setting.  During his hospital course he was initiated on amlodipine for blood pressure management.  He did well in the postoperative setting.  He was discharged home on 2/16/2024.  He would outpatient follow-up with his primary care provider and the general surgeon.    Significant Findings, Care, Treatment and Services Provided:   FL upper GI UGI    Result Date: 2/16/2024  Impression: Postoperative change is noted reflecting recent paraesophageal hernia repair with fundoplication with slightly reduced transit across the wrapped segment. No evidence for leak. No residual/recurrent hernia. Workstation performed: [id number]        Complications: no complications    Discharge Diagnosis:   Patient Active Problem List   Diagnosis    Anxiety    Major depression    Mixed hyperlipidemia    Tobacco dependence syndrome    COPD (chronic obstructive pulmonary disease) (HCC)    BPH (benign prostatic hyperplasia)    Postnasal drip    Seborrheic dermatitis    Gastroesophageal reflux disease without esophagitis    OA (osteoarthritis) of neck "    Constipation    Hiatal hernia    Chronic pain of both knees    Screen for STD (sexually transmitted disease)    Prostate cancer screening    Premature ejaculation    Allergic rhinitis    Dysuria    Testicular discomfort    Pain of left hip    Left shoulder pain    Bronchitis    Poison ivy    Congestion of larynx    Preop pulmonary/respiratory exam    Angina of effort    Cough         Medical Problems       Resolved Problems  Date Reviewed: 5/31/2023   None         Condition at Discharge: good         Discharge instructions/Information to patient and family:   See after visit summary for information provided to patient and family.      Provisions for Follow-Up Care:  See after visit summary for information related to follow-up care and any pertinent home health orders.      PCP: Tahir Pena DO    Disposition: Home    Planned Readmission: No      Discharge Statement   I spent 23 minutes discharging the patient. This time was spent on the day of discharge. I had direct contact with the patient on the day of discharge. Additional documentation is required if more than 30 minutes were spent on discharge.     Discharge Medications:  See after visit summary for reconciled discharge medications provided to patient and family.

## 2024-02-16 NOTE — PHYSICAL THERAPY NOTE
Physical Therapy Evaluation     Patient's Name: Chau Lou III    Admitting Diagnosis  Hiatal hernia [K44.9]  Paraesophageal hernia with gastroesophageal reflux [K44.9, K21.9]    Problem List  Patient Active Problem List   Diagnosis    Anxiety    Major depression    Mixed hyperlipidemia    Tobacco dependence syndrome    COPD (chronic obstructive pulmonary disease) (HCC)    BPH (benign prostatic hyperplasia)    Postnasal drip    Seborrheic dermatitis    Gastroesophageal reflux disease without esophagitis    OA (osteoarthritis) of neck    Constipation    Hiatal hernia    Chronic pain of both knees    Screen for STD (sexually transmitted disease)    Prostate cancer screening    Premature ejaculation    Allergic rhinitis    Dysuria    Testicular discomfort    Pain of left hip    Left shoulder pain    Bronchitis    Poison ivy    Congestion of larynx    Preop pulmonary/respiratory exam    Angina of effort    Cough       Past Medical History  Past Medical History:   Diagnosis Date    Anxiety     Asthma     BPH (benign prostatic hyperplasia)     COPD (chronic obstructive pulmonary disease) (HCC)     Depression     GERD (gastroesophageal reflux disease)     Hiatal hernia     Hyperlipidemia     Hypertension     Suicide attempt (HCC)        Past Surgical History  Past Surgical History:   Procedure Laterality Date    COLONOSCOPY      ESOPHAGOGASTRODUODENOSCOPY N/A 03/01/2019    Procedure: ESOPHAGOGASTRODUODENOSCOPY (EGD);  Surgeon: Travis Nathan MD;  Location: [facility];  Service: Gastroenterology    FRACTURE SURGERY      finger surgery    HERNIA REPAIR      x2    SD LAPS RPR PARAESPHGL HRNA INCL FUNDPLSTY W/MESH N/A 2/15/2024    Procedure: REPAIR HERNIA PARAESOPHAGEAL  LAPAROSCOPIC;  Surgeon: Robbie Peguero MD;  Location:  MAIN OR;  Service: General    SD LAPS SURG ESOPG/GSTR FUNDOPLASTY N/A 2/15/2024    Procedure: FUNDOPLICATION TOUPET LAPAROSCOPIC;  Surgeon: Robbie Peguero MD;  Location: [facility];  Service:  "General    SKIN GRAFT Right     Foot          02/16/24 1015   PT Last Visit   PT Visit Date 02/16/24   Note Type   Note type Evaluation   Pain Assessment   Pain Assessment Tool 0-10   Pain Score No Pain   Restrictions/Precautions   Weight Bearing Precautions Per Order No   Other Precautions Fall Risk;Impulsive   Home Living   Type of Home House   Home Layout Multi-level  ([place])   Additional Comments Pt irritable, refusing to answer questions or engage in converstaion.   Prior Function   Level of [place] Independent with ADLs;Independent with functional mobility   General   Family/Caregiver Present No   Cognition   Comments Pt was very irritable, perseverating on not needing therapy services.   Subjective   Subjective \"I can walk\"   RLE Assessment   RLE Assessment   (functionally 3+/5)   LLE Assessment   LLE Assessment   (functionally 3+/5)   Bed Mobility   Supine to Sit 6  Modified independent   Additional items HOB elevated   Additional Comments Left sitting EOB with all needs in reach.   Transfers   Sit to Stand 7  Independent   Stand to Sit 7  Independent   Additional Comments without AD   Ambulation/Elevation   Gait pattern   (mild unsteadiness)   Gait Assistance 5  Supervision   Assistive Device None   Distance 70 ft x 2   Balance   Static Sitting Fair +   Dynamic Sitting Fair +   Static Standing Fair   Dynamic Standing Fair -   Ambulatory Fair -   Activity Tolerance   Activity Tolerance Patient tolerated treatment well   Medical Staff Made Aware OT Ramona   Nurse Made Aware RN cleared pt to be seen by PT   Assessment   Prognosis Good   Assessment Pt seen for moderate complexity PT evaluation due to decrease in functional mobility status compared to baseline.  Pt with active PT eval/treat orders at this time.  Pt is a 58 y.o. M who presented to [facility] with hiatal hernia on 2/15/24, now s/p lap PEHR with fundoplication on 2/15/24.  Pt  has a past medical history of Anxiety, Asthma, BPH (benign " prostatic hyperplasia), COPD (chronic obstructive pulmonary disease) ([facility]), Depression, GERD (gastroesophageal reflux disease), Hiatal hernia, Hyperlipidemia, Hypertension, and Suicide attempt ([facility]).  Pt resides in [facility].  Pt requires supervision for ambulation at this time.  Pt left sitting EOB with all needs in reach.  Pt denies any concerns regarding mobility upon DC. PT to DC pt as pt has no further acute skilled PT needs and recommending home.  The patient's AM-PAC Basic Mobility Inpatient Short Form Raw Score is 22. A Raw score of greater than 16 suggests the patient may benefit from discharge to home. Please also refer to the recommendation of the Physical Therapist for safe discharge planning.   Barriers to Discharge None   Goals   Patient Goals to go home   Discharge Recommendation   Rehab Resource Intensity Level, PT No post-acute rehabilitation needs   Equipment Recommended   (none)   AM-PAC Basic Mobility Inpatient   Turning in Flat Bed Without Bedrails 4   Lying on Back to Sitting on Edge of Flat Bed Without Bedrails 4   Moving Bed to Chair 4   Standing Up From Chair Using Arms 4   Walk in Room 3   Climb 3-5 Stairs With Railing 3   Basic Mobility Inpatient Raw Score 22   Basic Mobility Standardized Score 47.4   Highest Level Of Mobility   JH-HLM Goal 7: Walk 25 feet or more   JH-HLM Achieved 7: Walk 25 feet or more   Modified Ceiba Scale   Modified Ceiba Scale 2           Fariha Diop, PT, DPT

## 2024-02-16 NOTE — QUICK NOTE
"Post- OP Note - GeneralSurgery   Chau Lou III 58 y.o. male MRN: [id number]  Unit/Bed#: [facility] 904-01 Encounter: [id number]    Assessment:  58 y.o. male Day of Surgery s/p Procedure(s) (LRB):  REPAIR HERNIA PARAESOPHAGEAL  LAPAROSCOPIC (N/A)  FUNDOPLICATION TOUPET LAPAROSCOPIC (N/A)       Subjective/Objective     Subjective:   Patient alert and oriented. Some abdominal pain. No N/V. Elevated BP per staff. Pain improved after prns    Objective:    Blood pressure 162/94, pulse 98, temperature 98.6 °F (37 °C), resp. rate 15, height 5' 6\" (1.676 m), weight 66.2 kg (146 lb), SpO2 93%.,Body mass index is 23.57 kg/m².    Physical Exam:   Gen: NAD.  HEENT: MMM  CV: well perfused  Lungs: Normal respiratory effort on RA  Abd: soft, appropriately tender, nondistended, Incisions clean dry and intact  Skin: warm/ dry  Neuro: AxO x3    Please Tigertext on call surgery resident role with any questions     "

## 2024-02-16 NOTE — PROGRESS NOTES
"Progress Note -General Surgery  Chau Lou III 58 y.o. male MRN: [id number]  Unit/Bed#: [facility] 904-01 Encounter: [id number]    Assessment:  Patient is a 58 y.o. male s/p lap PEHR with fundoplication 2/15    Plan:  Fulls  IVF  UGI today  OOB/ambulate  Please TigerText on call Red Surgery or Acute Care Surgery Floor Call with any questions     Subjective/Objective     Subjective:   No acute events overnight. With pain and hypertension. Stable pain overnight. Tolerated liquids.    Pertinent review of systems as above. All other review of systems negative.    Objective:    Blood pressure 148/76, pulse 74, temperature 98.6 °F (37 °C), resp. rate 15, height 5' 6\" (1.676 m), weight 66.2 kg (146 lb), SpO2 96%.,Body mass index is 23.57 kg/m².      Intake/Output Summary (Last 24 hours) at 2/16/2024 0141  Last data filed at 2/15/2024 1113  Gross per 24 hour   Intake 2003.1 ml   Output 20 ml   Net 1983.1 ml       Invasive Devices       Peripheral Intravenous Line  Duration             Peripheral IV 12/11/23 Right Antecubital 66 days                    Physical Exam:   Gen:  NAD.  HEENT: NCAT. MMM  CV: well perfused  Lungs: Normal respiratory effort  Abd: soft,mild tenderss in LUQ. incisions cdi  Skin: warm/ dry  Extremities: no peripheral edema, no clubbing or cyanosis  Neuro: AxO x3      I have personally reviewed pertinent films in PACS.    "

## 2024-02-16 NOTE — PROGRESS NOTES
"Gave pt  instruction on use of incentive spirometer but pt sated \"I don't want that. I know what it is. I refuse\". Educated pt on importance of use but continues to refuse. Left I/S at bedside will try again later.  "

## 2024-02-16 NOTE — PLAN OF CARE
Problem: INFECTION - ADULT  Goal: Absence or prevention of progression during hospitalization  Description: INTERVENTIONS:  - Assess and monitor for signs and symptoms of infection  - Monitor lab/diagnostic results  - Monitor all insertion sites, i.e. indwelling lines, tubes, and drains  - Monitor endotracheal if appropriate and nasal secretions for changes in amount and color  - [place] appropriate cooling/warming therapies per order  - Administer medications as ordered  - Instruct and encourage patient and family to use good hand hygiene technique  - Identify and instruct in appropriate isolation precautions for identified infection/condition  Outcome: Progressing     Problem: SAFETY ADULT  Goal: Patient will remain free of falls  Description: INTERVENTIONS:  - Educate patient/family on patient safety including physical limitations  - Instruct patient to call for assistance with activity   - Consult OT/PT to assist with strengthening/mobility   - Keep Call bell within reach  - Keep bed low and locked with side rails adjusted as appropriate  - Keep care items and personal belongings within reach  - Initiate and maintain comfort rounds  - Make Fall Risk Sign visible to staff  - Offer Toileting every 2 Hours, in advance of need  - Initiate/Maintain alarm  - Obtain necessary fall risk management equipment:   - Apply yellow socks and bracelet for high fall risk patients  - Consider moving patient to room near nurses station  Outcome: Progressing

## 2024-02-19 ENCOUNTER — TELEPHONE (OUTPATIENT)
Age: 59
End: 2024-02-19

## 2024-02-19 DIAGNOSIS — K44.9 HIATAL HERNIA: Primary | ICD-10-CM

## 2024-02-19 PROCEDURE — 88302 TISSUE EXAM BY PATHOLOGIST: CPT | Performed by: PATHOLOGY

## 2024-02-19 RX ORDER — OXYCODONE HYDROCHLORIDE 5 MG/1
5 TABLET ORAL EVERY 4 HOURS PRN
Qty: 10 TABLET | Refills: 0 | Status: SHIPPED | OUTPATIENT
Start: 2024-02-19 | End: 2024-02-29

## 2024-02-19 NOTE — TELEPHONE ENCOUNTER
Pt states pharmacy does not carry liquid form of oxycodone; asking doctor for pill form new script asap please.    Pharmacy [facility] [address] in [place] [phone]    Patient also states he is experiencing reflux when he eats.

## 2024-02-20 ENCOUNTER — PATIENT OUTREACH (OUTPATIENT)
Dept: OTHER | Facility: OTHER | Age: 59
End: 2024-02-20

## 2024-02-20 NOTE — PROGRESS NOTES
Tuesday February 20, 2024    Client requested to meet Community Health Resource Nurse (CHRN)- [place] [facility] (PN) during [facility] outreach hours.     Experiencing occasional pain 6/10 in incision area and upper left quadrant. Has relief with prescribed Oxycodone. Last doses was at 12:30 pm today.. Client last BM was loose and it was today.    Reviewed upcoming appointments. Client stated he arranged a ride for Friday appointment.with [facility] .     He reported last alcohol drink was last Tuesday.2/13/24    Emotional support given.

## 2024-02-21 ENCOUNTER — PATIENT OUTREACH (OUTPATIENT)
Dept: OTHER | Facility: OTHER | Age: 59
End: 2024-02-21

## 2024-02-21 PROBLEM — Z12.5 PROSTATE CANCER SCREENING: Status: RESOLVED | Noted: 2020-05-28 | Resolved: 2024-02-21

## 2024-02-21 PROBLEM — Z11.3 SCREEN FOR STD (SEXUALLY TRANSMITTED DISEASE): Status: RESOLVED | Noted: 2020-02-20 | Resolved: 2024-02-21

## 2024-02-21 PROBLEM — R05.9 COUGH: Status: RESOLVED | Noted: 2024-01-25 | Resolved: 2024-02-21

## 2024-02-21 NOTE — PROGRESS NOTES
Wednesday February 21, 2024    Client requested to see Community Health Resource Nurse (CHRN)-[place] [facility] (PN) during [facility] ([facility] outreach hours.    Client questioned when Dr. Pena would want to see him again. After reviewing Dr. Pena's note, he is to return around 2/29/24.    CHRN reached out to [facility] to seek appointment. Unable to reach office. CHRN will notify client when appointment is made. Client requests appointment after 12 PM.     Client pleasant in this encounter. Emotional support given.     08:30

## 2024-02-23 ENCOUNTER — OFFICE VISIT (OUTPATIENT)
Dept: SURGERY | Facility: CLINIC | Age: 59
End: 2024-02-23

## 2024-02-23 VITALS — HEIGHT: 66 IN | WEIGHT: 144.2 LBS | BODY MASS INDEX: 23.18 KG/M2 | TEMPERATURE: 98.1 F

## 2024-02-23 DIAGNOSIS — K44.9 HIATAL HERNIA: Primary | ICD-10-CM

## 2024-02-23 PROCEDURE — 99024 POSTOP FOLLOW-UP VISIT: CPT | Performed by: SURGERY

## 2024-02-23 RX ORDER — METHYLPREDNISOLONE 4 MG/1
TABLET ORAL
Qty: 21 EACH | Refills: 0 | Status: SHIPPED | OUTPATIENT
Start: 2024-02-23

## 2024-02-23 RX ORDER — GABAPENTIN 300 MG/1
300 CAPSULE ORAL 3 TIMES DAILY
Qty: 21 CAPSULE | Refills: 0 | Status: SHIPPED | OUTPATIENT
Start: 2024-02-23

## 2024-02-23 NOTE — ASSESSMENT & PLAN NOTE
58-year-old male status post laparoscopic paraesophageal hernia repair with partial fundoplication and EGD at 2/15/2024, here for follow-up.    Plan:  He is still having some mild dysphagia, but has been very adventurous with his diet.  I reiterated healthy eating habits after fundoplication including taking small bites, chewing his food thoroughly taking 6 small meals per day.  I have also ordered a short course of oral steroids to help with some of his inflammation and presumably help his dysphagia.  Additionally, I have ordered short course of gabapentin for some of the pain he is experiencing in his left upper quadrant.  I like to see him back in 4 weeks for next check.

## 2024-02-23 NOTE — PROGRESS NOTES
Office Visit - General Surgery  Chau Lou III MRN: [id number]  Encounter: [id number]  Assessment/Plan    Problem List Items Addressed This Visit       Hiatal hernia - Primary     58-year-old male status post laparoscopic paraesophageal hernia repair with partial fundoplication and EGD at 2/15/2024, here for follow-up.    Plan:  He is still having some mild dysphagia, but has been very adventurous with his diet.  I reiterated healthy eating habits after fundoplication including taking small bites, chewing his food thoroughly taking 6 small meals per day.  I have also ordered a short course of oral steroids to help with some of his inflammation and presumably help his dysphagia.  Additionally, I have ordered short course of gabapentin for some of the pain he is experiencing in his left upper quadrant.  I like to see him back in 4 weeks for next check.         Relevant Medications    methylPREDNISolone 4 MG tablet therapy pack    gabapentin (Neurontin) 300 mg capsule       Subjective    Chau Lou III is a 58 y.o. male who presents status post paraesophageal hernia repair.  He underwent laparoscopic paraesophageal hernia repair with partial fundoplication and EGD on 2/15/2024, here for follow-up.  Overall he is doing fairly well with just some mild dysphagia.  He has been deviating from his foregut diet and trying all foods.  He has had some episodes of regurgitation and vomiting especially with bread products.  He is tolerating liquids without difficulty.  He denies any fevers, chills, chest pain, shortness of breath.  Moving his bowels normally.      Pt  has a past medical history of Anxiety, Asthma, BPH (benign prostatic hyperplasia), COPD (chronic obstructive pulmonary disease) ([facility]), Depression, GERD (gastroesophageal reflux disease), Hiatal hernia, Hyperlipidemia, Hypertension, and Suicide attempt ([facility]).    Pt  has a past surgical history that includes Hernia repair; Skin graft (Right); Fracture  surgery; Esophagogastroduodenoscopy (N/A, 03/01/2019); Colonoscopy; pr laps rpr paraesphgl hrna incl fundplsty w/mesh (N/A, 2/15/2024); and pr laps surg esopg/gstr fundoplasty (N/A, 2/15/2024).    family history includes Prostate cancer in his paternal uncle.    Chau Lou III reports that he has been smoking cigarettes. He started smoking about 40 years ago. He has a 60.2 pack-year smoking history. He has been exposed to tobacco smoke. He has never used smokeless tobacco. He reports current alcohol use of about 14.0 standard drinks of alcohol per week. He reports that he does not use drugs.      Current Outpatient Medications on File Prior to Visit   Medication Sig Dispense Refill    acetaminophen (TYLENOL) 650 mg CR tablet TAKE 1 TABLET (650 MG TOTAL) BY MOUTH EVERY 8 (EIGHT) HOURS AS NEEDED FOR MILD PAIN 30 tablet 0    Advair Diskus 500-50 MCG/ACT inhaler INHALE 1 PUFF 2 (TWO) TIMES A DAY RINSE MOUTH AFTER USE. 14 blister 3    albuterol (PROVENTIL HFA,VENTOLIN HFA) 90 mcg/act inhaler Inhale 1 puff every 6 (six) hours as needed for wheezing 17 g 10    amLODIPine (NORVASC) 5 mg tablet Take 1 tablet (5 mg total) by mouth daily for 30 doses 30 tablet 0    atorvastatin (LIPITOR) 10 mg tablet TAKE 1 TABLET (10 MG TOTAL) BY MOUTH DAILY 90 tablet 3    benzonatate (TESSALON PERLES) 100 mg capsule TAKE 1 CAPSULE (100 MG TOTAL) BY MOUTH 3 (THREE) TIMES A DAY AS NEEDED FOR COUGH 20 capsule 0    brompheniramine-pseudoephedrine (DIMETAPP) 1-15 MG/5ML ELIX Take 5 mL by mouth every 6 (six) hours as needed for allergies 473 mL 3    buPROPion (WELLBUTRIN SR) 150 mg 12 hr tablet TAKE 1 TABLET (150 MG TOTAL) BY MOUTH 2 (TWO) TIMES A DAY 60 tablet 2    cloNIDine (CATAPRES) 0.1 mg tablet TAKE 1 TABLET BY ORAL ROUTE 2 TIMES PER DAY      dextromethorphan-guaiFENesin (ROBITUSSIN DM)  mg/5 mL syrup Take 5 mL by mouth 3 (three) times a day as needed for cough 100 mL 0    escitalopram (LEXAPRO) 20 mg tablet Take 1 tablet (20 mg  total) by mouth daily 30 tablet 1    finasteride (PROSCAR) 5 mg tablet Take 1 tablet (5 mg total) by mouth daily 90 tablet 3    fluticasone (FLONASE) 50 mcg/act nasal spray 1 SPRAY INTO EACH NOSTRIL DAILY 16 g 3    guaiFENesin (ROBITUSSIN) 100 MG/5ML oral liquid Take 10 mL (200 mg total) by mouth 3 (three) times a day as needed for cough 120 mL 0    methocarbamol (ROBAXIN) 500 mg tablet Take 1 tablet (500 mg total) by mouth 4 (four) times a day 30 tablet 0    Multiple Vitamin (multivitamin) tablet Take 1 tablet by mouth daily 90 tablet 3    ondansetron (Zofran ODT) 4 mg disintegrating tablet Take 1 tablet (4 mg total) by mouth every 6 (six) hours as needed for nausea or vomiting 16 tablet 0    oxyCODONE (Roxicodone) 5 immediate release tablet Take 1 tablet (5 mg total) by mouth every 4 (four) hours as needed for severe pain for up to 10 days Max Daily Amount: 30 mg 10 tablet 0    oxyCODONE (ROXICODONE) 5 mg/5 mL solution Take 5 mL (5 mg total) by mouth every 6 (six) hours as needed for moderate pain for up to 10 days Max Daily Amount: 20 mg 80 mL 0    pantoprazole (PROTONIX) 40 mg tablet TAKE 1 TABLET (40 MG TOTAL) BY MOUTH DAILY 60 tablet 3    QUEtiapine (SEROquel) 50 mg tablet Take 3 tablets (150 mg total) by mouth daily at bedtime 90 tablet 1    tamsulosin (FLOMAX) 0.4 mg Take 1 capsule (0.4 mg total) by mouth daily with dinner 90 capsule 3    tiotropium (Spiriva HandiHaler) 18 mcg inhalation capsule Place 1 capsule (18 mcg total) into inhaler and inhale daily 30 capsule 0    traZODone (DESYREL) 50 mg tablet Take 0.5 tablets (25 mg total) by mouth daily at bedtime 15 tablet 1    cholecalciferol (VITAMIN D3) 1,000 units tablet TAKE 1 TABLET (1,000 UNITS TOTAL) BY MOUTH DAILY 56 tablet 0     No current facility-administered medications on file prior to visit.     Allergies   Allergen Reactions    Seasonal Ic [Cholestatin] Sneezing       Review of Systems   Constitutional:  Negative for appetite change, chills,  diaphoresis and fever.   HENT:  Negative for nosebleeds and trouble swallowing.    Eyes: Negative.    Respiratory:  Negative for cough, shortness of breath and wheezing.    Cardiovascular:  Negative for chest pain, palpitations and leg swelling.   Gastrointestinal:  Positive for vomiting. Negative for abdominal distention, abdominal pain and nausea.   Genitourinary:  Negative for difficulty urinating, flank pain and frequency.   Musculoskeletal:  Negative for arthralgias, joint swelling and myalgias.   Skin:  Negative for pallor and rash.   Neurological:  Negative for dizziness, facial asymmetry and speech difficulty.   Hematological:  Does not bruise/bleed easily.   Psychiatric/Behavioral:  Negative for agitation and confusion.    All other systems reviewed and are negative.      Objective    Vitals:    02/23/24 1025   Temp: 98.1 °F (36.7 °C)       Physical Exam  Vitals and nursing note reviewed.   Constitutional:       General: He is not in acute distress.     Appearance: Normal appearance. He is not toxic-appearing.   HENT:      Head: Normocephalic and atraumatic.      Mouth/Throat:      Mouth: Mucous membranes are moist.   Eyes:      Extraocular Movements: Extraocular movements intact.      Pupils: Pupils are equal, round, and reactive to light.   Cardiovascular:      Rate and Rhythm: Normal rate and regular rhythm.      Pulses: Normal pulses.   Pulmonary:      Effort: Pulmonary effort is normal. No respiratory distress.      Breath sounds: Normal breath sounds. No wheezing.   Abdominal:      General: There is no distension.      Palpations: Abdomen is soft. There is no mass.      Tenderness: There is no abdominal tenderness. There is no guarding or rebound.      Hernia: No hernia is present.      Comments: Well-healed laparoscopic port sites, some tenderness of the left upper quadrant incision.   Musculoskeletal:         General: No swelling or deformity. Normal range of motion.      Cervical back: Normal range  of motion and neck supple.      Right lower leg: No edema.      Left lower leg: No edema.   Skin:     General: Skin is warm and dry.      Coloration: Skin is not jaundiced.   Neurological:      General: No focal deficit present.      Mental Status: He is alert and oriented to person, place, and time.   Psychiatric:         Mood and Affect: Mood normal.         Behavior: Behavior normal.

## 2024-03-06 ENCOUNTER — OFFICE VISIT (OUTPATIENT)
Dept: FAMILY MEDICINE CLINIC | Facility: CLINIC | Age: 59
End: 2024-03-06

## 2024-03-06 VITALS
OXYGEN SATURATION: 99 % | TEMPERATURE: 98.7 F | WEIGHT: 137 LBS | SYSTOLIC BLOOD PRESSURE: 124 MMHG | HEIGHT: 66 IN | BODY MASS INDEX: 22.02 KG/M2 | RESPIRATION RATE: 16 BRPM | HEART RATE: 88 BPM | DIASTOLIC BLOOD PRESSURE: 70 MMHG

## 2024-03-06 DIAGNOSIS — J44.9 CHRONIC OBSTRUCTIVE PULMONARY DISEASE, UNSPECIFIED COPD TYPE (HCC): ICD-10-CM

## 2024-03-06 DIAGNOSIS — K44.9 HIATAL HERNIA: Primary | ICD-10-CM

## 2024-03-06 PROCEDURE — G2211 COMPLEX E/M VISIT ADD ON: HCPCS | Performed by: FAMILY MEDICINE

## 2024-03-06 PROCEDURE — 99213 OFFICE O/P EST LOW 20 MIN: CPT | Performed by: FAMILY MEDICINE

## 2024-03-06 RX ORDER — GABAPENTIN 300 MG/1
300 CAPSULE ORAL 3 TIMES DAILY
Qty: 60 CAPSULE | Refills: 3 | Status: SHIPPED | OUTPATIENT
Start: 2024-03-06

## 2024-03-06 RX ORDER — LIDOCAINE HCL 4% 4 G/100G
CREAM TOPICAL EVERY 6 HOURS PRN
Qty: 30 G | Refills: 2 | Status: SHIPPED | OUTPATIENT
Start: 2024-03-06

## 2024-03-08 NOTE — PROGRESS NOTES
Assessment/Plan:    1. Hiatal hernia  Assessment & Plan:  Continue to emphasize the importance of taking small meals and chewing his food before he swallows.  Patient incision is dry well-healed and intact.  Patient has follow-up with general surgery 3/15.  Prescribed topical lidocaine for his incisional pain he intermittently gets.    Orders:  -     gabapentin (Neurontin) 300 mg capsule; Take 1 capsule (300 mg total) by mouth 3 (three) times a day  -     Lidocaine HCl (Lidocaine Plus) 4 % CREA; Apply topically every 6 (six) hours as needed (sperficial skin pain)    2. Chronic obstructive pulmonary disease, unspecified COPD type ([facility])  Assessment & Plan:   Controlled.  On Spiriva and Advair.  Not in exacerbation.  Continue management with inhalers and avoid triggers and encourage smoking sensation           Subjective:      Patient ID: Chau Lou III is a 58 y.o. male.    Past medical history of Anxiety, Asthma, BPH (benign prostatic hyperplasia), COPD (chronic obstructive pulmonary disease) ([facility]), Depression, GERD (gastroesophageal reflux disease), Hiatal hernia, Hyperlipidemia, Hypertension, and Suicide attempt .  Who underwent laparoscopic paraesophageal hernia repair with partial fundoplication and EGD on 2/15/2024.  Since his repair, patient has had nausea and vomiting after having meals.  Though, patient has not been adhering to diet that general surgery provided.  We went over patient's diet and reemphasized the importance of slow progression of food until it is okay for him to have larger meals and solids.  At the time of the appointment, patient had no nausea, or vomiting.  He states that his surgical incisions are well-healed and intact.        The following portions of the patient's history were reviewed and updated as appropriate: allergies, current medications, past family history, past medical history, past social history, past surgical history, and problem list.    Review of Systems  "  Constitutional:  Negative for chills and fever.   HENT:  Negative for ear pain and sore throat.    Eyes:  Negative for pain and visual disturbance.   Respiratory:  Negative for cough and shortness of breath.    Cardiovascular:  Negative for chest pain and palpitations.   Gastrointestinal:  Negative for abdominal pain and vomiting.   Genitourinary:  Negative for dysuria and hematuria.   Musculoskeletal:  Negative for arthralgias and back pain.   Skin:  Negative for color change and rash.   Neurological:  Negative for seizures and syncope.   All other systems reviewed and are negative.        Objective:      /70 (BP Location: Right arm, Patient Position: Sitting, Cuff Size: Standard)   Pulse 88   Temp 98.7 °F (37.1 °C) (Temporal)   Resp 16   Ht 5' 6\" (1.676 m)   Wt 62.1 kg (137 lb)   SpO2 99%   BMI 22.11 kg/m²          Physical Exam  Constitutional:       General: He is not in acute distress.     Appearance: Normal appearance. He is not toxic-appearing or diaphoretic.   HENT:      Head: Normocephalic.      Mouth/Throat:      Mouth: Mucous membranes are moist.   Eyes:      Extraocular Movements: Extraocular movements intact.      Pupils: Pupils are equal, round, and reactive to light.   Cardiovascular:      Rate and Rhythm: Normal rate and regular rhythm.   Pulmonary:      Effort: Pulmonary effort is normal. No tachypnea.      Breath sounds: Normal breath sounds. No wheezing or rales.   Chest:      Chest wall: No edema. There is no dullness to percussion.   Abdominal:      General: Abdomen is flat. There is no distension.      Palpations: Abdomen is soft.      Tenderness: There is no abdominal tenderness.   Musculoskeletal:      Right lower leg: No edema.      Left lower leg: No edema.   Skin:     Capillary Refill: Capillary refill takes less than 2 seconds.      Findings: Lesion (Well-healed and intact.) present.   Neurological:      General: No focal deficit present.      Mental Status: He is alert and " oriented to person, place, and time.   Psychiatric:         Mood and Affect: Mood normal.         Behavior: Behavior normal.           Tahir Pena DO   Family Medicine PGY-3  3/8/2024

## 2024-03-08 NOTE — ASSESSMENT & PLAN NOTE
Continue to emphasize the importance of taking small meals and chewing his food before he swallows.  Patient incision is dry well-healed and intact.  Patient has follow-up with general surgery 3/15.  Prescribed topical lidocaine for his incisional pain he intermittently gets.

## 2024-03-08 NOTE — ASSESSMENT & PLAN NOTE
Controlled.  On Spiriva and Advair.  Not in exacerbation.  Continue management with inhalers and avoid triggers and encourage smoking sensation

## 2024-03-10 ENCOUNTER — TELEPHONE (OUTPATIENT)
Dept: UROLOGY | Facility: CLINIC | Age: 59
End: 2024-03-10

## 2024-03-11 NOTE — TELEPHONE ENCOUNTER
Voicemail left for the patient asking to please call the office to discuss his 3/28/2024 appointment.

## 2024-03-12 ENCOUNTER — PATIENT OUTREACH (OUTPATIENT)
Dept: OTHER | Facility: OTHER | Age: 59
End: 2024-03-12

## 2024-03-12 NOTE — TELEPHONE ENCOUNTER
Contacted and spoke with the patient to discuss his 3/28/2024 appointment with Patti OLSON.  Asked patient about a VV instead of in office visit and patient is in agreement.    Instructed patient to obtain PSA prior to 3/28/2024. His VV will remain the same time 120 pm.

## 2024-03-12 NOTE — PROGRESS NOTES
Pt presented and we reviewed his upcoming appointments that he needed clarification on the dates and times. Pt mentioned that he has rides for his upcoming appointments.     Pt mentioned he is having trouble eating solids since his hiatal hernia surgery. He has to cut food up into very small pieces in order to get food down or sticks to softer foods like jello and oatmeal.     He says he has been feeling in good spirits lately, due to new relationship developments.     Client pleasant in this encounter.Emotional support given.Writer remains available for assistance as needed.

## 2024-03-15 ENCOUNTER — OFFICE VISIT (OUTPATIENT)
Dept: SURGERY | Facility: CLINIC | Age: 59
End: 2024-03-15

## 2024-03-15 DIAGNOSIS — K44.9 HIATAL HERNIA: Primary | ICD-10-CM

## 2024-03-15 PROCEDURE — 99024 POSTOP FOLLOW-UP VISIT: CPT | Performed by: SURGERY

## 2024-03-15 NOTE — ASSESSMENT & PLAN NOTE
58-year-old male status post laparoscopic paraesophageal hernia repair with partial fundoplication and EGD on 2/15/2024, here for follow-up.    Plan:  Should continue with healthy eating habits, including 6 small meals per day, chewing his food thoroughly, and taking small bites.  He has no restrictions from a consistency of food standpoint at this time.  We discussed his upper quadrant abdominal pain at his port site, and I reassured him that this would steadily improve with time.  To return to clinic in 2 months for next check.

## 2024-03-15 NOTE — PROGRESS NOTES
Assessment/Plan:    Hiatal hernia  58-year-old male status post laparoscopic paraesophageal hernia repair with partial fundoplication and EGD on 2/15/2024, here for follow-up.    Plan:  Should continue with healthy eating habits, including 6 small meals per day, chewing his food thoroughly, and taking small bites.  He has no restrictions from a consistency of food standpoint at this time.  We discussed his upper quadrant abdominal pain at his port site, and I reassured him that this would steadily improve with time.  To return to clinic in 2 months for next check.     Diagnoses and all orders for this visit:    Hiatal hernia          Subjective:      Patient ID: Chau Lou III is a 58 y.o. male.    58-year-old male well-known to me status post laparoscopic paraesophageal hernia repair with partial fundoplication and EGD on 2/15/2024, here for follow-up.  Overall, he is doing fairly well without significant complaints.  He has advance his diet to all foods, and is no longer having significant episodes of dysphagia, nausea, or vomiting.  This happens occasionally when he takes too large of bites.  Previously, he had been not particularly compliant with the postop foregut diet, but has been really good over the past 2 weeks.  Denies any fevers or chills, and is moving his bowels normally.  Still does complain of some left upper quadrant abdominal pain at his port site.        The following portions of the patient's history were reviewed and updated as appropriate: He  has a past medical history of Anxiety, Asthma, BPH (benign prostatic hyperplasia), COPD (chronic obstructive pulmonary disease) ([facility]), Depression, GERD (gastroesophageal reflux disease), Hiatal hernia, Hyperlipidemia, Hypertension, and Suicide attempt ([facility]).  He   Patient Active Problem List    Diagnosis Date Noted    Angina of effort 10/31/2023    Preop pulmonary/respiratory exam 10/23/2023    Congestion of larynx 10/02/2023    Poison ivy  08/03/2023    Bronchitis 05/31/2023    Pain of left hip 04/29/2023    Left shoulder pain 04/29/2023    Dysuria 10/18/2022    Testicular discomfort 10/18/2022    Allergic rhinitis 02/03/2022    Premature ejaculation 05/28/2020    Chronic pain of both knees 10/23/2019    Constipation 07/17/2019    Hiatal hernia 07/17/2019    OA (osteoarthritis) of neck 05/20/2019    Gastroesophageal reflux disease without esophagitis 01/30/2019    Seborrheic dermatitis 12/14/2018    Postnasal drip 10/25/2018    Anxiety 09/27/2018    Mixed hyperlipidemia 09/27/2018    Tobacco dependence syndrome 09/27/2018    COPD (chronic obstructive pulmonary disease) (HCC) 09/27/2018    BPH (benign prostatic hyperplasia) 09/27/2018    Major depression 05/21/2012     He  has a past surgical history that includes Hernia repair; Skin graft (Right); Fracture surgery; Esophagogastroduodenoscopy (N/A, 03/01/2019); Colonoscopy; pr laps rpr paraesphgl hrna incl fundplsty w/mesh (N/A, 2/15/2024); and pr laps surg esopg/gstr fundoplasty (N/A, 2/15/2024).  His family history includes Prostate cancer in his paternal uncle.  He  reports that he has been smoking cigarettes. He started smoking about 40 years ago. He has a 60.3 pack-year smoking history. He has been exposed to tobacco smoke. He has never used smokeless tobacco. He reports current alcohol use of about 14.0 standard drinks of alcohol per week. He reports that he does not use drugs.  Current Outpatient Medications   Medication Sig Dispense Refill    acetaminophen (TYLENOL) 650 mg CR tablet TAKE 1 TABLET (650 MG TOTAL) BY MOUTH EVERY 8 (EIGHT) HOURS AS NEEDED FOR MILD PAIN 30 tablet 0    Advair Diskus 500-50 MCG/ACT inhaler INHALE 1 PUFF 2 (TWO) TIMES A DAY RINSE MOUTH AFTER USE. 14 blister 3    albuterol (PROVENTIL HFA,VENTOLIN HFA) 90 mcg/act inhaler Inhale 1 puff every 6 (six) hours as needed for wheezing 17 g 10    amLODIPine (NORVASC) 5 mg tablet Take 1 tablet (5 mg total) by mouth daily for 30  doses 30 tablet 0    atorvastatin (LIPITOR) 10 mg tablet TAKE 1 TABLET (10 MG TOTAL) BY MOUTH DAILY 90 tablet 3    benzonatate (TESSALON PERLES) 100 mg capsule TAKE 1 CAPSULE (100 MG TOTAL) BY MOUTH 3 (THREE) TIMES A DAY AS NEEDED FOR COUGH 20 capsule 0    brompheniramine-pseudoephedrine (DIMETAPP) 1-15 MG/5ML ELIX Take 5 mL by mouth every 6 (six) hours as needed for allergies 473 mL 3    buPROPion (WELLBUTRIN SR) 150 mg 12 hr tablet TAKE 1 TABLET (150 MG TOTAL) BY MOUTH 2 (TWO) TIMES A DAY 60 tablet 2    cholecalciferol (VITAMIN D3) 1,000 units tablet TAKE 1 TABLET (1,000 UNITS TOTAL) BY MOUTH DAILY 56 tablet 0    cloNIDine (CATAPRES) 0.1 mg tablet TAKE 1 TABLET BY ORAL ROUTE 2 TIMES PER DAY      dextromethorphan-guaiFENesin (ROBITUSSIN DM)  mg/5 mL syrup Take 5 mL by mouth 3 (three) times a day as needed for cough 100 mL 0    escitalopram (LEXAPRO) 20 mg tablet Take 1 tablet (20 mg total) by mouth daily 30 tablet 1    finasteride (PROSCAR) 5 mg tablet Take 1 tablet (5 mg total) by mouth daily 90 tablet 3    fluticasone (FLONASE) 50 mcg/act nasal spray 1 SPRAY INTO EACH NOSTRIL DAILY 16 g 3    gabapentin (Neurontin) 300 mg capsule Take 1 capsule (300 mg total) by mouth 3 (three) times a day 60 capsule 3    guaiFENesin (ROBITUSSIN) 100 MG/5ML oral liquid Take 10 mL (200 mg total) by mouth 3 (three) times a day as needed for cough 120 mL 0    Lidocaine HCl (Lidocaine Plus) 4 % CREA Apply topically every 6 (six) hours as needed (sperficial skin pain) 30 g 2    methocarbamol (ROBAXIN) 500 mg tablet Take 1 tablet (500 mg total) by mouth 4 (four) times a day 30 tablet 0    methylPREDNISolone 4 MG tablet therapy pack Use as directed on package 21 each 0    Multiple Vitamin (multivitamin) tablet Take 1 tablet by mouth daily 90 tablet 3    ondansetron (Zofran ODT) 4 mg disintegrating tablet Take 1 tablet (4 mg total) by mouth every 6 (six) hours as needed for nausea or vomiting 16 tablet 0    pantoprazole (PROTONIX)  40 mg tablet TAKE 1 TABLET (40 MG TOTAL) BY MOUTH DAILY 60 tablet 3    QUEtiapine (SEROquel) 50 mg tablet Take 3 tablets (150 mg total) by mouth daily at bedtime 90 tablet 1    tamsulosin (FLOMAX) 0.4 mg Take 1 capsule (0.4 mg total) by mouth daily with dinner 90 capsule 3    tiotropium (Spiriva HandiHaler) 18 mcg inhalation capsule Place 1 capsule (18 mcg total) into inhaler and inhale daily 30 capsule 0    traZODone (DESYREL) 50 mg tablet Take 0.5 tablets (25 mg total) by mouth daily at bedtime 15 tablet 1     No current facility-administered medications for this visit.     Current Outpatient Medications on File Prior to Visit   Medication Sig    acetaminophen (TYLENOL) 650 mg CR tablet TAKE 1 TABLET (650 MG TOTAL) BY MOUTH EVERY 8 (EIGHT) HOURS AS NEEDED FOR MILD PAIN    Advair Diskus 500-50 MCG/ACT inhaler INHALE 1 PUFF 2 (TWO) TIMES A DAY RINSE MOUTH AFTER USE.    albuterol (PROVENTIL HFA,VENTOLIN HFA) 90 mcg/act inhaler Inhale 1 puff every 6 (six) hours as needed for wheezing    amLODIPine (NORVASC) 5 mg tablet Take 1 tablet (5 mg total) by mouth daily for 30 doses    atorvastatin (LIPITOR) 10 mg tablet TAKE 1 TABLET (10 MG TOTAL) BY MOUTH DAILY    benzonatate (TESSALON PERLES) 100 mg capsule TAKE 1 CAPSULE (100 MG TOTAL) BY MOUTH 3 (THREE) TIMES A DAY AS NEEDED FOR COUGH    brompheniramine-pseudoephedrine (DIMETAPP) 1-15 MG/5ML ELIX Take 5 mL by mouth every 6 (six) hours as needed for allergies    buPROPion (WELLBUTRIN SR) 150 mg 12 hr tablet TAKE 1 TABLET (150 MG TOTAL) BY MOUTH 2 (TWO) TIMES A DAY    cholecalciferol (VITAMIN D3) 1,000 units tablet TAKE 1 TABLET (1,000 UNITS TOTAL) BY MOUTH DAILY    cloNIDine (CATAPRES) 0.1 mg tablet TAKE 1 TABLET BY ORAL ROUTE 2 TIMES PER DAY    dextromethorphan-guaiFENesin (ROBITUSSIN DM)  mg/5 mL syrup Take 5 mL by mouth 3 (three) times a day as needed for cough    escitalopram (LEXAPRO) 20 mg tablet Take 1 tablet (20 mg total) by mouth daily    finasteride  (PROSCAR) 5 mg tablet Take 1 tablet (5 mg total) by mouth daily    fluticasone (FLONASE) 50 mcg/act nasal spray 1 SPRAY INTO EACH NOSTRIL DAILY    gabapentin (Neurontin) 300 mg capsule Take 1 capsule (300 mg total) by mouth 3 (three) times a day    guaiFENesin (ROBITUSSIN) 100 MG/5ML oral liquid Take 10 mL (200 mg total) by mouth 3 (three) times a day as needed for cough    Lidocaine HCl (Lidocaine Plus) 4 % CREA Apply topically every 6 (six) hours as needed (sperficial skin pain)    methocarbamol (ROBAXIN) 500 mg tablet Take 1 tablet (500 mg total) by mouth 4 (four) times a day    methylPREDNISolone 4 MG tablet therapy pack Use as directed on package    Multiple Vitamin (multivitamin) tablet Take 1 tablet by mouth daily    ondansetron (Zofran ODT) 4 mg disintegrating tablet Take 1 tablet (4 mg total) by mouth every 6 (six) hours as needed for nausea or vomiting    pantoprazole (PROTONIX) 40 mg tablet TAKE 1 TABLET (40 MG TOTAL) BY MOUTH DAILY    QUEtiapine (SEROquel) 50 mg tablet Take 3 tablets (150 mg total) by mouth daily at bedtime    tamsulosin (FLOMAX) 0.4 mg Take 1 capsule (0.4 mg total) by mouth daily with dinner    tiotropium (Spiriva HandiHaler) 18 mcg inhalation capsule Place 1 capsule (18 mcg total) into inhaler and inhale daily    traZODone (DESYREL) 50 mg tablet Take 0.5 tablets (25 mg total) by mouth daily at bedtime     No current facility-administered medications on file prior to visit.     He is allergic to seasonal ic [cholestatin]..    Review of Systems   Constitutional:  Negative for appetite change, chills, diaphoresis and fever.   HENT:  Negative for nosebleeds and trouble swallowing.    Eyes: Negative.    Respiratory:  Negative for cough, shortness of breath and wheezing.    Cardiovascular:  Negative for chest pain, palpitations and leg swelling.   Gastrointestinal:  Positive for abdominal pain. Negative for abdominal distention, nausea and vomiting.   Genitourinary:  Negative for difficulty  urinating, flank pain and frequency.   Musculoskeletal:  Negative for arthralgias, joint swelling and myalgias.   Skin:  Negative for pallor and rash.   Neurological:  Negative for dizziness, facial asymmetry and speech difficulty.   Hematological:  Does not bruise/bleed easily.   Psychiatric/Behavioral:  Negative for agitation and confusion.    All other systems reviewed and are negative.        Objective:      There were no vitals taken for this visit.         Physical Exam  Vitals and nursing note reviewed.   Constitutional:       General: He is not in acute distress.     Appearance: Normal appearance. He is not toxic-appearing.   HENT:      Head: Normocephalic and atraumatic.      Mouth/Throat:      Mouth: Mucous membranes are moist.   Eyes:      Extraocular Movements: Extraocular movements intact.      Pupils: Pupils are equal, round, and reactive to light.   Cardiovascular:      Rate and Rhythm: Normal rate and regular rhythm.      Pulses: Normal pulses.   Pulmonary:      Effort: Pulmonary effort is normal. No respiratory distress.      Breath sounds: Normal breath sounds. No wheezing.   Abdominal:      General: There is no distension.      Palpations: Abdomen is soft. There is no mass.      Tenderness: There is abdominal tenderness. There is no guarding or rebound.      Hernia: No hernia is present.      Comments: Well-healed laparoscopic port sites, mildly tender at the left upper quadrant port site.   Musculoskeletal:         General: No swelling or deformity. Normal range of motion.      Cervical back: Normal range of motion and neck supple.      Right lower leg: No edema.      Left lower leg: No edema.   Skin:     General: Skin is warm and dry.      Coloration: Skin is not jaundiced.   Neurological:      General: No focal deficit present.      Mental Status: He is alert and oriented to person, place, and time.   Psychiatric:         Mood and Affect: Mood normal.         Behavior: Behavior normal.

## 2024-03-20 ENCOUNTER — PATIENT OUTREACH (OUTPATIENT)
Dept: OTHER | Facility: OTHER | Age: 59
End: 2024-03-20

## 2024-03-20 ENCOUNTER — APPOINTMENT (OUTPATIENT)
Dept: LAB | Facility: HOSPITAL | Age: 59
End: 2024-03-20
Payer: MEDICARE

## 2024-03-20 DIAGNOSIS — Z12.5 PROSTATE CANCER SCREENING: ICD-10-CM

## 2024-03-20 LAB — PSA SERPL-MCNC: 1.52 NG/ML (ref 0–4)

## 2024-03-20 PROCEDURE — 36415 COLL VENOUS BLD VENIPUNCTURE: CPT

## 2024-03-20 PROCEDURE — G0103 PSA SCREENING: HCPCS

## 2024-03-20 NOTE — PROGRESS NOTES
Wednesday March 20, 2024    This Community Health Resource Nurse (CHRN)- [facility] (PN) encountered client at [facility] during outreach hours.  Client shares with this writer that he just got blood work today that he needed for his urologist appt. He also states that his swallowing is doing better and he has been able to eat some chicken and pizza if he cuts it up into smaller pieces.     Reviewed patient's upcoming appointments and confirmed the times with patient.    Client pleasant in this encounter and verbalized appreciation for assistance. Emotional support given.

## 2024-03-24 DIAGNOSIS — J44.9 CHRONIC OBSTRUCTIVE PULMONARY DISEASE, UNSPECIFIED COPD TYPE (HCC): ICD-10-CM

## 2024-03-25 RX ORDER — FLUTICASONE PROPIONATE AND SALMETEROL 500; 50 UG/1; UG/1
1 POWDER RESPIRATORY (INHALATION) 2 TIMES DAILY
Qty: 60 BLISTER | Refills: 0 | Status: SHIPPED | OUTPATIENT
Start: 2024-03-25

## 2024-03-28 ENCOUNTER — TELEMEDICINE (OUTPATIENT)
Dept: UROLOGY | Facility: CLINIC | Age: 59
End: 2024-03-28
Payer: MEDICARE

## 2024-03-28 DIAGNOSIS — R30.0 DYSURIA: ICD-10-CM

## 2024-03-28 DIAGNOSIS — Z12.5 PROSTATE CANCER SCREENING: ICD-10-CM

## 2024-03-28 DIAGNOSIS — R35.0 BENIGN PROSTATIC HYPERPLASIA WITH URINARY FREQUENCY: Primary | ICD-10-CM

## 2024-03-28 DIAGNOSIS — N40.1 BENIGN PROSTATIC HYPERPLASIA WITH URINARY FREQUENCY: Primary | ICD-10-CM

## 2024-03-28 PROBLEM — N50.819 TESTICULAR DISCOMFORT: Status: RESOLVED | Noted: 2022-10-18 | Resolved: 2024-03-28

## 2024-03-28 PROBLEM — F52.4 PREMATURE EJACULATION: Status: RESOLVED | Noted: 2020-05-28 | Resolved: 2024-03-28

## 2024-03-28 PROCEDURE — 99213 OFFICE O/P EST LOW 20 MIN: CPT | Performed by: PHYSICIAN ASSISTANT

## 2024-03-28 NOTE — PROGRESS NOTES
Virtual Brief Visit    This Visit is being completed by telephone. The Patient is located at Home and in the following state in which I hold an active license [place]    The patient was identified by name and date of birth. Chau Lou III was informed that this is a telemedicine visit and that the visit is being conducted through Telephone.  My office door was closed. No one else was in the room.  He acknowledged consent and understanding of privacy and security of the video platform. The patient has agreed to participate and understands they can discontinue the visit at any time.    Patient is aware this is a billable service.       Assessment/Plan:    BPH with LUTS, dysuria. cystoscopy reviewed (3/16/23)- bilobar coaptation, some stenosis apical prostate and sphincter, unclear if related to dysuria. had been on flomax for few years, added finasteride at that visit.    Followup medication effect- he feels well more than 90% of the time voiding feels more normal, rarely an episode of dysuria but resolves quickly and minimal bother. No bleeding. No flank pain. No UTIs. Good stream.    Prostate cancer screening- PSA 1.5, stable; will resume annual psa/bibi at next visit    Problem List Items Addressed This Visit        Genitourinary    BPH (benign prostatic hyperplasia) - Primary       Urinary    Dysuria       Other    Prostate cancer screening    Relevant Orders    PSA, Total Screen       Recent Visits  No visits were found meeting these conditions.  Showing recent visits within past 7 days and meeting all other requirements  Today's Visits  Date Type Provider Dept   03/28/24 Telemedicine Patti Randolph PA-C [facility]   Showing today's visits and meeting all other requirements  Future Appointments  No visits were found meeting these conditions.  Showing future appointments within next 150 days and meeting all other requirements         Visit Time  Total Visit Duration: 11 minutes

## 2024-03-31 DIAGNOSIS — J40 BRONCHITIS: ICD-10-CM

## 2024-04-01 RX ORDER — FLUTICASONE PROPIONATE 50 MCG
1 SPRAY, SUSPENSION (ML) NASAL DAILY
Qty: 16 G | Refills: 3 | Status: SHIPPED | OUTPATIENT
Start: 2024-04-01

## 2024-04-08 DIAGNOSIS — R05.9 COUGH, UNSPECIFIED TYPE: ICD-10-CM

## 2024-04-08 DIAGNOSIS — N40.0 BENIGN PROSTATIC HYPERPLASIA WITHOUT LOWER URINARY TRACT SYMPTOMS: ICD-10-CM

## 2024-04-08 DIAGNOSIS — M54.2 NECK PAIN ON LEFT SIDE: ICD-10-CM

## 2024-04-08 RX ORDER — GUAIFENESIN 200 MG/10ML
200 LIQUID ORAL 3 TIMES DAILY PRN
Qty: 120 ML | Refills: 0 | Status: SHIPPED | OUTPATIENT
Start: 2024-04-08

## 2024-04-08 RX ORDER — METHOCARBAMOL 500 MG/1
500 TABLET, FILM COATED ORAL 4 TIMES DAILY
Qty: 30 TABLET | Refills: 0 | Status: SHIPPED | OUTPATIENT
Start: 2024-04-08

## 2024-04-08 RX ORDER — ATORVASTATIN CALCIUM 10 MG/1
10 TABLET, FILM COATED ORAL DAILY
Qty: 90 TABLET | Refills: 3 | Status: SHIPPED | OUTPATIENT
Start: 2024-04-08

## 2024-04-10 DIAGNOSIS — Z09 ENCOUNTER FOR FOLLOW-UP: ICD-10-CM

## 2024-04-10 RX ORDER — TAMSULOSIN HYDROCHLORIDE 0.4 MG/1
0.4 CAPSULE ORAL
Qty: 90 CAPSULE | Refills: 1 | Status: SHIPPED | OUTPATIENT
Start: 2024-04-10

## 2024-04-10 NOTE — TELEPHONE ENCOUNTER
Reason for call:   [x] Refill   [] Prior Auth  [] Other:     Office:   [] PCP/Provider -   [x] Specialty/Provider -     Medication: tamsulosin (FLOMAX) 0.4 mg     Dose/Frequency: Take 1 capsule (0.4 mg total) by mouth daily with dinner     Quantity:  90 capsule     Pharmacy: [facility] - [facility] - [address] [facility] [phone]     Does the patient have enough for 3 days?   [] Yes   [x] No - Send as HP to POD

## 2024-04-17 ENCOUNTER — RA CDI HCC (OUTPATIENT)
Dept: OTHER | Facility: HOSPITAL | Age: 59
End: 2024-04-17

## 2024-04-22 ENCOUNTER — OFFICE VISIT (OUTPATIENT)
Dept: FAMILY MEDICINE CLINIC | Facility: CLINIC | Age: 59
End: 2024-04-22

## 2024-04-22 VITALS
HEART RATE: 72 BPM | WEIGHT: 139 LBS | RESPIRATION RATE: 16 BRPM | OXYGEN SATURATION: 99 % | HEIGHT: 66 IN | BODY MASS INDEX: 22.34 KG/M2 | DIASTOLIC BLOOD PRESSURE: 84 MMHG | TEMPERATURE: 98 F | SYSTOLIC BLOOD PRESSURE: 138 MMHG

## 2024-04-22 DIAGNOSIS — M25.521 RIGHT ELBOW PAIN: ICD-10-CM

## 2024-04-22 DIAGNOSIS — R09.82 POST-NASAL DRIP: Primary | ICD-10-CM

## 2024-04-22 DIAGNOSIS — J37.0 CONGESTION OF LARYNX: ICD-10-CM

## 2024-04-22 DIAGNOSIS — M25.512 LEFT SHOULDER PAIN, UNSPECIFIED CHRONICITY: ICD-10-CM

## 2024-04-22 LAB
SARS-COV-2 AG UPPER RESP QL IA: NEGATIVE
VALID CONTROL: NORMAL

## 2024-04-22 PROCEDURE — 99213 OFFICE O/P EST LOW 20 MIN: CPT | Performed by: FAMILY MEDICINE

## 2024-04-22 PROCEDURE — G2211 COMPLEX E/M VISIT ADD ON: HCPCS | Performed by: FAMILY MEDICINE

## 2024-04-22 PROCEDURE — 87811 SARS-COV-2 COVID19 W/OPTIC: CPT | Performed by: FAMILY MEDICINE

## 2024-04-22 RX ORDER — GUAIFENESIN/DEXTROMETHORPHAN 100-10MG/5
5 SYRUP ORAL 3 TIMES DAILY PRN
Qty: 100 ML | Refills: 3 | Status: SHIPPED | OUTPATIENT
Start: 2024-04-22

## 2024-04-22 RX ORDER — IPRATROPIUM BROMIDE 21 UG/1
2 SPRAY, METERED NASAL EVERY 12 HOURS
Qty: 30 ML | Refills: 5 | Status: SHIPPED | OUTPATIENT
Start: 2024-04-22

## 2024-04-22 NOTE — PROGRESS NOTES
Assessment/Plan:    1. Post-nasal drip  Comments:  Will do a trial of intranasal antihistamine.  Orders:  -     ipratropium (ATROVENT) 0.03 % nasal spray; 2 sprays into each nostril every 12 (twelve) hours  -     POCT Rapid Covid Ag    2. Left shoulder pain, unspecified chronicity  Comments:  Follow-up with physical therapy.  Orders:  -     Ambulatory Referral to Physical Therapy; Future    3. Right elbow pain  Comments:  Atraumatic.  Follow-up with physical therapy.  Orders:  -     Ambulatory Referral to Physical Therapy; Future    4. Congestion of larynx  Comments:  Continue with as needed cough medication.  Orders:  -     dextromethorphan-guaiFENesin (ROBITUSSIN DM)  mg/5 mL syrup; Take 5 mL by mouth 3 (three) times a day as needed for cough         Subjective:      Patient ID: Chau Lou III is a 58 y.o. male.    Past medical history notable for hiatal hernia, recently completed the surgery for repair, postoperatively struggled with meals with continuous nausea and vomiting, now reports that he is doing well.  Patient is able to tolerate all foods p.o.  Patient reports that his abdominal pain symptoms have now resolved.  Patient current complaints are localized to his left shoulder and right elbow.  In our last visits, we encourage patient to follow-up with physical therapy however, given that he had recent surgery he was not able to do so.  Patient continues to complain of left shoulder and right elbow pain.  Patient denies any new trauma.  Patient denies any neurological deficits.        The following portions of the patient's history were reviewed and updated as appropriate: allergies, current medications, past family history, past medical history, past social history, past surgical history, and problem list.    Review of Systems   Constitutional:  Negative for chills and fever.   HENT:  Negative for ear pain and sore throat.    Eyes:  Negative for pain and visual disturbance.   Respiratory:   "Positive for cough. Negative for shortness of breath.    Cardiovascular:  Negative for chest pain and palpitations.   Gastrointestinal:  Negative for abdominal pain and vomiting.   Genitourinary:  Negative for dysuria and hematuria.   Musculoskeletal:  Positive for neck pain. Negative for arthralgias and back pain.   Skin:  Negative for color change and rash.   Neurological:  Negative for seizures and syncope.   All other systems reviewed and are negative.        Objective:      /84 (BP Location: Left arm, Patient Position: Sitting, Cuff Size: Standard)   Pulse 72   Temp 98 °F (36.7 °C) (Temporal)   Resp 16   Ht 5' 6\" (1.676 m)   Wt 63 kg (139 lb)   SpO2 99%   BMI 22.44 kg/m²          Physical Exam  Constitutional:       General: He is not in acute distress.     Appearance: Normal appearance. He is not toxic-appearing or diaphoretic.   HENT:      Head: Normocephalic.      Nose: Congestion and rhinorrhea present.      Mouth/Throat:      Mouth: Mucous membranes are moist.   Eyes:      Extraocular Movements: Extraocular movements intact.      Pupils: Pupils are equal, round, and reactive to light.   Cardiovascular:      Rate and Rhythm: Normal rate and regular rhythm.   Pulmonary:      Effort: Pulmonary effort is normal. No tachypnea.      Breath sounds: Normal breath sounds. No wheezing or rales.   Chest:      Chest wall: No edema. There is no dullness to percussion.   Abdominal:      General: Abdomen is flat. There is no distension.      Palpations: Abdomen is soft.      Tenderness: There is no abdominal tenderness.   Musculoskeletal:      Right shoulder: No tenderness.      Left shoulder: Tenderness (Anterior aspect of the shoulder) present.      Right lower leg: No edema.      Left lower leg: No edema.   Skin:     Capillary Refill: Capillary refill takes less than 2 seconds.   Neurological:      General: No focal deficit present.      Mental Status: He is alert and oriented to person, place, and time. "   Psychiatric:         Mood and Affect: Mood normal.         Behavior: Behavior normal.           Tahir Pena,    Family Medicine PGY-3  4/22/2024

## 2024-04-23 ENCOUNTER — PATIENT OUTREACH (OUTPATIENT)
Dept: OTHER | Facility: OTHER | Age: 59
End: 2024-04-23

## 2024-04-23 NOTE — PROGRESS NOTES
04/23/24    Patient came in to [facility] to speak to CHW. Had hernia surgery awhile back. Patient is back to himself. He is able to eat and and keep things now. Just signed up for Physical Therapy. Starts PT on May 15th at [address] [place] [address].

## 2024-04-29 DIAGNOSIS — R30.0 DYSURIA: ICD-10-CM

## 2024-04-29 NOTE — PROGRESS NOTES
"Pulmonary Follow Up Note   Chau Lou III 58 y.o. male MRN: [id number]  4/28/2024    Assessment:  COPD  Chronic bronchitis phenotype  Minimal symptoms of cough/minimal sputum production  Last exacerbation 2/2024 treated as an outpatient  On triple inhalers  No frequent use of PRN albuterol    Plan:  Continue Advair 500/Spiriva HandiHaler  Evaluate at next visit to de-escalation of therapy if remains stable  Reordered PFT  Up-to-date on immunization    Active tobacco abuse  At least 60-pack-year  Cut down to 0.5 PPD  Repeat CT chest as below    Pulmonary nodules  Noted on LDCT 10/2023, new clusters of nodularities at LLL  Suspect inflammatory/infectious process  Follow-up approximately 6 months is due now/ordered    Return in about 2 months (around 7/1/2024).    History of Present Illness     Follow up for: COPD        Background  58 y.o. male with a h/o hiatal hernia, COPD, allergic rhinitis, tobacco abuse, osteoarthritis, major depression, BPH     Dx COPD in 2022.  Known symptoms of cough, chest congestion with mucus production of white/greenish sputum.  Improved with inhalers currently on Advair/Spiriva.  No history of severe attack, hospitalization, or frequent oral steroid use.  Significant/heavy tobacco abuse history, 1.5 PPD for the past 40 years.  Continues to smoke 1.5 PPD.  Used to work at the [email or web address] shop.  No frequent use of PRN albuterol     At baseline, able to walk on a surface level long distances, climb 1-2 flight of stairs without stop.     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 intake since then.    Last treated for COPD exacerbation in 2/2024/outpatient treatment with the steroids.    Cut down smoking to 0.5 PPD    Denies dyspnea on exertion, cough or chest congestion.  Still active and independent.  Does not work at the bakery shop anymore      Review of Systems  As per hpi, all other systems reviewed and were negative    Studies:  Imaging and other studies: I have personally reviewed pertinent films in PACS     LDCT 10/24/2022-no suspicious nodules.  LLL GGO seen in 2014-resolved.    LDCT 10/20/2023-scattered small apical perifissural nodule consistent with intrapulmonary lymph nodes.  New cluster of nodular opacity at LLL likely infectious/inflammatory 1 to 3 months follow-up recommended     Pulmonary function testing:         EKG, Pathology, and Other Studies: I have personally reviewed pertinent reports.         Past medical, surgical, social and family histories reviewed.      Medications/Allergies: Reviewed      Vitals: Blood pressure 116/70, pulse 68, temperature 98.6 °F (37 °C), temperature source Tympanic, height 5' 6\" (1.676 m), weight 60.3 kg (133 lb), SpO2 98%. There is no height or weight on file to calculate BMI.        Physical Exam  Body mass index is 21.47 kg/m².   Gen: not in acute distress, thin  Neck/Eyes: supple, PERRL  Ear: normal appearance, no significant hearing impairment  Nose:  normal nasal mucosa, no drainage  Mouth:  unremarkable/normal appearance of lips, multiple lost teeth  Oropharynx: mucosa is moist, no focal lesions or erythema  Salivary glands: soft nontender  Chest: normal respiratory efforts, diminished but clear sounds bilaterally  CV: RRR, no murmurs appreciated, no edema  Abdomen: soft, non tender  Extremities:  No observed deformity   Skin: unremarkable  Neuro: AAO X3, no focal motor deficit        Labs:  Lab Results   Component Value Date    WBC 5.58 02/02/2024    HGB 13.8 02/02/2024    HCT 41.6 02/02/2024    MCV 92 02/02/2024 " "    (L) 02/15/2024     Lab Results   Component Value Date    GLUCOSE 93 08/17/2014    CALCIUM 8.2 (L) 02/15/2024     (L) 08/17/2014    K 3.9 02/15/2024    CO2 23 02/15/2024    CL 99 02/15/2024    BUN 14 02/15/2024    CREATININE 0.73 02/15/2024     No results found for: \"IGE\"  Lab Results   Component Value Date    ALT 15 02/02/2024    AST 18 02/02/2024    ALKPHOS 57 02/02/2024    BILITOT 0.6 08/16/2014           Portions of the record may have been created with voice recognition software.  Occasional wrong word or \"sound a like\" substitutions may have occurred due to the inherent limitations of voice recognition software.  Read the chart carefully and recognize, using context, where substitutions have occurred    Blu Lema M.D.  [facility]  "

## 2024-04-30 ENCOUNTER — PATIENT OUTREACH (OUTPATIENT)
Dept: OTHER | Facility: OTHER | Age: 59
End: 2024-04-30

## 2024-04-30 RX ORDER — FINASTERIDE 5 MG/1
5 TABLET, FILM COATED ORAL DAILY
Qty: 90 TABLET | Refills: 1 | Status: SHIPPED | OUTPATIENT
Start: 2024-04-30

## 2024-04-30 NOTE — PROGRESS NOTES
04/30/24    Came in to tell this chw that he is eating and keeping food down and feel better. He is still smoking but does not want to quit. Drinking 2 cans of beer a night. Filled out for free [employer] vouchers.

## 2024-05-01 ENCOUNTER — OFFICE VISIT (OUTPATIENT)
Dept: PULMONOLOGY | Facility: CLINIC | Age: 59
End: 2024-05-01
Payer: MEDICARE

## 2024-05-01 VITALS
HEART RATE: 68 BPM | WEIGHT: 133 LBS | HEIGHT: 66 IN | TEMPERATURE: 98.6 F | OXYGEN SATURATION: 98 % | BODY MASS INDEX: 21.38 KG/M2 | SYSTOLIC BLOOD PRESSURE: 116 MMHG | DIASTOLIC BLOOD PRESSURE: 70 MMHG

## 2024-05-01 DIAGNOSIS — R91.8 PULMONARY NODULES: ICD-10-CM

## 2024-05-01 DIAGNOSIS — J43.8 OTHER EMPHYSEMA (HCC): ICD-10-CM

## 2024-05-01 DIAGNOSIS — J44.9 CHRONIC OBSTRUCTIVE PULMONARY DISEASE, UNSPECIFIED COPD TYPE (HCC): Primary | ICD-10-CM

## 2024-05-01 PROBLEM — Z12.5 PROSTATE CANCER SCREENING: Status: RESOLVED | Noted: 2020-05-28 | Resolved: 2024-05-01

## 2024-05-01 PROCEDURE — 99213 OFFICE O/P EST LOW 20 MIN: CPT | Performed by: INTERNAL MEDICINE

## 2024-05-09 DIAGNOSIS — K44.9 HIATAL HERNIA: ICD-10-CM

## 2024-05-09 DIAGNOSIS — J44.9 CHRONIC OBSTRUCTIVE PULMONARY DISEASE, UNSPECIFIED COPD TYPE (HCC): ICD-10-CM

## 2024-05-10 RX ORDER — GABAPENTIN 300 MG/1
300 CAPSULE ORAL 3 TIMES DAILY
Qty: 60 CAPSULE | Refills: 3 | Status: SHIPPED | OUTPATIENT
Start: 2024-05-10

## 2024-05-11 RX ORDER — FLUTICASONE PROPIONATE AND SALMETEROL 500; 50 UG/1; UG/1
1 POWDER RESPIRATORY (INHALATION) 2 TIMES DAILY
Qty: 60 BLISTER | Refills: 5 | Status: SHIPPED | OUTPATIENT
Start: 2024-05-11

## 2024-05-14 ENCOUNTER — PATIENT OUTREACH (OUTPATIENT)
Dept: OTHER | Facility: OTHER | Age: 59
End: 2024-05-14

## 2024-05-14 NOTE — PROGRESS NOTES
Client requested to meet with this Community Health Resource Nurse (CHRN) during [facility] outreach hours.   Reviewed upcoming medical appointments.     Client complains of flatulence. He also reports he is belching often. States he has it all day. Denies incontinence. Client has an upcoming appointment with surgeon on May 17. Encouraged client to discuss with surgeon.    Client is aware he has a PCP appointment on 7/24. Writer instructed client to call PCP office if his symptoms continue or worsen.    Client verbalized appreciation for assistance. Supportive listening provided.

## 2024-05-15 ENCOUNTER — EVALUATION (OUTPATIENT)
Dept: PHYSICAL THERAPY | Facility: CLINIC | Age: 59
End: 2024-05-15
Payer: MEDICARE

## 2024-05-15 DIAGNOSIS — M54.2 NECK PAIN: ICD-10-CM

## 2024-05-15 DIAGNOSIS — M25.512 LEFT SHOULDER PAIN, UNSPECIFIED CHRONICITY: Primary | ICD-10-CM

## 2024-05-15 DIAGNOSIS — M25.521 RIGHT ELBOW PAIN: ICD-10-CM

## 2024-05-15 PROCEDURE — 97140 MANUAL THERAPY 1/> REGIONS: CPT | Performed by: PHYSICAL THERAPIST

## 2024-05-15 PROCEDURE — 97162 PT EVAL MOD COMPLEX 30 MIN: CPT | Performed by: PHYSICAL THERAPIST

## 2024-05-15 PROCEDURE — 97110 THERAPEUTIC EXERCISES: CPT | Performed by: PHYSICAL THERAPIST

## 2024-05-15 NOTE — PROGRESS NOTES
PT Evaluation     Today's date: 5/15/2024  Patient name: Chau Lou III  : 1965  MRN: [id number]  Referring provider: Jose Eduardo Garsia PT  Dx:   Encounter Diagnosis     ICD-10-CM    1. Left shoulder pain, unspecified chronicity  M25.512 Ambulatory Referral to Physical Therapy    Follow-up with physical therapy.      2. Right elbow pain  M25.521 Ambulatory Referral to Physical Therapy    Atraumatic.  Follow-up with physical therapy.                     Assessment  Impairments: abnormal muscle firing, abnormal or restricted ROM, abnormal movement, activity intolerance, impaired physical strength, lacks appropriate home exercise program, pain with function, poor posture , poor body mechanics and endurance  Symptom irritability: moderate    Assessment details: Pt is a 59 y.o. year old male presenting to physical therapy for Left shoulder pain, Right elbow pain, and neck pain.  She presents with the following impairments: limited cervical ROM, hypomobility, RTC weakness and TTP, shoulder ROM deficits, and R elbow  weakness with TTP along lateral malleolus affecting his function with turning his neck, looking up/down, lifting, carrying, reaching, sleeping, and taking out the garbage.  Pt will benefit from skilled physical therapy to address functional limitations noted in evaluation and meet patient goals.    Goals  ST. Pt will reduce his pain to 4/10.  2. Pt will improve cervical rotation to nil deficits.    LT. Pt will improve b/l shoulder ABD strength to 4+/5 for improved lifting.  2. Pt will improve R wrist ext to 5/5 for improved ability to lift and take out garbage.  3. Pt will meet projected FOTO score.    Plan  Patient would benefit from: PT eval and skilled physical therapy  Planned modality interventions: cryotherapy, TENS, thermotherapy: hydrocollator packs, electrical stimulation/Mauritian stimulation and unattended electrical stimulation    Planned therapy interventions: joint  mobilization, abdominal trunk stabilization, manual therapy, behavior modification, body mechanics training, neuromuscular re-education, postural training, stretching, therapeutic activities, therapeutic exercise, strengthening, functional ROM exercises, flexibility and home exercise program    Frequency: 2x week  Duration in weeks: 6        Subjective Evaluation    History of Present Illness  Mechanism of injury: Pt reports neck pain and spasms for a long time, at least a few months.  He reports R elbow pain for the past 2 months, but does not recall any WADE.  He thinks he may have banged his elbow taking out the garbage.  He denies any numbness or tinlig, but has a soreness in his R elbow.  He is uncomfortable when sleeping due ot his neck pain.  He is still able to do his daily tasks like taking out the garbage.  He takes meloxicam for muscle spasms which helps some. 4/10 elbow, 6/10 neck  Pain  Current pain ratin        Objective     Tenderness     Left Shoulder   Tenderness in the bicipital groove, infraspinatus tendon, subscapularis tendon and supraspinatus tendon.     Right Shoulder  Tenderness in the bicipital groove, infraspinatus tendon, subscapularis tendon and supraspinatus tendon.     Right Elbow   Tenderness in the lateral epicondyle and medial epicondyle. No tenderness in the cubital tunnel.     Right Wrist/Hand   Tenderness in the lateral epicondyle and medial epicondyle.     Active Range of Motion   Cervical/Thoracic Spine       Cervical    Flexion:  Restriction level: minimal  Extension:  Restriction level: minimal  Left lateral flexion:  with pain Restriction level: maximal  Right lateral flexion:  Restriction level moderate  Left rotation:  with pain Restriction level: moderate  Right rotation:  Restriction level: minimal  Left Shoulder   Flexion: WFL  Abduction: WFL    Right Shoulder   Flexion: WFL  Abduction: WFL    Passive Range of Motion   Left Shoulder   External rotation 90°: 90 degrees  "  Internal rotation 45°: 45 degrees     Right Shoulder   External rotation 90°: 80 degrees with pain  Internal rotation 45°: 40 degrees with pain    Joint Play     Hypomobile: C1, C2, C3, C4, C5, C6, C7, T1 and T2     Pain: C1, C2, C6 and C7     Strength/Myotome Testing   Cervical Spine     Left   Interossei strength (t1): 5    Right   Interossei strength (t1): 4    Left Shoulder     Planes of Motion   Abduction: 4   External rotation at 0°: 4   Internal rotation at 0°: 4+     Right Shoulder     Planes of Motion   Abduction: 4   External rotation at 0°: 4   Internal rotation at 0°: 4+     Left Elbow   Flexion: 4+  Extension: 4    Right Elbow   Flexion: 4+  Extension: 4    Left Wrist/Hand   Wrist extension: 4+  Wrist flexion: 4+  Thumb extension: 4+    Right Wrist/Hand   Wrist extension: 4-  Wrist flexion: 4-  Thumb extension: 3+    Tests   Cervical   Positive cervical distraction test.    Left   Positive Spurling's Test A.     Right   Negative Spurling's Test A.     Left Shoulder   Positive painful arc.   Negative empty can, Hawkin's and ULTT1.     Right Shoulder   Positive apprehension, empty can, Hawkin's and painful arc.   Negative ULTT1.     Right Elbow   Positive varus stress at 0 degrees.   Negative valgus stress at 0 degrees.            Precautions:     Date 5/15            Visit # IE            FOTO IE             Re-eval IE              Manuals 5/15            C/s PROM SF            UT/LS str w STM SF            T/s PA's Gr III                         Neuro Re-Ed             Scap retraction 3x10\"            No money 10x GTB            DNF endurance 10\"            Rhythmic stabs             Body Blade                                       Ther Ex             Pulleys             Pec str 3x15\"            UT str 3x15\"            Rows             ER/IR             Wrist flex/ext 10x ea GTB            Wrist pro/sup                          Ther Activity                                       Gait Training          "                              Modalities

## 2024-05-16 DIAGNOSIS — M54.2 NECK PAIN ON LEFT SIDE: ICD-10-CM

## 2024-05-17 ENCOUNTER — OFFICE VISIT (OUTPATIENT)
Dept: SURGERY | Facility: CLINIC | Age: 59
End: 2024-05-17
Payer: MEDICARE

## 2024-05-17 VITALS
WEIGHT: 136.8 LBS | OXYGEN SATURATION: 96 % | RESPIRATION RATE: 18 BRPM | DIASTOLIC BLOOD PRESSURE: 86 MMHG | TEMPERATURE: 98.6 F | BODY MASS INDEX: 21.98 KG/M2 | HEART RATE: 63 BPM | SYSTOLIC BLOOD PRESSURE: 148 MMHG | HEIGHT: 66 IN

## 2024-05-17 DIAGNOSIS — K44.9 HIATAL HERNIA: Primary | ICD-10-CM

## 2024-05-17 PROCEDURE — 99213 OFFICE O/P EST LOW 20 MIN: CPT | Performed by: SURGERY

## 2024-05-17 RX ORDER — NALTREXONE HYDROCHLORIDE 50 MG/1
TABLET, FILM COATED ORAL
COMMUNITY
Start: 2024-05-14

## 2024-05-17 RX ORDER — SIMETHICONE 180 MG
180 CAPSULE ORAL EVERY 6 HOURS PRN
Qty: 48 CAPSULE | Refills: 0 | Status: SHIPPED | OUTPATIENT
Start: 2024-05-17

## 2024-05-17 RX ORDER — HYDROXYZINE PAMOATE 50 MG/1
CAPSULE ORAL
COMMUNITY
Start: 2024-04-11

## 2024-05-17 RX ORDER — SACCHAROMYCES BOULARDII 250 MG
250 CAPSULE ORAL 2 TIMES DAILY
Qty: 60 CAPSULE | Refills: 0 | Status: SHIPPED | OUTPATIENT
Start: 2024-05-17

## 2024-05-17 RX ORDER — CARIPRAZINE 1.5 MG/1
CAPSULE, GELATIN COATED ORAL
COMMUNITY
Start: 2024-05-14

## 2024-05-17 RX ORDER — BUSPIRONE HYDROCHLORIDE 10 MG/1
TABLET ORAL
COMMUNITY
Start: 2024-05-14

## 2024-05-17 RX ORDER — METHOCARBAMOL 500 MG/1
500 TABLET, FILM COATED ORAL 4 TIMES DAILY
Qty: 30 TABLET | Refills: 0 | Status: SHIPPED | OUTPATIENT
Start: 2024-05-17

## 2024-05-17 NOTE — PROGRESS NOTES
Ambulatory Visit  Name: Chau Lou III      : 1965      MRN: [id number]  Encounter Provider: Robbie Peguero MD  Encounter Date: 2024   Encounter department: [facility] [place]    Assessment & Plan   1. Hiatal hernia  Assessment & Plan:  59-year-old male status post laparoscopic paraesophageal hernia repair with partial fundoplication and EGD on 2/15/2024, here for follow-up.    Plan:  His biggest complaint is flatulence and some diarrhea at this point.  I have ordered some simethicone that he can take as needed for excess flatulence.  Additionally have prescribed him a probiotic which I would like him to take which I suspect will help with some of the gas.  Should he continue to have occasional diarrhea and excessive gas I will refer him to gastroenterology for further workup.  To return to clinic in 1 month.  Orders:  -     simethicone (Gas-X Ultra Strength) 180 MG capsule; Take 1 capsule (180 mg total) by mouth every 6 (six) hours as needed for flatulence  -     saccharomyces boulardii (FLORASTOR) 250 mg capsule; Take 1 capsule (250 mg total) by mouth 2 (two) times a day      History of Present Illness     Chau Lou III is a 59 y.o. male who presents status post laparoscopic paraesophageal hernia repair with partial fundoplication and EGD on 2/15/2024, here for follow-up.  While he is tolerating a diet, he is having some occasional belching, but his biggest complaint is excess flatulence and diarrhea.  This has been going on for 2 to 3 weeks.  He complains of several episodes of flatulence especially in the morning.  Denies any nausea or vomiting is tolerating regular diet, and has returned to all foods.    Review of Systems   Constitutional:  Negative for appetite change, chills, diaphoresis and fever.   HENT:  Negative for nosebleeds and trouble swallowing.    Eyes: Negative.    Respiratory:  Negative for cough, shortness of breath and wheezing.    Cardiovascular:   Negative for chest pain, palpitations and leg swelling.   Gastrointestinal:  Positive for abdominal distention and diarrhea. Negative for abdominal pain, nausea and vomiting.   Genitourinary:  Negative for difficulty urinating, flank pain and frequency.   Musculoskeletal:  Negative for arthralgias, joint swelling and myalgias.   Skin:  Negative for pallor and rash.   Neurological:  Negative for dizziness, facial asymmetry and speech difficulty.   Hematological:  Does not bruise/bleed easily.   Psychiatric/Behavioral:  Negative for agitation and confusion.    All other systems reviewed and are negative.    Past Medical History   Past Medical History:   Diagnosis Date    Anxiety     Asthma     BPH (benign prostatic hyperplasia)     COPD (chronic obstructive pulmonary disease) (HCC)     Depression     GERD (gastroesophageal reflux disease)     Hiatal hernia     Hyperlipidemia     Hypertension     Suicide attempt (HCC)      Past Surgical History:   Procedure Laterality Date    COLONOSCOPY      ESOPHAGOGASTRODUODENOSCOPY N/A 03/01/2019    Procedure: ESOPHAGOGASTRODUODENOSCOPY (EGD);  Surgeon: Travis Ntahan MD;  Location: [facility];  Service: Gastroenterology    FRACTURE SURGERY      finger surgery    HERNIA REPAIR      x2    WI LAPS RPR PARAESPHGL HRNA INCL FUNDPLSTY W/MESH N/A 2/15/2024    Procedure: REPAIR HERNIA PARAESOPHAGEAL  LAPAROSCOPIC;  Surgeon: Robbie Peguero MD;  Location:  MAIN OR;  Service: General    WI LAPS SURG ESOPG/GSTR FUNDOPLASTY N/A 2/15/2024    Procedure: FUNDOPLICATION TOUPET LAPAROSCOPIC;  Surgeon: Robbie Peguero MD;  Location: [facility];  Service: General    SKIN GRAFT Right     Foot     Family History   Problem Relation Age of Onset    Prostate cancer Paternal Uncle      Current Outpatient Medications on File Prior to Visit   Medication Sig Dispense Refill    albuterol (PROVENTIL HFA,VENTOLIN HFA) 90 mcg/act inhaler Inhale 1 puff every 6 (six) hours as needed for wheezing 17 g 10     atorvastatin (LIPITOR) 10 mg tablet TAKE 1 TABLET (10 MG TOTAL) BY MOUTH DAILY 90 tablet 3    benzonatate (TESSALON PERLES) 100 mg capsule TAKE 1 CAPSULE (100 MG TOTAL) BY MOUTH 3 (THREE) TIMES A DAY AS NEEDED FOR COUGH 20 capsule 0    brompheniramine-pseudoephedrine (DIMETAPP) 1-15 MG/5ML ELIX Take 5 mL by mouth every 6 (six) hours as needed for allergies 473 mL 3    buPROPion (WELLBUTRIN SR) 150 mg 12 hr tablet TAKE 1 TABLET (150 MG TOTAL) BY MOUTH 2 (TWO) TIMES A DAY 60 tablet 2    busPIRone (BUSPAR) 10 mg tablet TAKE 1 TABLET(S) BY ORAL ROUTE 2 TIME(S) PER DAY      cloNIDine (CATAPRES) 0.1 mg tablet TAKE 1 TABLET BY ORAL ROUTE 2 TIMES PER DAY      dextromethorphan-guaiFENesin (ROBITUSSIN DM)  mg/5 mL syrup Take 5 mL by mouth 3 (three) times a day as needed for cough 100 mL 3    escitalopram (LEXAPRO) 20 mg tablet Take 1 tablet (20 mg total) by mouth daily 30 tablet 1    finasteride (PROSCAR) 5 mg tablet Take 1 tablet (5 mg total) by mouth daily 90 tablet 1    fluticasone (FLONASE) 50 mcg/act nasal spray 1 SPRAY INTO EACH NOSTRIL DAILY 16 g 3    Fluticasone-Salmeterol (Advair) 500-50 mcg/dose inhaler INHALE 1 PUFF 2 (TWO) TIMES A DAY RINSE MOUTH AFTER USE. 60 blister 5    gabapentin (NEURONTIN) 300 mg capsule TAKE 1 CAPSULE (300 MG TOTAL) BY MOUTH 3 (THREE) TIMES A DAY 60 capsule 3    guaiFENesin (ROBITUSSIN) 100 MG/5ML oral liquid TAKE 10 ML (200 MG TOTAL) BY MOUTH 3 (THREE) TIMES A DAY AS NEEDED FOR COUGH 120 mL 0    ipratropium (ATROVENT) 0.03 % nasal spray 2 sprays into each nostril every 12 (twelve) hours 30 mL 5    methocarbamol (ROBAXIN) 500 mg tablet TAKE 1 TABLET (500 MG TOTAL) BY MOUTH 4 (FOUR) TIMES A DAY 30 tablet 0    Multiple Vitamin (multivitamin) tablet Take 1 tablet by mouth daily 90 tablet 3    naltrexone (REVIA) 50 mg tablet TAKE 1 TABLET BY ORAL ROUTE 1 TIME PER DAY      tamsulosin (FLOMAX) 0.4 mg Take 1 capsule (0.4 mg total) by mouth daily with dinner 90 capsule 1    tiotropium  (Spiriva HandiHaler) 18 mcg inhalation capsule Place 1 capsule (18 mcg total) into inhaler and inhale daily 30 capsule 0    acetaminophen (TYLENOL) 650 mg CR tablet TAKE 1 TABLET (650 MG TOTAL) BY MOUTH EVERY 8 (EIGHT) HOURS AS NEEDED FOR MILD PAIN (Patient not taking: Reported on 5/17/2024) 30 tablet 0    amLODIPine (NORVASC) 5 mg tablet Take 1 tablet (5 mg total) by mouth daily for 30 doses 30 tablet 0    cholecalciferol (VITAMIN D3) 1,000 units tablet TAKE 1 TABLET (1,000 UNITS TOTAL) BY MOUTH DAILY 56 tablet 0    hydrOXYzine pamoate (VISTARIL) 50 mg capsule TAKE 1 CAPSULE(S) BY ORAL ROUTE PER AT BEDTIME AS NEEDED FOR SLEEP (Patient not taking: Reported on 5/17/2024)      Lidocaine HCl (Lidocaine Plus) 4 % CREA Apply topically every 6 (six) hours as needed (sperficial skin pain) (Patient not taking: Reported on 5/17/2024) 30 g 2    ondansetron (Zofran ODT) 4 mg disintegrating tablet Take 1 tablet (4 mg total) by mouth every 6 (six) hours as needed for nausea or vomiting (Patient not taking: Reported on 5/17/2024) 16 tablet 0    pantoprazole (PROTONIX) 40 mg tablet TAKE 1 TABLET (40 MG TOTAL) BY MOUTH DAILY (Patient not taking: Reported on 5/17/2024) 60 tablet 3    QUEtiapine (SEROquel) 50 mg tablet Take 3 tablets (150 mg total) by mouth daily at bedtime (Patient not taking: Reported on 5/17/2024) 90 tablet 1    traZODone (DESYREL) 50 mg tablet Take 0.5 tablets (25 mg total) by mouth daily at bedtime (Patient not taking: Reported on 5/17/2024) 15 tablet 1    Vraylar 1.5 MG capsule TAKE 1 CAPSULE BY ORAL ROUTE 1 TIME PER DAY AT BEDTIME (Patient not taking: Reported on 5/17/2024)       No current facility-administered medications on file prior to visit.     Allergies   Allergen Reactions    Seasonal Ic [Cholestatin] Sneezing      Current Outpatient Medications on File Prior to Visit   Medication Sig Dispense Refill    albuterol (PROVENTIL HFA,VENTOLIN HFA) 90 mcg/act inhaler Inhale 1 puff every 6 (six) hours as  needed for wheezing 17 g 10    atorvastatin (LIPITOR) 10 mg tablet TAKE 1 TABLET (10 MG TOTAL) BY MOUTH DAILY 90 tablet 3    benzonatate (TESSALON PERLES) 100 mg capsule TAKE 1 CAPSULE (100 MG TOTAL) BY MOUTH 3 (THREE) TIMES A DAY AS NEEDED FOR COUGH 20 capsule 0    brompheniramine-pseudoephedrine (DIMETAPP) 1-15 MG/5ML ELIX Take 5 mL by mouth every 6 (six) hours as needed for allergies 473 mL 3    buPROPion (WELLBUTRIN SR) 150 mg 12 hr tablet TAKE 1 TABLET (150 MG TOTAL) BY MOUTH 2 (TWO) TIMES A DAY 60 tablet 2    busPIRone (BUSPAR) 10 mg tablet TAKE 1 TABLET(S) BY ORAL ROUTE 2 TIME(S) PER DAY      cloNIDine (CATAPRES) 0.1 mg tablet TAKE 1 TABLET BY ORAL ROUTE 2 TIMES PER DAY      dextromethorphan-guaiFENesin (ROBITUSSIN DM)  mg/5 mL syrup Take 5 mL by mouth 3 (three) times a day as needed for cough 100 mL 3    escitalopram (LEXAPRO) 20 mg tablet Take 1 tablet (20 mg total) by mouth daily 30 tablet 1    finasteride (PROSCAR) 5 mg tablet Take 1 tablet (5 mg total) by mouth daily 90 tablet 1    fluticasone (FLONASE) 50 mcg/act nasal spray 1 SPRAY INTO EACH NOSTRIL DAILY 16 g 3    Fluticasone-Salmeterol (Advair) 500-50 mcg/dose inhaler INHALE 1 PUFF 2 (TWO) TIMES A DAY RINSE MOUTH AFTER USE. 60 blister 5    gabapentin (NEURONTIN) 300 mg capsule TAKE 1 CAPSULE (300 MG TOTAL) BY MOUTH 3 (THREE) TIMES A DAY 60 capsule 3    guaiFENesin (ROBITUSSIN) 100 MG/5ML oral liquid TAKE 10 ML (200 MG TOTAL) BY MOUTH 3 (THREE) TIMES A DAY AS NEEDED FOR COUGH 120 mL 0    ipratropium (ATROVENT) 0.03 % nasal spray 2 sprays into each nostril every 12 (twelve) hours 30 mL 5    methocarbamol (ROBAXIN) 500 mg tablet TAKE 1 TABLET (500 MG TOTAL) BY MOUTH 4 (FOUR) TIMES A DAY 30 tablet 0    Multiple Vitamin (multivitamin) tablet Take 1 tablet by mouth daily 90 tablet 3    naltrexone (REVIA) 50 mg tablet TAKE 1 TABLET BY ORAL ROUTE 1 TIME PER DAY      tamsulosin (FLOMAX) 0.4 mg Take 1 capsule (0.4 mg total) by mouth daily with dinner 90  capsule 1    tiotropium (Spiriva HandiHaler) 18 mcg inhalation capsule Place 1 capsule (18 mcg total) into inhaler and inhale daily 30 capsule 0    acetaminophen (TYLENOL) 650 mg CR tablet TAKE 1 TABLET (650 MG TOTAL) BY MOUTH EVERY 8 (EIGHT) HOURS AS NEEDED FOR MILD PAIN (Patient not taking: Reported on 5/17/2024) 30 tablet 0    amLODIPine (NORVASC) 5 mg tablet Take 1 tablet (5 mg total) by mouth daily for 30 doses 30 tablet 0    cholecalciferol (VITAMIN D3) 1,000 units tablet TAKE 1 TABLET (1,000 UNITS TOTAL) BY MOUTH DAILY 56 tablet 0    hydrOXYzine pamoate (VISTARIL) 50 mg capsule TAKE 1 CAPSULE(S) BY ORAL ROUTE PER AT BEDTIME AS NEEDED FOR SLEEP (Patient not taking: Reported on 5/17/2024)      Lidocaine HCl (Lidocaine Plus) 4 % CREA Apply topically every 6 (six) hours as needed (sperficial skin pain) (Patient not taking: Reported on 5/17/2024) 30 g 2    ondansetron (Zofran ODT) 4 mg disintegrating tablet Take 1 tablet (4 mg total) by mouth every 6 (six) hours as needed for nausea or vomiting (Patient not taking: Reported on 5/17/2024) 16 tablet 0    pantoprazole (PROTONIX) 40 mg tablet TAKE 1 TABLET (40 MG TOTAL) BY MOUTH DAILY (Patient not taking: Reported on 5/17/2024) 60 tablet 3    QUEtiapine (SEROquel) 50 mg tablet Take 3 tablets (150 mg total) by mouth daily at bedtime (Patient not taking: Reported on 5/17/2024) 90 tablet 1    traZODone (DESYREL) 50 mg tablet Take 0.5 tablets (25 mg total) by mouth daily at bedtime (Patient not taking: Reported on 5/17/2024) 15 tablet 1    Vraylar 1.5 MG capsule TAKE 1 CAPSULE BY ORAL ROUTE 1 TIME PER DAY AT BEDTIME (Patient not taking: Reported on 5/17/2024)       No current facility-administered medications on file prior to visit.      Social History     Tobacco Use    Smoking status: Heavy Smoker     Current packs/day: 1.50     Average packs/day: 1.5 packs/day for 40.4 years (60.6 ttl pk-yrs)     Types: Cigarettes     Start date: 1984     Passive exposure: Current     "Smokeless tobacco: Never   Vaping Use    Vaping status: Never Used   Substance and Sexual Activity    Alcohol use: Yes     Alcohol/week: 14.0 standard drinks of alcohol     Types: 14 Cans of beer per week    Drug use: Never    Sexual activity: Not Currently     Partners: Female     Objective     /86 (BP Location: Left arm, Patient Position: Sitting, Cuff Size: Standard)   Pulse 63   Temp 98.6 °F (37 °C) (Temporal)   Resp 18   Ht 5' 6\" (1.676 m)   Wt 62.1 kg (136 lb 12.8 oz)   SpO2 96%   BMI 22.08 kg/m²     Physical Exam  Vitals and nursing note reviewed.   Constitutional:       General: He is not in acute distress.     Appearance: Normal appearance. He is not ill-appearing.   HENT:      Head: Normocephalic and atraumatic.      Mouth/Throat:      Mouth: Mucous membranes are moist.      Pharynx: Oropharynx is clear.   Eyes:      Extraocular Movements: Extraocular movements intact.   Pulmonary:      Effort: Pulmonary effort is normal. No respiratory distress.      Breath sounds: No stridor. No wheezing.   Abdominal:      General: There is no distension.      Palpations: Abdomen is soft. There is no mass.      Tenderness: There is no abdominal tenderness.      Hernia: No hernia is present.   Musculoskeletal:         General: No swelling or tenderness. Normal range of motion.      Cervical back: Neck supple.   Skin:     General: Skin is warm and dry.   Neurological:      General: No focal deficit present.      Mental Status: He is alert and oriented to person, place, and time. Mental status is at baseline.   Psychiatric:         Mood and Affect: Mood normal.         Behavior: Behavior normal.       Administrative Statements           "

## 2024-05-21 ENCOUNTER — OFFICE VISIT (OUTPATIENT)
Dept: PHYSICAL THERAPY | Facility: CLINIC | Age: 59
End: 2024-05-21
Payer: MEDICARE

## 2024-05-21 DIAGNOSIS — M25.521 RIGHT ELBOW PAIN: ICD-10-CM

## 2024-05-21 DIAGNOSIS — M54.2 NECK PAIN: ICD-10-CM

## 2024-05-21 DIAGNOSIS — M25.512 LEFT SHOULDER PAIN, UNSPECIFIED CHRONICITY: Primary | ICD-10-CM

## 2024-05-21 PROCEDURE — 97112 NEUROMUSCULAR REEDUCATION: CPT | Performed by: PHYSICAL THERAPIST

## 2024-05-21 PROCEDURE — 97110 THERAPEUTIC EXERCISES: CPT | Performed by: PHYSICAL THERAPIST

## 2024-05-21 PROCEDURE — 97140 MANUAL THERAPY 1/> REGIONS: CPT | Performed by: PHYSICAL THERAPIST

## 2024-05-21 NOTE — PROGRESS NOTES
"Daily Note     Today's date: 2024  Patient name: Chau Lou III  : 1965  MRN: [id number]  Referring provider: Jose Eduardo Garsia PT  Dx:   Encounter Diagnosis     ICD-10-CM    1. Left shoulder pain, unspecified chronicity  M25.512       2. Right elbow pain  M25.521       3. Neck pain  M54.2                      Subjective: Pt reports continued shoulder pain, neck stiffness, and tightness.      Objective: See treatment diary below      Assessment:  Pt is challenged w today's progression and has some L shoulder soreness with pulleys.  He fatigues with rows and progression of no money exercise. He has hypertonic and tender UT/LS muscles b/l that improves some with STM and stretching.  Patient demonstrated fatigue post treatment, exhibited good technique with therapeutic exercises, and would benefit from continued PT.      Plan: Continue per plan of care.  Progress treatment as tolerated.       Precautions:     Date 5/15 5/21           Visit # IE 2           FOTO IE             Re-eval IE              Manuals 5/15 5/21           C/s PROM SF SF           UT/LS str w STM SF SF           T/s PA's Gr III                         Neuro Re-Ed             Scap retraction 3x10\" 5x10\"           No money 10x GTB 3x10 GTB           DNF endurance 10\" 5x10\"           Rhythmic stabs             Body Blade                                       Ther Ex             Pulleys  4'           Pec str 3x15\" 3x15\"           UT str 3x15\"            Rows/ext  3x10 11#/7#           ER/IR             Wrist flex/ext 10x ea GTB 3x10 ea GTB           Wrist pro/sup             T/s ext  10x                        Ther Activity                                       Gait Training                                       Modalities                                            "

## 2024-05-22 ENCOUNTER — OFFICE VISIT (OUTPATIENT)
Dept: FAMILY MEDICINE CLINIC | Facility: CLINIC | Age: 59
End: 2024-05-22

## 2024-05-22 ENCOUNTER — PATIENT OUTREACH (OUTPATIENT)
Dept: OTHER | Facility: OTHER | Age: 59
End: 2024-05-22

## 2024-05-22 VITALS
OXYGEN SATURATION: 98 % | BODY MASS INDEX: 21.92 KG/M2 | TEMPERATURE: 96.3 F | HEIGHT: 66 IN | HEART RATE: 86 BPM | RESPIRATION RATE: 18 BRPM | DIASTOLIC BLOOD PRESSURE: 76 MMHG | SYSTOLIC BLOOD PRESSURE: 132 MMHG | WEIGHT: 136.4 LBS

## 2024-05-22 DIAGNOSIS — T30.0 BURN: Primary | ICD-10-CM

## 2024-05-22 PROCEDURE — 99213 OFFICE O/P EST LOW 20 MIN: CPT | Performed by: PHYSICIAN ASSISTANT

## 2024-05-22 PROCEDURE — G2211 COMPLEX E/M VISIT ADD ON: HCPCS | Performed by: PHYSICIAN ASSISTANT

## 2024-05-22 RX ORDER — IBUPROFEN 600 MG/1
600 TABLET ORAL EVERY 8 HOURS PRN
Qty: 30 TABLET | Refills: 0 | Status: SHIPPED | OUTPATIENT
Start: 2024-05-22 | End: 2024-06-07

## 2024-05-22 NOTE — PROGRESS NOTES
Patient came up to me at [facility]. Patient showed this CHW a burn he has on his finger. He wanted this CHW to make a sick appointment for him. This CHW made an appointment through Teams with Julia TOLBERT at 3:40 today with Dr. Rosas. No further outreach is scheduled.

## 2024-05-22 NOTE — PROGRESS NOTES
Assessment/Plan:    Burn  - Located on left index finger.   - Will rx silver sulfadiazine cream, apply topically daily.  - Will prescribe ibuprofen 600 mg, take every 8 hours as needed for pain.  - Advised to change dressing daily.  - Advised to contact the office or report to ED if symptoms persist, worsen, or new symptoms arise.      Diagnoses and all orders for this visit:    Burn  -     ibuprofen (MOTRIN) 600 mg tablet; Take 1 tablet (600 mg total) by mouth every 8 (eight) hours as needed for mild pain  -     silver sulfadiazine (SILVADENE,SSD) 1 % cream; Apply topically daily        All of patients questions were answered. Patient understands and agrees with the above plan.     Return if symptoms worsen or fail to improve.    Candice Rosas PA-C  05/22/24  [facility]          Subjective:     Patient ID: Chau Lou III  is a 59 y.o. male with known PMH of anxiety, depression, hyperlipidemia, tobacco dependence syndrome, COPD, BPH, seborrheic dermatitis, GERD, chronic pain of both knees, allergic rhinitis who presents today in office for burn of finger.     - Patient is a 59 y.o. male who presents today for burn of finger.  Patient notes yesterday he was pouring himself a cup of coffee and he accidentally hit his left index finger on the coffee pot, causing a burn.  Patient notes he did rinse out the burn and applied a cool paper towel.  Patient notes he did see a blister last night, but this morning it was popped.  Patient notes the blister then formed again, but popped just prior to this visit.  Patient reports clear drainage.      The following portions of the patient's history were reviewed and updated as appropriate: allergies, current medications, past family history, past medical history, past social history, past surgical history, and problem list.        Review of Systems   Constitutional:  Negative for chills and fever.   Skin:  Positive for wound (burn of left index finger).   All other  "systems reviewed and are negative.                Objective:   Vitals:    05/22/24 1537   BP: 132/76   BP Location: Right arm   Patient Position: Sitting   Cuff Size: Standard   Pulse: 86   Resp: 18   Temp: (!) 96.3 °F (35.7 °C)   TempSrc: Temporal   SpO2: 98%   Weight: 61.9 kg (136 lb 6.4 oz)   Height: 5' 6\" (1.676 m)         Physical Exam  Vitals and nursing note reviewed.   Constitutional:       General: He is not in acute distress.     Appearance: He is well-developed.   HENT:      Head: Normocephalic and atraumatic.   Cardiovascular:      Rate and Rhythm: Normal rate and regular rhythm.      Pulses: Normal pulses.      Heart sounds: Normal heart sounds.   Pulmonary:      Effort: Pulmonary effort is normal. No respiratory distress.      Breath sounds: Normal breath sounds. No wheezing.   Skin:     General: Skin is warm and dry.      Findings: Burn (Superficial burn located on left index finger.) present.   Neurological:      Mental Status: He is alert and oriented to person, place, and time.   Psychiatric:         Behavior: Behavior normal.             "

## 2024-05-23 ENCOUNTER — TELEPHONE (OUTPATIENT)
Age: 59
End: 2024-05-23

## 2024-05-23 ENCOUNTER — APPOINTMENT (OUTPATIENT)
Dept: PHYSICAL THERAPY | Facility: CLINIC | Age: 59
End: 2024-05-23
Payer: MEDICARE

## 2024-05-23 NOTE — TELEPHONE ENCOUNTER
Pt requests call from Dr. Peguero abt the medications he prescribed; he needs something to stop the gas/diarrhea. He is wondering why he was prescribed the medications he was. Having bathroom issues.   
Cephalic

## 2024-05-28 ENCOUNTER — TELEPHONE (OUTPATIENT)
Dept: FAMILY MEDICINE CLINIC | Facility: CLINIC | Age: 59
End: 2024-05-28

## 2024-05-28 ENCOUNTER — OFFICE VISIT (OUTPATIENT)
Dept: PHYSICAL THERAPY | Facility: CLINIC | Age: 59
End: 2024-05-28
Payer: MEDICARE

## 2024-05-28 DIAGNOSIS — M54.2 NECK PAIN: ICD-10-CM

## 2024-05-28 DIAGNOSIS — M25.521 RIGHT ELBOW PAIN: ICD-10-CM

## 2024-05-28 DIAGNOSIS — M25.512 LEFT SHOULDER PAIN, UNSPECIFIED CHRONICITY: Primary | ICD-10-CM

## 2024-05-28 PROCEDURE — 97110 THERAPEUTIC EXERCISES: CPT | Performed by: PHYSICAL THERAPIST

## 2024-05-28 PROCEDURE — 97112 NEUROMUSCULAR REEDUCATION: CPT | Performed by: PHYSICAL THERAPIST

## 2024-05-28 PROCEDURE — 97140 MANUAL THERAPY 1/> REGIONS: CPT | Performed by: PHYSICAL THERAPIST

## 2024-05-28 NOTE — TELEPHONE ENCOUNTER
PCP SIGNATURE NEEDED FOR [facility] FORM RECEIVED VIA FAX AND PLACED IN PCP FOLDER TO BE DELIVERED AT ASSIGNED TIMES.        Prescriber Response

## 2024-05-28 NOTE — PROGRESS NOTES
"Daily Note     Today's date: 2024  Patient name: Chau Lou III  : 1965  MRN: [id number]  Referring provider: Jose Eduardo Garsia PT  Dx:   Encounter Diagnosis     ICD-10-CM    1. Left shoulder pain, unspecified chronicity  M25.512       2. Right elbow pain  M25.521       3. Neck pain  M54.2           Start Time: 1100  Stop Time: 1145  Total time in clinic (min): 45 minutes    Subjective: Patient reports that he still has neck pain. Patient takes muscle relaxer.      Objective: See treatment diary below      Assessment: Patient demonstrates decreased scapular retraction b/l during rows, improved with VC and TC. Patient experiences pinching pain on L upper cervical spine during UT stretch. Patient demonstrates decreased DNF endurance exhibiting difficulty with 5 sec hold. Patient experienced discomfort with trial of foam roll pec stretch but felt a good stretch. Patient demonstrated fatigue post treatment, exhibited good technique with therapeutic exercises, and would benefit from continued PT.      Plan: Continue per plan of care.  Progress treatment as tolerated.      Session completed by SPT Tim Russo under supervision from Jose Eduardo Garsia DPT.     Precautions:     Date 5/15 5/21 5/28          Visit # IE 2 3          FOTO IE             Re-eval IE              Manuals 5/15 5/21 5/28          C/s PROM SF SF ND          UT/LS str w STM SF SF ND          T/s PA's Gr III  Gr III                       Neuro Re-Ed             Scap retraction 3x10\" 5x10\" 20x 3\"          No money 10x GTB 3x10 GTB 2x10 GTB          DNF endurance 10\" 5x10\" 10x5\"          Rhythmic stabs   nv          Body Blade             Wall slide w/ retraction                          Ther Ex             Pulleys  4' 2'/2' flex/abd          Pec str 3x15\" 3x15\" 2' on half foam roll          UT str 3x15\"  3x30\"ea          Rows/ext  3x10 11#/7# 3x10 11#/7#          ER/IR             Wrist flex/ext 10x ea GTB 3x10 ea GTB 3x10ea GTB    " "      Wrist pro/sup             T/s ext  10x 10x10\"                       Ther Activity                                       Gait Training                                       Modalities                                              "

## 2024-05-29 ENCOUNTER — HOSPITAL ENCOUNTER (OUTPATIENT)
Dept: PULMONOLOGY | Facility: HOSPITAL | Age: 59
Discharge: HOME | End: 2024-05-29
Attending: INTERNAL MEDICINE
Payer: MEDICARE

## 2024-05-29 ENCOUNTER — HOSPITAL ENCOUNTER (OUTPATIENT)
Dept: CT IMAGING | Facility: HOSPITAL | Age: 59
Discharge: HOME | End: 2024-05-29
Attending: INTERNAL MEDICINE
Payer: MEDICARE

## 2024-05-29 DIAGNOSIS — R91.8 PULMONARY NODULES: ICD-10-CM

## 2024-05-29 DIAGNOSIS — J43.8 OTHER EMPHYSEMA (HCC): ICD-10-CM

## 2024-05-29 DIAGNOSIS — J44.9 CHRONIC OBSTRUCTIVE PULMONARY DISEASE, UNSPECIFIED COPD TYPE (HCC): ICD-10-CM

## 2024-05-29 PROCEDURE — 94060 EVALUATION OF WHEEZING: CPT | Performed by: INTERNAL MEDICINE

## 2024-05-29 PROCEDURE — 94726 PLETHYSMOGRAPHY LUNG VOLUMES: CPT

## 2024-05-29 PROCEDURE — 94726 PLETHYSMOGRAPHY LUNG VOLUMES: CPT | Performed by: INTERNAL MEDICINE

## 2024-05-29 PROCEDURE — 94760 N-INVAS EAR/PLS OXIMETRY 1: CPT

## 2024-05-29 PROCEDURE — 94729 DIFFUSING CAPACITY: CPT | Performed by: INTERNAL MEDICINE

## 2024-05-29 PROCEDURE — 71250 CT THORAX DX C-: CPT

## 2024-05-29 PROCEDURE — 94729 DIFFUSING CAPACITY: CPT

## 2024-05-29 PROCEDURE — 94060 EVALUATION OF WHEEZING: CPT

## 2024-05-29 RX ORDER — ALBUTEROL SULFATE 2.5 MG/3ML
2.5 SOLUTION RESPIRATORY (INHALATION) ONCE AS NEEDED
Status: COMPLETED | OUTPATIENT
Start: 2024-05-29 | End: 2024-05-29

## 2024-05-29 RX ADMIN — ALBUTEROL SULFATE 2.5 MG: 2.5 SOLUTION RESPIRATORY (INHALATION) at 17:41

## 2024-05-30 NOTE — TELEPHONE ENCOUNTER
Detail Level: Detailed Pt called, sts that he was speaking with someone but then something went wrong with his phone.  Informed pt of previous message.  Pt sts he is awaiting call back.    Pt call back: [phone]

## 2024-06-04 ENCOUNTER — OFFICE VISIT (OUTPATIENT)
Dept: PHYSICAL THERAPY | Facility: CLINIC | Age: 59
End: 2024-06-04
Payer: MEDICARE

## 2024-06-04 DIAGNOSIS — M25.521 RIGHT ELBOW PAIN: ICD-10-CM

## 2024-06-04 DIAGNOSIS — M54.2 NECK PAIN: ICD-10-CM

## 2024-06-04 DIAGNOSIS — M25.512 LEFT SHOULDER PAIN, UNSPECIFIED CHRONICITY: Primary | ICD-10-CM

## 2024-06-04 PROCEDURE — 97110 THERAPEUTIC EXERCISES: CPT | Performed by: PHYSICAL THERAPIST

## 2024-06-04 PROCEDURE — 97112 NEUROMUSCULAR REEDUCATION: CPT | Performed by: PHYSICAL THERAPIST

## 2024-06-04 PROCEDURE — 97140 MANUAL THERAPY 1/> REGIONS: CPT | Performed by: PHYSICAL THERAPIST

## 2024-06-04 NOTE — PROGRESS NOTES
"Daily Note     Today's date: 2024  Patient name: Chau Lou III  : 1965  MRN: [id number]  Referring provider: Jose Eduardo Garsia PT  Dx:   Encounter Diagnosis     ICD-10-CM    1. Left shoulder pain, unspecified chronicity  M25.512       2. Right elbow pain  M25.521       3. Neck pain  M54.2           Start Time: 1100  Stop Time: 1150  Total time in clinic (min): 50 minutes    Subjective: Patient reports that the stiffness in his neck is still present but has lessened since starting PT. He continues to have about the same level of neck pain.      Objective: See treatment diary below      Assessment: Patient started ball on wall circles today and exhibited appropriate fatigue with decreased scapular control at the end of set. Patient experiences posterior shoulder pain with pec stretch, held for session, will trial next session. Patient experienced pain with resisted standing ER after 5 repetitions, held for session, trial s/l ER next session. Patient b/l UT and LS trigger points lessened with STM. Patient demonstrated fatigue post treatment, exhibited good technique with therapeutic exercises, and would benefit from continued PT      Plan: Continue per plan of care.  Progress treatment as tolerated.      Session completed by RENE Russo under supervision from Jose Eduardo Garsia DPT.     Precautions:     Date 5/15 5/21 5/28 6/4         Visit # IE 2 3 4         FOTO IE             Re-eval IE              Manuals 5/15 5/21 5/28 6/4         C/s PROM SF SF ND ND         UT/LS str w STM SF SF ND ND         T/s PA's Gr III  Gr III Gr III-IV                      Neuro Re-Ed            Scap retraction 3x10\" 5x10\" 20x 3\" 20x5\"         No money 10x GTB 3x10 GTB 2x10 GTB 2x10 GTB         DNF endurance 10\" 5x10\" 10x5\" 5x15\"         Rhythmic stabs   nv Ball on wall 20x ea circles         Body Blade             Wall slide w/ retraction                          Ther Ex            Pulleys  4' 2'/2' " "flex/abd 2'/2' flex/abd         Pec str 3x15\" 3x15\" 2' on half foam roll 3x20\"*         UT str 3x15\"  3x30\"ea 3x30\"         Rows/ext  3x10 11#/7# 3x10 11#/7# 3x10 11.5#/8#         ER/IR    2x10 3# ea         Wrist flex/ext 10x ea GTB 3x10 ea GTB 3x10ea GTB 2x10 GTB         Wrist pro/sup             T/s ext  10x 10x10\" 10x10\"         S/l ER    nv         Ther Activity                                       Gait Training                                       Modalities                                                "

## 2024-06-05 ENCOUNTER — PATIENT OUTREACH (OUTPATIENT)
Dept: OTHER | Facility: OTHER | Age: 59
End: 2024-06-05

## 2024-06-05 NOTE — PROGRESS NOTES
Spoke with client at [facility]. He said he felt when he walked straight he was veering to the left. This CHW asked client to make a doctors appointment for him to be seen. Client refused and said he wanted to wait. This CHW told him if he felt  worse to go to the doctor or the ED. Patient will keep us informed as to how he feels and what he plans to do about it if anything changes. No further outreach scheduled.

## 2024-06-06 ENCOUNTER — OFFICE VISIT (OUTPATIENT)
Dept: PHYSICAL THERAPY | Facility: CLINIC | Age: 59
End: 2024-06-06
Payer: MEDICARE

## 2024-06-06 DIAGNOSIS — M54.2 NECK PAIN: ICD-10-CM

## 2024-06-06 DIAGNOSIS — M25.521 RIGHT ELBOW PAIN: ICD-10-CM

## 2024-06-06 DIAGNOSIS — T30.0 BURN: ICD-10-CM

## 2024-06-06 DIAGNOSIS — M25.512 LEFT SHOULDER PAIN, UNSPECIFIED CHRONICITY: Primary | ICD-10-CM

## 2024-06-06 PROCEDURE — 97110 THERAPEUTIC EXERCISES: CPT | Performed by: PHYSICAL THERAPIST

## 2024-06-06 PROCEDURE — 97140 MANUAL THERAPY 1/> REGIONS: CPT | Performed by: PHYSICAL THERAPIST

## 2024-06-06 PROCEDURE — 97112 NEUROMUSCULAR REEDUCATION: CPT | Performed by: PHYSICAL THERAPIST

## 2024-06-06 NOTE — PROGRESS NOTES
"Daily Note     Today's date: 2024  Patient name: Chau Lou III  : 1965  MRN: [id number]  Referring provider: Jose Eduardo Garisa PT  Dx:   Encounter Diagnosis     ICD-10-CM    1. Left shoulder pain, unspecified chronicity  M25.512       2. Right elbow pain  M25.521       3. Neck pain  M54.2                      Subjective: Pt reports banging his R elbow and having some soreness today.      Objective: See treatment diary below      Assessment:  Pt does well w progression of today's session, but continues to have thoracic hypomobility, kyphosis and has some difficulty with wall angels and completes in limited ROM.  He has some neck pain lying prone for PA mobs to t/s. Patient demonstrated fatigue post treatment, exhibited good technique with therapeutic exercises, and would benefit from continued PT.      Plan: Continue per plan of care.  Progress treatment as tolerated.       Precautions:     Date 5/15 5/21 5/28 6/4 6/6        Visit # IE 2 3 4 5        FOTO IE             Re-eval IE              Manuals 5/15 5/21 5/28 6/4 6/6        C/s PROM SF SF ND ND SF        UT/LS str w STM SF SF ND ND SF        T/s PA's Gr III  Gr III Gr III-IV SF Gr III                     Neuro Re-Ed           Scap retraction 3x10\" 5x10\" 20x 3\" 20x5\"         No money 10x GTB 3x10 GTB 2x10 GTB 2x10 GTB 2x15 GTB        DNF endurance 10\" 5x10\" 10x5\" 5x15\" nv        Rhythmic stabs   nv Ball on wall 20x ea circles Ball on wall 20x ea circles        Body Blade             Wall angels     10x5\"                     Ther Ex           Pulleys  4' 2'/2' flex/abd 2'/2' flex/abd 2'/2' flex/abd        Pec str 3x15\" 3x15\" 2' on half foam roll 3x20\" 3x20\"        UT str 3x15\"  3x30\"ea 3x30\" 3x20\"        Rows/ext  3x10 11#/7# 3x10 11#/7# 3x10 11.5#/8# 2x15 12#/8#        ER/IR    2x10 3# ea 2x10 4# ER ea        Wrist flex/ext 10x ea GTB 3x10 ea GTB 3x10ea GTB 2x10 GTB 3x10 GTB        Wrist pro/sup             T/s ext  10x " "10x10\" 10x10\" 10x5\"        S/l ER    nv         Ther Activity                                       Gait Training                                       Modalities                                                  "

## 2024-06-07 RX ORDER — IBUPROFEN 600 MG/1
600 TABLET ORAL EVERY 8 HOURS PRN
Qty: 30 TABLET | Refills: 0 | Status: SHIPPED | OUTPATIENT
Start: 2024-06-07

## 2024-06-10 DIAGNOSIS — K44.9 HIATAL HERNIA: ICD-10-CM

## 2024-06-10 RX ORDER — GABAPENTIN 300 MG/1
300 CAPSULE ORAL 3 TIMES DAILY
Qty: 60 CAPSULE | Refills: 3 | Status: SHIPPED | OUTPATIENT
Start: 2024-06-10

## 2024-06-12 NOTE — PROGRESS NOTES
"Daily Note     Today's date: 2024  Patient name: Chau Lou III  : 1965  MRN: [id number]  Referring provider: Jose Eduardo Garsia, PT  Dx:   Encounter Diagnosis     ICD-10-CM    1. Left shoulder pain, unspecified chronicity  M25.512       2. Right elbow pain  M25.521       3. Neck pain  M54.2           Start Time: 1015  Stop Time: 1100  Total time in clinic (min): 45 minutes    Subjective: Chau reports cont L sided neck pain, along with R elbow pain as well today. States he is having some gastrointestinal issues today as well.       Objective: See treatment diary below      Assessment: Chau Tolerated treatment well with appropriate muscle fatigue experienced with ex's. He is making steady gains towards goals. R UT/levator, as well as c/s paraspinals muscle tightness persists. He was challenged with cervical stabilization ex's.  Required vc's t/o session for proper positioning with ex's. Chau  would benefit from continued PT and compliance with HEP.        Plan: Continue per plan of care.      Precautions:     Date 5/15 5/21 5/28 6/4 6/6 6/13       Visit # IE 2 3 4 5 6       FOTO IE             Re-eval IE              Manuals 5/15 5/21 5/28 6/4 6/6 6/13       C/s PROM SF SF ND ND SF AW       UT/LS str w STM SF SF ND ND SF AW       T/s PA's Gr III  Gr III Gr III-IV SF Gr III                     Neuro Re-Ed          Scap retraction 3x10\" 5x10\" 20x 3\" 20x5\"  NP       No money 10x GTB 3x10 GTB 2x10 GTB 2x10 GTB 2x15 GTB GTB 2x15        DNF endurance 10\" 5x10\" 10x5\" 5x15\" nv 5\"x15       Rhythmic stabs   nv Ball on wall 20x ea circles Ball on wall 20x ea circles X20 ea        Body Blade             Wall angels     10x5\" NP                    Ther Ex          Pulleys  4' 2'/2' flex/abd 2'/2' flex/abd 2'/2' flex/abd 2/2 ea FF/ abd       Pec str 3x15\" 3x15\" 2' on half foam roll 3x20\" 3x20\" NP       UT str 3x15\"  3x30\"ea 3x30\" 3x20\" 20\"x3ea        Rows/ext  3x10 11#/7# 3x10 " "11#/7# 3x10 11.5#/8# 2x15 12#/8# 12# / 8# 2x15 ea        ER/IR    2x10 3# ea 2x10 4# ER ea NP       Wrist flex/ext 10x ea GTB 3x10 ea GTB 3x10ea GTB 2x10 GTB 3x10 GTB GTB 3x10        Wrist pro/sup             T/s ext  10x 10x10\" 10x10\" 10x5\"        S/l ER    nv         Ther Activity                                       Gait Training                                       Modalities                                                    "

## 2024-06-13 ENCOUNTER — OFFICE VISIT (OUTPATIENT)
Dept: PHYSICAL THERAPY | Facility: CLINIC | Age: 59
End: 2024-06-13
Payer: MEDICARE

## 2024-06-13 DIAGNOSIS — M25.512 LEFT SHOULDER PAIN, UNSPECIFIED CHRONICITY: Primary | ICD-10-CM

## 2024-06-13 DIAGNOSIS — M25.521 RIGHT ELBOW PAIN: ICD-10-CM

## 2024-06-13 DIAGNOSIS — M54.2 NECK PAIN: ICD-10-CM

## 2024-06-13 PROCEDURE — 97110 THERAPEUTIC EXERCISES: CPT

## 2024-06-13 PROCEDURE — 97140 MANUAL THERAPY 1/> REGIONS: CPT

## 2024-06-17 DIAGNOSIS — Z09 ENCOUNTER FOR FOLLOW-UP: ICD-10-CM

## 2024-06-18 ENCOUNTER — EVALUATION (OUTPATIENT)
Dept: PHYSICAL THERAPY | Facility: CLINIC | Age: 59
End: 2024-06-18
Payer: MEDICARE

## 2024-06-18 ENCOUNTER — PATIENT OUTREACH (OUTPATIENT)
Dept: OTHER | Facility: OTHER | Age: 59
End: 2024-06-18

## 2024-06-18 DIAGNOSIS — M25.521 RIGHT ELBOW PAIN: ICD-10-CM

## 2024-06-18 DIAGNOSIS — M54.2 NECK PAIN: ICD-10-CM

## 2024-06-18 DIAGNOSIS — M25.512 LEFT SHOULDER PAIN, UNSPECIFIED CHRONICITY: Primary | ICD-10-CM

## 2024-06-18 PROCEDURE — 97164 PT RE-EVAL EST PLAN CARE: CPT | Performed by: PHYSICAL THERAPIST

## 2024-06-18 PROCEDURE — 97140 MANUAL THERAPY 1/> REGIONS: CPT | Performed by: PHYSICAL THERAPIST

## 2024-06-18 PROCEDURE — 97110 THERAPEUTIC EXERCISES: CPT | Performed by: PHYSICAL THERAPIST

## 2024-06-18 RX ORDER — TAMSULOSIN HYDROCHLORIDE 0.4 MG/1
0.4 CAPSULE ORAL
Qty: 90 CAPSULE | Refills: 1 | Status: SHIPPED | OUTPATIENT
Start: 2024-06-18

## 2024-06-18 NOTE — PROGRESS NOTES
PT Evaluation     Today's date: 5/15/2024  Patient name: Chau Lou III  : 1965  MRN: [id number]  Referring provider: Jose Eduardo Garsia PT  Dx:   Encounter Diagnosis     ICD-10-CM    1. Left shoulder pain, unspecified chronicity  M25.512 Ambulatory Referral to Physical Therapy    Follow-up with physical therapy.      2. Right elbow pain  M25.521 Ambulatory Referral to Physical Therapy    Atraumatic.  Follow-up with physical therapy.                     Assessment  Impairments: abnormal muscle firing, abnormal or restricted ROM, abnormal movement, activity intolerance, impaired physical strength, lacks appropriate home exercise program, pain with function, poor posture , poor body mechanics and endurance  Symptom irritability: moderate    Assessment details: : Pt is a 59 y.o. year old male presenting 6 visits post PT evaluation for Left shoulder pain, Right elbow pain, and neck pain.  He presents with the following impairments: limited cervical ROM, hypomobility, RTC weakness and TTP, shoulder ROM deficits, and UT/LS tightness affecting his function with turning his neck, looking up/down, lifting, carrying, reaching, sleeping, and taking out the garbage. Patient has has a noticeable decrease in R elbow pain. Pt will benefit from skilled physical therapy to address functional limitations noted in evaluation and meet patient goals.    Spoke with patient about current lack of progress with shoulder and neck pain. Pt educated on stretching at least 2 times per day and limiting prolonged positions to reduce muscle overuse and tightness, poor postural positions, and increased pain with movement. Pt would benefit from skilled PT to address functional limitations noted above. Tentative discharge in 1-2 weeks pending progress.      Goals  ST. Pt will reduce his pain to 4/10. - not met  2. Pt will improve cervical rotation to nil deficits. - not met    LT. Pt will improve b/l shoulder ABD strength to  4+/5 for improved lifting. - not met  2. Pt will improve R wrist ext to 5/5 for improved ability to lift and take out garbage. - to be tested  3. Pt will meet projected FOTO score. -to be tested    Plan  Patient would benefit from: PT eval and skilled physical therapy  Planned modality interventions: cryotherapy, TENS, thermotherapy: hydrocollator packs, electrical stimulation/Stateless stimulation and unattended electrical stimulation    Planned therapy interventions: joint mobilization, abdominal trunk stabilization, manual therapy, behavior modification, body mechanics training, neuromuscular re-education, postural training, stretching, therapeutic activities, therapeutic exercise, strengthening, functional ROM exercises, flexibility and home exercise program    Frequency: 2x week  Duration in weeks: 6        Subjective Evaluation    History of Present Illness  Mechanism of injury: IE (5/15): Pt reports neck pain and spasms for a long time, at least a few months.  He reports R elbow pain for the past 2 months, but does not recall any WADE.  He thinks he may have banged his elbow taking out the garbage.  He denies any numbness or tinlig, but has a soreness in his R elbow.  He is uncomfortable when sleeping due ot his neck pain.  He is still able to do his daily tasks like taking out the garbage.  He takes meloxicam for muscle spasms which helps some. 4/10 elbow, 6/10 neck    6/18: Patient reports that his shoulder and neck symptoms continue to persist at same intensity despite doing exercises at home. His L shoulder started to hurt this weekend after leaning to the L side while sitting for a few hours.      Objective     Tenderness     Tenderness of b/l upper trap with trigger points, reduced with manual UT str w/ STM.    Active Range of Motion   Cervical/Thoracic Spine       Cervical    Flexion:  Restriction level: WNL, pain in suboccipitals  Extension:  Restriction level: moderate, pain  Left lateral flexion:  with  "pain Restriction level: maximal  Right lateral flexion:  Restriction level moderate  Left rotation:  with pain Restriction level: moderate, pain  Right rotation:  Restriction level: moderate, pain  Left Shoulder   Flexion: WFL  Abduction: WFL    Right Shoulder   Flexion: WFL  Abduction: WFL    Passive Range of Motion   Left Shoulder   External rotation 90°: 90 degrees with pain    Right Shoulder   External rotation 90°: 80 degrees with pain    Joint Play     Hypomobile: C1, C2, C3, C4, C5, C6, C7, T1 and T2     Pain: C2, T3    Strength/Myotome Testing     Left Shoulder     Planes of Motion   Abduction: 4-   External rotation at 0°: 4  Internal rotation at 0°: 4     Right Shoulder     Planes of Motion   Abduction: 4-   External rotation at 0°: 4   Internal rotation at 0°: 4+        Precautions:     Date 5/15 5/21 5/28 6/4 6/6 6/13 6/18      Visit # IE 2 3 4 5 6 7      FOTO IE             Re-eval IE      ND        Manuals 5/15 5/21 5/28 6/4 6/6 6/13 6/18      C/s PROM SF SF ND ND SF AW ND      UT/LS str w STM SF SF ND ND SF AW ND      T/s PA's Gr III  Gr III Gr III-IV SF Gr III                     Neuro Re-Ed   5/28 6/4 6/6 6/13 6/18      Scap retraction 3x10\" 5x10\" 20x 3\" 20x5\"  NP       No money 10x GTB 3x10 GTB 2x10 GTB 2x10 GTB 2x15 GTB GTB 2x15  GTB 2x15      DNF endurance 10\" 5x10\" 10x5\" 5x15\" nv 5\"x15 nv      Rhythmic stabs   nv Ball on wall 20x ea circles Ball on wall 20x ea circles X20 ea        Body Blade             Wall angels     10x5\" NP                    Ther Ex   5/28 6/4 6/6 6/13 6/18      Pulleys  4' 2'/2' flex/abd 2'/2' flex/abd 2'/2' flex/abd 2/2 ea FF/ abd 2/2 ea FF/abd      Pec str 3x15\" 3x15\" 2' on half foam roll 3x20\" 3x20\" NP       UT str 3x15\"  3x30\"ea 3x30\" 3x20\" 20\"x3ea  3x30\" LS*      Rows/ext  3x10 11#/7# 3x10 11#/7# 3x10 11.5#/8# 2x15 12#/8# 12# / 8# 2x15 ea        ER/IR    2x10 3# ea 2x10 4# ER ea NP       Wrist flex/ext 10x ea GTB 3x10 ea GTB 3x10ea GTB 2x10 GTB 3x10 GTB GTB 3x10      " "  Wrist pro/sup             T/s ext  10x 10x10\" 10x10\" 10x5\"  10x10\" *LBP      S/l ER    nv         Pt Education       ND      Ther Activity                                       Gait Training                                       Modalities                                            "

## 2024-06-18 NOTE — PROGRESS NOTES
Client requested to meet with this Community Health Resource Nurse (CHRN) during [facility] ([facility]) outreach hours. Client discussed his PT visit today. He states he will be going once next week.   Reviewed upcoming appointments.   Supportive listening provided.

## 2024-06-20 ENCOUNTER — APPOINTMENT (OUTPATIENT)
Dept: PHYSICAL THERAPY | Facility: CLINIC | Age: 59
End: 2024-06-20
Payer: MEDICARE

## 2024-06-25 ENCOUNTER — PATIENT OUTREACH (OUTPATIENT)
Dept: OTHER | Facility: OTHER | Age: 59
End: 2024-06-25

## 2024-06-25 ENCOUNTER — OFFICE VISIT (OUTPATIENT)
Dept: PHYSICAL THERAPY | Facility: CLINIC | Age: 59
End: 2024-06-25
Payer: MEDICARE

## 2024-06-25 DIAGNOSIS — M25.521 RIGHT ELBOW PAIN: ICD-10-CM

## 2024-06-25 DIAGNOSIS — M25.512 LEFT SHOULDER PAIN, UNSPECIFIED CHRONICITY: Primary | ICD-10-CM

## 2024-06-25 DIAGNOSIS — M54.2 NECK PAIN: ICD-10-CM

## 2024-06-25 PROCEDURE — 97140 MANUAL THERAPY 1/> REGIONS: CPT

## 2024-06-25 PROCEDURE — 97110 THERAPEUTIC EXERCISES: CPT
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
## 2024-06-25 NOTE — PROGRESS NOTES
"Daily Note     Today's date: 2024  Patient name: Chau Lou III  : 1965  MRN: [id number]  Referring provider: Jose Eduardo Garsia PT  Dx:   Encounter Diagnosis     ICD-10-CM    1. Left shoulder pain, unspecified chronicity  M25.512       2. Right elbow pain  M25.521       3. Neck pain  M54.2           Start Time: 1100  Stop Time: 1140  Total time in clinic (min): 40 minutes    Subjective: Patient reports R elbow seems to be improving, but symptoms have primarily been exacerbated with C/S recently and currently has increased L>R cervical tightness and pain.      Objective: See treatment diary below      Assessment: Tolerated treatment well. Patient did not experience any worsening of symptoms during any exercises performed throughout today's session. Frequent verbal cueing required for proper recall, pacing, and form of exercises in order to avoid occurrence of compensations, with fair carryover noted. Patient responded favorably to manual therapy and stretching and noted some improvements in tightness and stiffness post treatment. Motion restrictions present in all planes of motion during PROM with tightness and limitations more present with L UT>R UT. Muscle fatigue present at the conclusion of session. Patient would benefit from continued PT to reduce symptoms, restore cervical mobility, stability, and strength, and to maximize overall functional ability.      Plan: Continue per plan of care.      Precautions:     Date 5/15 5/21 5/28 6/4 6/6 6/13 6/25      Visit # IE 2 3 4 5 6 7      FOTO IE             Re-eval IE              Manuals 5/15 5/21 5/28 6/4 6/6 6/13 6/25      C/s PROM SF SF ND ND SF AW KR      UT/LS str w STM SF SF ND ND SF AW KR      T/s PA's Gr III  Gr III Gr III-IV SF Gr III                     Neuro Re-Ed         Scap retraction 3x10\" 5x10\" 20x 3\" 20x5\"  NP       No money 10x GTB 3x10 GTB 2x10 GTB 2x10 GTB 2x15 GTB GTB 2x15  GTB 2x15      DNF endurance 10\" " "5x10\" 10x5\" 5x15\" nv 5\"x15 5\"x15      Rhythmic stabs   nv Ball on wall 20x ea circles Ball on wall 20x ea circles X20 ea  X20 ea      Body Blade             Wall angels     10x5\" NP                    Ther Ex   5/28 6/4 6/6 6/13 6/25      Pulleys  4' 2'/2' flex/abd 2'/2' flex/abd 2'/2' flex/abd 2/2 ea FF/ abd 2'/2' flex/abd      Pec str 3x15\" 3x15\" 2' on half foam roll 3x20\" 3x20\" NP 3x20\"      UT str 3x15\"  3x30\"ea 3x30\" 3x20\" 20\"x3ea  20\"x3ea       Rows/ext  3x10 11#/7# 3x10 11#/7# 3x10 11.5#/8# 2x15 12#/8# 12# / 8# 2x15 ea  12# / 8# 2x15 ea       ER/IR    2x10 3# ea 2x10 4# ER ea NP       Wrist flex/ext 10x ea GTB 3x10 ea GTB 3x10ea GTB 2x10 GTB 3x10 GTB GTB 3x10  GTB 3x10      Wrist pro/sup             T/s ext  10x 10x10\" 10x10\" 10x5\"        S/l ER    nv         Ther Activity                                       Gait Training                                       Modalities                                                      "

## 2024-07-01 ENCOUNTER — OFFICE VISIT (OUTPATIENT)
Dept: PHYSICAL THERAPY | Facility: CLINIC | Age: 59
End: 2024-07-01
Payer: MEDICARE

## 2024-07-01 DIAGNOSIS — M25.521 RIGHT ELBOW PAIN: ICD-10-CM

## 2024-07-01 DIAGNOSIS — M25.512 LEFT SHOULDER PAIN, UNSPECIFIED CHRONICITY: Primary | ICD-10-CM

## 2024-07-01 DIAGNOSIS — M54.2 NECK PAIN: ICD-10-CM

## 2024-07-01 PROCEDURE — 97110 THERAPEUTIC EXERCISES: CPT

## 2024-07-01 PROCEDURE — 97140 MANUAL THERAPY 1/> REGIONS: CPT

## 2024-07-01 PROCEDURE — 97112 NEUROMUSCULAR REEDUCATION: CPT

## 2024-07-01 NOTE — PROGRESS NOTES
"Daily Note     Today's date: 2024  Patient name: Chau Lou III  : 1965  MRN: [id number]  Referring provider: Genoveva Tian PA-C  Dx:   Encounter Diagnosis     ICD-10-CM    1. Left shoulder pain, unspecified chronicity  M25.512       2. Right elbow pain  M25.521       3. Neck pain  M54.2           Start Time: 1100  Stop Time: 1145  Total time in clinic (min): 45 minutes    Subjective: Hugo reports new R elbow pain today upon arrival to PT. States he feel like his neck is getting a little bit better but the left side is still bothering him.       Objective: See treatment diary below      Assessment: Hugo Tolerated treatment well with appropriate muscle fatigue experienced with ex's. He is making steady gains towards goals. Increased muscle guarding and tension noted today during manuals. Required vc's t/o session for proper positioning with ex's.  Hugo would benefit from continued PT and compliance with HEP.        Plan: Continue per plan of care.      Precautions:     Date 5/15 5/21 5/28 6/4 6/6 6/13 6/25 7/1     Visit # IE 2 3 4 5 6 7 8     FOTO IE             Re-eval IE              Manuals 5/15 5/21 5/28 6/4 6/6 6/13 6/25 7/1     C/s PROM SF SF ND ND SF AW KR AW     UT/LS str w STM SF SF ND ND SF AW KR AW     T/s PA's Gr III  Gr III Gr III-IV SF Gr III                     Neuro Re-Ed        Scap retraction 3x10\" 5x10\" 20x 3\" 20x5\"  NP  NP     No money 10x GTB 3x10 GTB 2x10 GTB 2x10 GTB 2x15 GTB GTB 2x15  GTB 2x15 GTB 5\" 2x15       DNF endurance 10\" 5x10\" 10x5\" 5x15\" nv 5\"x15 5\"x15 5\"x15      Rhythmic stabs   nv Ball on wall 20x ea circles Ball on wall 20x ea circles X20 ea  X20 ea 20x ea      Body Blade             Wall angels     10x5\" NP                    Ther Ex   1     Pulleys  4' 2'/2' flex/abd 2'/2' flex/abd 2'/2' flex/abd 2/2 ea FF/ abd 2'/2' flex/abd 2/2 ea      Pec str 3x15\" 3x15\" 2' on half foam roll 3x20\" 3x20\" NP 3x20\" 20\"x3     UT " "str 3x15\"  3x30\"ea 3x30\" 3x20\" 20\"x3ea  20\"x3ea  20\"x3 ea     Rows/ext  3x10 11#/7# 3x10 11#/7# 3x10 11.5#/8# 2x15 12#/8# 12# / 8# 2x15 ea  12# / 8# 2x15 ea  12# / 8# 2x15 ea      ER/IR    2x10 3# ea 2x10 4# ER ea NP       Wrist flex/ext 10x ea GTB 3x10 ea GTB 3x10ea GTB 2x10 GTB 3x10 GTB GTB 3x10  GTB 3x10 GTB 3x10      Wrist pro/sup             T/s ext  10x 10x10\" 10x10\" 10x5\"        S/l ER    nv         Ther Activity                                       Gait Training                                       Modalities                                                        "

## 2024-07-02 ENCOUNTER — PATIENT OUTREACH (OUTPATIENT)
Dept: OTHER | Facility: OTHER | Age: 59
End: 2024-07-02

## 2024-07-02 NOTE — PROGRESS NOTES
Client asked to speak with this Community Health Resource Nurse (CHRN) during [facility] outreach hours. Client stated last week he was informed by his therapist and psychiatrist at [facility] ([facility] that they no longer accept his medicare insurance, [employer].    Client reported he is on Clonidine 0.5mg BID

## 2024-07-03 ENCOUNTER — OFFICE VISIT (OUTPATIENT)
Dept: PHYSICAL THERAPY | Facility: CLINIC | Age: 59
End: 2024-07-03
Payer: MEDICARE

## 2024-07-03 ENCOUNTER — PATIENT OUTREACH (OUTPATIENT)
Dept: OTHER | Facility: OTHER | Age: 59
End: 2024-07-03

## 2024-07-03 DIAGNOSIS — M54.2 NECK PAIN: ICD-10-CM

## 2024-07-03 DIAGNOSIS — M25.512 LEFT SHOULDER PAIN, UNSPECIFIED CHRONICITY: Primary | ICD-10-CM

## 2024-07-03 DIAGNOSIS — M25.521 RIGHT ELBOW PAIN: ICD-10-CM

## 2024-07-03 PROCEDURE — 97112 NEUROMUSCULAR REEDUCATION: CPT

## 2024-07-03 PROCEDURE — 97110 THERAPEUTIC EXERCISES: CPT

## 2024-07-03 PROCEDURE — 97140 MANUAL THERAPY 1/> REGIONS: CPT

## 2024-07-03 NOTE — PROGRESS NOTES
Client approached this writer at [facility] saying he was struggling to get things done. For example laundry and bathing. Client wanted to this writer to log a referral to Department of Aging. We called department of aging together. Department of aging will reach out to client to set up an assessment. No further outreach scheduled.

## 2024-07-03 NOTE — PROGRESS NOTES
"Daily Note     Today's date: 7/3/2024  Patient name: Chau Lou III  : 1965  MRN: [id number]  Referring provider: Jose Eduardo Garsia, PT  Dx:   Encounter Diagnosis     ICD-10-CM    1. Left shoulder pain, unspecified chronicity  M25.512       2. Right elbow pain  M25.521       3. Neck pain  M54.2           Start Time: 1050  Stop Time: 1130  Total time in clinic (min): 40 minutes    Subjective: Hugo c/o still being frustrated about having to switch his psychiatrist.        Objective: See treatment diary below      Assessment: Hugo Tolerated treatment well with appropriate muscle fatigue experienced with ex's. He is making steady gains towards goals. Required vc's t/o session for proper positioning with ex's. Hugo would benefit from continued PT and compliance with HEP.        Plan: Continue per plan of care.      Precautions:     Date 5/15 5/21 5/28 6/4 6/6 6/13 6/25 7/1 7/3    Visit # IE 2 3 4 5 6 7 8 9    FOTO IE             Re-eval IE              Manuals 5/15 5/21 5/28 6/4 6/6 6/13 6/25 7/1 7/3    C/s PROM SF SF ND ND SF AW KR AW AW    UT/LS str w STM SF SF ND ND SF AW KR AW AW    T/s PA's Gr III  Gr III Gr III-IV SF Gr III                     Neuro Re-Ed    7/3    Scap retraction 3x10\" 5x10\" 20x 3\" 20x5\"  NP  NP     No money 10x GTB 3x10 GTB 2x10 GTB 2x10 GTB 2x15 GTB GTB 2x15  GTB 2x15 GTB 5\" 2x15   GTB 5\" 2x15     DNF endurance 10\" 5x10\" 10x5\" 5x15\" nv 5\"x15 5\"x15 5\"x15  5\"x15     Rhythmic stabs   nv Ball on wall 20x ea circles Ball on wall 20x ea circles X20 ea  X20 ea 20x ea  20x ea     Body Blade             Wall angels     10x5\" NP                    Ther Ex    6/ 6/ 7/3    Pulleys  4' 2'/2' flex/abd 2'/2' flex/abd 2'/2' flex/abd 2/2 ea FF/ abd 2'/2' flex/abd 2/2 ea  2/    Pec str 3x15\" 3x15\" 2' on half foam roll 3x20\" 3x20\" NP 3x20\" 20\"x3 20\"x3     UT str 3x15\"  3x30\"ea 3x30\" 3x20\" 20\"x3ea  20\"x3ea  20\"x3 ea 20\"x3 ea    Rows/ext  3x10 11#/7# 3x10 11#/7# " "3x10 11.5#/8# 2x15 12#/8# 12# / 8# 2x15 ea  12# / 8# 2x15 ea  12# / 8# 2x15 ea  12# / 8# 2x15 Ea     ER/IR    2x10 3# ea 2x10 4# ER ea NP       Wrist flex/ext 10x ea GTB 3x10 ea GTB 3x10ea GTB 2x10 GTB 3x10 GTB GTB 3x10  GTB 3x10 GTB 3x10  GTB 3x10     Wrist pro/sup             T/s ext  10x 10x10\" 10x10\" 10x5\"        S/l ER    nv         Ther Activity                                       Gait Training                                       Modalities                                                          "

## 2024-07-09 ENCOUNTER — OFFICE VISIT (OUTPATIENT)
Dept: PHYSICAL THERAPY | Facility: CLINIC | Age: 59
End: 2024-07-09
Payer: MEDICARE

## 2024-07-09 DIAGNOSIS — M25.512 LEFT SHOULDER PAIN, UNSPECIFIED CHRONICITY: Primary | ICD-10-CM

## 2024-07-09 DIAGNOSIS — M25.521 RIGHT ELBOW PAIN: ICD-10-CM

## 2024-07-09 DIAGNOSIS — M54.2 NECK PAIN: ICD-10-CM

## 2024-07-09 PROCEDURE — 97140 MANUAL THERAPY 1/> REGIONS: CPT | Performed by: PHYSICAL THERAPIST

## 2024-07-09 PROCEDURE — 97110 THERAPEUTIC EXERCISES: CPT | Performed by: PHYSICAL THERAPIST

## 2024-07-09 PROCEDURE — 97112 NEUROMUSCULAR REEDUCATION: CPT | Performed by: PHYSICAL THERAPIST

## 2024-07-09 NOTE — PROGRESS NOTES
"Daily Note     Today's date: 2024  Patient name: Chau Lou III  : 1965  MRN: [id number]  Referring provider: Genoveva Tian PA-C  Dx:   Encounter Diagnosis     ICD-10-CM    1. Left shoulder pain, unspecified chronicity  M25.512       2. Right elbow pain  M25.521       3. Neck pain  M54.2                      Subjective: Pt reports having a rough week getting caught in the rain on  last week.      Objective: See treatment diary below      Assessment:  Pt does well w progression of today's session and is challenged appropriately.  He continues to have UT/LS stiffness (worse on the L), TTP, and cervical/thoracic hypomobility, that improves minimally with manuals.  Patient demonstrated fatigue post treatment, exhibited good technique with therapeutic exercises, and would benefit from continued PT.      Plan: Continue per plan of care.  Progress treatment as tolerated.       Precautions:     Date 7/9   6/4 6/6 6/13 6/18 6/25 7/1 7/3   Visit # 11   4 5 6 7 8 9 10   FOTO              Re-eval       xx        Manuals 7/9   6/4 6/6 6/13 6/25 7/1 7/3 7/3   C/s PROM SF   ND SF AW KR AW AW    UT/LS str w STM SF   ND SF AW KR AW AW    T/s PA's SF Gr III   Gr III-IV SF Gr III                     Neuro Re-Ed 7/9   6/4 6/6 6/13 6/25 7/1 7/3 7/9   Scap retraction    20x5\"  NP  NP     No money 2x15 GTB   2x10 GTB 2x15 GTB GTB 2x15  GTB 2x15 GTB 5\" 2x15   GTB 5\" 2x15     DNF endurance    5x15\" nv 5\"x15 5\"x15 5\"x15  5\"x15     Rhythmic stabs 30\" ea flexion   Ball on wall 20x ea circles Ball on wall 20x ea circles X20 ea  X20 ea 20x ea  20x ea     Body Blade             Wall angels     10x5\" NP                    Ther Ex 7/9   6/4 6/6 6/13 6/25 7/1 7/3 7/9   Pulleys 2'/2'   2'/2' flex/abd 2'/2' flex/abd 2/2 ea FF/ abd 2'/2' flex/abd 2/2 ea  2/2 2'/2' flex/abd   Pec str 3x20\"   3x20\" 3x20\" NP 3x20\" 20\"x3 20\"x3  20\"x3    UT str 3x20\"   3x30\" 3x20\" 20\"x3ea  20\"x3ea  20\"x3 ea 20\"x3 ea 20\"x3    Rows/ext 15# / " "8# 2x15 Ea    3x10 11.5#/8# 2x15 12#/8# 12# / 8# 2x15 ea  12# / 8# 2x15 ea  12# / 8# 2x15 ea  12# / 8# 2x15 Ea  12# / 8# 2x15 Ea    ER/IR 2x10 3# ER   2x10 3# ea 2x10 4# ER ea NP       Wrist flex/ext    2x10 GTB 3x10 GTB GTB 3x10  GTB 3x10 GTB 3x10  GTB 3x10     Flexion AAROM 20x red 3# bar            T/s ext    10x10\" 10x5\"        Shoulder Raises 20x 2# flex & abd   nv         Ther Activity                                       Gait Training                                       Modalities                                                            "

## 2024-07-10 ENCOUNTER — PATIENT OUTREACH (OUTPATIENT)
Dept: OTHER | Facility: OTHER | Age: 59
End: 2024-07-10

## 2024-07-11 ENCOUNTER — PATIENT OUTREACH (OUTPATIENT)
Dept: OTHER | Facility: OTHER | Age: 59
End: 2024-07-11

## 2024-07-11 ENCOUNTER — OFFICE VISIT (OUTPATIENT)
Dept: PHYSICAL THERAPY | Facility: CLINIC | Age: 59
End: 2024-07-11
Payer: MEDICARE

## 2024-07-11 DIAGNOSIS — M54.2 NECK PAIN: ICD-10-CM

## 2024-07-11 DIAGNOSIS — M25.512 LEFT SHOULDER PAIN, UNSPECIFIED CHRONICITY: Primary | ICD-10-CM

## 2024-07-11 DIAGNOSIS — M25.521 RIGHT ELBOW PAIN: ICD-10-CM

## 2024-07-11 PROCEDURE — 97140 MANUAL THERAPY 1/> REGIONS: CPT

## 2024-07-11 PROCEDURE — 97112 NEUROMUSCULAR REEDUCATION: CPT

## 2024-07-11 PROCEDURE — 97110 THERAPEUTIC EXERCISES: CPT

## 2024-07-11 NOTE — PROGRESS NOTES
Sat in with assessment with department of aging. During assessment this writer helped answer questions and voice concerns about clients status. Yovani the assessment advisor said client qualified for 5 hours of services a week. The asessment will be processed and someone from the office will contact this writer about the next steps.

## 2024-07-11 NOTE — PROGRESS NOTES
"Daily Note     Today's date: 2024  Patient name: Chau Lou III  : 1965  MRN: [id number]  Referring provider: Jose Eduardo Garsia, PT  Dx:   Encounter Diagnosis     ICD-10-CM    1. Left shoulder pain, unspecified chronicity  M25.512       2. Right elbow pain  M25.521       3. Neck pain  M54.2           Start Time: 1130  Stop Time: 1215  Total time in clinic (min): 45 minutes    Subjective: Hugo has no new complaints today. Denies any change in his neck and shoulder pain.     Objective: See treatment diary below      Assessment: Hugo Tolerated treatment well with appropriate muscle fatigue experienced with ex's. He cont to make slow gains towards goals. L sided c/s paraspinal and levator tightness persists. Required vc's t/o session for proper positioning with ex's.  Patient demonstrated fatigue post treatment and would benefit from continued PT and compliance with HEP.        Plan: Continue per plan of care.      Precautions:     Date 7/9 7/11  6/4 6/6 6/13 6/18 6/25 7/1 7/3   Visit # 11 12  4 5 6 7 8 9 10   FOTO              Re-eval       xx        Manuals 7/9 7/11  6/4 6/6 6/13 6/25 7/1 7/3 7/3   C/s PROM SF AW  ND SF AW KR AW AW    UT/LS str w STM SF AW  ND SF AW KR AW AW    T/s PA's SF Gr III   Gr III-IV SF Gr III                     Neuro Re-Ed 7/9 7/11  6/4 6/6 6/13 6/25 7/1 7/3 7/9   Scap retraction    20x5\"  NP  NP     No money 2x15 GTB GTB 2x15  2x10 GTB 2x15 GTB GTB 2x15  GTB 2x15 GTB 5\" 2x15   GTB 5\" 2x15     DNF endurance  5\"x20  5x15\" nv 5\"x15 5\"x15 5\"x15  5\"x15     Rhythmic stabs 30\" ea flexion 30x ea   Ball on wall 20x ea circles Ball on wall 20x ea circles X20 ea  X20 ea 20x ea  20x ea     Body Blade             Wall angels     10x5\" NP                    Ther Ex 7/9 7/11  6/4 6/6 6/13 6/25 7/1 7/3 7/9   Pulleys 2'/2' 2'/2  2'/2' flex/abd 2'/2' flex/abd 2/2 ea FF/ abd 2'/2' flex/abd 2/2 ea  2/2 2'/2' flex/abd   Pec str 3x20\" 20\"x3  3x20\" 3x20\" NP 3x20\" 20\"x3 20\"x3  20\"x3    UT str 3x20\" " "20\"x3  3x30\" 3x20\" 20\"x3ea  20\"x3ea  20\"x3 ea 20\"x3 ea 20\"x3    Rows/ext 15# / 8# 2x15 Ea  14# / 8#  2x15 ea   3x10 11.5#/8# 2x15 12#/8# 12# / 8# 2x15 ea  12# / 8# 2x15 ea  12# / 8# 2x15 ea  12# / 8# 2x15 Ea  12# / 8# 2x15 Ea    ER/IR 2x10 3# ER 3# 2x15 ea   2x10 3# ea 2x10 4# ER ea NP       Wrist flex/ext    2x10 GTB 3x10 GTB GTB 3x10  GTB 3x10 GTB 3x10  GTB 3x10     Flexion AAROM 20x red 3# bar 3# bar  20x            T/s ext    10x10\" 10x5\"        Shoulder Raises 20x 2# flex & abd 2# x20 FF x10 abd   nv         Ther Activity                                       Gait Training                                       Modalities                                                              "

## 2024-07-16 ENCOUNTER — OFFICE VISIT (OUTPATIENT)
Dept: PHYSICAL THERAPY | Facility: CLINIC | Age: 59
End: 2024-07-16
Payer: MEDICARE

## 2024-07-16 ENCOUNTER — TELEPHONE (OUTPATIENT)
Dept: PULMONOLOGY | Facility: CLINIC | Age: 59
End: 2024-07-16

## 2024-07-16 ENCOUNTER — PATIENT OUTREACH (OUTPATIENT)
Dept: OTHER | Facility: OTHER | Age: 59
End: 2024-07-16

## 2024-07-16 DIAGNOSIS — M25.521 RIGHT ELBOW PAIN: ICD-10-CM

## 2024-07-16 DIAGNOSIS — M54.2 NECK PAIN: ICD-10-CM

## 2024-07-16 DIAGNOSIS — M25.512 LEFT SHOULDER PAIN, UNSPECIFIED CHRONICITY: Primary | ICD-10-CM

## 2024-07-16 PROCEDURE — 97140 MANUAL THERAPY 1/> REGIONS: CPT

## 2024-07-16 PROCEDURE — 97110 THERAPEUTIC EXERCISES: CPT

## 2024-07-16 PROCEDURE — 97112 NEUROMUSCULAR REEDUCATION: CPT

## 2024-07-16 NOTE — TELEPHONE ENCOUNTER
Called patient to r/s appt with Dr. Lema on 7/25, unable to leave . Had the option to send SMS message instead. Patient does not have mychart

## 2024-07-16 NOTE — PROGRESS NOTES
"Daily Note     Today's date: 2024  Patient name: Chau Lou III  : 1965  MRN: [id number]  Referring provider: Jose Eduardo Garsia, PT  Dx:   Encounter Diagnosis     ICD-10-CM    1. Left shoulder pain, unspecified chronicity  M25.512       2. Right elbow pain  M25.521       3. Neck pain  M54.2           Start Time: 1120  Stop Time: 1200  Total time in clinic (min): 40 minutes    Subjective: Pt reports no changes in sx since his previous session.     Objective: See treatment diary below      Assessment: Tolerated treatment well. He reports no elevation in sx with exercises completed this session. He is given rest breaks as needed throughout treatment. Patient demonstrated fatigue post treatment, exhibited good technique with therapeutic exercises, and would benefit from continued PT      Plan: Continue per plan of care.      Precautions:     Date 7/9 7/11 7/16  6/4 6/6 6/13 6/18 6/25 7/1 7/3   Visit # 11 12 13  4 5 6 7 8 9 10   FOTO               Re-eval        xx        Manuals 7/9 7/11 7/16 6/4 6/6 6/13 6/25 7/1 7/3 7/3   C/s PROM SF AW  ND SF AW KR AW AW FH   UT/LS str w STM SF AW  ND SF AW KR AW AW FH   T/s PA's SF Gr III   Gr III-IV SF Gr III                     Neuro Re-Ed 7/9 7/11 7/16 6/4 6/6 6/13 6/25 7/1 7/3 7/9   Scap retraction    20x5\"  NP  NP     No money 2x15 GTB GTB 2x15  2x10 GTB 2x15 GTB GTB 2x15  GTB 2x15 GTB 5\" 2x15   GTB 5\" 2x15  GTB 5\" 2x15    DNF endurance  5\"x20  5x15\" nv 5\"x15 5\"x15 5\"x15  5\"x15  5\"x15    Rhythmic stabs 30\" ea flexion 30x ea   Ball on wall 20x ea circles Ball on wall 20x ea circles X20 ea  X20 ea 20x ea  20x ea  20x ea    Body Blade             Wall angels     10x5\" NP                    Ther Ex 7/9 7/11 7/16 6/4 6/6 6/13 6/25 7/1 7/3 7/9   Pulleys 2'/2' 2'/2  2'/2' flex/abd 2'/2' flex/abd 2/2 ea FF/ abd 2'/2' flex/abd 2/2 ea  2/2 2'/2' flex/abd   Pec str 3x20\" 20\"x3  3x20\" 3x20\" NP 3x20\" 20\"x3 20\"x3  20\"x3    UT str 3x20\" 20\"x3  3x30\" 3x20\" 20\"x3ea  20\"x3ea  " "20\"x3 ea 20\"x3 ea 20\"x3    Rows/ext 15# / 8# 2x15 Ea  14# / 8#  2x15 ea   3x10 11.5#/8# 2x15 12#/8# 12# / 8# 2x15 ea  12# / 8# 2x15 ea  12# / 8# 2x15 ea  12# / 8# 2x15 Ea  12# / 8# 2x15 Ea    ER/IR 2x10 3# ER 3# 2x15 ea   2x10 3# ea 2x10 4# ER ea NP       Wrist flex/ext    2x10 GTB 3x10 GTB GTB 3x10  GTB 3x10 GTB 3x10  GTB 3x10  GTB 3x10    Flexion AAROM 20x red 3# bar 3# bar  20x            T/s ext    10x10\" 10x5\"        Shoulder Raises 20x 2# flex & abd 2# x20 FF x10 abd   nv         Ther Activity                                       Gait Training                                       Modalities                                                                "

## 2024-07-17 ENCOUNTER — PATIENT OUTREACH (OUTPATIENT)
Dept: OTHER | Facility: OTHER | Age: 59
End: 2024-07-17

## 2024-07-18 ENCOUNTER — OFFICE VISIT (OUTPATIENT)
Dept: SURGERY | Facility: CLINIC | Age: 59
End: 2024-07-18
Payer: MEDICARE

## 2024-07-18 VITALS
RESPIRATION RATE: 18 BRPM | HEART RATE: 76 BPM | BODY MASS INDEX: 21.63 KG/M2 | OXYGEN SATURATION: 97 % | SYSTOLIC BLOOD PRESSURE: 130 MMHG | WEIGHT: 134.6 LBS | DIASTOLIC BLOOD PRESSURE: 74 MMHG | HEIGHT: 66 IN

## 2024-07-18 DIAGNOSIS — K21.9 GASTROESOPHAGEAL REFLUX DISEASE WITHOUT ESOPHAGITIS: ICD-10-CM

## 2024-07-18 DIAGNOSIS — K44.9 HIATAL HERNIA: ICD-10-CM

## 2024-07-18 DIAGNOSIS — R19.7 DIARRHEA: Primary | ICD-10-CM

## 2024-07-18 PROCEDURE — 99213 OFFICE O/P EST LOW 20 MIN: CPT | Performed by: SURGERY

## 2024-07-18 NOTE — PROGRESS NOTES
Ambulatory Visit  Name: Chau Lou III      : 1965      MRN: [id number]  Encounter Provider: Robbie Peguero MD  Encounter Date: 2024   Encounter department: [facility] [place]    Assessment & Plan   1. Diarrhea  -     Ambulatory Referral to Gastroenterology; Future  2. Hiatal hernia  Assessment & Plan:  59-year-old male status post laparoscopic paraesophageal hernia repair with partial fundoplication and EGD on 2024, here for follow-up.    Plan:  He is doing well from upper GI perspective, but is still struggling with specifically morning diarrhea.  We discussed strategies for his excess flatulence, but he has declined taking simethicone.  At this point I would like for him to have a conversation with gastroenterology to see if he needs any further workup for his diarrhea.  To return to clinic after seeing GI.  3. Gastroesophageal reflux disease without esophagitis      History of Present Illness     Chau Lou III is a 59 y.o. male who presents status post laparoscopic paraesophageal hernia repair with partial fundoplication and EGD on 2024, here for follow-up.  Overall, he is relatively unchanged from our last visit.  He took simethicone for period of time which is unclear whether or not it helped any of his flatulence.  His biggest complaint is he is having diarrhea in the morning only, 2-3 times per day.  None of his efforts to alter his diet has made any impact on his diarrhea.  Denies any reflux, nausea, or vomiting.    Review of Systems   Constitutional:  Negative for appetite change, chills, diaphoresis and fever.   HENT:  Negative for nosebleeds and trouble swallowing.    Eyes: Negative.    Respiratory:  Negative for cough, shortness of breath and wheezing.    Cardiovascular:  Negative for chest pain, palpitations and leg swelling.   Gastrointestinal:  Positive for diarrhea. Negative for abdominal distention, abdominal pain, nausea and vomiting.    Genitourinary:  Negative for difficulty urinating, flank pain and frequency.   Musculoskeletal:  Negative for arthralgias, joint swelling and myalgias.   Skin:  Negative for pallor and rash.   Neurological:  Negative for dizziness, facial asymmetry and speech difficulty.   Hematological:  Does not bruise/bleed easily.   Psychiatric/Behavioral:  Negative for agitation and confusion.    All other systems reviewed and are negative.    Past Medical History   Past Medical History:   Diagnosis Date    Anxiety     Asthma     BPH (benign prostatic hyperplasia)     COPD (chronic obstructive pulmonary disease) (HCC)     Depression     GERD (gastroesophageal reflux disease)     Hiatal hernia     Hyperlipidemia     Hypertension     Suicide attempt (HCC)      Past Surgical History:   Procedure Laterality Date    COLONOSCOPY      ESOPHAGOGASTRODUODENOSCOPY N/A 03/01/2019    Procedure: ESOPHAGOGASTRODUODENOSCOPY (EGD);  Surgeon: Travis Nathan MD;  Location: [facility];  Service: Gastroenterology    FRACTURE SURGERY      finger surgery    HERNIA REPAIR      x2    NE LAPS RPR PARAESPHGL HRNA INCL FUNDPLSTY W/MESH N/A 2/15/2024    Procedure: REPAIR HERNIA PARAESOPHAGEAL  LAPAROSCOPIC;  Surgeon: Robbie Peguero MD;  Location: [facility];  Service: General    NE LAPS SURG ESOPG/GSTR FUNDOPLASTY N/A 2/15/2024    Procedure: FUNDOPLICATION TOUPET LAPAROSCOPIC;  Surgeon: Robbie Peguero MD;  Location: [facility];  Service: General    SKIN GRAFT Right     Foot     Family History   Problem Relation Age of Onset    Prostate cancer Paternal Uncle      Current Outpatient Medications on File Prior to Visit   Medication Sig Dispense Refill    albuterol (PROVENTIL HFA,VENTOLIN HFA) 90 mcg/act inhaler Inhale 1 puff every 6 (six) hours as needed for wheezing 17 g 10    atorvastatin (LIPITOR) 10 mg tablet TAKE 1 TABLET (10 MG TOTAL) BY MOUTH DAILY 90 tablet 3    benzonatate (TESSALON PERLES) 100 mg capsule TAKE 1 CAPSULE (100 MG TOTAL) BY MOUTH 3  (THREE) TIMES A DAY AS NEEDED FOR COUGH 20 capsule 0    brompheniramine-pseudoephedrine (DIMETAPP) 1-15 MG/5ML ELIX Take 5 mL by mouth every 6 (six) hours as needed for allergies 473 mL 3    buPROPion (WELLBUTRIN SR) 150 mg 12 hr tablet TAKE 1 TABLET (150 MG TOTAL) BY MOUTH 2 (TWO) TIMES A DAY 60 tablet 2    busPIRone (BUSPAR) 10 mg tablet TAKE 1 TABLET(S) BY ORAL ROUTE 2 TIME(S) PER DAY      cloNIDine (CATAPRES) 0.1 mg tablet TAKE 1 TABLET BY ORAL ROUTE 2 TIMES PER DAY      dextromethorphan-guaiFENesin (ROBITUSSIN DM)  mg/5 mL syrup Take 5 mL by mouth 3 (three) times a day as needed for cough 100 mL 3    escitalopram (LEXAPRO) 20 mg tablet Take 1 tablet (20 mg total) by mouth daily 30 tablet 1    finasteride (PROSCAR) 5 mg tablet Take 1 tablet (5 mg total) by mouth daily 90 tablet 1    fluticasone (FLONASE) 50 mcg/act nasal spray 1 SPRAY INTO EACH NOSTRIL DAILY 16 g 3    Fluticasone-Salmeterol (Advair) 500-50 mcg/dose inhaler INHALE 1 PUFF 2 (TWO) TIMES A DAY RINSE MOUTH AFTER USE. 60 blister 5    gabapentin (NEURONTIN) 300 mg capsule TAKE 1 CAPSULE (300 MG TOTAL) BY MOUTH 3 (THREE) TIMES A DAY 60 capsule 3    guaiFENesin (ROBITUSSIN) 100 MG/5ML oral liquid TAKE 10 ML (200 MG TOTAL) BY MOUTH 3 (THREE) TIMES A DAY AS NEEDED FOR COUGH 120 mL 0    ibuprofen (MOTRIN) 600 mg tablet TAKE 1 TABLET (600 MG TOTAL) BY MOUTH EVERY 8 (EIGHT) HOURS AS NEEDED FOR MILD PAIN 30 tablet 0    ipratropium (ATROVENT) 0.03 % nasal spray 2 sprays into each nostril every 12 (twelve) hours 30 mL 5    methocarbamol (ROBAXIN) 500 mg tablet TAKE 1 TABLET (500 MG TOTAL) BY MOUTH 4 (FOUR) TIMES A DAY 30 tablet 0    Multiple Vitamin (multivitamin) tablet Take 1 tablet by mouth daily 90 tablet 3    naltrexone (REVIA) 50 mg tablet TAKE 1 TABLET BY ORAL ROUTE 1 TIME PER DAY      saccharomyces boulardii (FLORASTOR) 250 mg capsule Take 1 capsule (250 mg total) by mouth 2 (two) times a day 60 capsule 0    silver sulfadiazine (SILVADENE,SSD) 1 %  cream APPLY TOPICALLY DAILY 50 g 0    simethicone (Gas-X Ultra Strength) 180 MG capsule Take 1 capsule (180 mg total) by mouth every 6 (six) hours as needed for flatulence 48 capsule 0    tamsulosin (FLOMAX) 0.4 mg Take 1 capsule (0.4 mg total) by mouth daily with dinner 90 capsule 1    tiotropium (Spiriva HandiHaler) 18 mcg inhalation capsule Place 1 capsule (18 mcg total) into inhaler and inhale daily 30 capsule 0    acetaminophen (TYLENOL) 650 mg CR tablet TAKE 1 TABLET (650 MG TOTAL) BY MOUTH EVERY 8 (EIGHT) HOURS AS NEEDED FOR MILD PAIN (Patient not taking: Reported on 5/17/2024) 30 tablet 0    amLODIPine (NORVASC) 5 mg tablet Take 1 tablet (5 mg total) by mouth daily for 30 doses 30 tablet 0    cholecalciferol (VITAMIN D3) 1,000 units tablet TAKE 1 TABLET (1,000 UNITS TOTAL) BY MOUTH DAILY 56 tablet 0    hydrOXYzine pamoate (VISTARIL) 50 mg capsule TAKE 1 CAPSULE(S) BY ORAL ROUTE PER AT BEDTIME AS NEEDED FOR SLEEP (Patient not taking: Reported on 5/17/2024)      Lidocaine HCl (Lidocaine Plus) 4 % CREA Apply topically every 6 (six) hours as needed (sperficial skin pain) (Patient not taking: Reported on 5/17/2024) 30 g 2    ondansetron (Zofran ODT) 4 mg disintegrating tablet Take 1 tablet (4 mg total) by mouth every 6 (six) hours as needed for nausea or vomiting (Patient not taking: Reported on 5/17/2024) 16 tablet 0    pantoprazole (PROTONIX) 40 mg tablet TAKE 1 TABLET (40 MG TOTAL) BY MOUTH DAILY (Patient not taking: Reported on 5/17/2024) 60 tablet 3    QUEtiapine (SEROquel) 50 mg tablet Take 3 tablets (150 mg total) by mouth daily at bedtime (Patient not taking: Reported on 5/17/2024) 90 tablet 1    traZODone (DESYREL) 50 mg tablet Take 0.5 tablets (25 mg total) by mouth daily at bedtime (Patient not taking: Reported on 5/17/2024) 15 tablet 1    Vraylar 1.5 MG capsule TAKE 1 CAPSULE BY ORAL ROUTE 1 TIME PER DAY AT BEDTIME (Patient not taking: Reported on 5/17/2024)       No current facility-administered  medications on file prior to visit.     Allergies   Allergen Reactions    Seasonal Ic [Cholestatin] Sneezing      Current Outpatient Medications on File Prior to Visit   Medication Sig Dispense Refill    albuterol (PROVENTIL HFA,VENTOLIN HFA) 90 mcg/act inhaler Inhale 1 puff every 6 (six) hours as needed for wheezing 17 g 10    atorvastatin (LIPITOR) 10 mg tablet TAKE 1 TABLET (10 MG TOTAL) BY MOUTH DAILY 90 tablet 3    benzonatate (TESSALON PERLES) 100 mg capsule TAKE 1 CAPSULE (100 MG TOTAL) BY MOUTH 3 (THREE) TIMES A DAY AS NEEDED FOR COUGH 20 capsule 0    brompheniramine-pseudoephedrine (DIMETAPP) 1-15 MG/5ML ELIX Take 5 mL by mouth every 6 (six) hours as needed for allergies 473 mL 3    buPROPion (WELLBUTRIN SR) 150 mg 12 hr tablet TAKE 1 TABLET (150 MG TOTAL) BY MOUTH 2 (TWO) TIMES A DAY 60 tablet 2    busPIRone (BUSPAR) 10 mg tablet TAKE 1 TABLET(S) BY ORAL ROUTE 2 TIME(S) PER DAY      cloNIDine (CATAPRES) 0.1 mg tablet TAKE 1 TABLET BY ORAL ROUTE 2 TIMES PER DAY      dextromethorphan-guaiFENesin (ROBITUSSIN DM)  mg/5 mL syrup Take 5 mL by mouth 3 (three) times a day as needed for cough 100 mL 3    escitalopram (LEXAPRO) 20 mg tablet Take 1 tablet (20 mg total) by mouth daily 30 tablet 1    finasteride (PROSCAR) 5 mg tablet Take 1 tablet (5 mg total) by mouth daily 90 tablet 1    fluticasone (FLONASE) 50 mcg/act nasal spray 1 SPRAY INTO EACH NOSTRIL DAILY 16 g 3    Fluticasone-Salmeterol (Advair) 500-50 mcg/dose inhaler INHALE 1 PUFF 2 (TWO) TIMES A DAY RINSE MOUTH AFTER USE. 60 blister 5    gabapentin (NEURONTIN) 300 mg capsule TAKE 1 CAPSULE (300 MG TOTAL) BY MOUTH 3 (THREE) TIMES A DAY 60 capsule 3    guaiFENesin (ROBITUSSIN) 100 MG/5ML oral liquid TAKE 10 ML (200 MG TOTAL) BY MOUTH 3 (THREE) TIMES A DAY AS NEEDED FOR COUGH 120 mL 0    ibuprofen (MOTRIN) 600 mg tablet TAKE 1 TABLET (600 MG TOTAL) BY MOUTH EVERY 8 (EIGHT) HOURS AS NEEDED FOR MILD PAIN 30 tablet 0    ipratropium (ATROVENT) 0.03 % nasal  spray 2 sprays into each nostril every 12 (twelve) hours 30 mL 5    methocarbamol (ROBAXIN) 500 mg tablet TAKE 1 TABLET (500 MG TOTAL) BY MOUTH 4 (FOUR) TIMES A DAY 30 tablet 0    Multiple Vitamin (multivitamin) tablet Take 1 tablet by mouth daily 90 tablet 3    naltrexone (REVIA) 50 mg tablet TAKE 1 TABLET BY ORAL ROUTE 1 TIME PER DAY      saccharomyces boulardii (FLORASTOR) 250 mg capsule Take 1 capsule (250 mg total) by mouth 2 (two) times a day 60 capsule 0    silver sulfadiazine (SILVADENE,SSD) 1 % cream APPLY TOPICALLY DAILY 50 g 0    simethicone (Gas-X Ultra Strength) 180 MG capsule Take 1 capsule (180 mg total) by mouth every 6 (six) hours as needed for flatulence 48 capsule 0    tamsulosin (FLOMAX) 0.4 mg Take 1 capsule (0.4 mg total) by mouth daily with dinner 90 capsule 1    tiotropium (Spiriva HandiHaler) 18 mcg inhalation capsule Place 1 capsule (18 mcg total) into inhaler and inhale daily 30 capsule 0    acetaminophen (TYLENOL) 650 mg CR tablet TAKE 1 TABLET (650 MG TOTAL) BY MOUTH EVERY 8 (EIGHT) HOURS AS NEEDED FOR MILD PAIN (Patient not taking: Reported on 5/17/2024) 30 tablet 0    amLODIPine (NORVASC) 5 mg tablet Take 1 tablet (5 mg total) by mouth daily for 30 doses 30 tablet 0    cholecalciferol (VITAMIN D3) 1,000 units tablet TAKE 1 TABLET (1,000 UNITS TOTAL) BY MOUTH DAILY 56 tablet 0    hydrOXYzine pamoate (VISTARIL) 50 mg capsule TAKE 1 CAPSULE(S) BY ORAL ROUTE PER AT BEDTIME AS NEEDED FOR SLEEP (Patient not taking: Reported on 5/17/2024)      Lidocaine HCl (Lidocaine Plus) 4 % CREA Apply topically every 6 (six) hours as needed (sperficial skin pain) (Patient not taking: Reported on 5/17/2024) 30 g 2    ondansetron (Zofran ODT) 4 mg disintegrating tablet Take 1 tablet (4 mg total) by mouth every 6 (six) hours as needed for nausea or vomiting (Patient not taking: Reported on 5/17/2024) 16 tablet 0    pantoprazole (PROTONIX) 40 mg tablet TAKE 1 TABLET (40 MG TOTAL) BY MOUTH DAILY (Patient not  "taking: Reported on 5/17/2024) 60 tablet 3    QUEtiapine (SEROquel) 50 mg tablet Take 3 tablets (150 mg total) by mouth daily at bedtime (Patient not taking: Reported on 5/17/2024) 90 tablet 1    traZODone (DESYREL) 50 mg tablet Take 0.5 tablets (25 mg total) by mouth daily at bedtime (Patient not taking: Reported on 5/17/2024) 15 tablet 1    Vraylar 1.5 MG capsule TAKE 1 CAPSULE BY ORAL ROUTE 1 TIME PER DAY AT BEDTIME (Patient not taking: Reported on 5/17/2024)       No current facility-administered medications on file prior to visit.      Social History     Tobacco Use    Smoking status: Heavy Smoker     Current packs/day: 1.50     Average packs/day: 1.5 packs/day for 40.5 years (60.8 ttl pk-yrs)     Types: Cigarettes     Start date: 1984     Passive exposure: Current    Smokeless tobacco: Never   Vaping Use    Vaping status: Never Used   Substance and Sexual Activity    Alcohol use: Yes     Alcohol/week: 14.0 standard drinks of alcohol     Types: 14 Cans of beer per week    Drug use: Never    Sexual activity: Not Currently     Partners: Female     Objective     /74 (BP Location: Left arm, Patient Position: Sitting, Cuff Size: Standard)   Pulse 76   Resp 18   Ht 5' 6\" (1.676 m)   Wt 61.1 kg (134 lb 9.6 oz)   SpO2 97%   BMI 21.73 kg/m²     Physical Exam  Vitals and nursing note reviewed.   Constitutional:       General: He is not in acute distress.     Appearance: Normal appearance. He is not ill-appearing.   HENT:      Head: Normocephalic and atraumatic.      Mouth/Throat:      Mouth: Mucous membranes are moist.      Pharynx: Oropharynx is clear.   Eyes:      Extraocular Movements: Extraocular movements intact.   Pulmonary:      Effort: Pulmonary effort is normal. No respiratory distress.      Breath sounds: No stridor. No wheezing.   Abdominal:      General: There is no distension.      Palpations: Abdomen is soft. There is no mass.      Tenderness: There is no abdominal tenderness.      Hernia: No " hernia is present.   Musculoskeletal:         General: No swelling or tenderness. Normal range of motion.      Cervical back: Neck supple.   Skin:     General: Skin is warm and dry.   Neurological:      General: No focal deficit present.      Mental Status: He is alert and oriented to person, place, and time. Mental status is at baseline.   Psychiatric:         Mood and Affect: Mood normal.         Behavior: Behavior normal.       Administrative Statements

## 2024-07-18 NOTE — ASSESSMENT & PLAN NOTE
59-year-old male status post laparoscopic paraesophageal hernia repair with partial fundoplication and EGD on 2/25/2024, here for follow-up.    Plan:  He is doing well from upper GI perspective, but is still struggling with specifically morning diarrhea.  We discussed strategies for his excess flatulence, but he has declined taking simethicone.  At this point I would like for him to have a conversation with gastroenterology to see if he needs any further workup for his diarrhea.  To return to clinic after seeing GI.

## 2024-07-18 NOTE — PROGRESS NOTES
Client requested visit with this writer at [facility] ([facility]) during outreach hours. Client informed this writer the department of aging contacted client and client would like this writer to attend meeting with him. This writer agreed. Client will contact writer with a date and time.

## 2024-07-18 NOTE — PROGRESS NOTES
Client requested visit with this Community Health Resource Nurse (CHRN) at [facility] ([facility]) during outreach hours.  Client stated he has been trying to clean out his room. He shared he got rid of clothes, 1/2 trash bag. He reported he sleeps on an air mattress which leaks, finding himself on floor by morning.  Supportive listening provided. Client verbalized appreciation for assistance.

## 2024-07-19 ENCOUNTER — OFFICE VISIT (OUTPATIENT)
Dept: PHYSICAL THERAPY | Facility: CLINIC | Age: 59
End: 2024-07-19
Payer: MEDICARE

## 2024-07-19 DIAGNOSIS — M54.2 NECK PAIN: ICD-10-CM

## 2024-07-19 DIAGNOSIS — M25.512 LEFT SHOULDER PAIN, UNSPECIFIED CHRONICITY: Primary | ICD-10-CM

## 2024-07-19 DIAGNOSIS — M25.521 RIGHT ELBOW PAIN: ICD-10-CM

## 2024-07-19 PROCEDURE — 97112 NEUROMUSCULAR REEDUCATION: CPT | Performed by: PHYSICAL THERAPIST

## 2024-07-19 PROCEDURE — 97140 MANUAL THERAPY 1/> REGIONS: CPT | Performed by: PHYSICAL THERAPIST

## 2024-07-19 PROCEDURE — 97110 THERAPEUTIC EXERCISES: CPT | Performed by: PHYSICAL THERAPIST

## 2024-07-19 NOTE — PROGRESS NOTES
"Daily Note     Today's date: 2024  Patient name: Chau Lou III  : 1965  MRN: [id number]  Referring provider: Jose Eduardo Garsia PT  Dx:   Encounter Diagnosis     ICD-10-CM    1. Left shoulder pain, unspecified chronicity  M25.512       2. Right elbow pain  M25.521       3. Neck pain  M54.2           Start Time: 1055  Stop Time: 1135  Total time in clinic (min): 40 minutes    Subjective: Patient reports that his L upper back/neck are hurting today from his sleeping position last night.      Objective: See treatment diary below      Assessment: Patient experienced pain during pec str, exercise held, trial next session. Patient progressed rows, ext, IR/ER today with no adverse effects and moderate difficulty. Patient exhibited fatigue with DNF endurance as he was able to hold for about 5 seconds before needing to rest. Patient exhibits difficulty with scapular retraction during rows and no moneys, improved slightly with VC and TC. Patient demonstrated fatigue post treatment, exhibited good technique with therapeutic exercises, and would benefit from continued PT.      Plan: Continue per plan of care.  Progress treatment as tolerated.      Session completed by RENE Russo under supervision from Jose Eduardo Garsia DPT.      Precautions:     Date 7/9 7/11 7/16 7/19   6/13 6/18 6/25 7/1 7/3   Visit # 11 12 13 12   6 7 8 9 10   FOTO              Re-eval        xx        Manuals 7/9 7/11 7/16 7/19   6/25 7/1 7/3 7/3   C/s PROM SF AW  ND   KR AW AW FH   UT/LS str w STM SF AW  ND   KR AW AW FH   T/s PA's SF Gr III   ND Gr III-IVs                      Neuro Re-Ed 7/9 7/11 7/16 7/19   6/25 7/1 7/3 7/9   Scap retraction        NP     No money 2x15 GTB GTB 2x15  20x GTB   GTB 2x15 GTB 5\" 2x15   GTB 5\" 2x15  GTB 5\" 2x15    DNF endurance  5\"x20  8x5\"   5\"x15 5\"x15  5\"x15  5\"x15    Rhythmic stabs 30\" ea flexion 30x ea      X20 ea 20x ea  20x ea  20x ea    Body Blade             Wall angels             Jamison kelley    " "10x5\"         Ther Ex 7/9 7/11 7/16 7/19   6/25 7/1 7/3 7/9   Pulleys 2'/2' 2'/2  2'/2'   2'/2' flex/abd 2/2 ea  2/2 2'/2' flex/abd   Pec str 3x20\" 20\"x3  2x30\"*   3x20\" 20\"x3 20\"x3  20\"x3    UT str 3x20\" 20\"x3     20\"x3ea  20\"x3 ea 20\"x3 ea 20\"x3    Rows/ext 15# / 8# 2x15 Ea  14# / 8#  2x15 ea   20x 15# rows and ext    12# / 8# 2x15 ea  12# / 8# 2x15 ea  12# / 8# 2x15 Ea  12# / 8# 2x15 Ea    ER/IR 2x10 3# ER 3# 2x15 ea   20x 5# ea         Wrist flex/ext       GTB 3x10 GTB 3x10  GTB 3x10  GTB 3x10    Flexion AAROM 20x red 3# bar 3# bar  20x   3# bar 20x 5\"         T/s ext             Shoulder Raises 20x 2# flex & abd 2# x20 FF x10 abd            Ther Activity                                       Gait Training                                       Modalities                                                                  "

## 2024-07-23 ENCOUNTER — EVALUATION (OUTPATIENT)
Dept: PHYSICAL THERAPY | Facility: CLINIC | Age: 59
End: 2024-07-23
Payer: MEDICARE

## 2024-07-23 DIAGNOSIS — M54.2 NECK PAIN: ICD-10-CM

## 2024-07-23 DIAGNOSIS — M25.512 LEFT SHOULDER PAIN, UNSPECIFIED CHRONICITY: Primary | ICD-10-CM

## 2024-07-23 DIAGNOSIS — M25.521 RIGHT ELBOW PAIN: ICD-10-CM

## 2024-07-23 PROCEDURE — 97164 PT RE-EVAL EST PLAN CARE: CPT | Performed by: PHYSICAL THERAPIST

## 2024-07-23 PROCEDURE — 97110 THERAPEUTIC EXERCISES: CPT | Performed by: PHYSICAL THERAPIST

## 2024-07-23 PROCEDURE — 97140 MANUAL THERAPY 1/> REGIONS: CPT | Performed by: PHYSICAL THERAPIST

## 2024-07-23 PROCEDURE — 97112 NEUROMUSCULAR REEDUCATION: CPT | Performed by: PHYSICAL THERAPIST

## 2024-07-23 NOTE — PROGRESS NOTES
PT Re-Evaluation     Today's date: 5/15/2024  Patient name: Chau Lou III  : 1965  MRN: [id number]  Referring provider: Jose Eduardo Garsia PT  Dx:   Encounter Diagnosis     ICD-10-CM    1. Left shoulder pain, unspecified chronicity  M25.512 Ambulatory Referral to Physical Therapy    Follow-up with physical therapy.      2. Right elbow pain  M25.521 Ambulatory Referral to Physical Therapy    Atraumatic.  Follow-up with physical therapy.   3.      Neck pain                                                       M54.2       Assessment:  Assessment details: : Pt has attended 15 visits of physical therapy to address neck, L shoulder, and R elbow functional impairments and demonstrates improvements in UE strength, cervical AROM, reduced shoulder and elbow pain allowing for improved function with carrying, lifting overhead, and reaching.  He continues to have limited cervical AROM, UT/LS hypertonicity and TTP, hypomobility, and some lingering UE weakness b/l affecting his function with turning his neck, sleeping, looking up/down, lifting heavy objects overhead, and taking out the garbage.  He will benefit from another 4-6 weeks of skilled care to address functional impairments and meet set goals.       Goals  ST. Pt will reduce his pain to 4/10. - met (shoulder and elbow)  2. Pt will improve cervical rotation to nil deficits. - not met    LT. Pt will improve b/l shoulder ABD strength to 4+/5 for improved lifting. - not met  2. Pt will improve R wrist ext to 5/5 for improved ability to lift and take out garbage. - met  3. Pt will meet projected FOTO score. -    Plan  Patient would benefit from: PT eval and skilled physical therapy  Planned modality interventions: cryotherapy, TENS, thermotherapy: hydrocollator packs, electrical stimulation/[place] stimulation and unattended electrical stimulation    Planned therapy interventions: joint mobilization, abdominal trunk stabilization, manual therapy,  behavior modification, body mechanics training, neuromuscular re-education, postural training, stretching, therapeutic activities, therapeutic exercise, strengthening, functional ROM exercises, flexibility and home exercise program    Frequency: 2x week  Duration in weeks: 6        Subjective Evaluation    History of Present Illness  Mechanism of injury: IE (5/15): Pt reports neck pain and spasms for a long time, at least a few months.  He reports R elbow pain for the past 2 months, but does not recall any WADE.  He thinks he may have banged his elbow taking out the garbage.  He denies any numbness or tinlig, but has a soreness in his R elbow.  He is uncomfortable when sleeping due ot his neck pain.  He is still able to do his daily tasks like taking out the garbage.  He takes meloxicam for muscle spasms which helps some. 4/10 elbow, 6/10 neck    6/18: Patient reports that his shoulder and neck symptoms continue to persist at same intensity despite doing exercises at home. His L shoulder started to hurt this weekend after leaning to the L side while sitting for a few hours.    7/23:  Pt reports his R elbow and L shoulder pain are improved, but his neck is bothering him today and he has 8/10 pain.  He reports occasional difficulty with lifting heavy objects overhead and carrying grocery bags with his R elbow, but most of the time it feels okay.  He also reports lots of difficulty turning his neck and has been sleeping on an air mattress which has been bothering his neck more.      Objective     Posture:  Poor sitting and standing posture with rounded shoulders and forward head.    Tenderness     TTP grossly in b/l UT and LS muscles, with hypertonicity    Active Range of Motion   Cervical/Thoracic Spine       Cervical    Flexion:  WNL  Extension:  minimal  Left lateral flexion:  mod with pain  Right lateral flexion:  mod with pain  Left rotation:  minimal with pain  Right rotation:  minimal    Left Shoulder   Flexion:  "WFL  Abduction: WFL    Right Shoulder   Flexion: WFL  Abduction: WFL    Passive Range of Motion   Left Shoulder   External rotation 90°: 90 degrees with pain    Right Shoulder   External rotation 90°: 80 degrees with pain    Joint Play     Hypomobile: C1, C2, C3, C4, C5, C6, C7, T1 and T2     Strength/Myotome Testing     Left Shoulder     Planes of Motion   Abduction: 4  External rotation at 0°: 4  Internal rotation at 0°: 4+   Elbow Flex: 4  Elbow Ext:  4  Wrist Flex: 5  Wrist Ext: 5    Right Shoulder     Planes of Motion   Abduction: 4  External rotation at 0°: 4- (elbow pain)  Internal rotation at 0°: 4+   Elbow Flex: 4  Elbow Ext:  4-  Wrist Flex: 5  Wrist Ext: 5       Precautions:     Date 7/9 7/11 7/16 7/19 7/23  6/13 6/18 6/25 7/1 7/3   Visit # 11 12 13 14 15  6 7 8 9 10   FOTO     xx         Re-eval     SF   xx        Manuals 7/9 7/11 7/16 7/19 7/23  6/25 7/1 7/3 7/3   C/s PROM SF AW  ND SF  KR AW AW FH   UT/LS str w STM SF AW  ND SF  KR AW AW FH   T/s PA's SF Gr III   ND Gr III-IVs                       Neuro Re-Ed 7/9 7/11 7/16 7/19 7/23  6/25 7/1 7/3 7/9   Scap retraction        NP     No money 2x15 GTB GTB 2x15  20x GTB 2x10 GTB  GTB 2x15 GTB 5\" 2x15   GTB 5\" 2x15  GTB 5\" 2x15    DNF endurance  5\"x20  8x5\" 3x20\"  5\"x15 5\"x15  5\"x15  5\"x15    Rhythmic stabs 30\" ea flexion 30x ea      X20 ea 20x ea  20x ea  20x ea    Body Blade             Wall angels             Chin tucks    10x5\"         Ther Ex 7/9 7/11 7/16 7/19 7/23  6/25 7/1 7/3 7/9   Pulleys 2'/2' 2'/2  2'/2' 2'/2'  2'/2' flex/abd 2/2 ea  2/2 2'/2' flex/abd   Pec str 3x20\" 20\"x3  2x30\"*   3x20\" 20\"x3 20\"x3  20\"x3    UT str 3x20\" 20\"x3     20\"x3ea  20\"x3 ea 20\"x3 ea 20\"x3    Rows/ext 15# / 8# 2x15 Ea  14# / 8#  2x15 ea   20x 15# rows and ext  3x10 15# rows  12# / 8# 2x15 ea  12# / 8# 2x15 ea  12# / 8# 2x15 Ea  12# / 8# 2x15 Ea    ER/IR 2x10 3# ER 3# 2x15 ea   20x 5# ea         Wrist flex/ext       GTB 3x10 GTB 3x10  GTB 3x10  GTB 3x10    Flexion " "AAROM 20x red 3# bar 3# bar  20x   3# bar 20x 5\" 3# bar 20x 5\"        T/s ext             Shoulder Raises 20x 2# flex & abd 2# x20 FF x10 abd            Ther Activity                                       Gait Training                                       Modalities                                             "

## 2024-07-24 ENCOUNTER — PATIENT OUTREACH (OUTPATIENT)
Dept: OTHER | Facility: OTHER | Age: 59
End: 2024-07-24

## 2024-07-24 ENCOUNTER — OFFICE VISIT (OUTPATIENT)
Dept: FAMILY MEDICINE CLINIC | Facility: CLINIC | Age: 59
End: 2024-07-24

## 2024-07-24 VITALS
BODY MASS INDEX: 21.69 KG/M2 | OXYGEN SATURATION: 97 % | RESPIRATION RATE: 18 BRPM | SYSTOLIC BLOOD PRESSURE: 123 MMHG | HEIGHT: 66 IN | TEMPERATURE: 97.6 F | HEART RATE: 67 BPM | DIASTOLIC BLOOD PRESSURE: 70 MMHG | WEIGHT: 135 LBS

## 2024-07-24 DIAGNOSIS — H93.13 TINNITUS OF BOTH EARS: Primary | ICD-10-CM

## 2024-07-24 DIAGNOSIS — H61.20 WAX IN EAR: ICD-10-CM

## 2024-07-24 DIAGNOSIS — F17.200 TOBACCO DEPENDENCE SYNDROME: ICD-10-CM

## 2024-07-24 PROBLEM — Z01.811 PREOP PULMONARY/RESPIRATORY EXAM: Status: RESOLVED | Noted: 2023-10-23 | Resolved: 2024-07-24

## 2024-07-24 PROBLEM — R09.82 POSTNASAL DRIP: Status: RESOLVED | Noted: 2018-10-25 | Resolved: 2024-07-24

## 2024-07-24 PROBLEM — M47.812 OA (OSTEOARTHRITIS) OF NECK: Status: RESOLVED | Noted: 2019-05-20 | Resolved: 2024-07-24

## 2024-07-24 PROBLEM — K59.00 CONSTIPATION: Status: RESOLVED | Noted: 2019-07-17 | Resolved: 2024-07-24

## 2024-07-24 PROBLEM — R30.0 DYSURIA: Status: RESOLVED | Noted: 2022-10-18 | Resolved: 2024-07-24

## 2024-07-24 PROCEDURE — 99213 OFFICE O/P EST LOW 20 MIN: CPT | Performed by: FAMILY MEDICINE

## 2024-07-24 PROCEDURE — G2211 COMPLEX E/M VISIT ADD ON: HCPCS | Performed by: FAMILY MEDICINE

## 2024-07-25 PROBLEM — H93.13 TINNITUS OF BOTH EARS: Status: ACTIVE | Noted: 2024-07-25

## 2024-07-25 NOTE — PROGRESS NOTES
Client approached writer at [facility]. Client has received confusing calls from Department of Aging. Client asked this writer to call Department of Aging and set up an appointment to secure services that were offered to client at initial meeting with Dept. Of Aging. This writer left a message for Department of Aging. Will contact client when Dept of Aging contacts this writer.

## 2024-07-25 NOTE — PROGRESS NOTES
"Ambulatory Visit  Name: Chau Lou III      : 1965      MRN: [id number]  Encounter Provider: Batsheva Redding MD  Encounter Date: 2024   Encounter department: [facility]    Assessment & Plan   1. Tinnitus of both ears  Assessment & Plan:  Patient c/o hearing constant \"crystals\"   Chronic denied recent URI  Doesn't change with movement  Denied vertigo, nausea, vomiting or any other symptoms  Hearing test revealed mild hearing loss    PLAN:  Referral ENT      Orders:  -     Audiometry with tympanometry; Future  -     Ambulatory Referral to Otolaryngology; Future  2. Tobacco dependence syndrome  Assessment & Plan:  Half a pack a day    PLAN:  Tobacco cessation counseling  Refused NRT  3. Wax in ear  -     carbamide peroxide (DEBROX) 6.5 % otic solution; Administer 5 drops into both ears 2 (two) times a day       History of Present Illness     Mr. Ritchie presented to the office today c/o hearing \"crystals \"constantly, denies hearing music, denies worse at bedtime or any time of the day, denies tremor or any other neurologic symptom. He requested referral to be seen by ENT specialist. Hearing test today reveled mild hearing loss.         Review of Systems   Constitutional:  Negative for chills and fever.   HENT:  Positive for tinnitus. Negative for ear pain and sore throat.    Eyes:  Negative for pain and visual disturbance.   Respiratory:  Negative for cough and shortness of breath.    Cardiovascular:  Negative for chest pain and palpitations.   Gastrointestinal:  Negative for abdominal pain and vomiting.   Genitourinary:  Negative for dysuria and hematuria.   Musculoskeletal:  Negative for arthralgias and back pain.   Skin:  Negative for color change and rash.   Neurological:  Negative for seizures and syncope.   All other systems reviewed and are negative.      Objective     /70 (BP Location: Left arm, Patient Position: Sitting, Cuff Size: Standard)   " "Pulse 67   Temp 97.6 °F (36.4 °C) (Temporal)   Resp 18   Ht 5' 6\" (1.676 m)   Wt 61.2 kg (135 lb)   SpO2 97%   BMI 21.79 kg/m²     Physical Exam  Vitals and nursing note reviewed.   Constitutional:       General: He is not in acute distress.     Appearance: He is well-developed.   HENT:      Head: Normocephalic and atraumatic.   Eyes:      Conjunctiva/sclera: Conjunctivae normal.   Cardiovascular:      Rate and Rhythm: Normal rate and regular rhythm.      Heart sounds: No murmur heard.  Pulmonary:      Effort: Pulmonary effort is normal. No respiratory distress.      Breath sounds: Normal breath sounds.   Abdominal:      Palpations: Abdomen is soft.      Tenderness: There is no abdominal tenderness.   Musculoskeletal:         General: No swelling.      Cervical back: Neck supple.   Skin:     General: Skin is warm and dry.      Capillary Refill: Capillary refill takes less than 2 seconds.   Neurological:      Mental Status: He is alert.   Psychiatric:         Mood and Affect: Mood normal.       Administrative Statements     "

## 2024-07-25 NOTE — ASSESSMENT & PLAN NOTE
"Patient c/o hearing constant \"crystals\"   Chronic denied recent URI  Doesn't change with movement  Denied vertigo, nausea, vomiting or any other symptoms  Hearing test revealed mild hearing loss    PLAN:  Referral ENT      "

## 2024-07-30 ENCOUNTER — PATIENT OUTREACH (OUTPATIENT)
Dept: OTHER | Facility: OTHER | Age: 59
End: 2024-07-30

## 2024-07-30 NOTE — PROGRESS NOTES
"Daily Note     Today's date: 2024  Patient name: Chau Lou III  : 1965  MRN: [id number]  Referring provider: Jose Eduardo Garsia, PT  Dx:   Encounter Diagnosis     ICD-10-CM    1. Left shoulder pain, unspecified chronicity  M25.512       2. Right elbow pain  M25.521       3. Neck pain  M54.2           Start Time: 1130  Stop Time: 1210  Total time in clinic (min): 40 minutes    Subjective: Hugo c/o increased L sided neck pain this AM.       Objective: See treatment diary below      Assessment: Chau Tolerated treatment well with appropriate muscle fatigue experienced with ex's. He is making steady gains towards goals. Demonstrated increased L UT and paraspinal tightness today. Reported relief post manuals. Required vc's t/o session for proper positioning with ex's.  Chau  would benefit from continued PT and compliance with HEP.        Plan: Continue per plan of care.      Precautions:     Date 7/9 7/11 7/16 7/19 7/23 7/31  6/18 6/25 7/1 7/3   Visit # 11 12 13 14 15 16  7 8 9 10   FOTO     xx         Re-eval     SF   xx        Manuals 7/9 7/11 7/16 7/19 7/23 7/31  7/1 7/3 7/3   C/s PROM SF AW  ND SF AW  AW AW FH   UT/LS str w STM SF AW  ND SF AW  AW AW FH   T/s PA's SF Gr III   ND Gr III-IVs SF                      Neuro Re-Ed 7/9 7/11 7/16 7/19 7/23 7/31  7/1 7/3 7/9   Scap retraction        NP     No money 2x15 GTB GTB 2x15  20x GTB 2x10 GTB GTB 3\"x20   GTB 5\" 2x15   GTB 5\" 2x15  GTB 5\" 2x15    DNF endurance  5\"x20  8x5\" 3x20\" 3'x20  5\"x15  5\"x15  5\"x15    Rhythmic stabs 30\" ea flexion 30x ea       20x ea  20x ea  20x ea    Body Blade             Wall angels             Chin tucks    10x5\"         Ther Ex 7/9 7/11 7/16 7/19 7/23 7/31  7/1 7/3 7/9   Pulleys 2'/2' 2'/2  2'/2' 2'/2' 2/2  2/2 ea  2/2 2'/2' flex/abd   Pec str 3x20\" 20\"x3  2x30\"*  30\"x2  20\"x3 20\"x3  20\"x3    UT str 3x20\" 20\"x3    L 20\"x3   20\"x3 ea 20\"x3 ea 20\"x3    Rows/ext 15# / 8# 2x15 Ea  14# / 8#  2x15 ea   20x 15# rows and ext  " "3x10 15# rows 15# 2x15   12# / 8# 2x15 ea  12# / 8# 2x15 Ea  12# / 8# 2x15 Ea    ER/IR 2x10 3# ER 3# 2x15 ea   20x 5# ea  5# 3x10 ea        Wrist flex/ext        GTB 3x10  GTB 3x10  GTB 3x10    Flexion AAROM 20x red 3# bar 3# bar  20x   3# bar 20x 5\" 3# bar 20x 5\" 3# bar 5\"x20        T/s ext             Shoulder Raises 20x 2# flex & abd 2# x20 FF x10 abd     2# 2x10 ea FF/abd        Ther Activity                                       Gait Training                                       Modalities                                              "

## 2024-07-30 NOTE — PROGRESS NOTES
Client requested to visit with this Community Health Resource Nurse (CHRN) during [facility] outreach hours. Client stated this past Sunday (7/28) he experienced chest pain, pointing to his mid-sternal area and reported it radiated down from shoulders to elbow. Pain level reached a 10. Reported he took a muscle relaxer and laid down. The pain went down to 1 or 2.   Client is reporting he presently has mid-sternal area with some shortness of breath.Client doesn't appear to be in any distress.   Pain is 3 out of 10.     Client instructed to go to ED. He is refusing for CHRN to call an ambulance. Client stated he thinks it is anxiey because he has history of experiencing the same. He reported in the past he had gone to the hospital for similar symptoms and he was diagnosed with Anxiety.    Client shared the 7th anniversary of his girlfriend's death is coming up in August. He reported he thinks it is contributing to his anxiety.     This CHRN encouraged client to go to ED for further evaluation. Client stated he will go tomorrow after his scheduled PT.   Community Health Navigation (CHN) team is very familiar with client. Client requests visit with CHN team every day CHN team is present.  Supportive listening provided. Client does not appear to be in any duress.

## 2024-07-31 ENCOUNTER — PATIENT OUTREACH (OUTPATIENT)
Dept: OTHER | Facility: OTHER | Age: 59
End: 2024-07-31

## 2024-07-31 ENCOUNTER — OFFICE VISIT (OUTPATIENT)
Dept: PHYSICAL THERAPY | Facility: CLINIC | Age: 59
End: 2024-07-31
Payer: MEDICARE

## 2024-07-31 DIAGNOSIS — M25.521 RIGHT ELBOW PAIN: ICD-10-CM

## 2024-07-31 DIAGNOSIS — M54.2 NECK PAIN: ICD-10-CM

## 2024-07-31 DIAGNOSIS — M25.512 LEFT SHOULDER PAIN, UNSPECIFIED CHRONICITY: Primary | ICD-10-CM

## 2024-07-31 PROCEDURE — 97112 NEUROMUSCULAR REEDUCATION: CPT

## 2024-07-31 PROCEDURE — 97140 MANUAL THERAPY 1/> REGIONS: CPT

## 2024-07-31 PROCEDURE — 97110 THERAPEUTIC EXERCISES: CPT

## 2024-07-31 NOTE — PROGRESS NOTES
Client asked me to to call the department of aging to check on the status again since we have not heard back from them. This writer called and spoke with Burak who informed us that we should call [facility]. We called [facility] together and spoke with Lucius. Lucius set up an appointment for us to meet with the Andreina murphy on August 14th from 1-3 at [facility]. This writer will be with client during interview. Medicaid application will be done during interview. Client will bring ID. Phone number for katherine [phone].

## 2024-08-02 ENCOUNTER — OFFICE VISIT (OUTPATIENT)
Dept: PHYSICAL THERAPY | Facility: CLINIC | Age: 59
End: 2024-08-02
Payer: MEDICARE

## 2024-08-02 DIAGNOSIS — M25.521 RIGHT ELBOW PAIN: ICD-10-CM

## 2024-08-02 DIAGNOSIS — M25.512 LEFT SHOULDER PAIN, UNSPECIFIED CHRONICITY: Primary | ICD-10-CM

## 2024-08-02 DIAGNOSIS — M54.2 NECK PAIN: ICD-10-CM

## 2024-08-02 NOTE — PROGRESS NOTES
"Daily Note     Today's date: 2024  Patient name: Chau Lou III  : 1965  MRN: [id number]  Referring provider: Jose Eduardo Garsia PT  Dx:   Encounter Diagnosis     ICD-10-CM    1. Left shoulder pain, unspecified chronicity  M25.512       2. Right elbow pain  M25.521       3. Neck pain  M54.2           Start Time: 1115  Stop Time: 1125  Total time in clinic (min): 10 minutes    Subjective: Treatment held today. Chau arrived to PT with c/o chest pain. BP assessed L UE seated, 150/70. Advised he f/u with doctor about current symptoms. Hugo feels he maybe feeling depressed and stressed over the anniversary of his girlfriends passing. Suggested he go to ER is symptoms persist or worsen, Hugo is in agreement.           Plan: Continue per plan of care.      Precautions:     Date         Visit # 11 12 13 14 15 16        FOTO     xx         Re-eval     SF           Manuals        C/s PROM SF AW  ND SF AW       UT/LS str w STM SF AW  ND SF AW       T/s PA's SF Gr III   ND Gr III-IVs SF                      Neuro Re-Ed        Scap retraction             No money 2x15 GTB GTB 2x15  20x GTB 2x10 GTB GTB 3\"x20        DNF endurance  5\"x20  8x5\" 3x20\" 3'x20       Rhythmic stabs 30\" ea flexion 30x ea            Body Blade             Wall angels             Chin tucks    10x5\"         Ther Ex        Pulleys 2'/2' 2'/2  2'/2' 2'/2' 2/2       Pec str 3x20\" 20\"x3  2x30\"*  30\"x2       UT str 3x20\" 20\"x3    L 20\"x3        Rows/ext 15# / 8# 2x15 Ea  14# / 8#  2x15 ea   20x 15# rows and ext  3x10 15# rows 15# 2x15        ER/IR 2x10 3# ER 3# 2x15 ea   20x 5# ea  5# 3x10 ea        Wrist flex/ext             Flexion AAROM 20x red 3# bar 3# bar  20x   3# bar 20x 5\" 3# bar 20x 5\" 3# bar 5\"x20        T/s ext             Shoulder Raises 20x 2# flex & abd 2# x20 FF x10 abd     2# 2x10 ea FF/abd        Ther Activity          "                              Gait Training                                       Modalities

## 2024-08-05 ENCOUNTER — OFFICE VISIT (OUTPATIENT)
Dept: PHYSICAL THERAPY | Facility: CLINIC | Age: 59
End: 2024-08-05
Payer: MEDICARE

## 2024-08-05 DIAGNOSIS — M25.521 RIGHT ELBOW PAIN: ICD-10-CM

## 2024-08-05 DIAGNOSIS — M54.2 NECK PAIN: ICD-10-CM

## 2024-08-05 DIAGNOSIS — M25.512 LEFT SHOULDER PAIN, UNSPECIFIED CHRONICITY: Primary | ICD-10-CM

## 2024-08-05 PROCEDURE — 97140 MANUAL THERAPY 1/> REGIONS: CPT

## 2024-08-05 PROCEDURE — 97110 THERAPEUTIC EXERCISES: CPT

## 2024-08-05 NOTE — PROGRESS NOTES
"Daily Note     Today's date: 2024  Patient name: Chau Lou III  : 1965  MRN: [id number]  Referring provider: Jose Eduardo Garsia PT  Dx:   Encounter Diagnosis     ICD-10-CM    1. Left shoulder pain, unspecified chronicity  M25.512       2. Neck pain  M54.2       3. Right elbow pain  M25.521                      Subjective: Pt presents to PT reporting he feels better than last visit but states sx are from \"still grieving from my girlfriends death\".  He states it is close to the anniversary of her death.  Pt was advised to let clinician know if he doesn't feel well.  He states he will.       Objective: See treatment diary below      Assessment: Pt demonstrates fair tolerance to progressions secondary to complaints of pain.  He reports TTP in L CS paraspinals.  Pt continues to progress PT goals.  Patient demonstrated fatigue post treatment, exhibited good technique with therapeutic exercises, and would benefit from continued PT to increase flexibility, strength and function.      Plan: Continue per plan of care.      Precautions:     Date  8/5       Visit # 11 12 13 14 15 16 17       FOTO     xx         Re-eval     SF           Manuals  8      C/s PROM SF AW  ND SF AW       UT/LS str w STM SF AW  ND SF AW PK      T/s PA's SF Gr III   ND Gr III-IVs SF                      Neuro Re-Ed  8/      Scap retraction             No money 2x15 GTB GTB 2x15  20x GTB 2x10 GTB GTB 3\"x20  GTB 3\"x20       DNF endurance  5\"x20  8x5\" 3x20\" 3'x20 3'x20      Rhythmic stabs 30\" ea flexion 30x ea            Body Blade             Wall angels             Chin tucks    10x5\"         Ther Ex  8/5      Pulleys 2'/2' 2'/2  2'/2' 2'/2' 2/2 2' flex only/ pn with ABD      Pec str 3x20\" 20\"x3  2x30\"*  30\"x2 30\"x2      UT str 3x20\" 20\"x3    L 20\"x3  L 20\"x3       Rows/ext 15# / 8# 2x15 Ea  14# / 8#  2x15 ea   20x 15# rows " "and ext  3x10 15# rows 15# 2x15  15# 2x15       ER/IR 2x10 3# ER 3# 2x15 ea   20x 5# ea  5# 3x10 ea  5# 3x10 ea       Wrist flex/ext             Flexion AAROM 20x red 3# bar 3# bar  20x   3# bar 20x 5\" 3# bar 20x 5\" 3# bar 5\"x20        T/s ext             Shoulder Raises 20x 2# flex & abd 2# x20 FF x10 abd     2# 2x10 ea FF/abd  3# 10x ea FF/abd       Ther Activity       8/5                                Gait Training       8/5                                Modalities       8/5                                           "

## 2024-08-07 ENCOUNTER — TELEPHONE (OUTPATIENT)
Dept: FAMILY MEDICINE CLINIC | Facility: CLINIC | Age: 59
End: 2024-08-07

## 2024-08-07 ENCOUNTER — PATIENT OUTREACH (OUTPATIENT)
Dept: OTHER | Facility: OTHER | Age: 59
End: 2024-08-07

## 2024-08-07 NOTE — TELEPHONE ENCOUNTER
[facility] FORM RECEIVED ON 8/7/2024  GIVEN TO ZOEY TOLBERT TO BE COMPLETED, SIGNED BY MD/ (INCLUDE LISC. NUMBER), AND FAXED BACK IN 3 DAYS

## 2024-08-07 NOTE — PROGRESS NOTES
Client requested visit with this Community Health Resource Nurse (CHRN) during [facility]. Client shared he needs to make an appointment with an ENT. Epic showed a referral was put in on 7/24.     CHRN called ENT office with client present. Appointment made for 8/21/24 11:00 AM at [address] [facility] [address] [place] [address]. Phone # [phone]. Client made aware. Client pleasant in this encounter and verbalized appreciation for assistance.

## 2024-08-07 NOTE — TELEPHONE ENCOUNTER
FORM COMPLETED, SIGNED BY MD/ (INCLUDE LISC. NUMBER) FAXED ON 08/07/24 TO MARIA E at [phone]. FAX CONFIRMATION RECEIVED. FORM GIVEN TO CHARI TO SCAN

## 2024-08-08 ENCOUNTER — OFFICE VISIT (OUTPATIENT)
Dept: PHYSICAL THERAPY | Facility: CLINIC | Age: 59
End: 2024-08-08
Payer: MEDICARE

## 2024-08-08 DIAGNOSIS — M25.512 LEFT SHOULDER PAIN, UNSPECIFIED CHRONICITY: Primary | ICD-10-CM

## 2024-08-08 DIAGNOSIS — M54.2 NECK PAIN: ICD-10-CM

## 2024-08-08 DIAGNOSIS — M25.521 RIGHT ELBOW PAIN: ICD-10-CM

## 2024-08-08 PROCEDURE — 97110 THERAPEUTIC EXERCISES: CPT | Performed by: PHYSICAL THERAPIST

## 2024-08-08 PROCEDURE — 97112 NEUROMUSCULAR REEDUCATION: CPT | Performed by: PHYSICAL THERAPIST

## 2024-08-08 PROCEDURE — 97140 MANUAL THERAPY 1/> REGIONS: CPT | Performed by: PHYSICAL THERAPIST

## 2024-08-08 NOTE — PROGRESS NOTES
"Daily Note     Today's date: 2024  Patient name: Chau Lou III  : 1965  MRN: [id number]  Referring provider: Jose Eduardo Garsia PT  Dx:   Encounter Diagnosis     ICD-10-CM    1. Left shoulder pain, unspecified chronicity  M25.512       2. Neck pain  M54.2       3. Right elbow pain  M25.521                      Subjective: Pt reports some shoulder soreness present today.      Objective: See treatment diary below      Assessment:  Pt does well w today's progression but has some R eblow pain with no money and shoulder raises. He has lots of TTP in R UT/LS as well as left subscap with cross body str and elevation PROM.  Patient demonstrated fatigue post treatment, exhibited good technique with therapeutic exercises, and would benefit from continued PT.      Plan: Continue per plan of care.  Progress treatment as tolerated.       Precautions:     Date       Visit # 11 12 13 14 15 16 17 18      FOTO     xx         Re-eval     SF           Manuals  8/8     C/s PROM SF AW  ND SF AW  SF     UT/LS str w STM SF AW  ND SF AW PK SF     T/s PA's SF Gr III   ND Gr III-IVs SF         L shoulder PROM        SF w cross body str and subscap STM     Neuro Re-Ed  8/8     Scap retraction             No money 2x15 GTB GTB 2x15  20x GTB 2x10 GTB GTB 3\"x20  GTB 3\"x20  2x15 GTB     DNF endurance  5\"x20  8x5\" 3x20\" 3'x20 3'x20 3x20\"     Rhythmic stabs 30\" ea flexion 30x ea            Body Blade             Wall angels             Chin tucks    10x5\"         Ther Ex  8/8     Pulleys 2'/2' 2'/2  2'/2' 2'/2' 2/2 2' flex only/ pn with ABD 2'/2'     Pec str 3x20\" 20\"x3  2x30\"*  30\"x2 30\"x2      UT str 3x20\" 20\"x3    L 20\"x3  L 20\"x3       Rows/ext 15# / 8# 2x15 Ea  14# / 8#  2x15 ea   20x 15# rows and ext  3x10 15# rows 15# 2x15  15# 2x15  15# 3x15     ER/IR 2x10 3# ER 3# 2x15 ea   20x 5# ea  5# 3x10 " "ea  5# 3x10 ea  2x15 GTB     Wrist flex/ext             Flexion AAROM 20x red 3# bar 3# bar  20x   3# bar 20x 5\" 3# bar 20x 5\" 3# bar 5\"x20   20x 3# bar     T/s ext             Shoulder Raises 20x 2# flex & abd 2# x20 FF x10 abd     2# 2x10 ea FF/abd  3# 10x ea FF/abd  20x 3# ea     Ther Activity       8/5                                Gait Training       8/5                                Modalities       8/5                                             "

## 2024-08-13 ENCOUNTER — PATIENT OUTREACH (OUTPATIENT)
Dept: OTHER | Facility: OTHER | Age: 59
End: 2024-08-13

## 2024-08-13 NOTE — PROGRESS NOTES
Client asked to speak with this client to have the appointment for the  changed from this Wednesday 8/14 to Wednesday 8/21 from 1-3 at [facility].  This writer called Lucius at the Department of Aging and rescheduled the appointment. Nunujudith Castellanos will be meeting with us. Client must have ID on him for the appointment.

## 2024-08-15 ENCOUNTER — OFFICE VISIT (OUTPATIENT)
Dept: PHYSICAL THERAPY | Facility: CLINIC | Age: 59
End: 2024-08-15
Payer: MEDICARE

## 2024-08-15 DIAGNOSIS — M25.521 RIGHT ELBOW PAIN: ICD-10-CM

## 2024-08-15 DIAGNOSIS — M54.2 NECK PAIN: ICD-10-CM

## 2024-08-15 DIAGNOSIS — M25.512 LEFT SHOULDER PAIN, UNSPECIFIED CHRONICITY: Primary | ICD-10-CM

## 2024-08-15 PROCEDURE — 97110 THERAPEUTIC EXERCISES: CPT | Performed by: PHYSICAL THERAPIST

## 2024-08-15 PROCEDURE — 97140 MANUAL THERAPY 1/> REGIONS: CPT | Performed by: PHYSICAL THERAPIST

## 2024-08-15 PROCEDURE — 97112 NEUROMUSCULAR REEDUCATION: CPT | Performed by: PHYSICAL THERAPIST

## 2024-08-15 NOTE — PROGRESS NOTES
"Daily Note     Today's date: 8/15/2024  Patient name: Chau Lou III  : 1965  MRN: [id number]  Referring provider: Jose Eduardo Garsia, PT  Dx:   Encounter Diagnosis     ICD-10-CM    1. Left shoulder pain, unspecified chronicity  M25.512       2. Neck pain  M54.2       3. Right elbow pain  M25.521                      Subjective: patient reports he has been sleeping on an air mattress and occasionally ends up on the floor and it is causing him increased shoulder pain.       Objective: See treatment diary below      Assessment: Tolerated treatment well. Continued with program as noted below.  Patient demonstrated fatigue post treatment and would benefit from continued PT      Plan: Continue per plan of care.      Precautions:     Date 7/9 7/11 7/16 7/19 7/23 7/31 8/5 8/8 8/15     Visit # 11 12 13 14 15 16 17 18 19     FOTO     xx         Re-eval     SF           Manuals 7/9 7/11 7/16 7/19 7/23 7/31 8/5 8/8 8/15    C/s PROM SF AW  ND SF AW  SF SK    UT/LS str w STM SF AW  ND SF AW PK SF SK    T/s PA's SF Gr III   ND Gr III-IVs SF         L shoulder PROM        SF w cross body str and subscap STM     Neuro Re-Ed 7/9 7/11 7/16 7/19 7/23 7/31 8/5 8/8 8/15    Scap retraction             No money 2x15 GTB GTB 2x15  20x GTB 2x10 GTB GTB 3\"x20  GTB 3\"x20  2x15 GTB 2x15 GTB    DNF endurance  5\"x20  8x5\" 3x20\" 3'x20 3'x20 3x20\" 3\"x20    Rhythmic stabs 30\" ea flexion 30x ea            Body Blade             Wall angels             Chin tucks    10x5\"         Ther Ex 7/9 7/11 7/16 7/19 7/23 7/31 8/5 8/8 8/15    Pulleys 2'/2' 2'/2  2'/2' 2'/2' 2/2 2' flex only/ pn with ABD 2'/2' 2'/2'    Pec str 3x20\" 20\"x3  2x30\"*  30\"x2 30\"x2      UT str 3x20\" 20\"x3    L 20\"x3  L 20\"x3       Rows/ext 15# / 8# 2x15 Ea  14# / 8#  2x15 ea   20x 15# rows and ext  3x10 15# rows 15# 2x15  15# 2x15  15# 3x15 15# Row 10# ext 3x15    ER/IR 2x10 3# ER 3# 2x15 ea   20x 5# ea  5# 3x10 ea  5# 3x10 ea  2x15 GTB 2x15 GTB    Wrist flex/ext           " "  Flexion AAROM 20x red 3# bar 3# bar  20x   3# bar 20x 5\" 3# bar 20x 5\" 3# bar 5\"x20   20x 3# bar 20x 3# bar    T/s ext             Shoulder Raises 20x 2# flex & abd 2# x20 FF x10 abd     2# 2x10 ea FF/abd  3# 10x ea FF/abd  20x 3# ea 20x 3# bar     Ther Activity       8/5                                Gait Training       8/5                                Modalities       8/5                                               "

## 2024-08-20 ENCOUNTER — PATIENT OUTREACH (OUTPATIENT)
Dept: OTHER | Facility: OTHER | Age: 59
End: 2024-08-20

## 2024-08-20 NOTE — PROGRESS NOTES
Client requested to visit with this Community Health Resource Nurse (CHRN) during outreach hours. Client stated he rescheduled his upcoming ENT appointment to September 6 due to transportation. Client pleasant in this encounter and verbalized appreciation for assistance.

## 2024-08-21 ENCOUNTER — PATIENT OUTREACH (OUTPATIENT)
Dept: OTHER | Facility: OTHER | Age: 59
End: 2024-08-21

## 2024-08-21 NOTE — PROGRESS NOTES
Met client at [facility] with Vasile walton representative from [place] [employer] . In the meeting papers applying for a waiver for help at home were explained and signed. Mail will be sent to this writers work in two to three weeks. After that we will continue on to the next step.

## 2024-08-22 ENCOUNTER — OFFICE VISIT (OUTPATIENT)
Dept: PHYSICAL THERAPY | Facility: CLINIC | Age: 59
End: 2024-08-22
Payer: MEDICARE

## 2024-08-22 DIAGNOSIS — M25.512 LEFT SHOULDER PAIN, UNSPECIFIED CHRONICITY: Primary | ICD-10-CM

## 2024-08-22 DIAGNOSIS — M54.2 NECK PAIN: ICD-10-CM

## 2024-08-22 DIAGNOSIS — M25.521 RIGHT ELBOW PAIN: ICD-10-CM

## 2024-08-22 PROCEDURE — 97110 THERAPEUTIC EXERCISES: CPT

## 2024-08-22 PROCEDURE — 97112 NEUROMUSCULAR REEDUCATION: CPT

## 2024-08-22 PROCEDURE — 97140 MANUAL THERAPY 1/> REGIONS: CPT

## 2024-08-22 NOTE — PROGRESS NOTES
"Daily Note     Today's date: 2024  Patient name: Chau Lou III  : 1965  MRN: [id number]  Referring provider: Jose Eduardo Garsia PT  Dx:   Encounter Diagnosis     ICD-10-CM    1. Left shoulder pain, unspecified chronicity  M25.512       2. Neck pain  M54.2       3. Right elbow pain  M25.521           Start Time: 1155          Subjective: Pt presents to the clinic stating most of pain is now in his neck. He doesn't complain of any pain in his shoulder this date. Pt states he has been sleeping on an air mattress that does not stay filled.       Objective: See treatment diary below      Assessment: Pt presents to the clinic with neck pain that has gotten worse since sleeping on an air mattress. Pt demonstrated correct mechanics and posture of all exercises with no acute bouts of pain. Manual therapy was done to release the paraspinals in the c/s and aid mobility of the c/s. Pt has closing restriction on the R side of c/s. Will continue to prescribe shoulder strength and cervical mobility to aid in the pt's functional mobility. Pt's Tolerated treatment well. Patient demonstrated fatigue post treatment      Plan: Continue per plan of care.      Precautions:     Date 7/9 7/11 7/16 7/19 7/23 7/31 8/5 8/8 8/15 8/22    Visit # 11 12 13 14 15 16 17 18 19 20    FOTO     xx         Re-eval     SF           Manuals 7/9 7/11 7/16 7/19 7/23 7/31 8/5 8/8 8/15 8/22   C/s PROM SF AW  ND SF AW  SF SK NH   UT/LS str w STM SF AW  ND SF AW PK SF SK NH   T/s PA's SF Gr III   ND Gr III-IVs SF         L shoulder PROM        SF w cross body str and subscap STM  NH   Neuro Re-Ed 7/9 7/11 7/16 7/19 7/23 7/31 8/5 8/8 8/15 8/22   Scap retraction             No money 2x15 GTB GTB 2x15  20x GTB 2x10 GTB GTB 3\"x20  GTB 3\"x20  2x15 GTB 2x15 GTB 2x15 GTB   DNF endurance  5\"x20  8x5\" 3x20\" 3'x20 3'x20 3x20\" 3\"x20 5x20\"   Rhythmic stabs 30\" ea flexion 30x ea            Body Blade             Wall angels             Chin tucks    10x5\"   " "      R wrist ext             Ther Ex 7/9 7/11 7/16 7/19 7/23 7/31 8/5 8/8 8/15 8/22   Pulleys 2'/2' 2'/2  2'/2' 2'/2' 2/2 2' flex only/ pn with ABD 2'/2' 2'/2' 2'/2'   Pec str 3x20\" 20\"x3  2x30\"*  30\"x2 30\"x2      UT str 3x20\" 20\"x3    L 20\"x3  L 20\"x3       Rows/ext 15# / 8# 2x15 Ea  14# / 8#  2x15 ea   20x 15# rows and ext  3x10 15# rows 15# 2x15  15# 2x15  15# 3x15 15# Row 10# ext 3x15 BTB 20x rows/ext   ER/IR 2x10 3# ER 3# 2x15 ea   20x 5# ea  5# 3x10 ea  5# 3x10 ea  2x15 GTB 2x15 GTB 2x15 GTB   Wrist flex/ext             Flexion AAROM 20x red 3# bar 3# bar  20x   3# bar 20x 5\" 3# bar 20x 5\" 3# bar 5\"x20   20x 3# bar 20x 3# bar 20x 3# bar   T/s ext             Shoulder Raises 20x 2# flex & abd 2# x20 FF x10 abd     2# 2x10 ea FF/abd  3# 10x ea FF/abd  20x 3# ea 20x 3# bar  20x 3# bar   Ther Activity       8/5                                Gait Training       8/5                                Modalities       8/5                                                 "

## 2024-08-28 ENCOUNTER — PATIENT OUTREACH (OUTPATIENT)
Dept: OTHER | Facility: OTHER | Age: 59
End: 2024-08-28

## 2024-08-28 NOTE — PROGRESS NOTES
Client is inquiring about his upcoming medical appointments. Client given his Pulmonary appointment in 9/4 at 2 PM at [address] [facility].  Client's ENT appointment is 9/6 at 10:30 AM, [address] [facility] [address], phone [phone].Client pleasant in this encounter and verbalized appreciation for assistance. This CHRN remains available for assistance as needed.

## 2024-08-29 ENCOUNTER — OFFICE VISIT (OUTPATIENT)
Dept: PHYSICAL THERAPY | Facility: CLINIC | Age: 59
End: 2024-08-29
Payer: MEDICARE

## 2024-08-29 DIAGNOSIS — M54.2 NECK PAIN: ICD-10-CM

## 2024-08-29 DIAGNOSIS — M25.521 RIGHT ELBOW PAIN: ICD-10-CM

## 2024-08-29 DIAGNOSIS — M25.512 LEFT SHOULDER PAIN, UNSPECIFIED CHRONICITY: Primary | ICD-10-CM

## 2024-08-29 PROCEDURE — 97112 NEUROMUSCULAR REEDUCATION: CPT

## 2024-08-29 PROCEDURE — 97110 THERAPEUTIC EXERCISES: CPT

## 2024-08-29 PROCEDURE — 97140 MANUAL THERAPY 1/> REGIONS: CPT

## 2024-08-29 NOTE — PROGRESS NOTES
"Daily Note     Today's date: 2024  Patient name: Chau Lou III  : 1965  MRN: [id number]  Referring provider: Jose Eduardo Garsia PT  Dx:   Encounter Diagnosis     ICD-10-CM    1. Left shoulder pain, unspecified chronicity  M25.512       2. Neck pain  M54.2       3. Right elbow pain  M25.521           Start Time: 1115          Subjective: Pt states his neck still hurts the same but his arm hurts every other day. Pt is still sleeping on an air mattress but is sleeping well.      Objective: See treatment diary below      Assessment: Pt presents with the same neck pain. Pt seems to be tolerating treatment better recently. Pt progressed RTC exercises to the blue TB this date with no increased pain. Will continue manual therapy on neck to help with neck pain. Tolerated treatment well. Patient demonstrated fatigue post treatment and would benefit from continued PT      Plan: Continue per plan of care.      Precautions:     Date 7/9 7/11 7/16 7/19 7/23 7/31 8/5 8/8 8/15 8/22 8/29   Visit # 11 12 13 14 15 16 17 18 19 20 21   FOTO     xx         Re-eval     SF           Manuals 8/29   7/19 7/23 7/31 8/5 8/8 8/15 8/22   C/s PROM NH   ND SF AW  SF SK NH   UT/LS str w STM NH   ND SF AW PK SF SK NH   T/s PA's    ND Gr III-IVs SF         L shoulder PROM        SF w cross body str and subscap STM  NH   Neuro Re-Ed 8/29   7/19 7/23 7/31 8/5 8/8 8/15 8/22   Scap retraction             No money 2x15 BTB   20x GTB 2x10 GTB GTB 3\"x20  GTB 3\"x20  2x15 GTB 2x15 GTB 2x15 GTB   DNF endurance 20x5\"   8x5\" 3x20\" 3'x20 3'x20 3x20\" 3\"x20 5x20\"   Rhythmic stabs             Body Blade             Wall angels             Chin tucks    10x5\"         R wrist ext             GTB ER standing  2x10            Ther Ex 8/29   7/19 7/23 7/31 8/5 8/8 8/15 8/22   Pulleys 2'/2'   2'/2' 2'/2' 2/2 2' flex only/ pn with ABD 2'/2' 2'/2' 2'/2'   Pec str    2x30\"*  30\"x2 30\"x2      UT str      L 20\"x3  L 20\"x3       Rows/ext BTB 2x20   20x 15# rows " "and ext  3x10 15# rows 15# 2x15  15# 2x15  15# 3x15 15# Row 10# ext 3x15 BTB 20x rows/ext   ER/IR 2x15 BTB   20x 5# ea  5# 3x10 ea  5# 3x10 ea  2x15 GTB 2x15 GTB 2x15 GTB   Wrist flex/ext             Flexion AAROM 5# bar 2x10   3# bar 20x 5\" 3# bar 20x 5\" 3# bar 5\"x20   20x 3# bar 20x 3# bar 20x 3# bar   T/s ext             Shoulder Raises 20x 5# bar     2# 2x10 ea FF/abd  3# 10x ea FF/abd  20x 3# ea 20x 3# bar  20x 3# bar   Ther Activity       8/5                                Gait Training       8/5                                Modalities       8/5                                                   "

## 2024-09-03 ENCOUNTER — PATIENT OUTREACH (OUTPATIENT)
Dept: OTHER | Facility: OTHER | Age: 59
End: 2024-09-03

## 2024-09-03 ENCOUNTER — OFFICE VISIT (OUTPATIENT)
Dept: PHYSICAL THERAPY | Facility: CLINIC | Age: 59
End: 2024-09-03
Payer: MEDICARE

## 2024-09-03 DIAGNOSIS — R09.82 POST-NASAL DRIP: ICD-10-CM

## 2024-09-03 DIAGNOSIS — M25.521 RIGHT ELBOW PAIN: ICD-10-CM

## 2024-09-03 DIAGNOSIS — K44.9 HIATAL HERNIA: ICD-10-CM

## 2024-09-03 DIAGNOSIS — M25.512 LEFT SHOULDER PAIN, UNSPECIFIED CHRONICITY: Primary | ICD-10-CM

## 2024-09-03 DIAGNOSIS — M54.2 NECK PAIN: ICD-10-CM

## 2024-09-03 PROCEDURE — 97140 MANUAL THERAPY 1/> REGIONS: CPT

## 2024-09-03 PROCEDURE — 97112 NEUROMUSCULAR REEDUCATION: CPT

## 2024-09-03 PROCEDURE — 97110 THERAPEUTIC EXERCISES: CPT

## 2024-09-03 NOTE — PROGRESS NOTES
"Daily Note     Today's date: 9/3/2024  Patient name: Chau Lou III  : 1965  MRN: [id number]  Referring provider: Jose Eduardo Garsia PT  Dx:   Encounter Diagnosis     ICD-10-CM    1. Left shoulder pain, unspecified chronicity  M25.512       2. Right elbow pain  M25.521       3. Neck pain  M54.2           Start Time: 1200  Stop Time: 1240  Total time in clinic (min): 40 minutes    Subjective: Pt presents to the clinic stating he still has neck and elbow pain. No changes in symptoms this date.       Objective: See treatment diary below      Assessment: Pt presents to the clinic with complaints of neck and elbow pain. Pt is doing well in current protocol but repeatedly exacerbates neck symptoms when leaving the clinic. Pt shows signs of hypersensitive neck musculature around the scalenes. STM done to try and relax neck musculature. Tolerated treatment well. Patient demonstrated fatigue post treatment and would benefit from continued PT      Plan: Continue per plan of care.      Precautions:     Date 9/3 7/11 7/16 7/19 7/23 7/31 8/5 8/8 8/15 8/22 8/29   Visit # 22 12 13 14 15 16 17 18 19 20 21   FOTO     xx         Re-eval     SF           Manuals 8/29 9/3  7/19 7/23 7/31 8/5 8/8 8/15 8/22   C/s PROM NH NH  ND SF AW  SF SK NH   UT/LS str w STM NH NH  ND SF AW PK SF SK NH   T/s PA's  NH  ND Gr III-IVs SF         L shoulder PROM        SF w cross body str and subscap STM  NH   Neuro Re-Ed 8/29   7/19 7/23 7/31 8/5 8/8 8/15 8/22   Scap retraction             No money 2x15 BTB 2x20 BTB  20x GTB 2x10 GTB GTB 3\"x20  GTB 3\"x20  2x15 GTB 2x15 GTB 2x15 GTB   DNF endurance 20x5\" 5x30\"  8x5\" 3x20\" 3'x20 3'x20 3x20\" 3\"x20 5x20\"   Rhythmic stabs             Body Blade             Wall angels             Chin tucks    10x5\"         R wrist ext             GTB ER standing  2x10 2x10           Ther Ex 8 8/8 8/15 8/22   Pulleys 2'/2' 3'/3'  2'/2' 2'/2' 2/2 2' flex only/ pn with ABD 2'/2' 2'/2' 2'/2' " "  Pec str    2x30\"*  30\"x2 30\"x2      UT str      L 20\"x3  L 20\"x3       Rows/ext BTB 2x20   20x 15# rows and ext  3x10 15# rows 15# 2x15  15# 2x15  15# 3x15 15# Row 10# ext 3x15 BTB 20x rows/ext   ER/IR 2x15 BTB 2x15 BTB  20x 5# ea  5# 3x10 ea  5# 3x10 ea  2x15 GTB 2x15 GTB 2x15 GTB   Wrist flex/ext             Flexion AAROM 5# bar 2x10 5# bar 2x10     3# bar 20x 5\" 3# bar 20x 5\" 3# bar 5\"x20   20x 3# bar 20x 3# bar 20x 3# bar   T/s ext             Shoulder Raises 20x 5# bar 20x 5# bar    2# 2x10 ea FF/abd  3# 10x ea FF/abd  20x 3# ea 20x 3# bar  20x 3# bar   Tball circles  20c/cc           Ther Activity       8/5                                Gait Training       8/5                                Modalities       8/5                                                     "

## 2024-09-04 ENCOUNTER — OFFICE VISIT (OUTPATIENT)
Dept: PULMONOLOGY | Facility: CLINIC | Age: 59
End: 2024-09-04
Payer: MEDICARE

## 2024-09-04 VITALS
BODY MASS INDEX: 21.89 KG/M2 | HEIGHT: 66 IN | SYSTOLIC BLOOD PRESSURE: 128 MMHG | DIASTOLIC BLOOD PRESSURE: 70 MMHG | WEIGHT: 136.2 LBS | HEART RATE: 77 BPM | OXYGEN SATURATION: 98 %

## 2024-09-04 DIAGNOSIS — R91.8 PULMONARY NODULES: ICD-10-CM

## 2024-09-04 DIAGNOSIS — J44.9 CHRONIC OBSTRUCTIVE PULMONARY DISEASE, UNSPECIFIED COPD TYPE (HCC): Primary | ICD-10-CM

## 2024-09-04 DIAGNOSIS — F17.210 NICOTINE DEPENDENCE, CIGARETTES, UNCOMPLICATED: ICD-10-CM

## 2024-09-04 PROCEDURE — 99214 OFFICE O/P EST MOD 30 MIN: CPT | Performed by: INTERNAL MEDICINE

## 2024-09-04 RX ORDER — GABAPENTIN 300 MG/1
300 CAPSULE ORAL 3 TIMES DAILY
Qty: 60 CAPSULE | Refills: 3 | Status: SHIPPED | OUTPATIENT
Start: 2024-09-04

## 2024-09-04 RX ORDER — IPRATROPIUM BROMIDE 21 UG/1
2 SPRAY, METERED NASAL EVERY 12 HOURS
Qty: 30 ML | Refills: 5 | Status: SHIPPED | OUTPATIENT
Start: 2024-09-04

## 2024-09-04 RX ORDER — FLUTICASONE PROPIONATE AND SALMETEROL 250; 50 UG/1; UG/1
1 POWDER RESPIRATORY (INHALATION) 2 TIMES DAILY
Qty: 60 BLISTER | Refills: 11 | Status: SHIPPED | OUTPATIENT
Start: 2024-09-04 | End: 2024-10-04

## 2024-09-04 NOTE — PROGRESS NOTES
Client requested to speak with this writer [facility]. Client wants to set up an appointment for Dept of aging's assessment at [facility] with the writer. Writer agreed.

## 2024-09-04 NOTE — PROGRESS NOTES
"Pulmonary Follow Up Note   Chau Lou III 59 y.o. male MRN: [id number]  9/4/2024    Assessment:  COPD  Moderate, last FEV1 of 50% in 5/2024, no BD response with mild to moderate air trapping  Long smoking history, about 60-pack-year actively smokes 0.5 PPD  Last exacerbation in 2/2024 treated as an outpatient  On triple inhalers with Advair 500/Spiriva HandiHaler for few years  No frequent use of PRN albuterol    Plan:  De-escalate therapy to Advair 250 twice daily/Spiriva HandiHaler 1 capsule daily  Up-to-date on immunization  Continue PRN albuterol HFA    Pulmonary nodules  Noted new clusters of nodularities at the LLL in 10/2023  Follow-up CT chest 5/2024 showed improvement of those nodules  Switch to annual LDCT for lung cancer screening    Active tobacco abuse  About 60-pack-year  Counseled extensively, actively smokes 0.5 PPD  Not interested in nicotine replacement therapy    Return in about 9 months (around 6/4/2025).    History of Present Illness     Follow up for: COPD        Background  59 y.o. male with a h/o hiatal hernia, COPD, allergic rhinitis, tobacco abuse, osteoarthritis, major depression, BPH     Dx COPD in 2022.  Known symptoms of cough, chest congestion with mucus production of white/greenish sputum.  Improved with inhalers currently on Advair/Spiriva.  No history of severe attack, hospitalization, or frequent oral steroid use.  Significant/heavy tobacco abuse history, 1.5 PPD for the past 40 years.  Continues to smoke 1.5 PPD.  Used to work at the [employer].  No frequent use of PRN albuterol     At baseline, able to walk on a surface level long distances, climb 1-2 flight of stairs without stop.     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      Interval History  Since last seen, no major changes.  Continues to smoke about 0.5 PPD.  No new respiratory complaints.  Using PRN albuterol no more than few times per week.  Still on Advair 500 and Spiriva HandiHaler.  No ED visit, hospitalization, or treatment with oral steroids since last seen.      Review of Systems  As per hpi, all other systems reviewed and were negative    Studies:  Imaging and other studies: I have personally reviewed pertinent films in PACS     LDCT 10/24/2022-no suspicious nodules.  LLL GGO seen in 2014-resolved.     LDCT 10/20/2023-scattered small apical perifissural nodule consistent with intrapulmonary lymph nodes.  New cluster of nodular opacity at LLL likely infectious/inflammatory 1 to 3 months follow-up recommended    CT chest 5/29/2024-interval resolution of previously seen nodular opacities at the LLL.  Background of emphysema.  Small calcified granulomas.  Coronary artery calcification.  Resume annual LDCT     Pulmonary function testing:   PFT 5/29/2024-ratio 70%, FEV1 1.57 L / 50%, FVC 2.24 L / 56%, TLC 85%, %, DLCO 50%       EKG, Pathology, and Other Studies: I have personally reviewed pertinent reports.         Past medical, surgical, social and family histories reviewed.      Medications/Allergies: Reviewed      Vitals: Blood pressure 128/70, pulse 77, height 5' 6\" (1.676 m), weight 61.8 kg (136 lb 3.2 oz), SpO2 98%. Body mass index is 21.98 kg/m². Oxygen Therapy  SpO2: 98 %      Physical Exam  Body mass index is 21.98 kg/m².   Gen: not in acute distress, thin  Neck/Eyes: supple, no adenopathy, PERRL  Ear: normal appearance, no significant hearing impairment  Nose:  normal nasal mucosa, no drainage  Salivary glands: soft nontender  Chest: normal " "respiratory efforts, diminished but clear breath sounds bilaterally  CV: RRR, no murmurs appreciated, no edema  Abdomen: soft, non tender  Extremities:  No observed deformity   Neuro: AAO X3, no focal motor deficit        Labs:  Lab Results   Component Value Date    WBC 5.58 02/02/2024    HGB 13.8 02/02/2024    HCT 41.6 02/02/2024    MCV 92 02/02/2024     (L) 02/15/2024     Lab Results   Component Value Date    GLUCOSE 93 08/17/2014    CALCIUM 8.2 (L) 02/15/2024     (L) 08/17/2014    K 3.9 02/15/2024    CO2 23 02/15/2024    CL 99 02/15/2024    BUN 14 02/15/2024    CREATININE 0.73 02/15/2024     No results found for: \"IGE\"  Lab Results   Component Value Date    ALT 15 02/02/2024    AST 18 02/02/2024    ALKPHOS 57 02/02/2024    BILITOT 0.6 08/16/2014           Portions of the record may have been created with voice recognition software.  Occasional wrong word or \"sound a like\" substitutions may have occurred due to the inherent limitations of voice recognition software.  Read the chart carefully and recognize, using context, where substitutions have occurred    Blu Lema M.D.  [facility]  "

## 2024-09-10 ENCOUNTER — PATIENT OUTREACH (OUTPATIENT)
Dept: OTHER | Facility: OTHER | Age: 59
End: 2024-09-10

## 2024-09-10 NOTE — PROGRESS NOTES
Pt complains of pain that forces him to rush to the bathroom in addition to continuous belching.  Had surgery on 2/15.  Abdominal pain 8/10, no facial grimacing noted, sharp pain in the upper two quadrants of the abdomin.  Constant pain for the past week or two, no improvement when going to the bathroom. Both solid and liquid bowel movements, denies nausea and vomiting, dry heaving started last week.    [facility]

## 2024-09-11 ENCOUNTER — HOSPITAL ENCOUNTER (EMERGENCY)
Facility: HOSPITAL | Age: 59
Discharge: HOME | End: 2024-09-11
Attending: EMERGENCY MEDICINE
Payer: MEDICARE

## 2024-09-11 ENCOUNTER — OFFICE VISIT (OUTPATIENT)
Dept: FAMILY MEDICINE CLINIC | Facility: CLINIC | Age: 59
End: 2024-09-11

## 2024-09-11 ENCOUNTER — APPOINTMENT (EMERGENCY)
Dept: CT IMAGING | Facility: HOSPITAL | Age: 59
End: 2024-09-11
Payer: MEDICARE

## 2024-09-11 ENCOUNTER — PATIENT OUTREACH (OUTPATIENT)
Dept: OTHER | Facility: OTHER | Age: 59
End: 2024-09-11

## 2024-09-11 VITALS
HEIGHT: 66 IN | BODY MASS INDEX: 21.31 KG/M2 | RESPIRATION RATE: 17 BRPM | HEART RATE: 78 BPM | SYSTOLIC BLOOD PRESSURE: 122 MMHG | WEIGHT: 132.6 LBS | TEMPERATURE: 97.5 F | OXYGEN SATURATION: 98 % | DIASTOLIC BLOOD PRESSURE: 86 MMHG

## 2024-09-11 VITALS
RESPIRATION RATE: 16 BRPM | WEIGHT: 135.14 LBS | OXYGEN SATURATION: 98 % | SYSTOLIC BLOOD PRESSURE: 166 MMHG | HEART RATE: 73 BPM | DIASTOLIC BLOOD PRESSURE: 95 MMHG | TEMPERATURE: 97.9 F | BODY MASS INDEX: 21.81 KG/M2

## 2024-09-11 DIAGNOSIS — R10.33 PERIUMBILICAL ABDOMINAL PAIN: Primary | ICD-10-CM

## 2024-09-11 DIAGNOSIS — R10.9 ABDOMINAL PAIN: Primary | ICD-10-CM

## 2024-09-11 DIAGNOSIS — K52.9 ENTERITIS: ICD-10-CM

## 2024-09-11 LAB
ALBUMIN SERPL BCG-MCNC: 3.8 G/DL (ref 3.5–5)
ALP SERPL-CCNC: 59 U/L (ref 34–104)
ALT SERPL W P-5'-P-CCNC: 14 U/L (ref 7–52)
ANION GAP SERPL CALCULATED.3IONS-SCNC: 5 MMOL/L (ref 4–13)
AST SERPL W P-5'-P-CCNC: 17 U/L (ref 13–39)
BASOPHILS # BLD AUTO: 0.04 THOUSANDS/ÂΜL (ref 0–0.1)
BASOPHILS NFR BLD AUTO: 1 % (ref 0–1)
BILIRUB SERPL-MCNC: 0.66 MG/DL (ref 0.2–1)
BUN SERPL-MCNC: 13 MG/DL (ref 5–25)
CALCIUM SERPL-MCNC: 8.7 MG/DL (ref 8.4–10.2)
CHLORIDE SERPL-SCNC: 101 MMOL/L (ref 96–108)
CO2 SERPL-SCNC: 29 MMOL/L (ref 21–32)
CREAT SERPL-MCNC: 0.87 MG/DL (ref 0.6–1.3)
EOSINOPHIL # BLD AUTO: 0.08 THOUSAND/ÂΜL (ref 0–0.61)
EOSINOPHIL NFR BLD AUTO: 1 % (ref 0–6)
ERYTHROCYTE [DISTWIDTH] IN BLOOD BY AUTOMATED COUNT: 12.6 % (ref 11.6–15.1)
GFR SERPL CREATININE-BSD FRML MDRD: 93 ML/MIN/1.73SQ M
GLUCOSE SERPL-MCNC: 87 MG/DL (ref 65–140)
HCT VFR BLD AUTO: 39.6 % (ref 36.5–49.3)
HGB BLD-MCNC: 13.5 G/DL (ref 12–17)
IMM GRANULOCYTES # BLD AUTO: 0.02 THOUSAND/UL (ref 0–0.2)
IMM GRANULOCYTES NFR BLD AUTO: 0 % (ref 0–2)
LIPASE SERPL-CCNC: 19 U/L (ref 11–82)
LYMPHOCYTES # BLD AUTO: 1.75 THOUSANDS/ÂΜL (ref 0.6–4.47)
LYMPHOCYTES NFR BLD AUTO: 28 % (ref 14–44)
MCH RBC QN AUTO: 33 PG (ref 26.8–34.3)
MCHC RBC AUTO-ENTMCNC: 34.1 G/DL (ref 31.4–37.4)
MCV RBC AUTO: 97 FL (ref 82–98)
MONOCYTES # BLD AUTO: 0.65 THOUSAND/ÂΜL (ref 0.17–1.22)
MONOCYTES NFR BLD AUTO: 10 % (ref 4–12)
NEUTROPHILS # BLD AUTO: 3.75 THOUSANDS/ÂΜL (ref 1.85–7.62)
NEUTS SEG NFR BLD AUTO: 60 % (ref 43–75)
NRBC BLD AUTO-RTO: 0 /100 WBCS
PLATELET # BLD AUTO: 214 THOUSANDS/UL (ref 149–390)
PMV BLD AUTO: 8.8 FL (ref 8.9–12.7)
POTASSIUM SERPL-SCNC: 4.4 MMOL/L (ref 3.5–5.3)
PROT SERPL-MCNC: 6.2 G/DL (ref 6.4–8.4)
RBC # BLD AUTO: 4.09 MILLION/UL (ref 3.88–5.62)
SODIUM SERPL-SCNC: 135 MMOL/L (ref 135–147)
WBC # BLD AUTO: 6.29 THOUSAND/UL (ref 4.31–10.16)

## 2024-09-11 PROCEDURE — 83690 ASSAY OF LIPASE: CPT

## 2024-09-11 PROCEDURE — 99213 OFFICE O/P EST LOW 20 MIN: CPT | Performed by: PHYSICIAN ASSISTANT

## 2024-09-11 PROCEDURE — G2211 COMPLEX E/M VISIT ADD ON: HCPCS | Performed by: PHYSICIAN ASSISTANT

## 2024-09-11 PROCEDURE — 87505 NFCT AGENT DETECTION GI: CPT

## 2024-09-11 PROCEDURE — 74177 CT ABD & PELVIS W/CONTRAST: CPT

## 2024-09-11 PROCEDURE — 80053 COMPREHEN METABOLIC PANEL: CPT

## 2024-09-11 PROCEDURE — 36415 COLL VENOUS BLD VENIPUNCTURE: CPT

## 2024-09-11 PROCEDURE — 96374 THER/PROPH/DIAG INJ IV PUSH: CPT

## 2024-09-11 PROCEDURE — 99285 EMERGENCY DEPT VISIT HI MDM: CPT

## 2024-09-11 PROCEDURE — 85025 COMPLETE CBC W/AUTO DIFF WBC: CPT

## 2024-09-11 PROCEDURE — 99284 EMERGENCY DEPT VISIT MOD MDM: CPT

## 2024-09-11 RX ORDER — KETOROLAC TROMETHAMINE 30 MG/ML
15 INJECTION, SOLUTION INTRAMUSCULAR; INTRAVENOUS ONCE
Status: COMPLETED | OUTPATIENT
Start: 2024-09-11 | End: 2024-09-11

## 2024-09-11 RX ADMIN — KETOROLAC TROMETHAMINE 15 MG: 30 INJECTION, SOLUTION INTRAMUSCULAR; INTRAVENOUS at 12:43

## 2024-09-11 RX ADMIN — IOHEXOL 90 ML: 350 INJECTION, SOLUTION INTRAVENOUS at 13:37

## 2024-09-11 NOTE — PROGRESS NOTES
Client called to tell this writer that he is on the way to the ER for stomach pains. He will not be there for our appointment with department of aging today at [facility] at 1 pm. Will reschedule when client is able.

## 2024-09-11 NOTE — PROGRESS NOTES
Assessment/Plan:    Abdominal pain  - Status post laparoscopic paraesophageal hernia repair with partial fundoplication and EGD on 2/15/2024.  - Patient now with constant, intense sharp abdominal pains with associated dry heaves, belching, gas, diarrhea.  - Due to intensity of pains, recommend ED evaluation.  Offered ambulance, the patient declines.  -Will follow-up after ED visit.      Diagnoses and all orders for this visit:    Periumbilical abdominal pain          All of patients questions were answered. Patient understands and agrees with the above plan.     Return if symptoms worsen or fail to improve.    Candice Rosas PA-C  09/11/24  [facility]          Subjective:     Patient ID: Chau Lou III  is a 59 y.o. male with known PMH of allergic rhinitis, anxiety, BPH, COPD, GERD, depression, hyperlipidemia, tobacco dependence who presents today in office for same-day visit for abdominal pain.     - Patient is a 59 y.o. male who presents today for same-day visit for abdominal pain.    -Patient notes since he underwent hiatal hernia repair in February 2024, he has been experiencing abdominal pain at the site of the surgery.  However, patient notes for the past 2 days the pain has increased and has been very sharp in nature.  He currently rates the pain as 8/10 with associated diarrhea, dry heaves, belching, gas.  He denies any known fevers, chills, nausea, vomiting, dysuria, blood in the stool.  Patient notes he did attempt to drink some coffee today, but he was only able to drink a few sips before he had to use the bathroom.  He has not tried anything to help with the pain.        The following portions of the patient's history were reviewed and updated as appropriate: allergies, current medications, past family history, past medical history, past social history, past surgical history, and problem list.        Review of Systems   Constitutional:  Negative for chills and fever.   HENT:  Negative for  "congestion and sore throat.    Gastrointestinal:  Positive for abdominal pain, diarrhea and nausea. Negative for blood in stool, constipation and vomiting.   Genitourinary:  Negative for dysuria.                 Objective:   Vitals:    09/11/24 1139   BP: 122/86   BP Location: Left arm   Patient Position: Sitting   Cuff Size: Standard   Pulse: 78   Resp: 17   Temp: 97.5 °F (36.4 °C)   TempSrc: Temporal   SpO2: 98%   Weight: 60.1 kg (132 lb 9.6 oz)   Height: 5' 6\" (1.676 m)         Physical Exam  Vitals and nursing note reviewed.   Constitutional:       General: He is not in acute distress.     Appearance: He is well-developed.   HENT:      Head: Normocephalic and atraumatic.      Right Ear: External ear normal.      Left Ear: External ear normal.      Nose: Nose normal.   Eyes:      Conjunctiva/sclera: Conjunctivae normal.   Cardiovascular:      Rate and Rhythm: Normal rate and regular rhythm.      Pulses: Normal pulses.      Heart sounds: Normal heart sounds.   Pulmonary:      Effort: Pulmonary effort is normal. No respiratory distress.      Breath sounds: Normal breath sounds. No wheezing.   Abdominal:      General: Bowel sounds are normal.      Palpations: Abdomen is soft.      Tenderness: There is abdominal tenderness (periumbilical/epigastric). There is no right CVA tenderness or left CVA tenderness.   Musculoskeletal:         General: Normal range of motion.      Cervical back: Normal range of motion and neck supple.   Skin:     General: Skin is warm and dry.      Findings: No rash.   Neurological:      Mental Status: He is alert and oriented to person, place, and time.   Psychiatric:         Behavior: Behavior normal.             "

## 2024-09-11 NOTE — ED PROVIDER NOTES
"1. Abdominal pain    2. Enteritis      ED Disposition       ED Disposition   Discharge    Condition   Stable    Date/Time   Wed Sep 11, 2024  2:55 PM    Comment   Chau Lou III discharge to home/self care.                   Assessment & Plan       Medical Decision Making  Differential diagnosis includes but not limited to:  Appendicitis, viral syndrome, mesenteric adenitis, pancreatitis, cholecystitis, choledocholithiasis, bowel obstruction, ileus, gastroenteritis, colitis, malignancy, AAA. VSS. Afebrile. Exam without peritoneal signs.  CBC, CMP, lipase grossly nonpathologic.  CT abdomen pelvis findings consistent with enteritis/diarrheal illness.  Well-hydrated.  Tolerating p.o.  Afebrile, no blood or mucus in stool-no overt indications for antibiotics at this time, stool culture results pending. Plan for PCP/GI follow up.     All imaging and/or lab testing discussed with patient, strict return to ED precautions discussed. Patient recommended to follow up promptly with appropriate outpatient provider and risk of morbidity/mortality if patient does not follow up as recommended was discussed. Patient and/or family members verbalizes understanding and agrees with plan. Patient and/or family members were given opportunity to ask questions, all questions were answered at this time. Patient is stable for discharge.     Portions of the record may have been created with voice recognition software. Occasional wrong word or \"sound a like\" substitutions may have occurred due to the inherent limitations of voice recognition software. Read the chart carefully and recognize, using context, where substitutions have occurred.       Amount and/or Complexity of Data Reviewed  Labs: ordered.  Radiology: ordered.    Risk  OTC drugs.  Prescription drug management.                       Medications   ketorolac (TORADOL) injection 15 mg (15 mg Intravenous Given 9/11/24 1243)       History of Present Illness       59-year-old male " presents emergency department complaining of abdominal pain.  Sent by PCP for 7 months of intermittent abdominal pain and diarrhea following hiatal hernia surgery.  Reports pain is 8 out of 10 today.  4-5 daily bowel movements, no blood or mucus.  Denies fevers.  Tolerating p.o.  No vomiting.      History provided by:  Patient  Abdominal Pain  Pain location:  Periumbilical  Pain radiation: entire stomach.  Progression:  Waxing and waning  Associated symptoms: diarrhea    Associated symptoms: no anorexia, no chest pain, no chills, no constipation, no dysuria, no fatigue, no fever, no hematemesis, no hematochezia, no hematuria, no melena, no nausea, no shortness of breath and no vomiting            Review of Systems   Constitutional:  Negative for chills, fatigue and fever.   Respiratory:  Negative for shortness of breath.    Cardiovascular:  Negative for chest pain.   Gastrointestinal:  Positive for abdominal pain and diarrhea. Negative for abdominal distention, anorexia, blood in stool, constipation, hematemesis, hematochezia, melena, nausea and vomiting.   Genitourinary:  Negative for dysuria, hematuria and testicular pain.   Musculoskeletal:  Negative for back pain.   Skin:  Negative for rash.   All other systems reviewed and are negative.          Objective     ED Triage Vitals   Temperature Pulse Blood Pressure Respirations SpO2 Patient Position - Orthostatic VS   09/11/24 1205 09/11/24 1205 09/11/24 1205 09/11/24 1205 09/11/24 1205 09/11/24 1205   97.9 °F (36.6 °C) 73 166/95 16 98 % Sitting      Temp Source Heart Rate Source BP Location FiO2 (%) Pain Score    09/11/24 1205 09/11/24 1205 09/11/24 1205 -- 09/11/24 1243    Oral Monitor Right arm  5        Physical Exam  Vitals and nursing note reviewed.   Constitutional:       General: He is awake. He is not in acute distress.     Appearance: Normal appearance. He is not ill-appearing.   HENT:      Head: Normocephalic.      Mouth/Throat:      Lips: Pink.       Mouth: Mucous membranes are moist.   Eyes:      General: Vision grossly intact.   Cardiovascular:      Rate and Rhythm: Normal rate and regular rhythm.      Heart sounds: Normal heart sounds.   Pulmonary:      Effort: Pulmonary effort is normal. No respiratory distress.      Breath sounds: Normal breath sounds.   Abdominal:      General: There is no distension.      Palpations: Abdomen is soft.      Tenderness: There is abdominal tenderness in the periumbilical area. There is no right CVA tenderness, left CVA tenderness, guarding or rebound.   Skin:     General: Skin is warm and dry.      Capillary Refill: Capillary refill takes less than 2 seconds.      Coloration: Skin is not jaundiced.   Neurological:      Mental Status: He is alert.         Labs Reviewed   CBC AND DIFFERENTIAL - Abnormal       Result Value    WBC 6.29      RBC 4.09      Hemoglobin 13.5      Hematocrit 39.6      MCV 97      MCH 33.0      MCHC 34.1      RDW 12.6      MPV 8.8 (*)     Platelets 214      nRBC 0      Segmented % 60      Immature Grans % 0      Lymphocytes % 28      Monocytes % 10      Eosinophils Relative 1      Basophils Relative 1      Absolute Neutrophils 3.75      Absolute Immature Grans 0.02      Absolute Lymphocytes 1.75      Absolute Monocytes 0.65      Eosinophils Absolute 0.08      Basophils Absolute 0.04     COMPREHENSIVE METABOLIC PANEL - Abnormal    Sodium 135      Potassium 4.4      Chloride 101      CO2 29      ANION GAP 5      BUN 13      Creatinine 0.87      Glucose 87      Calcium 8.7      AST 17      ALT 14      Alkaline Phosphatase 59      Total Protein 6.2 (*)     Albumin 3.8      Total Bilirubin 0.66      eGFR 93      Narrative:     [employer] guidelines for Chronic Kidney Disease (CKD):     Stage 1 with normal or high GFR (GFR > 90 mL/min/1.73 square meters)    Stage 2 Mild CKD (GFR = 60-89 mL/min/1.73 square meters)    Stage 3A Moderate CKD (GFR = 45-59 mL/min/1.73 square meters)    Stage  3B Moderate CKD (GFR = 30-44 mL/min/1.73 square meters)    Stage 4 Severe CKD (GFR = 15-29 mL/min/1.73 square meters)    Stage 5 End Stage CKD (GFR <15 mL/min/1.73 square meters)  Note: GFR calculation is accurate only with a steady state creatinine   LIPASE - Normal    Lipase 19     CLOSTRIDIUM DIFFICILE TOXIN BY PCR WITH EIA   STOOL ENTERIC BACTERIAL PANEL BY PCR     CT abdomen pelvis with contrast   Final Interpretation by Larry Packer MD (09/11 1422)      Multiple fluid-filled loops of small and large bowel consistent with an enteritis/diarrheal illness.         Workstation performed: [id number]             Procedures       Jalyn Hubbard PA-C  09/11/24 3037

## 2024-09-11 NOTE — DISCHARGE INSTRUCTIONS
Follow-up with primary care provider, GI. Stay hydrated. Return to ED new or worsening symptoms as discussed.

## 2024-09-12 ENCOUNTER — OFFICE VISIT (OUTPATIENT)
Dept: PHYSICAL THERAPY | Facility: CLINIC | Age: 59
End: 2024-09-12
Payer: MEDICARE

## 2024-09-12 DIAGNOSIS — M25.512 LEFT SHOULDER PAIN, UNSPECIFIED CHRONICITY: Primary | ICD-10-CM

## 2024-09-12 DIAGNOSIS — M25.521 RIGHT ELBOW PAIN: ICD-10-CM

## 2024-09-12 DIAGNOSIS — M54.2 NECK PAIN: ICD-10-CM

## 2024-09-12 LAB
C COLI+JEJUNI TUF STL QL NAA+PROBE: NEGATIVE
C DIFF TOX GENS STL QL NAA+PROBE: NEGATIVE
EC STX1+STX2 GENES STL QL NAA+PROBE: NEGATIVE
SALMONELLA SP SPAO STL QL NAA+PROBE: NEGATIVE
SHIGELLA SP+EIEC IPAH STL QL NAA+PROBE: NEGATIVE

## 2024-09-12 PROCEDURE — 97140 MANUAL THERAPY 1/> REGIONS: CPT | Performed by: PHYSICAL THERAPIST

## 2024-09-12 PROCEDURE — 97110 THERAPEUTIC EXERCISES: CPT | Performed by: PHYSICAL THERAPIST

## 2024-09-12 PROCEDURE — 97112 NEUROMUSCULAR REEDUCATION: CPT | Performed by: PHYSICAL THERAPIST

## 2024-09-12 NOTE — PROGRESS NOTES
Daily Note     Today's date: 2024  Patient name: Chau Lou III  : 1965  MRN: [id number]  Referring provider: Jose Eduardo Garsia PT  Dx:   Encounter Diagnosis     ICD-10-CM    1. Left shoulder pain, unspecified chronicity  M25.512       2. Right elbow pain  M25.521       3. Neck pain  M54.2           Start Time: 1055  Stop Time: 1145  Total time in clinic (min): 50 minutes    Subjective: Patient reports that he is not feeling as good as he was last session due to being in the ED yesterday for GI complications.    Objective: See treatment diary below      Assessment: Patient started wall clock today and exhibited moderate fatigue with decreased scapular activation toward the end of set. Patient tolerated manual interventions well with some UT tenderness during STM, patient also experienced pain with pressure during trial of cervical upglides, patient reports L cervical pain post PROM into R rotation, completed stretches post manual interventions with no reported relief. Patient tolerated resisted ER well with some R RC/shoulder pain during the last 10 repetitions, pt instructed to not push through pain and hold exercise if he experiences increased pain with exercise. Patient demonstrated fatigue post treatment, exhibited good technique with therapeutic exercises, and would benefit from continued PT.      Plan: Continue per plan of care.  Progress treatment as tolerated.      Session completed by SPT Tim Russo under supervision from Jose Eduardo Garsia DPT.      Precautions:     Date 9/3 9/12   7/23 7/31 8/5 8/8 8/15 8/22 8/29   Visit # 22 23   15 16 17 18 19 20 21   FOTO     xx         Re-eval     SF           Manuals 8/29 9/3 9/12   7/31 8/5 8/8 8/15 8/22   C/s PROM NH NH ND   AW  SF SK NH   UT/LS str w STM NH NH ND   AW PK SF SK NH   T/s PA's  NH ND          L shoulder PROM        SF w cross body str and subscap STM  NH   Cervical upglides   Pn!          Neuro Re-Ed 8/29     7/31 8/5 8/8 8/15 8/22  "  Scap retraction             No money 2x15 BTB 2x20 BTB 2x10 BTB   GTB 3\"x20  GTB 3\"x20  2x15 GTB 2x15 GTB 2x15 GTB   DNF endurance 20x5\" 5x30\" nv   3'x20 3'x20 3x20\" 3\"x20 5x20\"   Rhythmic stabs             Body Blade             Wall clocks   10x ea GTB          Chin tucks             R wrist ext             GTB ER standing  2x10 2x10           Ther Ex 8/29  9/12   7/31 8/5 8/8 8/15 8/22   Pulleys 2'/2' 3'/3' 3'/3'   2/2 2' flex only/ pn with ABD 2'/2' 2'/2' 2'/2'   Pec str      30\"x2 30\"x2      UT str   3x30\" and LS   L 20\"x3  L 20\"x3       Rows/ext BTB 2x20  2x10 16# ea   15# 2x15  15# 2x15  15# 3x15 15# Row 10# ext 3x15 BTB 20x rows/ext   ER/IR 2x15 BTB 2x15 BTB 2x10 5# ER    2x10   7.5# IR   5# 3x10 ea  5# 3x10 ea  2x15 GTB 2x15 GTB 2x15 GTB   Wrist flex/ext             Flexion AAROM 5# bar 2x10 5# bar 2x10       3# bar 5\"x20   20x 3# bar 20x 3# bar 20x 3# bar   T/s ext             Shoulder Raises 20x 5# bar 20x 5# bar    2# 2x10 ea FF/abd  3# 10x ea FF/abd  20x 3# ea 20x 3# bar  20x 3# bar   Tball circles  20c/cc nv          Ther Activity       8/5                                Gait Training       8/5                                Modalities       8/5                                                       "

## 2024-09-17 ENCOUNTER — PATIENT OUTREACH (OUTPATIENT)
Dept: OTHER | Facility: OTHER | Age: 59
End: 2024-09-17

## 2024-09-17 NOTE — PROGRESS NOTES
This [facility] introduced Jeanne Brower from [facility], who completed the waiver assessment. Client requested writer share name and contact info of PCP, Dr. Redding with Jeanne.     Client continues to complain of ongoing abdominal pain. CHRN called Gastroenterology seeking an earlier appointment. Appointment made for 9/24 at 3 PM.     CHRN provided supportive listening.Client pleasant in this encounter and verbalized appreciation for assistance. This CHRN remains available for support as needed.

## 2024-09-17 NOTE — PROGRESS NOTES
This writer called client to remind him of his appointment today with [facility]. No further outreach scheduled.

## 2024-09-18 ENCOUNTER — OFFICE VISIT (OUTPATIENT)
Dept: PHYSICAL THERAPY | Facility: CLINIC | Age: 59
End: 2024-09-18
Payer: MEDICARE

## 2024-09-18 DIAGNOSIS — M25.512 LEFT SHOULDER PAIN, UNSPECIFIED CHRONICITY: Primary | ICD-10-CM

## 2024-09-18 DIAGNOSIS — M25.521 RIGHT ELBOW PAIN: ICD-10-CM

## 2024-09-18 PROCEDURE — 97110 THERAPEUTIC EXERCISES: CPT

## 2024-09-18 PROCEDURE — 97140 MANUAL THERAPY 1/> REGIONS: CPT

## 2024-09-18 NOTE — PROGRESS NOTES
"Daily Note     Today's date: 2024  Patient name: Chau Lou III  : 1965  MRN: [id number]  Referring provider: Jose Eduardo Garsia PT  Dx:   Encounter Diagnosis     ICD-10-CM    1. Left shoulder pain, unspecified chronicity  M25.512       2. Right elbow pain  M25.521                      Subjective: Pt presents to PT reporting pain in R elbow today and L CS.  He states the elbow pain comes and goes but neck pain is all the time.      Objective: See treatment diary below      Assessment: Pt demonstrates fair tolerance secondary to pain in c/s  and weakness. Pt reports TTP with manual therapy.  Pt demonstrates difficulty with [place] TE and required a decrease in weight today.  Patient demonstrated fatigue post treatment to increase flexibility, strength and function.        Plan: Continue per plan of care.      Precautions:     Date 9/3 9/12 9/18     8/8 8/15 8/22 8/29   Visit # 22 23 24     18 19 20 21   FOTO              Re-eval                Manuals 8/29 9/3 9/12 9/18    8/8 8/15 8/22   C/s PROM NH NH ND     SF SK NH   UT/LS str w STM NH NH ND PK    SF SK NH   T/s PA's  NH ND          L shoulder PROM        SF w cross body str and subscap STM  NH   Cervical upglides   Pn!          Neuro Re-Ed 8/29   9/18    8/8 8/15 8/22   Scap retraction             No money 2x15 BTB 2x20 BTB 2x10 BTB 2x10 BTB    2x15 GTB 2x15 GTB 2x15 GTB   DNF endurance 20x5\" 5x30\" nv 5\" x 20    3x20\" 3\"x20 5x20\"   Rhythmic stabs             Body Blade             Wall clocks   10x ea GTB nv         Chin tucks             R wrist ext             GTB ER standing  2x10 2x10           Ther Ex 8/29  9/12 9/18    8/8 8/15 8/22   Pulleys 2'/2' 3'/3' 3'/3' 3'/3'    2'/2' 2'/2' 2'/2'   Pec str             UT str   3x30\" and LS 3x30\" and LS L         Rows/ext BTB 2x20  2x10 16# ea 2x10 15# ea    15# 3x15 15# Row 10# ext 3x15 BTB 20x rows/ext   ER/IR 2x15 BTB 2x15 BTB 2x10 5# ER    2x10   7.5# IR 2x10 5# ER    2x10   7.5# IR    2x15 GTB " 2x15 GTB 2x15 GTB   Wrist flex/ext             Flexion AAROM 5# bar 2x10 5# bar 2x10     5# bar 2x10    20x 3# bar 20x 3# bar 20x 3# bar   T/s ext             Shoulder Raises 20x 5# bar 20x 5# bar  20x 5# bar    20x 3# ea 20x 3# bar  20x 3# bar   Tball circles  20c/cc nv 20c/cc         Ther Activity    9/18                                   Gait Training    9/18                                   Modalities    9/18

## 2024-09-20 DIAGNOSIS — J40 BRONCHITIS: ICD-10-CM

## 2024-09-20 RX ORDER — FLUTICASONE PROPIONATE 50 MCG
1 SPRAY, SUSPENSION (ML) NASAL DAILY
Qty: 16 G | Refills: 3 | Status: SHIPPED | OUTPATIENT
Start: 2024-09-20

## 2024-09-24 ENCOUNTER — APPOINTMENT (OUTPATIENT)
Dept: PHYSICAL THERAPY | Facility: CLINIC | Age: 59
End: 2024-09-24
Payer: MEDICARE

## 2024-09-24 ENCOUNTER — OFFICE VISIT (OUTPATIENT)
Dept: GASTROENTEROLOGY | Facility: CLINIC | Age: 59
End: 2024-09-24
Payer: MEDICARE

## 2024-09-24 VITALS
BODY MASS INDEX: 21.6 KG/M2 | WEIGHT: 134.4 LBS | SYSTOLIC BLOOD PRESSURE: 124 MMHG | TEMPERATURE: 97.7 F | HEIGHT: 66 IN | DIASTOLIC BLOOD PRESSURE: 80 MMHG

## 2024-09-24 DIAGNOSIS — R19.7 DIARRHEA, UNSPECIFIED TYPE: Primary | ICD-10-CM

## 2024-09-24 DIAGNOSIS — R10.84 GENERALIZED ABDOMINAL PAIN: ICD-10-CM

## 2024-09-24 DIAGNOSIS — Z87.19 HISTORY OF REPAIR OF HIATAL HERNIA: ICD-10-CM

## 2024-09-24 DIAGNOSIS — Z98.890 HISTORY OF REPAIR OF HIATAL HERNIA: ICD-10-CM

## 2024-09-24 DIAGNOSIS — R93.3 ABNORMAL CT SCAN, SMALL BOWEL: ICD-10-CM

## 2024-09-24 DIAGNOSIS — R19.4 CHANGE IN BOWEL HABIT: ICD-10-CM

## 2024-09-24 PROCEDURE — 99204 OFFICE O/P NEW MOD 45 MIN: CPT | Performed by: PHYSICIAN ASSISTANT

## 2024-09-24 RX ORDER — DICYCLOMINE HYDROCHLORIDE 10 MG/1
10 CAPSULE ORAL 3 TIMES DAILY PRN
Qty: 90 CAPSULE | Refills: 1 | Status: SHIPPED | OUTPATIENT
Start: 2024-09-24

## 2024-09-24 NOTE — PROGRESS NOTES
[facility] - Outpatient Consultation New Patient  Chau Lou III 59 y.o. male MRN: [id number]  Encounter: [id number]  ASSESSMENT AND PLAN:    1. Diarrhea, unspecified type  2. Change in bowel habit  3. Abnormal CT scan, small bowel  4. Generalized abdominal pain  Patient presenting with several months of change in bowel habit, diarrhea with generalized abdominal pain.  His weight has remained stable.  He is not having any rectal bleeding but he does endorse urgency and nocturnal stools.  We reviewed recent CT scan from the ER which showed evidence of enteritis/diarrheal illness.  He had a colonoscopy in June 2023 which we reviewed which showed numerous colon polyps however he was not having diarrhea or symptoms like this at the time.    At this point I recommend patient undergo further stool testing for Giardia, ova and parasite, fecal calprotectin  We will also check TSH, CRP, celiac panel  Recommended patient begin Metamucil packet, 1 packet by mouth daily in hopes to bulk and regulate stool.  Prescription for dicyclomine to take 3 times a day as needed for abdominal pain/discomfort also provided in the office today.  Given significant symptoms I do recommend patient undergo colonoscopy with TI intubation and pancolonic biopsies in the hospital setting for further evaluation of ongoing symptoms to rule out possible underlying new inflammatory bowel disease or microscopic colitis.  I did discuss the importance of smoking cessation with the patient.  He expressed understanding.    - Ambulatory Referral to Gastroenterology  - Giardia antigen; Future  - Ova and parasite examination; Future  - Calprotectin,Fecal; Future  - Colonoscopy; Future  - polyethylene glycol (GOLYTELY) 4000 mL solution; Take 4,000 mL by mouth once for 1 dose Take as directed by office instructions prior to colonoscopy.  Dispense: 4000 mL; Refill: 0  - psyllium (METAMUCIL) 58.6 % packet; Take 1 packet by mouth  daily Dissolve in water and take once daily at dinnertime.  Dispense: 30 packet; Refill: 3  - TSH, 3rd generation with Free T4 reflex; Future  - C-reactive protein; Future  - Tissue transglutaminase, IgA; Future  - IgA; Future  - dicyclomine (BENTYL) 10 mg capsule; Take 1 capsule (10 mg total) by mouth 3 (three) times a day as needed (abdominal pain/discomfort/spasm)  Dispense: 90 capsule; Refill: 1    5. History of repair of hiatal hernia  Patient is status post laparoscopic paraesophageal hernia repair with partial fundoplication on 2/15/2024 with cardiothoracic surgery.     The risk/benefits/alternatives of the procedure/s were discussed with the patient.  Risks included, but not limited to, infection, bleeding, perforation, injury to organs in the abdomen, missed lesion and incomplete procedure were discussed.  Patient expressed understanding and agreeable to proceed with procedure/s.    Patient was instructed to call the office with any questions, concerns, new/ worsening/ persisting GI symptoms. Advised patient go to the ER with any severe or worsening abdominal pain, fevers/ chills, intractable N/V, chest pain, SOB, dizziness, lightheadedness, feeling something stuck in esophagus that will not go down. Patient expressed understanding and is in agreement with treatment plan.     Will plan to follow up after diagnostic tests   ________________________________________________________    HPI:      Patient with a past medical history of COPD, allergies, GERD, BPH, anxiety and depression, tobacco use, hyperlipidemia, history of aparoscopic paraesophageal hernia repair with partial fundoplication and EGD on 2/15/2024  presents to the office today as a referral to discuss diarrhea.    Patient complains today of change in bowel habits, diarrhea x several months.   Patient states that since his HH repair he has been struggling with bowel issues.   Prior to HH repair patient reports would have a formed BM once sometimes  "twice daily.   Now, he can have up to 6 Bms/ day. He notes the stool is very loose. He \"is in the bathroom constantly\". This diarrhea is affecting his quality of life.   He has urgency.   Patient states he can have urge to have a BM but when he goes to have a BM nothing comes out.   There is associated gas and abdominal pain. No blood in stool.   He can have nocturnal stools.   Patient denies any fevers/ chills, unintentional weight loss, nausea, vomiting, or bloody stools, chronic heartburn, dysphagia, odynophagia.   Denies chest pain, SOB    Tobacco use- Current everyday tobacco tobacco smoker   Alcohol use- None  NSAID use- He does use NSAID's regular for his abdominal pain. Unsure if helping   Patient denies first-degree relative with colon cancer, inflammatory bowel disease, celiac disease     Patient underwent colonoscopy 6/7/2023, this showed several colon polyps which were removed as well as mild localized fissured and pallor mucosa with loss of folds in the proximal sigmoid colon.  Otherwise the colon was normal-appearing.  Biopsies from sigmoid colon were normal.  Polyps were tubular adenomas, one was hyperplastic.    Patient underwent EGD 6/7/2023 with cardiothoracic surgery this was reviewed, this showed a 6 cm type III hiatal hernia, evidence of gastritis, otherwise normal duodenum.  Gastric biopsies were negative for H. pylori.  Subsequent esophageal manometry's revealed normal esophageal motility in the setting of a large hiatal hernia.  Patient is now status post hiatal hernia repair in February 2024.    REVIEW OF SYSTEMS:    Review of Systems   Constitutional:  Positive for unexpected weight change. Negative for chills and fever.   HENT:  Negative for ear pain and sore throat.    Eyes:  Negative for pain and visual disturbance.   Respiratory:  Negative for cough and shortness of breath.    Cardiovascular:  Negative for chest pain and palpitations.   Gastrointestinal:  Positive for abdominal pain, " diarrhea and nausea. Negative for vomiting.   Genitourinary:  Negative for dysuria and hematuria.   Musculoskeletal:  Negative for arthralgias and back pain.   Skin:  Negative for color change and rash.   Neurological:  Negative for seizures and syncope.   All other systems reviewed and are negative.       Historical Information   Past Medical History:   Diagnosis Date    Anxiety     Asthma     BPH (benign prostatic hyperplasia)     COPD (chronic obstructive pulmonary disease) (HCC)     Depression     GERD (gastroesophageal reflux disease)     Hiatal hernia     Hyperlipidemia     Hypertension     Suicide attempt (HCC)      Past Surgical History:   Procedure Laterality Date    COLONOSCOPY      ESOPHAGOGASTRODUODENOSCOPY N/A 03/01/2019    Procedure: ESOPHAGOGASTRODUODENOSCOPY (EGD);  Surgeon: Travis Nathan MD;  Location: [facility];  Service: Gastroenterology    FRACTURE SURGERY      finger surgery    HERNIA REPAIR      x2    UT LAPS RPR PARAESPHGL HRNA INCL FUNDPLSTY W/MESH N/A 2/15/2024    Procedure: REPAIR HERNIA PARAESOPHAGEAL  LAPAROSCOPIC;  Surgeon: Robbie Peguero MD;  Location: [facility];  Service: General    UT LAPS SURG ESOPG/GSTR FUNDOPLASTY N/A 2/15/2024    Procedure: FUNDOPLICATION TOUPET LAPAROSCOPIC;  Surgeon: Robbie Peguero MD;  Location: [facility];  Service: General    SKIN GRAFT Right     Foot     Social History   Social History     Substance and Sexual Activity   Alcohol Use Yes    Alcohol/week: 14.0 standard drinks of alcohol    Types: 14 Cans of beer per week     Social History     Substance and Sexual Activity   Drug Use Never     Social History     Tobacco Use   Smoking Status Heavy Smoker    Current packs/day: 0.50    Average packs/day: 1.5 packs/day for 40.7 years (60.4 ttl pk-yrs)    Types: Cigarettes    Start date: 1984    Passive exposure: Current   Smokeless Tobacco Never     Family History   Problem Relation Age of Onset    Prostate cancer Paternal Uncle        Meds/Allergies        Current Outpatient Medications:     albuterol (PROVENTIL HFA,VENTOLIN HFA) 90 mcg/act inhaler    atorvastatin (LIPITOR) 10 mg tablet    benzonatate (TESSALON PERLES) 100 mg capsule    bismuth subsalicylate (PEPTO BISMOL) 524 mg/30 mL oral suspension    brompheniramine-pseudoephedrine (DIMETAPP) 1-15 MG/5ML ELIX    buPROPion (WELLBUTRIN SR) 150 mg 12 hr tablet    busPIRone (BUSPAR) 10 mg tablet    carbamide peroxide (DEBROX) 6.5 % otic solution    cloNIDine (CATAPRES) 0.1 mg tablet    dextromethorphan-guaiFENesin (ROBITUSSIN DM)  mg/5 mL syrup    escitalopram (LEXAPRO) 20 mg tablet    finasteride (PROSCAR) 5 mg tablet    fluticasone (FLONASE) 50 mcg/act nasal spray    gabapentin (NEURONTIN) 300 mg capsule    ibuprofen (MOTRIN) 600 mg tablet    ipratropium (ATROVENT) 0.03 % nasal spray    methocarbamol (ROBAXIN) 500 mg tablet    Multiple Vitamin (multivitamin) tablet    pantoprazole (PROTONIX) 40 mg tablet    tamsulosin (FLOMAX) 0.4 mg    tiotropium (Spiriva HandiHaler) 18 mcg inhalation capsule    acetaminophen (TYLENOL) 650 mg CR tablet    amLODIPine (NORVASC) 5 mg tablet    cholecalciferol (VITAMIN D3) 1,000 units tablet    Fluticasone-Salmeterol (Advair Diskus) 250-50 mcg/dose inhaler    guaiFENesin (ROBITUSSIN) 100 MG/5ML oral liquid    hydrOXYzine pamoate (VISTARIL) 50 mg capsule    Lidocaine HCl (Lidocaine Plus) 4 % CREA    naltrexone (REVIA) 50 mg tablet    ondansetron (Zofran ODT) 4 mg disintegrating tablet    QUEtiapine (SEROquel) 50 mg tablet    saccharomyces boulardii (FLORASTOR) 250 mg capsule    silver sulfadiazine (SILVADENE,SSD) 1 % cream    simethicone (Gas-X Ultra Strength) 180 MG capsule    traZODone (DESYREL) 50 mg tablet    Vraylar 1.5 MG capsule    Allergies   Allergen Reactions    Seasonal Ic [Cholestatin] Sneezing           Objective   Wt Readings from Last 3 Encounters:   09/24/24 61 kg (134 lb 6.4 oz)   09/11/24 61.3 kg (135 lb 2.3 oz)   09/11/24 60.1 kg (132 lb 9.6 oz)     Temp  Readings from Last 3 Encounters:   09/24/24 97.7 °F (36.5 °C) (Tympanic)   09/11/24 97.9 °F (36.6 °C) (Oral)   09/11/24 97.5 °F (36.4 °C) (Temporal)     BP Readings from Last 3 Encounters:   09/24/24 124/80   09/11/24 166/95   09/11/24 122/86     Pulse Readings from Last 3 Encounters:   09/11/24 73   09/11/24 78   09/04/24 77        PHYSICAL EXAM:     Physical Exam  Vitals and nursing note reviewed.   Constitutional:       General: He is not in acute distress.     Appearance: He is well-developed. He is not ill-appearing or diaphoretic.      Comments: Chronically ill appearing   HENT:      Head: Normocephalic and atraumatic.      Comments: A dentulous  Eyes:      Conjunctiva/sclera: Conjunctivae normal.   Cardiovascular:      Rate and Rhythm: Normal rate and regular rhythm.      Heart sounds: No murmur heard.  Pulmonary:      Effort: Pulmonary effort is normal. No respiratory distress.      Breath sounds: Normal breath sounds.   Abdominal:      Palpations: Abdomen is soft.      Tenderness: There is generalized abdominal tenderness (minimal).   Musculoskeletal:         General: No swelling.      Cervical back: Neck supple.   Skin:     General: Skin is warm and dry.      Capillary Refill: Capillary refill takes less than 2 seconds.   Neurological:      Mental Status: He is alert.   Psychiatric:         Mood and Affect: Mood normal.             Lab Results:   No visits with results within 1 Day(s) from this visit.   Latest known visit with results is:   Admission on 09/11/2024, Discharged on 09/11/2024   Component Date Value    WBC 09/11/2024 6.29     RBC 09/11/2024 4.09     Hemoglobin 09/11/2024 13.5     Hematocrit 09/11/2024 39.6     MCV 09/11/2024 97     MCH 09/11/2024 33.0     MCHC 09/11/2024 34.1     RDW 09/11/2024 12.6     MPV 09/11/2024 8.8 (L)     Platelets 09/11/2024 214     nRBC 09/11/2024 0     Segmented % 09/11/2024 60     Immature Grans % 09/11/2024 0     Lymphocytes % 09/11/2024 28     Monocytes %  09/11/2024 10     Eosinophils Relative 09/11/2024 1     Basophils Relative 09/11/2024 1     Absolute Neutrophils 09/11/2024 3.75     Absolute Immature Grans 09/11/2024 0.02     Absolute Lymphocytes 09/11/2024 1.75     Absolute Monocytes 09/11/2024 0.65     Eosinophils Absolute 09/11/2024 0.08     Basophils Absolute 09/11/2024 0.04     Sodium 09/11/2024 135     Potassium 09/11/2024 4.4     Chloride 09/11/2024 101     CO2 09/11/2024 29     ANION GAP 09/11/2024 5     BUN 09/11/2024 13     Creatinine 09/11/2024 0.87     Glucose 09/11/2024 87     Calcium 09/11/2024 8.7     AST 09/11/2024 17     ALT 09/11/2024 14     Alkaline Phosphatase 09/11/2024 59     Total Protein 09/11/2024 6.2 (L)     Albumin 09/11/2024 3.8     Total Bilirubin 09/11/2024 0.66     eGFR 09/11/2024 93     Lipase 09/11/2024 19     C.difficile toxin by PCR 09/11/2024 Negative     Salmonella sp PCR 09/11/2024 Negative     Shigella sp/Enteroinvasi* 09/11/2024 Negative     Campylobacter sp (jejuni* 09/11/2024 Negative     Shiga toxin 1/Shiga toxi* 09/11/2024 Negative      Lab Results   Component Value Date    SYE2AGQAGMET 0.554 09/11/2023     Lab Results   Component Value Date    HEPAIGM Non-reactive 02/19/2020    HEPBIGM Non-reactive 02/19/2020    HEPCAB Non-reactive 09/27/2022         Radiology Results:   CT abdomen pelvis with contrast    Result Date: 9/11/2024  Narrative: CT ABDOMEN AND PELVIS WITH IV CONTRAST INDICATION: periumbulical abdominal pain. . COMPARISON: CT of the abdomen and pelvis 2/11/2021 TECHNIQUE: CT examination of the abdomen and pelvis was performed. Multiplanar 2D reformatted images were created from the source data. This examination, like all CT scans performed in the [facility], was performed utilizing techniques to minimize radiation dose exposure, including the use of iterative reconstruction and automated exposure control. Radiation dose length product (DLP) for this visit: 283 mGy-cm IV Contrast: 90 mL of  iohexol (OMNIPAQUE) Enteric Contrast: Not administered. FINDINGS: ABDOMEN LOWER CHEST: No clinically significant abnormality in the visualized lower chest. LIVER/BILIARY TREE: Unremarkable. GALLBLADDER: No calcified gallstones. No pericholecystic inflammatory change. SPLEEN: Unremarkable. PANCREAS: Unremarkable. ADRENAL GLANDS: Unremarkable. KIDNEYS/URETERS: Unremarkable. No hydronephrosis. STOMACH AND BOWEL: No disproportionate dilation of the small or large bowel. Multiple fluid-filled loops of small and large bowel. APPENDIX: No findings to suggest appendicitis. ABDOMINOPELVIC CAVITY: No ascites. No pneumoperitoneum. No lymphadenopathy. VESSELS: Atherosclerosis without abdominal aortic aneurysm. PELVIS REPRODUCTIVE ORGANS: Unremarkable for patient's age. URINARY BLADDER: Unremarkable. ABDOMINAL WALL/INGUINAL REGIONS: Unremarkable. BONES: No acute fracture or suspicious osseous lesion.     Impression: Multiple fluid-filled loops of small and large bowel consistent with an enteritis/diarrheal illness. Workstation performed: [id number]     Lenore Nieto PA-C

## 2024-09-24 NOTE — PATIENT INSTRUCTIONS
Scheduled date of colonoscopy (as of today): 10/07/24  Physician performing colonoscopy: Dr. Padron  Location of colonoscopy: [place]  Bowel prep reviewed with patient: Golytely  Instructions reviewed with patient by: Christine   Clearances: none

## 2024-09-26 ENCOUNTER — EVALUATION (OUTPATIENT)
Dept: PHYSICAL THERAPY | Facility: CLINIC | Age: 59
End: 2024-09-26
Payer: MEDICARE

## 2024-09-26 DIAGNOSIS — M25.512 LEFT SHOULDER PAIN, UNSPECIFIED CHRONICITY: Primary | ICD-10-CM

## 2024-09-26 DIAGNOSIS — M25.521 RIGHT ELBOW PAIN: ICD-10-CM

## 2024-09-26 DIAGNOSIS — M54.2 NECK PAIN: ICD-10-CM

## 2024-09-26 PROCEDURE — 97110 THERAPEUTIC EXERCISES: CPT | Performed by: PHYSICAL THERAPIST

## 2024-09-26 PROCEDURE — 97112 NEUROMUSCULAR REEDUCATION: CPT | Performed by: PHYSICAL THERAPIST

## 2024-09-26 PROCEDURE — 97164 PT RE-EVAL EST PLAN CARE: CPT | Performed by: PHYSICAL THERAPIST

## 2024-09-26 PROCEDURE — 97140 MANUAL THERAPY 1/> REGIONS: CPT | Performed by: PHYSICAL THERAPIST

## 2024-09-26 NOTE — PROGRESS NOTES
Discharge    Today's date: 5/15/2024  Patient name: Chau Lou III  : 1965  MRN: [id number]  Referring provider: Jose Eduardo Garsia PT  Dx:   Encounter Diagnosis     ICD-10-CM    1. Left shoulder pain, unspecified chronicity  M25.512 Ambulatory Referral to Physical Therapy    Follow-up with physical therapy.      2. Right elbow pain  M25.521 Ambulatory Referral to Physical Therapy    Atraumatic.  Follow-up with physical therapy.   3.      Neck pain                                                       M54.2       Assessment:  Assessment details: Pt has attended 25 visits of physical therapy to address neck, L shoulder, and R elbow functional impairments and demonstrates improvements in pain levels and improvements in some strength since last re-evaluation. He continues to have limited R ER ROM, cervical ROM, b/l UT/LS hypertonicity, R wrist extensor hypertonicity, UE weakness, DNF weakness, TTP: R wrist extensors, R lateral humeral condyle, b/l UT/LS affecting his function with turning his neck, looking over shoulder, looking up/down, lifting heavy objects overhead, and taking out the garbage. Patient discharged with updated HEP given lack of progress since last re-evaluation about 2 months ago.       Goals  ST. Pt will reduce his pain to 4/10. - met (shoulder and elbow)  2. Pt will improve cervical rotation to nil deficits. - not met    LT. Pt will improve b/l shoulder ABD strength to 4+/5 for improved lifting. - not met  2. Pt will improve R wrist ext to 5/5 for improved ability to lift and take out garbage. - met  3. Pt will meet projected FOTO score. - met, FOTO closed    Plan  Patient discharged with updated HEP. Informed to complete comprehensive HEP for the next couple of months and call if symptoms worsen. Also recommended following up with doctor to determine additional treatment options. Patient agreeable.       Subjective Evaluation    History of Present Illness  Mechanism of injury:  IE (5/15): Pt reports neck pain and spasms for a long time, at least a few months.  He reports R elbow pain for the past 2 months, but does not recall any WADE.  He thinks he may have banged his elbow taking out the garbage.  He denies any numbness or tinlig, but has a soreness in his R elbow.  He is uncomfortable when sleeping due ot his neck pain.  He is still able to do his daily tasks like taking out the garbage.  He takes meloxicam for muscle spasms which helps some. 4/10 elbow, 6/10 neck    6/18: Patient reports that his shoulder and neck symptoms continue to persist at same intensity despite doing exercises at home. His L shoulder started to hurt this weekend after leaning to the L side while sitting for a few hours.    7/23:  Pt reports his R elbow and L shoulder pain are improved, but his neck is bothering him today and he has 8/10 pain.  He reports occasional difficulty with lifting heavy objects overhead and carrying grocery bags with his R elbow, but most of the time it feels okay.  He also reports lots of difficulty turning his neck and has been sleeping on an air mattress which has been bothering his neck more.    9/26: Patient reports getting a new mattress and is sleeping better with 6/10 shoulder and neck pain today. Patient reports continued elbow pain when taking out garbage. Patient reports upcoming doctor appointment on October 21st.      Objective     Posture:  Poor sitting and standing posture with rounded shoulders and forward head.    Tenderness     TTP grossly in b/l UT and LS muscles, with hypertonicity    Active Range of Motion   Cervical/Thoracic Spine       Cervical    Flexion:  WNL  Extension:  minimal  Left lateral flexion:  mod   Right lateral flexion:  mod with pain  Left rotation:  minimal with pain  Right rotation:  minimal w/ pain    Left Shoulder   Flexion: WFL  Abduction: WFL    Right Shoulder   Flexion: WFL  Abduction: WFL    Passive Range of Motion   Left Shoulder  "  Flexion/ebd/IR WFL  External rotation 90°: 90 degrees     Right Shoulder   Flexion/abd/IR WFL  External rotation 90°: 80 degrees    Joint Play     Hypomobile: C1, C2, C3, C4, C5, C6, C7, T1 and T2     Strength/Myotome Testing     Left Shoulder     Planes of Motion   Abduction: 4-  Elbow Flex: 4  IR: 4+  ER: 4+  Elbow Ext:  4  Wrist Flex: 5  Wrist Ext: 5    Right Shoulder     Planes of Motion   Abduction: 4  Elbow Flex: 4  IR: 4  ER: 4  Elbow Ext:  4  Wrist Flex: 5  Wrist Ext: 5    Additional strength assessment:  DNF 21 sec    Precautions:     Date 9/3 9/12 9/18 9/26    8/8 8/15 8/22 8/29   Visit # 22 23 24 25    18 19 20 21   FOTO    closed          Re-eval    done            Manuals 8/29 9/3 9/12 9/18 9/26   8/8 8/15 8/22   C/s PROM NH NH ND  ND   SF SK NH   UT/LS str w STM NH NH ND PK ND   SF SK NH   T/s PA's  NH ND          L shoulder PROM        SF w cross body str and subscap STM  NH   Cervical upglides   Pn!          Neuro Re-Ed 8/29   9/18 9/26   8/8 8/15 8/22   Scap retraction             No money 2x15 BTB 2x20 BTB 2x10 BTB 2x10 BTB 2x10   BTB   2x15 GTB 2x15 GTB 2x15 GTB   DNF endurance 20x5\" 5x30\" nv 5\" x 20 3x10\"   3x20\" 3\"x20 5x20\"   Rhythmic stabs             Body Blade             Wall clocks   10x ea GTB nv 2x10 GTB        Chin tucks             R wrist ext             GTB ER standing  2x10 2x10           Ther Ex 8/29  9/12 9/18 9/26   8/8 8/15 8/22   Pulleys 2'/2' 3'/3' 3'/3' 3'/3' 3'/3'   2'/2' 2'/2' 2'/2'   Pec str             UT str   3x30\" and LS 3x30\" and LS L         Rows/ext BTB 2x20  2x10 16# ea 2x10 15# ea 2x10 3\"15# ea   15# 3x15 15# Row 10# ext 3x15 BTB 20x rows/ext   ER/IR 2x15 BTB 2x15 BTB 2x10 5# ER    2x10   7.5# IR 2x10 5# ER    2x10   7.5# IR 2x10 4# ER    2x10 9# IR   2x15 GTB 2x15 GTB 2x15 GTB   Wrist flex/ext             Flexion AAROM 5# bar 2x10 5# bar 2x10     5# bar 2x10 10x5\" ER   20x 3# bar 20x 3# bar 20x 3# bar   T/s ext             Shoulder Raises 20x 5# bar 20x 5# bar  " 20x 5# bar    20x 3# ea 20x 3# bar  20x 3# bar   Tball circles  20c/cc nv 20c/cc 2x20 ea        Ther Activity    9/18                                   Gait Training    9/18                                   Modalities    9/18

## 2024-10-07 ENCOUNTER — PATIENT OUTREACH (OUTPATIENT)
Dept: OTHER | Facility: OTHER | Age: 59
End: 2024-10-07

## 2024-10-07 NOTE — PROGRESS NOTES
Client called to let this writer know that the home health aide is scheduled to come to clients house on 10/8 @ 12:30 pm. Client will call this writer tomorrow to explain outcome of the appointment.

## 2024-10-08 DIAGNOSIS — K21.9 GASTROESOPHAGEAL REFLUX DISEASE WITHOUT ESOPHAGITIS: ICD-10-CM

## 2024-10-08 RX ORDER — PANTOPRAZOLE SODIUM 40 MG/1
40 TABLET, DELAYED RELEASE ORAL DAILY
Qty: 60 TABLET | Refills: 3 | Status: SHIPPED | OUTPATIENT
Start: 2024-10-08

## 2024-10-14 ENCOUNTER — TELEPHONE (OUTPATIENT)
Dept: GASTROENTEROLOGY | Facility: CLINIC | Age: 59
End: 2024-10-14

## 2024-10-15 ENCOUNTER — PATIENT OUTREACH (OUTPATIENT)
Dept: OTHER | Facility: OTHER | Age: 59
End: 2024-10-15

## 2024-10-15 NOTE — PROGRESS NOTES
Client came in to see this writer and give an update on what's going on with caretaker coming into his apartment to help. No further outreach scheduled.

## 2024-10-25 ENCOUNTER — OFFICE VISIT (OUTPATIENT)
Dept: FAMILY MEDICINE CLINIC | Facility: CLINIC | Age: 59
End: 2024-10-25

## 2024-10-25 VITALS
BODY MASS INDEX: 21.53 KG/M2 | HEIGHT: 66 IN | RESPIRATION RATE: 16 BRPM | WEIGHT: 134 LBS | TEMPERATURE: 97.6 F | SYSTOLIC BLOOD PRESSURE: 140 MMHG | DIASTOLIC BLOOD PRESSURE: 70 MMHG | OXYGEN SATURATION: 95 % | HEART RATE: 105 BPM

## 2024-10-25 DIAGNOSIS — F32.9 MAJOR DEPRESSIVE DISORDER WITH CURRENT ACTIVE EPISODE, UNSPECIFIED DEPRESSION EPISODE SEVERITY, UNSPECIFIED WHETHER RECURRENT: ICD-10-CM

## 2024-10-25 DIAGNOSIS — J40 BRONCHITIS: ICD-10-CM

## 2024-10-25 DIAGNOSIS — R35.0 BENIGN PROSTATIC HYPERPLASIA WITH URINARY FREQUENCY: ICD-10-CM

## 2024-10-25 DIAGNOSIS — N40.1 BENIGN PROSTATIC HYPERPLASIA WITH URINARY FREQUENCY: ICD-10-CM

## 2024-10-25 DIAGNOSIS — J37.0 CONGESTION OF LARYNX: ICD-10-CM

## 2024-10-25 DIAGNOSIS — F41.9 ANXIETY: ICD-10-CM

## 2024-10-25 DIAGNOSIS — J06.9 VIRAL UPPER RESPIRATORY TRACT INFECTION: Primary | ICD-10-CM

## 2024-10-25 DIAGNOSIS — J44.9 CHRONIC OBSTRUCTIVE PULMONARY DISEASE, UNSPECIFIED COPD TYPE (HCC): ICD-10-CM

## 2024-10-25 PROCEDURE — 99213 OFFICE O/P EST LOW 20 MIN: CPT | Performed by: INTERNAL MEDICINE

## 2024-10-25 PROCEDURE — G2211 COMPLEX E/M VISIT ADD ON: HCPCS | Performed by: INTERNAL MEDICINE

## 2024-10-25 RX ORDER — FINASTERIDE 5 MG/1
5 TABLET, FILM COATED ORAL DAILY
Qty: 90 TABLET | Refills: 1 | Status: SHIPPED | OUTPATIENT
Start: 2024-10-25

## 2024-10-25 RX ORDER — BENZONATATE 100 MG/1
100 CAPSULE ORAL
Qty: 20 CAPSULE | Refills: 0 | Status: SHIPPED | OUTPATIENT
Start: 2024-10-25

## 2024-10-25 RX ORDER — PREDNISONE 20 MG/1
40 TABLET ORAL DAILY
Qty: 10 TABLET | Refills: 0 | Status: SHIPPED | OUTPATIENT
Start: 2024-10-25 | End: 2024-10-30

## 2024-10-25 RX ORDER — CLONIDINE HYDROCHLORIDE 0.1 MG/1
0.1 TABLET ORAL EVERY 12 HOURS SCHEDULED
Qty: 180 TABLET | Refills: 0 | Status: SHIPPED | OUTPATIENT
Start: 2024-10-25 | End: 2025-01-23

## 2024-10-25 RX ORDER — FLUTICASONE PROPIONATE AND SALMETEROL 250; 50 UG/1; UG/1
1 POWDER RESPIRATORY (INHALATION) 2 TIMES DAILY
Qty: 60 BLISTER | Refills: 0 | Status: SHIPPED | OUTPATIENT
Start: 2024-10-25 | End: 2024-11-24

## 2024-10-25 RX ORDER — GUAIFENESIN/DEXTROMETHORPHAN 100-10MG/5
5 SYRUP ORAL 3 TIMES DAILY PRN
Qty: 100 ML | Refills: 3 | Status: SHIPPED | OUTPATIENT
Start: 2024-10-25

## 2024-10-25 RX ORDER — BUSPIRONE HYDROCHLORIDE 10 MG/1
10 TABLET ORAL 2 TIMES DAILY
Qty: 30 TABLET | Refills: 1 | Status: SHIPPED | OUTPATIENT
Start: 2024-10-25 | End: 2025-01-23

## 2024-10-25 RX ORDER — TAMSULOSIN HYDROCHLORIDE 0.4 MG/1
0.4 CAPSULE ORAL
Qty: 90 CAPSULE | Refills: 1 | Status: SHIPPED | OUTPATIENT
Start: 2024-10-25

## 2024-10-27 NOTE — ASSESSMENT & PLAN NOTE
Due to smoking and COPD history     Flonase, benzonatate at night, tussin during day  Follow up if symptoms worsen or do not improve     Orders:    benzonatate (TESSALON PERLES) 100 mg capsule; Take 1 capsule (100 mg total) by mouth daily at bedtime

## 2024-10-27 NOTE — ASSESSMENT & PLAN NOTE
Orders:    dextromethorphan-guaiFENesin (ROBITUSSIN DM)  mg/5 mL syrup; Take 5 mL by mouth 3 (three) times a day as needed for cough

## 2024-10-27 NOTE — ASSESSMENT & PLAN NOTE
Controlled.  On Spiriva and Advair.  Not in exacerbation.  Continue management with inhalers and avoid triggers and encourage smoking sensation    Orders:    Fluticasone-Salmeterol (Advair Diskus) 250-50 mcg/dose inhaler; Inhale 1 puff 2 (two) times a day Rinse mouth after use.

## 2024-10-27 NOTE — ASSESSMENT & PLAN NOTE
PLAN:  Continue current regimen    Orders:    cloNIDine (CATAPRES) 0.1 mg tablet; Take 1 tablet (0.1 mg total) by mouth every 12 (twelve) hours

## 2024-10-27 NOTE — ASSESSMENT & PLAN NOTE
Continue flomax 0.4 mg daily and proscar  Avoid bladder irritants  Increase water intake      Orders:    tamsulosin (FLOMAX) 0.4 mg; Take 1 capsule (0.4 mg total) by mouth daily with dinner    finasteride (PROSCAR) 5 mg tablet; Take 1 tablet (5 mg total) by mouth daily

## 2024-10-27 NOTE — ASSESSMENT & PLAN NOTE
Controlled with current regimen  Smoking coping mechanism  , lost loved ones in short period of time    PLAN:  Patient was being managed by [facility]  no longer taking insurance  Continue current regimen    Orders:    busPIRone (BUSPAR) 10 mg tablet; Take 1 tablet (10 mg total) by mouth 2 (two) times a day

## 2024-10-27 NOTE — PROGRESS NOTES
Ambulatory Visit  Name: Chau Lou III      : 1965      MRN: [id number]  Encounter Provider: Batsheva Redding MD  Encounter Date: 10/25/2024   Encounter department: [facility]    Assessment & Plan  Viral upper respiratory tract infection  Patient has been having cough , nasal congestion , sore throat and upper respiratory symptoms for last 3 days, due to hx COPD will prescribe short course oral corticosteroids patient was not using his inhaleres  Patient unable to sleep due to cough and SOB at night  No fever, shortness of breath, diaphoresis or any alarm symtpms if develops go to ED    PLAN:  Re-start COPD regimen  Symptomatic treatment with cough meds/tylenol  Prednisone 40mg daily for 5 days given respiratory symptoms/viral infection and COPD hx patient would benefit from short course since he was not using  inhalers + viral infection COPD exacerbated  Orders:    predniSONE 20 mg tablet; Take 2 tablets (40 mg total) by mouth daily for 5 days    Benign prostatic hyperplasia with urinary frequency  Continue flomax 0.4 mg daily and proscar  Avoid bladder irritants  Increase water intake      Orders:    tamsulosin (FLOMAX) 0.4 mg; Take 1 capsule (0.4 mg total) by mouth daily with dinner    finasteride (PROSCAR) 5 mg tablet; Take 1 tablet (5 mg total) by mouth daily    Chronic obstructive pulmonary disease, unspecified COPD type (HCC)   Controlled.  On Spiriva and Advair.  Not in exacerbation.  Continue management with inhalers and avoid triggers and encourage smoking sensation    Orders:    Fluticasone-Salmeterol (Advair Diskus) 250-50 mcg/dose inhaler; Inhale 1 puff 2 (two) times a day Rinse mouth after use.    Bronchitis  Due to smoking and COPD history     Flonase, benzonatate at night, tussin during day  Follow up if symptoms worsen or do not improve     Orders:    benzonatate (TESSALON PERLES) 100 mg capsule; Take 1 capsule (100 mg total) by mouth daily at  bedtime    Congestion of larynx    Orders:    dextromethorphan-guaiFENesin (ROBITUSSIN DM)  mg/5 mL syrup; Take 5 mL by mouth 3 (three) times a day as needed for cough    Major depressive disorder with current active episode, unspecified depression episode severity, unspecified whether recurrent  Controlled with current regimen  Smoking coping mechanism  , lost loved ones in short period of time    PLAN:  Patient was being managed by [facility]IRIN  no longer taking insurance  Continue current regimen    Orders:    busPIRone (BUSPAR) 10 mg tablet; Take 1 tablet (10 mg total) by mouth 2 (two) times a day    Anxiety  PLAN:  Continue current regimen    Orders:    cloNIDine (CATAPRES) 0.1 mg tablet; Take 1 tablet (0.1 mg total) by mouth every 12 (twelve) hours       History of Present Illness     Patient presented to the office today c/o respiratory infection for the last few days, COPD mild exacerbation due to need medication re-fills and respiratory infection. No alarm symtptoms, prednisone 40 mg daily for 5 days and continue home regimen. We will follow up as needed  Patient also requested medication re fills for BPH , anxiety and depression he is doing well with current regimen, need for psychiatry appointment/ he is on waiting list          Review of Systems   Constitutional:  Negative for chills and fever.   HENT:  Negative for ear pain and sore throat.    Eyes:  Negative for pain and visual disturbance.   Respiratory:  Positive for cough. Negative for shortness of breath.    Cardiovascular:  Negative for chest pain and palpitations.   Gastrointestinal:  Negative for abdominal pain and vomiting.   Genitourinary:  Negative for dysuria and hematuria.   Musculoskeletal:  Negative for arthralgias and back pain.   Skin:  Negative for color change and rash.   Neurological:  Negative for seizures and syncope.   All other systems reviewed and are negative.          Objective     /70 (BP Location: Left arm, Patient  "Position: Sitting, Cuff Size: Standard)   Pulse 105   Temp 97.6 °F (36.4 °C) (Temporal)   Resp 16   Ht 5' 6\" (1.676 m)   Wt 60.8 kg (134 lb)   SpO2 95%   BMI 21.63 kg/m²     Physical Exam  Vitals and nursing note reviewed.   Constitutional:       General: He is not in acute distress.     Appearance: Normal appearance. He is well-developed.   HENT:      Head: Normocephalic and atraumatic.      Right Ear: Tympanic membrane normal.      Left Ear: Tympanic membrane normal.      Nose: Nose normal.      Mouth/Throat:      Mouth: Mucous membranes are moist.      Pharynx: No oropharyngeal exudate or posterior oropharyngeal erythema.   Eyes:      Conjunctiva/sclera: Conjunctivae normal.   Cardiovascular:      Rate and Rhythm: Normal rate and regular rhythm.      Pulses: Normal pulses.      Heart sounds: Normal heart sounds. No murmur heard.  Pulmonary:      Effort: Pulmonary effort is normal. No respiratory distress.      Breath sounds: Normal breath sounds.   Abdominal:      General: Abdomen is flat. Bowel sounds are normal.      Palpations: Abdomen is soft.      Tenderness: There is no abdominal tenderness.   Musculoskeletal:         General: No swelling. Normal range of motion.      Cervical back: Normal range of motion and neck supple.   Skin:     General: Skin is warm and dry.      Capillary Refill: Capillary refill takes less than 2 seconds.   Neurological:      General: No focal deficit present.      Mental Status: He is alert and oriented to person, place, and time.   Psychiatric:         Mood and Affect: Mood normal.         Behavior: Behavior normal.         "

## 2024-10-31 ENCOUNTER — OFFICE VISIT (OUTPATIENT)
Dept: FAMILY MEDICINE CLINIC | Facility: CLINIC | Age: 59
End: 2024-10-31

## 2024-10-31 VITALS
TEMPERATURE: 97.7 F | BODY MASS INDEX: 21.53 KG/M2 | OXYGEN SATURATION: 97 % | DIASTOLIC BLOOD PRESSURE: 86 MMHG | WEIGHT: 134 LBS | SYSTOLIC BLOOD PRESSURE: 132 MMHG | HEART RATE: 72 BPM | RESPIRATION RATE: 19 BRPM | HEIGHT: 66 IN

## 2024-10-31 DIAGNOSIS — J44.9 CHRONIC OBSTRUCTIVE PULMONARY DISEASE, UNSPECIFIED COPD TYPE (HCC): ICD-10-CM

## 2024-10-31 DIAGNOSIS — J30.2 SEASONAL ALLERGIC RHINITIS, UNSPECIFIED TRIGGER: Primary | ICD-10-CM

## 2024-10-31 DIAGNOSIS — R09.82 POST-NASAL DRIP: ICD-10-CM

## 2024-10-31 PROCEDURE — 99213 OFFICE O/P EST LOW 20 MIN: CPT | Performed by: FAMILY MEDICINE

## 2024-10-31 PROCEDURE — G2211 COMPLEX E/M VISIT ADD ON: HCPCS | Performed by: FAMILY MEDICINE

## 2024-10-31 RX ORDER — FLUTICASONE PROPIONATE 50 MCG
1 SPRAY, SUSPENSION (ML) NASAL DAILY
Qty: 16 G | Refills: 3 | Status: SHIPPED | OUTPATIENT
Start: 2024-10-31

## 2024-10-31 RX ORDER — CETIRIZINE HYDROCHLORIDE 10 MG/1
10 TABLET ORAL DAILY
Qty: 30 TABLET | Refills: 0 | Status: SHIPPED | OUTPATIENT
Start: 2024-10-31 | End: 2024-10-31

## 2024-10-31 NOTE — PROGRESS NOTES
Ambulatory Visit  Name: Chau Lou III      : 1965      MRN: [id number]  Encounter Provider: Clarisa Magaña MD  Encounter Date: 10/31/2024   Encounter department: [facility]    Assessment & Plan  Seasonal allergic rhinitis, unspecified trigger  History of allergic rhinitis but not currently on Flonase.  Describes itchy throat and postnasal drip.  Could be reason for cough.    -Patient to trial Flonase  Orders:    fluticasone (FLONASE) 50 mcg/act nasal spray; 1 spray into each nostril daily    Chronic obstructive pulmonary disease, unspecified COPD type (HCC)  Not in acute exacerbation.  Just completed a 5-day course of prednisone 40 mg daily.  Patient continues to smoke cigarettes.    -Symptoms not associated with a COPD exacerbation.  -Patient to continue with inhalers and avoid triggers such as smoking  -Return precautions provided       Post-nasal drip  This is likely the cause of patient's cough.  Other things to consider include uncontrolled GERD the patient admits to being compliant with home PPI.    -Start Flonase nasal spray  -Will reassess at next visit          History of Present Illness     Chau Lou III is a 59 y.o. male w a PMH of Asthma, COPD, GERD, Hiatal hernia, Hyperlipidemia, Hypertension presenting due to cough.  Patient states cough has been present for a couple of weeks.  He was seen on 10/25 for this cough and prescribed steroid and cough syrup.  He has since completed the steroid but did not  his cough syrup.  States cough continues to bother him and produces yellowish-greenish phlegm.  He does complain of congestion with itchy throat.  He states the cough is worse at night especially when he lies down.        History obtained from : patient  Review of Systems   Constitutional:  Negative for chills, fatigue and fever.   HENT:  Positive for congestion and postnasal drip. Negative for ear pain, rhinorrhea, sinus pressure,  "sinus pain, sneezing, sore throat and trouble swallowing.    Eyes:  Negative for pain and visual disturbance.   Respiratory:  Positive for cough. Negative for chest tightness, shortness of breath and wheezing.    Cardiovascular:  Negative for chest pain and palpitations.   Gastrointestinal:  Negative for abdominal pain, diarrhea, nausea and vomiting.   Genitourinary:  Negative for dysuria and hematuria.   Musculoskeletal:  Negative for arthralgias and back pain.   Skin:  Negative for color change and rash.   Neurological:  Negative for dizziness and headaches.   All other systems reviewed and are negative.          Objective     /86 (BP Location: Right arm, Patient Position: Sitting, Cuff Size: Standard)   Pulse 72   Temp 97.7 °F (36.5 °C) (Temporal)   Resp 19   Ht 5' 6\" (1.676 m)   Wt 60.8 kg (134 lb)   SpO2 97%   BMI 21.63 kg/m²     Physical Exam  Vitals and nursing note reviewed.   Constitutional:       General: He is not in acute distress.     Appearance: Normal appearance. He is well-developed.   HENT:      Head: Normocephalic and atraumatic.      Right Ear: External ear normal.      Left Ear: External ear normal.      Nose: Nose normal.      Mouth/Throat:      Mouth: Mucous membranes are moist.      Pharynx: No oropharyngeal exudate or posterior oropharyngeal erythema.   Eyes:      Extraocular Movements: Extraocular movements intact.      Conjunctiva/sclera: Conjunctivae normal.      Pupils: Pupils are equal, round, and reactive to light.   Cardiovascular:      Rate and Rhythm: Normal rate and regular rhythm.      Pulses: Normal pulses.      Heart sounds: Normal heart sounds. No murmur heard.  Pulmonary:      Effort: Pulmonary effort is normal. No respiratory distress.      Breath sounds: Normal breath sounds. No wheezing or rales.   Abdominal:      General: Abdomen is flat. Bowel sounds are normal.      Palpations: Abdomen is soft.      Tenderness: There is no abdominal tenderness. "   Musculoskeletal:         General: No swelling. Normal range of motion.      Cervical back: Normal range of motion and neck supple.   Skin:     General: Skin is warm and dry.      Capillary Refill: Capillary refill takes less than 2 seconds.   Neurological:      General: No focal deficit present.      Mental Status: He is alert and oriented to person, place, and time.   Psychiatric:         Mood and Affect: Mood normal.         Behavior: Behavior normal.

## 2024-11-01 NOTE — ASSESSMENT & PLAN NOTE
Not in acute exacerbation.  Just completed a 5-day course of prednisone 40 mg daily.  Patient continues to smoke cigarettes.    -Symptoms not associated with a COPD exacerbation.  -Patient to continue with inhalers and avoid triggers such as smoking  -Return precautions provided

## 2024-11-01 NOTE — ASSESSMENT & PLAN NOTE
History of allergic rhinitis but not currently on Flonase.  Describes itchy throat and postnasal drip.  Could be reason for cough.    -Patient to trial Flonase  Orders:    fluticasone (FLONASE) 50 mcg/act nasal spray; 1 spray into each nostril daily

## 2024-11-12 ENCOUNTER — PATIENT OUTREACH (OUTPATIENT)
Dept: OTHER | Facility: OTHER | Age: 59
End: 2024-11-12

## 2024-11-12 NOTE — PROGRESS NOTES
Client requested a visit with this Community Health Resource Nurse (CHRN) during outreach hours at [facility] ([facility]). Client requested times of upcoming appointments. This CHRN reviewed the times of the upcoming appointments.  Client shared he has been helping preparing meals at [employer].     CHRN provided supportive listening.Client pleasant in this encounter and verbalized appreciation for assistance. This CHRN remains available for support as needed.

## 2024-11-13 ENCOUNTER — PATIENT OUTREACH (OUTPATIENT)
Dept: OTHER | Facility: OTHER | Age: 59
End: 2024-11-13

## 2024-11-13 NOTE — PROGRESS NOTES
Jeanne called from [facility] stating that she could not get a hold of client. He is approved for services but has not contacted the people client needs to to get services started. This writer agreed to call Jeanne when with the client later on today at [facility].    This writer called Jeanne with client after arrival at [facility]. Client agreed to services. The agency that he is approved for is called [facility] @ [address] [facility] [address] [facility] [address].    This writer called [facility] [employer] [facility] at [phone]. Mary said they have a care giver awaiting results from [employer] fingerprinting. When the clearances come back, Mary will call this writer to set up a start date for 15 1/2 hours a week.     This writer will contact client when she hears back from Mary.

## 2024-12-03 ENCOUNTER — PATIENT OUTREACH (OUTPATIENT)
Dept: OTHER | Facility: OTHER | Age: 59
End: 2024-12-03

## 2024-12-03 NOTE — PROGRESS NOTES
Client requested a visit with this Community Health Resource Nurse (CHRN) during outreach hours at [facility] ([facility]). Reviewed upcoming appointments. CHRN provided supportive listening.Client pleasant in this encounter and verbalized appreciation for assistance. This CHRN remains available for support as needed.

## 2024-12-24 ENCOUNTER — PATIENT OUTREACH (OUTPATIENT)
Dept: OTHER | Facility: OTHER | Age: 59
End: 2024-12-24

## 2024-12-24 DIAGNOSIS — J30.2 SEASONAL ALLERGIC RHINITIS, UNSPECIFIED TRIGGER: ICD-10-CM

## 2024-12-24 RX ORDER — FLUTICASONE PROPIONATE 50 MCG
1 SPRAY, SUSPENSION (ML) NASAL DAILY
Qty: 16 G | Refills: 3 | Status: SHIPPED | OUTPATIENT
Start: 2024-12-24

## 2024-12-24 NOTE — PROGRESS NOTES
Client requested a visit with this Community Health Resource Nurse (CHRN) during outreach hours at [facility] ([facility]). Client stated he received a text from Christie with [employer]. He will reach out to  her after the holidays.     He plans to do the colonoscopy after the holidays. He stated he needs dicyclomine. CHRN made client aware that there was a refill available. CHRN provided supportive listening.Client pleasant in this encounter and verbalized appreciation for assistance. This CHRN remains available for support as needed.

## 2024-12-31 ENCOUNTER — PATIENT OUTREACH (OUTPATIENT)
Dept: OTHER | Facility: OTHER | Age: 59
End: 2024-12-31

## 2024-12-31 NOTE — PROGRESS NOTES
Client told this writer that he is getting phone calls from [facility] stating they are ready to set up services. Client does not want to set any services up until after the New Year. Client will see this writer on 1/8/25 at [facility] to call and set up services to the house. Client is hesitant about starting services. This writer will talk in greater length about concerns on 1/8.

## 2025-01-13 ENCOUNTER — RA CDI HCC (OUTPATIENT)
Dept: OTHER | Facility: HOSPITAL | Age: 60
End: 2025-01-13

## 2025-01-13 NOTE — PROGRESS NOTES
HCC coding opportunities       Chart reviewed, no opportunity found: CHART REVIEWED, NO OPPORTUNITY FOUND        Patients Insurance     Medicare Insurance: [employer]          This is a reminder to assess all HCC (risk adjustment) codes for the year 2025 as patients ALYCIA scores reset to zero with the New year.

## 2025-01-24 ENCOUNTER — OFFICE VISIT (OUTPATIENT)
Dept: FAMILY MEDICINE CLINIC | Facility: CLINIC | Age: 60
End: 2025-01-24

## 2025-01-24 VITALS
HEART RATE: 70 BPM | DIASTOLIC BLOOD PRESSURE: 80 MMHG | HEIGHT: 66 IN | SYSTOLIC BLOOD PRESSURE: 128 MMHG | OXYGEN SATURATION: 98 % | RESPIRATION RATE: 16 BRPM | TEMPERATURE: 97.6 F | BODY MASS INDEX: 22.95 KG/M2 | WEIGHT: 142.8 LBS

## 2025-01-24 DIAGNOSIS — R35.0 BENIGN PROSTATIC HYPERPLASIA WITH URINARY FREQUENCY: ICD-10-CM

## 2025-01-24 DIAGNOSIS — M25.512 LEFT SHOULDER PAIN, UNSPECIFIED CHRONICITY: ICD-10-CM

## 2025-01-24 DIAGNOSIS — K21.9 GASTROESOPHAGEAL REFLUX DISEASE WITHOUT ESOPHAGITIS: ICD-10-CM

## 2025-01-24 DIAGNOSIS — R10.84 GENERALIZED ABDOMINAL PAIN: ICD-10-CM

## 2025-01-24 DIAGNOSIS — J37.0 CONGESTION OF LARYNX: ICD-10-CM

## 2025-01-24 DIAGNOSIS — J06.9 VIRAL UPPER RESPIRATORY TRACT INFECTION: Primary | ICD-10-CM

## 2025-01-24 DIAGNOSIS — J44.9 CHRONIC OBSTRUCTIVE PULMONARY DISEASE, UNSPECIFIED COPD TYPE (HCC): ICD-10-CM

## 2025-01-24 DIAGNOSIS — F17.200 TOBACCO DEPENDENCE SYNDROME: ICD-10-CM

## 2025-01-24 DIAGNOSIS — N40.1 BENIGN PROSTATIC HYPERPLASIA WITH URINARY FREQUENCY: ICD-10-CM

## 2025-01-24 LAB
SARS-COV-2 AG UPPER RESP QL IA: NEGATIVE
SL AMB POCT RAPID FLU A: NEGATIVE
SL AMB POCT RAPID FLU B: NEGATIVE
VALID CONTROL: NORMAL

## 2025-01-24 PROCEDURE — 99213 OFFICE O/P EST LOW 20 MIN: CPT | Performed by: FAMILY MEDICINE

## 2025-01-24 PROCEDURE — G2211 COMPLEX E/M VISIT ADD ON: HCPCS | Performed by: FAMILY MEDICINE

## 2025-01-24 PROCEDURE — 87804 INFLUENZA ASSAY W/OPTIC: CPT | Performed by: FAMILY MEDICINE

## 2025-01-24 PROCEDURE — 87811 SARS-COV-2 COVID19 W/OPTIC: CPT | Performed by: FAMILY MEDICINE

## 2025-01-24 RX ORDER — LORATADINE 10 MG/1
10 TABLET ORAL DAILY
Qty: 30 TABLET | Refills: 2 | Status: SHIPPED | OUTPATIENT
Start: 2025-01-24

## 2025-01-24 RX ORDER — GABAPENTIN 300 MG/1
300 CAPSULE ORAL 3 TIMES DAILY
Qty: 60 CAPSULE | Refills: 3 | Status: SHIPPED | OUTPATIENT
Start: 2025-01-24

## 2025-01-24 RX ORDER — TIOTROPIUM BROMIDE 18 UG/1
18 CAPSULE ORAL; RESPIRATORY (INHALATION) DAILY
Qty: 30 CAPSULE | Refills: 0 | Status: SHIPPED | OUTPATIENT
Start: 2025-01-24

## 2025-01-24 RX ORDER — SENNOSIDES 8.6 MG
650 CAPSULE ORAL EVERY 8 HOURS PRN
Qty: 30 TABLET | Refills: 0 | Status: SHIPPED | OUTPATIENT
Start: 2025-01-24

## 2025-01-24 RX ORDER — PANTOPRAZOLE SODIUM 40 MG/1
40 TABLET, DELAYED RELEASE ORAL DAILY
Qty: 60 TABLET | Refills: 3 | Status: SHIPPED | OUTPATIENT
Start: 2025-01-24

## 2025-01-24 RX ORDER — ALBUTEROL SULFATE 90 UG/1
1 INHALANT RESPIRATORY (INHALATION) EVERY 6 HOURS PRN
Qty: 17 G | Refills: 10 | Status: SHIPPED | OUTPATIENT
Start: 2025-01-24

## 2025-01-24 RX ORDER — FINASTERIDE 5 MG/1
5 TABLET, FILM COATED ORAL DAILY
Qty: 90 TABLET | Refills: 1 | Status: SHIPPED | OUTPATIENT
Start: 2025-01-24

## 2025-01-24 RX ORDER — TAMSULOSIN HYDROCHLORIDE 0.4 MG/1
0.4 CAPSULE ORAL
Qty: 90 CAPSULE | Refills: 1 | Status: SHIPPED | OUTPATIENT
Start: 2025-01-24

## 2025-01-24 RX ORDER — DICYCLOMINE HYDROCHLORIDE 10 MG/1
10 CAPSULE ORAL 3 TIMES DAILY PRN
Qty: 90 CAPSULE | Refills: 1 | Status: SHIPPED | OUTPATIENT
Start: 2025-01-24

## 2025-01-24 RX ORDER — GUAIFENESIN/DEXTROMETHORPHAN 100-10MG/5
5 SYRUP ORAL 3 TIMES DAILY PRN
Qty: 100 ML | Refills: 3 | Status: SHIPPED | OUTPATIENT
Start: 2025-01-24

## 2025-01-24 RX ORDER — BUPROPION HYDROCHLORIDE 150 MG/1
150 TABLET, EXTENDED RELEASE ORAL 2 TIMES DAILY
Qty: 60 TABLET | Refills: 2 | Status: SHIPPED | OUTPATIENT
Start: 2025-01-24

## 2025-01-24 NOTE — ASSESSMENT & PLAN NOTE
Stable  Orders:    pantoprazole (PROTONIX) 40 mg tablet; Take 1 tablet (40 mg total) by mouth daily

## 2025-01-24 NOTE — ASSESSMENT & PLAN NOTE
Stable  Continue home meds  Orders:    tamsulosin (FLOMAX) 0.4 mg; Take 1 capsule (0.4 mg total) by mouth daily with dinner    finasteride (PROSCAR) 5 mg tablet; Take 1 tablet (5 mg total) by mouth daily

## 2025-01-24 NOTE — ASSESSMENT & PLAN NOTE
Not in acute exacerbation  Continue inhalers    Follow up as needed  Orders:    albuterol (PROVENTIL HFA,VENTOLIN HFA) 90 mcg/act inhaler; Inhale 1 puff every 6 (six) hours as needed for wheezing    tiotropium (Spiriva HandiHaler) 18 mcg inhalation capsule; Place 1 capsule (18 mcg total) into inhaler and inhale daily

## 2025-01-24 NOTE — ASSESSMENT & PLAN NOTE
Chronic  3/10  Normal ROM  PLAN:  Warm compress  Voltaren gel PRN  Continue home meds for chronic pain  Orders:    gabapentin (NEURONTIN) 300 mg capsule; Take 1 capsule (300 mg total) by mouth 3 (three) times a day    Diclofenac Sodium (VOLTAREN) 1 %; Apply 2 g topically 4 (four) times a day

## 2025-01-24 NOTE — PROGRESS NOTES
Name: Chau Lou III      : 1965      MRN: [id number]  Encounter Provider: Batsheva Redding MD  Encounter Date: 2025   Encounter department: [facility]  :  Assessment & Plan  Viral upper respiratory tract infection  2 weeks cough/ nasal congestion/ postnasal drip  No alarm symptoms  Flu COVID -  Most likely resolving viral infection  PLAN:  Supportive care  Use inhalers for COPD/  Orders:    POCT rapid flu A and B    POCT Rapid Covid Ag    loratadine (CLARITIN) 10 mg tablet; Take 1 tablet (10 mg total) by mouth daily    dextromethorphan-guaiFENesin (ROBITUSSIN DM)  mg/5 mL syrup; Take 5 mL by mouth 3 (three) times a day as needed for cough    acetaminophen (TYLENOL) 650 mg CR tablet; Take 1 tablet (650 mg total) by mouth every 8 (eight) hours as needed for mild pain    Chronic obstructive pulmonary disease, unspecified COPD type (HCC)  Not in acute exacerbation  Continue inhalers    Follow up as needed  Orders:    albuterol (PROVENTIL HFA,VENTOLIN HFA) 90 mcg/act inhaler; Inhale 1 puff every 6 (six) hours as needed for wheezing    tiotropium (Spiriva HandiHaler) 18 mcg inhalation capsule; Place 1 capsule (18 mcg total) into inhaler and inhale daily    Gastroesophageal reflux disease without esophagitis  Stable  Orders:    pantoprazole (PROTONIX) 40 mg tablet; Take 1 tablet (40 mg total) by mouth daily    Generalized abdominal pain  Chronic abdominal distension/ gas  Stable  Using bentyl PRN  Orders:    dicyclomine (BENTYL) 10 mg capsule; Take 1 capsule (10 mg total) by mouth 3 (three) times a day as needed (abdominal pain/discomfort/spasm)    Tobacco dependence syndrome  Tobacco cessation counseling done today  Patient refused due to way of coping for life stressors  Orders:    buPROPion (WELLBUTRIN SR) 150 mg 12 hr tablet; Take 1 tablet (150 mg total) by mouth 2 (two) times a day    Benign prostatic hyperplasia with urinary frequency  Stable  Continue  home meds  Orders:    tamsulosin (FLOMAX) 0.4 mg; Take 1 capsule (0.4 mg total) by mouth daily with dinner    finasteride (PROSCAR) 5 mg tablet; Take 1 tablet (5 mg total) by mouth daily    Left shoulder pain, unspecified chronicity  Chronic  3/10  Normal ROM  PLAN:  Warm compress  Voltaren gel PRN  Continue home meds for chronic pain  Orders:    gabapentin (NEURONTIN) 300 mg capsule; Take 1 capsule (300 mg total) by mouth 3 (three) times a day    Diclofenac Sodium (VOLTAREN) 1 %; Apply 2 g topically 4 (four) times a day           History of Present Illness   Patient is a 58 yo male presenting today to follow up on chronic conditions COPD, GERD,  tobacco dependence,  BPH, chronic pain, he c/o dry cough, postnasal drip and congestion for the last 2 weeks, patient recently moved to a new house, (when symptoms started), he agreed to clean and make sure no dust symptoms could be 2/2 allergies vs viral URI, we will treat with supportive care and antihistamines.  Patient also has PMH anxiety BPD he denied recent episodes of delilah or depression he found and office that takes his insurance after being left by [facility]IN has not been following up with psych he agreed to schedule an appointment.    We will follow up in 1 week for medicare annual wellness visit.      Review of Systems   Constitutional:  Negative for chills and fever.   HENT:  Positive for postnasal drip. Negative for ear pain and sore throat.    Eyes:  Negative for pain and visual disturbance.   Respiratory:  Positive for cough. Negative for shortness of breath.    Cardiovascular:  Negative for chest pain and palpitations.   Gastrointestinal:  Negative for abdominal pain and vomiting.   Genitourinary:  Negative for dysuria and hematuria.   Musculoskeletal:  Negative for arthralgias and back pain.   Skin:  Negative for color change and rash.   Neurological:  Negative for seizures and syncope.   Psychiatric/Behavioral:  Negative for self-injury, sleep disturbance and  "suicidal ideas.    All other systems reviewed and are negative.      Objective   /80 (BP Location: Left arm, Patient Position: Sitting, Cuff Size: Standard)   Pulse 70   Temp 97.6 °F (36.4 °C) (Temporal)   Resp 16   Ht 5' 6\" (1.676 m)   Wt 64.8 kg (142 lb 12.8 oz)   SpO2 98%   BMI 23.05 kg/m²      Physical Exam  Vitals and nursing note reviewed.   Constitutional:       General: He is not in acute distress.     Appearance: Normal appearance. He is well-developed.   HENT:      Head: Normocephalic and atraumatic.      Right Ear: Tympanic membrane, ear canal and external ear normal.      Left Ear: Tympanic membrane, ear canal and external ear normal.      Nose: Nose normal.      Mouth/Throat:      Mouth: Mucous membranes are moist.   Eyes:      Extraocular Movements: Extraocular movements intact.      Conjunctiva/sclera: Conjunctivae normal.      Pupils: Pupils are equal, round, and reactive to light.   Cardiovascular:      Rate and Rhythm: Normal rate and regular rhythm.      Pulses: Normal pulses.      Heart sounds: Normal heart sounds. No murmur heard.  Pulmonary:      Effort: Pulmonary effort is normal. No respiratory distress.      Breath sounds: Normal breath sounds.   Abdominal:      General: Bowel sounds are normal.      Palpations: Abdomen is soft.      Tenderness: There is no abdominal tenderness.   Musculoskeletal:         General: No swelling. Normal range of motion.      Cervical back: Normal range of motion and neck supple.   Skin:     General: Skin is warm and dry.      Capillary Refill: Capillary refill takes less than 2 seconds.   Neurological:      General: No focal deficit present.      Mental Status: He is alert and oriented to person, place, and time. Mental status is at baseline.   Psychiatric:         Mood and Affect: Mood normal.         Behavior: Behavior normal.         "

## 2025-01-24 NOTE — ASSESSMENT & PLAN NOTE
Tobacco cessation counseling done today  Patient refused due to way of coping for life stressors  Orders:    buPROPion (WELLBUTRIN SR) 150 mg 12 hr tablet; Take 1 tablet (150 mg total) by mouth 2 (two) times a day

## 2025-01-24 NOTE — ASSESSMENT & PLAN NOTE
Orders:    tiotropium (Spiriva HandiHaler) 18 mcg inhalation capsule; Place 1 capsule (18 mcg total) into inhaler and inhale daily

## 2025-02-18 ENCOUNTER — PATIENT OUTREACH (OUTPATIENT)
Dept: OTHER | Facility: OTHER | Age: 60
End: 2025-02-18

## 2025-02-18 NOTE — PROGRESS NOTES
Client requested a visit with this Community Health Resource Nurse (CHRN) during outreach hours at [facility] ([facility]). Client asked CHRN for verification of date and time of upcoming PCP appt. CHRN verified client has appt with Dr. Redding tomorrow 2/19/25 at 13:00.  Client reported he will be requesting refill for Clonidine. He shared he plans to participate in the [employer] walk on 5/31 which starts at 09:00 AM. CHRN provided supportive listening.Client pleasant in this encounter and verbalized appreciation for assistance. This CHRN remains available for emotional support as needed.

## 2025-02-19 ENCOUNTER — OFFICE VISIT (OUTPATIENT)
Dept: FAMILY MEDICINE CLINIC | Facility: CLINIC | Age: 60
End: 2025-02-19

## 2025-02-19 VITALS
DIASTOLIC BLOOD PRESSURE: 100 MMHG | BODY MASS INDEX: 22 KG/M2 | SYSTOLIC BLOOD PRESSURE: 146 MMHG | HEART RATE: 84 BPM | OXYGEN SATURATION: 97 % | HEIGHT: 66 IN | WEIGHT: 136.9 LBS | RESPIRATION RATE: 18 BRPM | TEMPERATURE: 98.4 F

## 2025-02-19 DIAGNOSIS — J06.9 VIRAL UPPER RESPIRATORY TRACT INFECTION: ICD-10-CM

## 2025-02-19 DIAGNOSIS — M25.512 LEFT SHOULDER PAIN, UNSPECIFIED CHRONICITY: Primary | ICD-10-CM

## 2025-02-19 DIAGNOSIS — I10 PRIMARY HYPERTENSION: ICD-10-CM

## 2025-02-19 DIAGNOSIS — F41.9 ANXIETY: ICD-10-CM

## 2025-02-19 PROCEDURE — 99213 OFFICE O/P EST LOW 20 MIN: CPT | Performed by: FAMILY MEDICINE

## 2025-02-19 PROCEDURE — G2211 COMPLEX E/M VISIT ADD ON: HCPCS | Performed by: FAMILY MEDICINE

## 2025-02-19 RX ORDER — BENZONATATE 100 MG/1
100 CAPSULE ORAL 3 TIMES DAILY PRN
Qty: 20 CAPSULE | Refills: 0 | Status: SHIPPED | OUTPATIENT
Start: 2025-02-19

## 2025-02-19 RX ORDER — AMLODIPINE BESYLATE 5 MG/1
5 TABLET ORAL DAILY
Qty: 30 TABLET | Refills: 0 | Status: SHIPPED | OUTPATIENT
Start: 2025-02-19 | End: 2025-03-21

## 2025-02-19 RX ORDER — CLONIDINE HYDROCHLORIDE 0.1 MG/1
0.1 TABLET ORAL EVERY 12 HOURS SCHEDULED
Qty: 180 TABLET | Refills: 0 | Status: SHIPPED | OUTPATIENT
Start: 2025-02-19 | End: 2025-05-20

## 2025-02-19 RX ORDER — GABAPENTIN 300 MG/1
300 CAPSULE ORAL 3 TIMES DAILY
Qty: 60 CAPSULE | Refills: 3 | Status: SHIPPED | OUTPATIENT
Start: 2025-02-19

## 2025-02-19 NOTE — PROGRESS NOTES
Name: Chau Lou III      : 1965      MRN: [id number]  Encounter Provider: Batsheva Redding MD  Encounter Date: 2025   Encounter department: [facility]  :  Assessment & Plan  Left shoulder pain, unspecified chronicity  Patient c/o chronic shoulder pain 5/10  No alarm symptoms  Xray: osteoarthritis   Differential diagnosis: rotator cuff injury , frozen shoulder, most likely osteoarthritis , diaphragm irritation from recent URI less likely  Empty Brigida mcqueen Hawkins -    PLAN:  Warm compress  Continue chronic pain management  Voltaren gel as needed      Orders:    gabapentin (NEURONTIN) 300 mg capsule; Take 1 capsule (300 mg total) by mouth 3 (three) times a day    Ambulatory referral to Spine & Pain Management; Future    Diclofenac Sodium (VOLTAREN) 1 %; Apply 2 g topically 4 (four) times a day    Anxiety  Stable  Patient needs psychiatrist to continue follow up  Trouble finding psychiatrist that accepts his insurance  GAD7 moderate anxiety  PLAN:  Psychiatry /mental health resources provided   Continue Catapres PRN  Continue wellbutrin  Continue buspar  Continue lexapro  Serotonin syndrome symptoms discussed ; unlikely taking meds chronically  Orders:    cloNIDine (CATAPRES) 0.1 mg tablet; Take 1 tablet (0.1 mg total) by mouth every 12 (twelve) hours    Viral upper respiratory tract infection  Recently had URI  Continues with cough     Requested tessalon perles   Orders:    benzonatate (TESSALON PERLES) 100 mg capsule; Take 1 capsule (100 mg total) by mouth 3 (three) times a day as needed for cough    Primary hypertension  Stable   Denies headaches, chest pain, visual disturbances    PLAN:  Low sodium diet  Cardiovascular exercise 150 min weekly  Orders:    amLODIPine (NORVASC) 5 mg tablet; Take 1 tablet (5 mg total) by mouth daily for 30 doses           History of Present Illness   Patient is a 60 yo male PMH COPD, smoker, anxiety , MDD, BPH presenting today  requesting re-fills on his medications for chronic pain, hypertension, anxiety. He recently had URI, requested tessalon perles for dry cough.    Patient has chronic left shoulder pain he requested additional medications for pain discussed might feel worse because cold weather, might have diaphragm irritation from recent URI (coughing) w radiation to shoulder, xray few years ago showed osteoarthrtitis, other shoulder pathology unlikely , he requested referral for pain management it was ordered.    We will follow up in 3 months for chronic conditions      Review of Systems   Constitutional:  Negative for chills and fever.   HENT:  Negative for ear pain and sore throat.    Eyes:  Negative for pain and visual disturbance.   Respiratory:  Positive for cough. Negative for shortness of breath.    Cardiovascular:  Negative for chest pain and palpitations.   Gastrointestinal:  Negative for abdominal pain and vomiting.   Genitourinary:  Negative for dysuria and hematuria.   Musculoskeletal:  Positive for arthralgias. Negative for back pain.        Shoulder pain    Skin:  Negative for color change and rash.   Neurological:  Negative for seizures and syncope.   Psychiatric/Behavioral:  Negative for self-injury, sleep disturbance and suicidal ideas. The patient is not nervous/anxious.    All other systems reviewed and are negative.      Objective   There were no vitals taken for this visit.     Physical Exam  Vitals and nursing note reviewed.   Constitutional:       General: He is not in acute distress.     Appearance: Normal appearance. He is well-developed.   HENT:      Head: Normocephalic and atraumatic.      Right Ear: Tympanic membrane, ear canal and external ear normal.      Left Ear: Tympanic membrane, ear canal and external ear normal.      Nose: Nose normal.      Mouth/Throat:      Mouth: Mucous membranes are moist.   Eyes:      Extraocular Movements: Extraocular movements intact.      Conjunctiva/sclera: Conjunctivae  normal.      Pupils: Pupils are equal, round, and reactive to light.   Cardiovascular:      Rate and Rhythm: Normal rate and regular rhythm.      Pulses: Normal pulses.      Heart sounds: Normal heart sounds. No murmur heard.  Pulmonary:      Effort: Pulmonary effort is normal. No respiratory distress.      Breath sounds: Normal breath sounds.      Comments: Lungs are clear  Abdominal:      General: Bowel sounds are normal.      Palpations: Abdomen is soft.      Tenderness: There is no abdominal tenderness.   Musculoskeletal:         General: No swelling or tenderness. Normal range of motion.      Cervical back: Normal range of motion and neck supple.      Comments: No tenderness on palpation  No limited ROM  Souza -  Neer -  Empty can test -   Skin:     General: Skin is warm and dry.      Capillary Refill: Capillary refill takes less than 2 seconds.   Neurological:      General: No focal deficit present.      Mental Status: He is alert and oriented to person, place, and time. Mental status is at baseline.   Psychiatric:         Mood and Affect: Mood normal.         Behavior: Behavior normal.

## 2025-02-20 PROBLEM — L23.7 POISON IVY: Status: RESOLVED | Noted: 2023-08-03 | Resolved: 2025-02-20

## 2025-02-20 NOTE — ASSESSMENT & PLAN NOTE
Stable  Patient needs psychiatrist to continue follow up  Trouble finding psychiatrist that accepts his insurance  GAD7 moderate anxiety  PLAN:  Psychiatry /mental health resources provided   Continue Catapres PRN  Continue wellbutrin  Continue buspar  Continue lexapro  Serotonin syndrome symptoms discussed ; unlikely taking meds chronically  Orders:    cloNIDine (CATAPRES) 0.1 mg tablet; Take 1 tablet (0.1 mg total) by mouth every 12 (twelve) hours

## 2025-02-20 NOTE — ASSESSMENT & PLAN NOTE
Patient c/o chronic shoulder pain 5/10  No alarm symptoms  Xray: osteoarthritis   Differential diagnosis: rotator cuff injury , frozen shoulder, most likely osteoarthritis , diaphragm irritation from recent URI less likely  Empty Brigida mcqueen Hawkins -    PLAN:  Warm compress  Continue chronic pain management  Voltaren gel as needed      Orders:    gabapentin (NEURONTIN) 300 mg capsule; Take 1 capsule (300 mg total) by mouth 3 (three) times a day    Ambulatory referral to Spine & Pain Management; Future    Diclofenac Sodium (VOLTAREN) 1 %; Apply 2 g topically 4 (four) times a day

## 2025-02-27 DIAGNOSIS — R10.84 GENERALIZED ABDOMINAL PAIN: ICD-10-CM

## 2025-02-27 DIAGNOSIS — I10 PRIMARY HYPERTENSION: ICD-10-CM

## 2025-02-27 RX ORDER — DICYCLOMINE HYDROCHLORIDE 10 MG/1
10 CAPSULE ORAL 3 TIMES DAILY PRN
Qty: 90 CAPSULE | Refills: 1 | Status: SHIPPED | OUTPATIENT
Start: 2025-02-27

## 2025-02-27 RX ORDER — AMLODIPINE BESYLATE 5 MG/1
5 TABLET ORAL DAILY
Qty: 30 TABLET | Refills: 0 | OUTPATIENT
Start: 2025-02-27 | End: 2025-03-29

## 2025-03-11 DIAGNOSIS — J44.9 CHRONIC OBSTRUCTIVE PULMONARY DISEASE, UNSPECIFIED COPD TYPE (HCC): ICD-10-CM

## 2025-03-11 RX ORDER — TIOTROPIUM BROMIDE 18 UG/1
18 CAPSULE ORAL; RESPIRATORY (INHALATION) DAILY
Qty: 30 CAPSULE | Refills: 0 | Status: SHIPPED | OUTPATIENT
Start: 2025-03-11

## 2025-03-26 DIAGNOSIS — J30.2 SEASONAL ALLERGIC RHINITIS, UNSPECIFIED TRIGGER: ICD-10-CM

## 2025-03-26 RX ORDER — FLUTICASONE PROPIONATE 50 MCG
1 SPRAY, SUSPENSION (ML) NASAL DAILY
Qty: 16 G | Refills: 3 | Status: SHIPPED | OUTPATIENT
Start: 2025-03-26

## 2025-03-31 DIAGNOSIS — J06.9 VIRAL UPPER RESPIRATORY TRACT INFECTION: ICD-10-CM

## 2025-03-31 DIAGNOSIS — M54.2 NECK PAIN ON LEFT SIDE: ICD-10-CM

## 2025-03-31 DIAGNOSIS — F17.200 TOBACCO DEPENDENCE SYNDROME: ICD-10-CM

## 2025-03-31 DIAGNOSIS — R09.82 POST-NASAL DRIP: ICD-10-CM

## 2025-03-31 DIAGNOSIS — I10 PRIMARY HYPERTENSION: ICD-10-CM

## 2025-03-31 DIAGNOSIS — F32.9 MAJOR DEPRESSIVE DISORDER WITH CURRENT ACTIVE EPISODE, UNSPECIFIED DEPRESSION EPISODE SEVERITY, UNSPECIFIED WHETHER RECURRENT: ICD-10-CM

## 2025-03-31 DIAGNOSIS — T30.0 BURN: ICD-10-CM

## 2025-03-31 DIAGNOSIS — N40.0 BENIGN PROSTATIC HYPERPLASIA WITHOUT LOWER URINARY TRACT SYMPTOMS: ICD-10-CM

## 2025-03-31 RX ORDER — SILVER SULFADIAZINE 10 MG/G
CREAM TOPICAL DAILY
Qty: 50 G | Refills: 0 | Status: SHIPPED | OUTPATIENT
Start: 2025-03-31

## 2025-04-03 RX ORDER — BUSPIRONE HYDROCHLORIDE 10 MG/1
10 TABLET ORAL 2 TIMES DAILY
Qty: 30 TABLET | Refills: 1 | Status: SHIPPED | OUTPATIENT
Start: 2025-04-03

## 2025-04-03 RX ORDER — BUPROPION HYDROCHLORIDE 150 MG/1
150 TABLET, EXTENDED RELEASE ORAL 2 TIMES DAILY
Qty: 60 TABLET | Refills: 1 | Status: SHIPPED | OUTPATIENT
Start: 2025-04-03

## 2025-04-03 RX ORDER — SENNOSIDES 8.6 MG
650 CAPSULE ORAL EVERY 8 HOURS PRN
Qty: 30 TABLET | Refills: 1 | Status: SHIPPED | OUTPATIENT
Start: 2025-04-03

## 2025-04-03 RX ORDER — METHOCARBAMOL 500 MG/1
500 TABLET, FILM COATED ORAL 4 TIMES DAILY
Qty: 30 TABLET | Refills: 1 | Status: SHIPPED | OUTPATIENT
Start: 2025-04-03

## 2025-04-03 RX ORDER — LORATADINE 10 MG/1
10 TABLET ORAL DAILY
Qty: 30 TABLET | Refills: 1 | Status: SHIPPED | OUTPATIENT
Start: 2025-04-03

## 2025-04-03 RX ORDER — AMLODIPINE BESYLATE 5 MG/1
5 TABLET ORAL DAILY
Qty: 30 TABLET | Refills: 1 | Status: SHIPPED | OUTPATIENT
Start: 2025-04-03 | End: 2025-06-02

## 2025-04-03 RX ORDER — ATORVASTATIN CALCIUM 10 MG/1
10 TABLET, FILM COATED ORAL DAILY
Qty: 90 TABLET | Refills: 1 | Status: SHIPPED | OUTPATIENT
Start: 2025-04-03

## 2025-04-03 RX ORDER — IPRATROPIUM BROMIDE 21 UG/1
2 SPRAY, METERED NASAL EVERY 12 HOURS
Qty: 30 ML | Refills: 1 | Status: SHIPPED | OUTPATIENT
Start: 2025-04-03

## 2025-04-07 DIAGNOSIS — R09.82 POST-NASAL DRIP: ICD-10-CM

## 2025-04-07 DIAGNOSIS — F17.200 TOBACCO DEPENDENCE SYNDROME: ICD-10-CM

## 2025-04-07 DIAGNOSIS — I10 PRIMARY HYPERTENSION: ICD-10-CM

## 2025-04-07 RX ORDER — IPRATROPIUM BROMIDE 21 UG/1
2 SPRAY, METERED NASAL EVERY 12 HOURS
Qty: 30 ML | Refills: 1 | OUTPATIENT
Start: 2025-04-07

## 2025-04-07 RX ORDER — BUPROPION HYDROCHLORIDE 150 MG/1
150 TABLET, EXTENDED RELEASE ORAL 2 TIMES DAILY
Qty: 60 TABLET | Refills: 1 | OUTPATIENT
Start: 2025-04-07

## 2025-04-07 RX ORDER — AMLODIPINE BESYLATE 5 MG/1
5 TABLET ORAL DAILY
Qty: 30 TABLET | Refills: 1 | OUTPATIENT
Start: 2025-04-07 | End: 2025-06-06

## 2025-04-08 DIAGNOSIS — T30.0 BURN: ICD-10-CM

## 2025-04-08 RX ORDER — SILVER SULFADIAZINE 10 MG/G
CREAM TOPICAL DAILY
Qty: 50 G | Refills: 0 | OUTPATIENT
Start: 2025-04-08

## 2025-04-11 DIAGNOSIS — M25.512 LEFT SHOULDER PAIN, UNSPECIFIED CHRONICITY: ICD-10-CM

## 2025-04-11 DIAGNOSIS — R10.84 GENERALIZED ABDOMINAL PAIN: ICD-10-CM

## 2025-04-11 RX ORDER — GABAPENTIN 300 MG/1
300 CAPSULE ORAL 3 TIMES DAILY
Qty: 60 CAPSULE | Refills: 3 | Status: SHIPPED | OUTPATIENT
Start: 2025-04-11

## 2025-04-11 RX ORDER — DICYCLOMINE HYDROCHLORIDE 10 MG/1
10 CAPSULE ORAL 3 TIMES DAILY PRN
Qty: 90 CAPSULE | Refills: 1 | Status: SHIPPED | OUTPATIENT
Start: 2025-04-11

## 2025-04-14 DIAGNOSIS — F32.9 MAJOR DEPRESSIVE DISORDER WITH CURRENT ACTIVE EPISODE, UNSPECIFIED DEPRESSION EPISODE SEVERITY, UNSPECIFIED WHETHER RECURRENT: ICD-10-CM

## 2025-04-15 RX ORDER — BUSPIRONE HYDROCHLORIDE 10 MG/1
10 TABLET ORAL 2 TIMES DAILY
Qty: 30 TABLET | Refills: 1 | OUTPATIENT
Start: 2025-04-15

## 2025-04-16 DIAGNOSIS — T30.0 BURN: ICD-10-CM

## 2025-04-17 RX ORDER — SILVER SULFADIAZINE 10 MG/G
CREAM TOPICAL DAILY
Qty: 50 G | Refills: 0 | OUTPATIENT
Start: 2025-04-17

## 2025-04-18 DIAGNOSIS — T30.0 BURN: ICD-10-CM

## 2025-04-18 RX ORDER — IBUPROFEN 600 MG/1
600 TABLET, FILM COATED ORAL EVERY 8 HOURS PRN
Qty: 30 TABLET | Refills: 0 | Status: SHIPPED | OUTPATIENT
Start: 2025-04-18

## 2025-04-19 DIAGNOSIS — J06.9 VIRAL UPPER RESPIRATORY TRACT INFECTION: ICD-10-CM

## 2025-04-21 RX ORDER — BENZONATATE 100 MG/1
100 CAPSULE ORAL 3 TIMES DAILY PRN
Qty: 20 CAPSULE | Refills: 0 | Status: SHIPPED | OUTPATIENT
Start: 2025-04-21

## 2025-04-22 DIAGNOSIS — J06.9 VIRAL UPPER RESPIRATORY TRACT INFECTION: ICD-10-CM

## 2025-04-22 RX ORDER — LORATADINE 10 MG/1
10 TABLET ORAL DAILY
Qty: 30 TABLET | Refills: 1 | Status: SHIPPED | OUTPATIENT
Start: 2025-04-22

## 2025-04-23 DIAGNOSIS — M54.2 NECK PAIN ON LEFT SIDE: ICD-10-CM

## 2025-04-23 DIAGNOSIS — T30.0 BURN: ICD-10-CM

## 2025-04-23 DIAGNOSIS — F32.9 MAJOR DEPRESSIVE DISORDER WITH CURRENT ACTIVE EPISODE, UNSPECIFIED DEPRESSION EPISODE SEVERITY, UNSPECIFIED WHETHER RECURRENT: ICD-10-CM

## 2025-04-24 DIAGNOSIS — T30.0 BURN: ICD-10-CM

## 2025-04-24 RX ORDER — IBUPROFEN 600 MG/1
600 TABLET, FILM COATED ORAL EVERY 8 HOURS PRN
Qty: 30 TABLET | Refills: 0 | OUTPATIENT
Start: 2025-04-24

## 2025-04-25 RX ORDER — METHOCARBAMOL 500 MG/1
500 TABLET, FILM COATED ORAL 4 TIMES DAILY
Qty: 30 TABLET | Refills: 1 | Status: SHIPPED | OUTPATIENT
Start: 2025-04-25

## 2025-04-25 RX ORDER — IBUPROFEN 600 MG/1
600 TABLET, FILM COATED ORAL EVERY 8 HOURS PRN
Qty: 30 TABLET | Refills: 0 | OUTPATIENT
Start: 2025-04-25

## 2025-04-25 RX ORDER — BUSPIRONE HYDROCHLORIDE 10 MG/1
10 TABLET ORAL 2 TIMES DAILY
Qty: 30 TABLET | Refills: 1 | Status: SHIPPED | OUTPATIENT
Start: 2025-04-25

## 2025-04-27 DIAGNOSIS — M54.2 NECK PAIN ON LEFT SIDE: ICD-10-CM

## 2025-04-27 DIAGNOSIS — F32.9 MAJOR DEPRESSIVE DISORDER WITH CURRENT ACTIVE EPISODE, UNSPECIFIED DEPRESSION EPISODE SEVERITY, UNSPECIFIED WHETHER RECURRENT: ICD-10-CM

## 2025-04-28 DIAGNOSIS — J06.9 VIRAL UPPER RESPIRATORY TRACT INFECTION: ICD-10-CM

## 2025-04-28 RX ORDER — BUSPIRONE HYDROCHLORIDE 10 MG/1
10 TABLET ORAL 2 TIMES DAILY
Qty: 30 TABLET | Refills: 1 | OUTPATIENT
Start: 2025-04-28

## 2025-04-28 RX ORDER — METHOCARBAMOL 500 MG/1
500 TABLET, FILM COATED ORAL 4 TIMES DAILY
Qty: 30 TABLET | Refills: 1 | OUTPATIENT
Start: 2025-04-28

## 2025-04-28 RX ORDER — SENNOSIDES 8.6 MG
650 CAPSULE ORAL EVERY 8 HOURS PRN
Qty: 30 TABLET | Refills: 1 | Status: SHIPPED | OUTPATIENT
Start: 2025-04-28

## 2025-04-29 DIAGNOSIS — R10.84 GENERALIZED ABDOMINAL PAIN: ICD-10-CM

## 2025-04-29 RX ORDER — DICYCLOMINE HYDROCHLORIDE 10 MG/1
10 CAPSULE ORAL 3 TIMES DAILY PRN
Qty: 90 CAPSULE | Refills: 1 | OUTPATIENT
Start: 2025-04-29

## 2025-05-01 DIAGNOSIS — R09.82 POST-NASAL DRIP: ICD-10-CM

## 2025-05-01 DIAGNOSIS — F17.200 TOBACCO DEPENDENCE SYNDROME: ICD-10-CM

## 2025-05-01 DIAGNOSIS — I10 PRIMARY HYPERTENSION: ICD-10-CM

## 2025-05-01 DIAGNOSIS — T30.0 BURN: ICD-10-CM

## 2025-05-01 RX ORDER — IBUPROFEN 600 MG/1
600 TABLET, FILM COATED ORAL EVERY 8 HOURS PRN
Qty: 30 TABLET | Refills: 0 | OUTPATIENT
Start: 2025-05-01

## 2025-05-05 RX ORDER — IPRATROPIUM BROMIDE 21 UG/1
2 SPRAY, METERED NASAL EVERY 12 HOURS
Qty: 30 ML | Refills: 1 | Status: SHIPPED | OUTPATIENT
Start: 2025-05-05

## 2025-05-05 RX ORDER — BUPROPION HYDROCHLORIDE 150 MG/1
150 TABLET, EXTENDED RELEASE ORAL 2 TIMES DAILY
Qty: 60 TABLET | Refills: 1 | Status: SHIPPED | OUTPATIENT
Start: 2025-05-05

## 2025-05-05 RX ORDER — AMLODIPINE BESYLATE 5 MG/1
5 TABLET ORAL DAILY
Qty: 30 TABLET | Refills: 1 | Status: SHIPPED | OUTPATIENT
Start: 2025-05-05 | End: 2025-07-04

## 2025-05-13 DIAGNOSIS — R35.0 BENIGN PROSTATIC HYPERPLASIA WITH URINARY FREQUENCY: ICD-10-CM

## 2025-05-13 DIAGNOSIS — N40.1 BENIGN PROSTATIC HYPERPLASIA WITH URINARY FREQUENCY: ICD-10-CM

## 2025-05-13 RX ORDER — FINASTERIDE 5 MG/1
5 TABLET, FILM COATED ORAL DAILY
Qty: 90 TABLET | Refills: 1 | Status: SHIPPED | OUTPATIENT
Start: 2025-05-13

## 2025-05-19 DIAGNOSIS — J44.9 CHRONIC OBSTRUCTIVE PULMONARY DISEASE, UNSPECIFIED COPD TYPE (HCC): ICD-10-CM

## 2025-05-19 DIAGNOSIS — R35.0 BENIGN PROSTATIC HYPERPLASIA WITH URINARY FREQUENCY: ICD-10-CM

## 2025-05-19 DIAGNOSIS — N40.1 BENIGN PROSTATIC HYPERPLASIA WITH URINARY FREQUENCY: ICD-10-CM

## 2025-05-20 RX ORDER — TAMSULOSIN HYDROCHLORIDE 0.4 MG/1
0.4 CAPSULE ORAL
Qty: 90 CAPSULE | Refills: 1 | Status: SHIPPED | OUTPATIENT
Start: 2025-05-20

## 2025-05-20 RX ORDER — FLUTICASONE PROPIONATE AND SALMETEROL 250; 50 UG/1; UG/1
1 POWDER RESPIRATORY (INHALATION) 2 TIMES DAILY
Qty: 60 BLISTER | Refills: 0 | Status: SHIPPED | OUTPATIENT
Start: 2025-05-20 | End: 2025-06-19

## 2025-06-04 ENCOUNTER — HOSPITAL ENCOUNTER (OUTPATIENT)
Dept: CT IMAGING | Facility: HOSPITAL | Age: 60
Discharge: HOME | End: 2025-06-04
Attending: INTERNAL MEDICINE

## 2025-06-04 DIAGNOSIS — F17.210 NICOTINE DEPENDENCE, CIGARETTES, UNCOMPLICATED: ICD-10-CM

## 2025-06-05 DIAGNOSIS — M54.2 NECK PAIN ON LEFT SIDE: ICD-10-CM

## 2025-06-05 DIAGNOSIS — N40.0 BENIGN PROSTATIC HYPERPLASIA WITHOUT LOWER URINARY TRACT SYMPTOMS: ICD-10-CM

## 2025-06-05 DIAGNOSIS — J44.9 CHRONIC OBSTRUCTIVE PULMONARY DISEASE, UNSPECIFIED COPD TYPE (HCC): ICD-10-CM

## 2025-06-05 DIAGNOSIS — R10.84 GENERALIZED ABDOMINAL PAIN: ICD-10-CM

## 2025-06-05 DIAGNOSIS — T30.0 BURN: ICD-10-CM

## 2025-06-05 DIAGNOSIS — F32.9 MAJOR DEPRESSIVE DISORDER WITH CURRENT ACTIVE EPISODE, UNSPECIFIED DEPRESSION EPISODE SEVERITY, UNSPECIFIED WHETHER RECURRENT: ICD-10-CM

## 2025-06-05 RX ORDER — DICYCLOMINE HYDROCHLORIDE 10 MG/1
10 CAPSULE ORAL 3 TIMES DAILY PRN
Qty: 90 CAPSULE | Refills: 1 | Status: SHIPPED | OUTPATIENT
Start: 2025-06-05

## 2025-06-05 RX ORDER — BUSPIRONE HYDROCHLORIDE 10 MG/1
10 TABLET ORAL 2 TIMES DAILY
Qty: 30 TABLET | Refills: 1 | Status: SHIPPED | OUTPATIENT
Start: 2025-06-05

## 2025-06-05 RX ORDER — TIOTROPIUM BROMIDE 18 UG/1
18 CAPSULE ORAL; RESPIRATORY (INHALATION) DAILY
Qty: 30 CAPSULE | Refills: 0 | Status: SHIPPED | OUTPATIENT
Start: 2025-06-05

## 2025-06-05 RX ORDER — IBUPROFEN 600 MG/1
600 TABLET, FILM COATED ORAL EVERY 8 HOURS PRN
Qty: 30 TABLET | Refills: 0 | OUTPATIENT
Start: 2025-06-05

## 2025-06-05 RX ORDER — METHOCARBAMOL 500 MG/1
500 TABLET, FILM COATED ORAL 4 TIMES DAILY
Qty: 30 TABLET | Refills: 1 | Status: SHIPPED | OUTPATIENT
Start: 2025-06-05

## 2025-06-05 RX ORDER — ATORVASTATIN CALCIUM 10 MG/1
10 TABLET, FILM COATED ORAL DAILY
Qty: 90 TABLET | Refills: 1 | Status: SHIPPED | OUTPATIENT
Start: 2025-06-05

## 2025-06-10 DIAGNOSIS — F32.9 MAJOR DEPRESSIVE DISORDER WITH CURRENT ACTIVE EPISODE, UNSPECIFIED DEPRESSION EPISODE SEVERITY, UNSPECIFIED WHETHER RECURRENT: ICD-10-CM

## 2025-06-10 DIAGNOSIS — M25.512 LEFT SHOULDER PAIN, UNSPECIFIED CHRONICITY: ICD-10-CM

## 2025-06-10 DIAGNOSIS — M54.2 NECK PAIN ON LEFT SIDE: ICD-10-CM

## 2025-06-10 RX ORDER — GABAPENTIN 300 MG/1
300 CAPSULE ORAL 3 TIMES DAILY
Qty: 60 CAPSULE | Refills: 3 | Status: SHIPPED | OUTPATIENT
Start: 2025-06-10

## 2025-06-10 RX ORDER — METHOCARBAMOL 500 MG/1
500 TABLET, FILM COATED ORAL 4 TIMES DAILY
Qty: 30 TABLET | Refills: 1 | OUTPATIENT
Start: 2025-06-10

## 2025-06-10 RX ORDER — BUSPIRONE HYDROCHLORIDE 10 MG/1
10 TABLET ORAL 2 TIMES DAILY
Qty: 30 TABLET | Refills: 1 | OUTPATIENT
Start: 2025-06-10

## 2025-06-14 ENCOUNTER — RESULTS FOLLOW-UP (OUTPATIENT)
Dept: PULMONOLOGY | Facility: CLINIC | Age: 60
End: 2025-06-14

## 2025-06-23 DIAGNOSIS — I10 PRIMARY HYPERTENSION: ICD-10-CM

## 2025-06-23 DIAGNOSIS — R09.82 POST-NASAL DRIP: ICD-10-CM

## 2025-06-23 DIAGNOSIS — J44.9 CHRONIC OBSTRUCTIVE PULMONARY DISEASE, UNSPECIFIED COPD TYPE (HCC): ICD-10-CM

## 2025-06-23 DIAGNOSIS — F17.200 TOBACCO DEPENDENCE SYNDROME: ICD-10-CM

## 2025-06-23 DIAGNOSIS — M54.2 NECK PAIN ON LEFT SIDE: ICD-10-CM

## 2025-06-25 RX ORDER — IPRATROPIUM BROMIDE 21 UG/1
2 SPRAY, METERED NASAL EVERY 12 HOURS
Qty: 30 ML | Refills: 1 | Status: SHIPPED | OUTPATIENT
Start: 2025-06-25

## 2025-06-25 RX ORDER — METHOCARBAMOL 500 MG/1
500 TABLET, FILM COATED ORAL 4 TIMES DAILY
Qty: 30 TABLET | Refills: 1 | Status: SHIPPED | OUTPATIENT
Start: 2025-06-25

## 2025-06-25 RX ORDER — TIOTROPIUM BROMIDE 18 UG/1
18 CAPSULE ORAL; RESPIRATORY (INHALATION) DAILY
Qty: 30 CAPSULE | Refills: 1 | Status: SHIPPED | OUTPATIENT
Start: 2025-06-25

## 2025-06-25 RX ORDER — BUPROPION HYDROCHLORIDE 150 MG/1
150 TABLET, EXTENDED RELEASE ORAL 2 TIMES DAILY
Qty: 60 TABLET | Refills: 1 | Status: SHIPPED | OUTPATIENT
Start: 2025-06-25

## 2025-06-25 RX ORDER — AMLODIPINE BESYLATE 5 MG/1
5 TABLET ORAL DAILY
Qty: 30 TABLET | Refills: 1 | Status: SHIPPED | OUTPATIENT
Start: 2025-06-25 | End: 2025-08-24

## 2025-06-28 DIAGNOSIS — J30.2 SEASONAL ALLERGIC RHINITIS, UNSPECIFIED TRIGGER: ICD-10-CM

## 2025-06-30 RX ORDER — FLUTICASONE PROPIONATE 50 MCG
1 SPRAY, SUSPENSION (ML) NASAL DAILY
Qty: 16 G | Refills: 3 | Status: SHIPPED | OUTPATIENT
Start: 2025-06-30

## 2025-07-08 DIAGNOSIS — J44.9 CHRONIC OBSTRUCTIVE PULMONARY DISEASE, UNSPECIFIED COPD TYPE (HCC): ICD-10-CM

## 2025-07-10 DIAGNOSIS — J44.9 CHRONIC OBSTRUCTIVE PULMONARY DISEASE, UNSPECIFIED COPD TYPE (HCC): ICD-10-CM

## 2025-07-10 RX ORDER — TIOTROPIUM BROMIDE 18 UG/1
18 CAPSULE ORAL; RESPIRATORY (INHALATION) DAILY
Qty: 30 CAPSULE | Refills: 1 | OUTPATIENT
Start: 2025-07-10

## 2025-07-10 RX ORDER — TIOTROPIUM BROMIDE 18 UG/1
18 CAPSULE ORAL; RESPIRATORY (INHALATION) DAILY
Qty: 30 CAPSULE | Refills: 1 | Status: SHIPPED | OUTPATIENT
Start: 2025-07-10

## 2025-07-11 DIAGNOSIS — F17.200 TOBACCO DEPENDENCE SYNDROME: ICD-10-CM

## 2025-07-11 DIAGNOSIS — I10 PRIMARY HYPERTENSION: ICD-10-CM

## 2025-07-11 DIAGNOSIS — R09.82 POST-NASAL DRIP: ICD-10-CM

## 2025-07-11 RX ORDER — BUPROPION HYDROCHLORIDE 150 MG/1
150 TABLET, EXTENDED RELEASE ORAL 2 TIMES DAILY
Qty: 60 TABLET | Refills: 1 | OUTPATIENT
Start: 2025-07-11

## 2025-07-11 RX ORDER — AMLODIPINE BESYLATE 5 MG/1
5 TABLET ORAL DAILY
Qty: 30 TABLET | Refills: 1 | OUTPATIENT
Start: 2025-07-11 | End: 2025-09-09

## 2025-07-11 RX ORDER — IPRATROPIUM BROMIDE 21 UG/1
2 SPRAY, METERED NASAL EVERY 12 HOURS
Qty: 30 ML | Refills: 1 | OUTPATIENT
Start: 2025-07-11

## 2025-07-19 DIAGNOSIS — K21.9 GASTROESOPHAGEAL REFLUX DISEASE WITHOUT ESOPHAGITIS: ICD-10-CM

## 2025-07-21 RX ORDER — PANTOPRAZOLE SODIUM 40 MG/1
40 TABLET, DELAYED RELEASE ORAL DAILY
Qty: 60 TABLET | Refills: 3 | Status: SHIPPED | OUTPATIENT
Start: 2025-07-21

## 2025-07-21 NOTE — PROGRESS NOTES
A user error has taken place: encounter opened in error, closed for administrative reasons.     Daily Note     Today's date: 2023  Patient name: Aron Delgadillo  : 1965  MRN: [id number]  Referring provider: Joseph Mcgowan  Dx:   Encounter Diagnosis     ICD-10-CM    1. Left shoulder pain, unspecified chronicity  M25.512       2. Pain of left hip  M25.552           Start Time: 07  Stop Time: 08  Total time in clinic (min): 47 minutes    Subjective: Pt reports he continues to have L sided neck and shoulder stiffness. His L hip continues to feel good with minimal issues. Objective: See treatment diary below      Assessment: Tolerated treatment well. Patient had to modify exercises due to continued deirdre taped finger following dislocation. Pt to follow up with orthopedics today for finger. Pt continues to display cervical hypomobility with ROM and mobilizations. Pt experienced less ttp with STM this session, though L levator scap is still very tender. Due to this, session was finished with MHP. Continue to progress pt as tolerated next visit. Plan: Continue per plan of care.       Precautions: GERD, hiatal hernia, COPD, anxiety, OA of neck, major depression, pain of L hip, pain of L shoulder      Manuals 6/19 6/22 6/29 7/6 7/14  6/1 6/9 6/12 6/15   AC mob             Update exercises             STM C/s erector spinae, UT, levator scap NS SOR/UT/levator scap NS SOR/UT/levator scap NS SOR/UT/levator scap NS SOR/UT/levator scap        C/s ROM  NS NS NS NS NS     NS   Lateral glides c/s  NS L to R gr III NS L to R gr III NS bilat gr III NS bilat gr III     NS/MM grade 3 bilat   Neuro Re-Ed 6/19 6/22 6/29 7/6 7/14  6/1 6/9 6/12 6/15   SL clams (HEP ptb) btb 15x ea side        btb 15x ea side    DNF   10x 5" hold 10x 5" hold 10x 5" hold        Chin tucks supine 15x 5" hold supine 2x10 5" hold supine 10x 5" hold supine 15x supine 15x        Cervical isometrics 10x SB ea direction 5" hold  10x SB/rot L hold          SL abduction (HEP)       2x10 2# 2x10 2# 2x15 ea 2#    scap retractions (HEP)             No monies (HEP ptb)  2x15 btb 2x15 btb held Gtb loop 2x15    2x15 gtb    bridges btb abduction 2x10            Rows/ext       2x15 rows/extensions 11# 2x15 rows/extensions 11#     Resisted ER/IR gtb 2x15 ea; btb nv      gtb 2x15 gtb 2x15     Prone I, T   15x ea I, T, Y 15x ea I, T 15x I, T, Y  2x15  1# x 15 ea     Ther Ex 6/19 6/22 6/29 7/6 7/14  6/1 6/9 6/12 6/15   Piriformis stretch (HEP)             UT shrugs 2x10 8# 2x10 9#           UBE 3'/3' 3'/3' 3'/3' held held  3'/3' 3'/3' 3'/3' 3'/3'   Calf stretch         2x1' lvl 2    UT stretch  3x30" ea  3x30" ea side          SA press       4# 2x10 4# 2x10     Lateral walks         gtb 5x at mirror    Stand shoulder abd & flexion        1# 2 x 10     HS curls             Supine hip flexion             HR DL         10x 10"    LP             Pt edu          NS   Ther Activity 6/19 6/22 6/29 7/6 7/14  6/1 6/9 6/12 6/15                             Gait Training 6/19 6/22 6/29 7/6 7/14  6/1 6/9 6/12 6/15                             Modalities 6/19 6/22 6/29 7/6 7/14  6/1 6/9 6/12 6/15   MHP 10' c/s  10' c/s  10' c/s

## 2025-08-07 DIAGNOSIS — J06.9 VIRAL UPPER RESPIRATORY TRACT INFECTION: ICD-10-CM

## 2025-08-07 DIAGNOSIS — F32.9 MAJOR DEPRESSIVE DISORDER WITH CURRENT ACTIVE EPISODE, UNSPECIFIED DEPRESSION EPISODE SEVERITY, UNSPECIFIED WHETHER RECURRENT: ICD-10-CM

## 2025-08-07 DIAGNOSIS — R10.84 GENERALIZED ABDOMINAL PAIN: ICD-10-CM

## 2025-08-07 RX ORDER — DICYCLOMINE HYDROCHLORIDE 10 MG/1
10 CAPSULE ORAL 3 TIMES DAILY PRN
Qty: 90 CAPSULE | Refills: 1 | Status: SHIPPED | OUTPATIENT
Start: 2025-08-07

## 2025-08-07 RX ORDER — LORATADINE 10 MG/1
10 TABLET ORAL DAILY
Qty: 30 TABLET | Refills: 1 | Status: SHIPPED | OUTPATIENT
Start: 2025-08-07

## 2025-08-07 RX ORDER — BUSPIRONE HYDROCHLORIDE 10 MG/1
10 TABLET ORAL 2 TIMES DAILY
Qty: 30 TABLET | Refills: 1 | Status: SHIPPED | OUTPATIENT
Start: 2025-08-07

## 2025-08-08 DIAGNOSIS — T30.0 BURN: ICD-10-CM

## 2025-08-08 RX ORDER — SILVER SULFADIAZINE 10 MG/G
CREAM TOPICAL DAILY
Qty: 50 G | Refills: 0 | Status: SHIPPED | OUTPATIENT
Start: 2025-08-08

## 2025-08-18 DIAGNOSIS — F32.9 MAJOR DEPRESSIVE DISORDER WITH CURRENT ACTIVE EPISODE, UNSPECIFIED DEPRESSION EPISODE SEVERITY, UNSPECIFIED WHETHER RECURRENT: ICD-10-CM

## 2025-08-18 RX ORDER — BUSPIRONE HYDROCHLORIDE 10 MG/1
10 TABLET ORAL 2 TIMES DAILY
Qty: 30 TABLET | Refills: 1 | OUTPATIENT
Start: 2025-08-18

## 2025-08-19 DIAGNOSIS — I10 PRIMARY HYPERTENSION: ICD-10-CM

## 2025-08-19 DIAGNOSIS — R09.82 POST-NASAL DRIP: ICD-10-CM

## 2025-08-19 DIAGNOSIS — F17.200 TOBACCO DEPENDENCE SYNDROME: ICD-10-CM

## 2025-08-21 RX ORDER — AMLODIPINE BESYLATE 5 MG/1
5 TABLET ORAL DAILY
Qty: 30 TABLET | Refills: 1 | Status: SHIPPED | OUTPATIENT
Start: 2025-08-21 | End: 2025-10-20

## 2025-08-21 RX ORDER — IPRATROPIUM BROMIDE 21 UG/1
2 SPRAY, METERED NASAL EVERY 12 HOURS
Qty: 30 ML | Refills: 1 | Status: SHIPPED | OUTPATIENT
Start: 2025-08-21

## 2025-08-21 RX ORDER — BUPROPION HYDROCHLORIDE 150 MG/1
150 TABLET, EXTENDED RELEASE ORAL 2 TIMES DAILY
Qty: 60 TABLET | Refills: 1 | Status: SHIPPED | OUTPATIENT
Start: 2025-08-21

## (undated) DEVICE — Device: Brand: DEFENDO AIR/WATER/SUCTION AND BIOPSY VALVE

## (undated) DEVICE — GLOVE SRG BIOGEL 7.5

## (undated) DEVICE — METZENBAUM ADTEC SINGLE USE DISSECTING SCISSORS, SHAFT ONLY, MONOPOLAR, CURVED TO LEFT, WORKING LENGTH: 12 1/4", (310 MM), DIAM. 5 MM, INSULATED, DOUBLE ACTION, STERILE, DISPOSABLE, PACKAGE OF 10 PIECES: Brand: AESCULAP

## (undated) DEVICE — PACK PBDS LAP CHOLE RF

## (undated) DEVICE — HARMONIC 1100 SHEARS, 36CM SHAFT LENGTH: Brand: HARMONIC

## (undated) DEVICE — 40595 XL TRENDELENBURG POSITIONING KIT: Brand: 40595 XL TRENDELENBURG POSITIONING KIT

## (undated) DEVICE — TUBING SMOKE EVAC W/FILTRATION DEVICE PLUMEPORT ACTIV

## (undated) DEVICE — PENROSE DRAIN, 18 X 3 8: Brand: CARDINAL HEALTH

## (undated) DEVICE — INSUFFLATION NEEDLE TO ESTABLISH PNEUMOPERITONEUM.: Brand: INSUFFLATION NEEDLE

## (undated) DEVICE — VISUALIZATION SYSTEM: Brand: CLEARIFY

## (undated) DEVICE — INTENDED FOR TISSUE SEPARATION, AND OTHER PROCEDURES THAT REQUIRE A SHARP SURGICAL BLADE TO PUNCTURE OR CUT.: Brand: BARD-PARKER SAFETY BLADES SIZE 11, STERILE

## (undated) DEVICE — FIRST STEP BEDSIDE KIT - STAND-UP POUCH, ENDOSCOPIC CLEANING PAD - 1 POUCH: Brand: FIRST STEP BEDSIDE KIT - STAND-UP POUCH, ENDOSCOPIC CLEANING PAD

## (undated) DEVICE — TUBING SUCTION 5MM X 12 FT

## (undated) DEVICE — Device: Brand: OMNICLOSE TROCAR SITE CLOSURE DEVICE

## (undated) DEVICE — GLOVE SRG BIOGEL ECLIPSE 7.5

## (undated) DEVICE — TROCAR: Brand: KII FIOS FIRST ENTRY

## (undated) DEVICE — SUT VICRYL PLUS 0 UR-6 27IN VCP603H

## (undated) DEVICE — 5 MM CURVED DISSECTORS WITH MONOPOLAR CAUTERY: Brand: ENDOPATH

## (undated) DEVICE — TRAY FOLEY 16FR URIMETER SURESTEP

## (undated) DEVICE — AIR AND WATER TUBING/CAP SET FOR OLYMPUS® SCOPES: Brand: ERBE

## (undated) DEVICE — SUT ETHIBOND 2-0 SH/SH 36 IN X523H

## (undated) DEVICE — TROCAR: Brand: KII SLEEVE

## (undated) DEVICE — INVIEW CLEAR LEGGINGS: Brand: CONVERTORS

## (undated) DEVICE — DRAPE LAPAROTOMY W/POUCHES

## (undated) DEVICE — TRAVELKIT CONTAINS FIRST STEP KIT (200ML EP-4 KIT) AND SOILED SCOPE BAG - 1 KIT: Brand: TRAVELKIT CONTAINS FIRST STEP KIT AND SOILED SCOPE BAG

## (undated) DEVICE — SUT ETHIBOND 0 SH/SH 36 IN X524H

## (undated) DEVICE — 3M™ STERI-STRIP™ REINFORCED ADHESIVE SKIN CLOSURES, R1547, 1/2 IN X 4 IN (12 MM X 100 MM), 6 STRIPS/ENVELOPE: Brand: 3M™ STERI-STRIP™

## (undated) DEVICE — CHLORAPREP HI-LITE 26ML ORANGE

## (undated) DEVICE — ADHESIVE SKIN HIGH VISCOSITY EXOFIN 1ML

## (undated) DEVICE — SUT MONOCRYL 4-0 PS-2 18 IN Y496G